# Patient Record
Sex: MALE | Race: BLACK OR AFRICAN AMERICAN | Employment: OTHER | URBAN - METROPOLITAN AREA
[De-identification: names, ages, dates, MRNs, and addresses within clinical notes are randomized per-mention and may not be internally consistent; named-entity substitution may affect disease eponyms.]

---

## 2018-07-09 ENCOUNTER — OFFICE VISIT (OUTPATIENT)
Dept: SURGERY | Age: 83
End: 2018-07-09

## 2018-07-09 VITALS
OXYGEN SATURATION: 96 % | BODY MASS INDEX: 23.4 KG/M2 | HEIGHT: 66 IN | WEIGHT: 145.6 LBS | HEART RATE: 62 BPM | DIASTOLIC BLOOD PRESSURE: 64 MMHG | RESPIRATION RATE: 16 BRPM | SYSTOLIC BLOOD PRESSURE: 109 MMHG

## 2018-07-09 DIAGNOSIS — L98.8 DRAINING CUTANEOUS SINUS TRACT: Primary | ICD-10-CM

## 2018-07-09 NOTE — PROGRESS NOTES
To: Bradley Celaya MD  From: Norma Ochoa MD    Thank you for sending Andra Marquez to see us. 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 No spreading soft tissue infection. Body mass index is 23.5 kg/(m^2). Plan:   I recommended excision of the tract int he OR. Risks including bleeding and infection were explained to the patient. The patient expressed understanding of the risks, and all questions were answered to the patient's satisfaction. HPI:   Vasu Christopher is a 80 y.o. male who is seen in consultation at the request of Bradley Celaya MD for evaluation of an abscess on the left flank. Says it started draining a few weeks ago. Says it started from his \"birth ngozi. \"  Has always had darker pigment there and always remember a bump there. Had not previously had drainage. Was started on Bactrim today. Denies fevers, pain. Past Medical History:   Diagnosis Date    Hypertension      Past Surgical History:   Procedure Laterality Date    HX ORTHOPAEDIC  2008    hip replacement/[facility]/Dr Alivia Perez      Family History   Problem Relation Age of Onset    Cancer Daughter      Breast ca - stage 1     Social History   Substance Use Topics    Smoking status: Never Smoker    Smokeless tobacco: Never Used    Alcohol use No      Current Outpatient Prescriptions   Medication Sig    sulfamethoxazole/trimethoprim (BACTRIM PO) Take  by mouth.  OTHER Takes prescription arthritis pain medication    diazepam (VALIUM) 5 mg tablet Take 1 Tab by mouth three (3) times daily as needed for Anxiety (spasm). No current facility-administered medications for this visit. Allergies:  No Known Allergies    Review of Systems:  10 systems reviewed. See scanned sheet in \"Media\" section. See HPI for pertinent positives and negatives.       Objective:     Visit Vitals    /64 (BP 1 Location: Left arm, BP Patient Position: Sitting)    Pulse 62    Resp 16    Ht 5' 6\" (1.676 m)    Wt 66 kg (145 lb 9.6 oz)    SpO2 96%    BMI 23.5 kg/m2       Physical Exam:  General appearance  Alert, cooperative, no distress, appears stated age   [de-identified] Anicteric           Lungs   Clear to auscultation bilaterally   Heart  Regular rate and rhythm. No murmur, rub or gallop   Abdomen   Soft, non-tender. Bowel sounds normal.    Extremities no cyanosis or edema   Pulses 2+ right radial       Lymph nodes No palpable axillary or groin LAD. Neurologic Without overt sensory or motor deficit     Focused exam of the left falnk reveals a area of darker pigment with a raised firm area ~3x10cm with a draining sinus. Non-tender. No erythema. On US, goes down to fascia.       Signed By: Monica Doty MD     July 9, 2018

## 2018-07-09 NOTE — MR AVS SNAPSHOT
[address], [address] 
[phone] Patient: Sandra Anthony MRN: [id number] [facility]:0/07/9224 Visit Information Date & Time Provider Department Dept. Phone Encounter #  
 7/9/2018  3:00 PM Connor Duran MD [facility] [id number] Upcoming Health Maintenance Date Due DTaP/Tdap/Td series (1 - Tdap) 6/13/1954 ZOSTER VACCINE AGE 60> 4/13/1993 GLAUCOMA SCREENING Q2Y 6/13/1998 Pneumococcal 65+ Low/Medium Risk (1 of 2 - PCV13) 6/13/1998 Influenza Age 5 to Adult 8/1/2018 Allergies as of 7/9/2018  Review Complete On: 7/9/2018 By: Connor Duran MD  
 No Known Allergies Current Immunizations  Never Reviewed No immunizations on file. Not reviewed this visit You Were Diagnosed With   
  
 Codes Comments Draining cutaneous sinus tract    -  Primary ICD-10-CM: L98.8 ICD-9-CM: 169. 9 Vitals BP Pulse Resp Height(growth percentile) Weight(growth percentile) SpO2  
 109/64 (BP 1 Location: Left arm, BP Patient Position: Sitting) 62 16 5' 6\" (1.676 m) 145 lb 9.6 oz (66 kg) 96% BMI Smoking Status 23.5 kg/m2 Never Smoker Vitals History BMI and BSA Data Body Mass Index Body Surface Area  
 23.5 kg/m 2 1.75 m 2 Your Updated Medication List  
  
   
This list is accurate as of 7/9/18  4:17 PM.  Always use your most recent med list.  
  
  
  
  
 BACTRIM PO Take  by mouth. diazePAM 5 mg tablet Commonly known as:  VALIUM Take 1 Tab by mouth three (3) times daily as needed for Anxiety (spasm). OTHER Takes prescription arthritis pain medication To-Do List   
 07/10/2018 4:00 PM  
  Appointment with [facility] PAT ROOM P2 at [place] [address] ([phone]) Introducing hospitals & HEALTH SERVICES! [facility] introduces [facility] patient portal. Now you can access parts of your medical record, email your doctor's office, and request medication refills online. 1. In your internet browser, go to [email or web address] 2. Click on the First Time User? Click Here link in the Sign In box. You will see the New Member Sign Up page. 3. Enter your [facility] Access Code exactly as it appears below. You will not need to use this code after youve completed the sign-up process. If you do not sign up before the expiration date, you must request a new code. · [facility] [employer] Access Code: [email or web address] Expires: 10/7/2018  2:23 PM 
 
4. Enter the last four digits of your Social Security Number (xxxx) and Date of Birth (mm/dd/yyyy) as indicated and click Submit. You will be taken to the next sign-up page. 5. Create a [facility] ID. This will be your [facility] login ID and cannot be changed, so think of one that is secure and easy to remember. 6. Create a [facility] password. You can change your password at any time. 7. Enter your Password Reset Question and Answer. This can be used at a later time if you forget your password. 8. Enter your e-mail address. You will receive e-mail notification when new information is available in [address] 9. Click Sign Up. You can now view and download portions of your medical record. 10. Click the Download Summary menu link to download a portable copy of your medical information. If you have questions, please visit the Frequently Asked Questions section of the [employer] website. Remember, [facility] is NOT to be used for urgent needs. For medical emergencies, dial 911. Now available from your iPhone and Android! Please provide this summary of care documentation to your next provider. Your primary care clinician is listed as Nidia Arrington. If you have any questions after today's visit, please call [phone].

## 2018-07-10 ENCOUNTER — HOSPITAL ENCOUNTER (OUTPATIENT)
Dept: PREADMISSION TESTING | Age: 83
Discharge: HOME | End: 2018-07-10
Payer: MEDICARE

## 2018-07-10 VITALS
OXYGEN SATURATION: 95 % | BODY MASS INDEX: 20.52 KG/M2 | HEIGHT: 71 IN | DIASTOLIC BLOOD PRESSURE: 59 MMHG | RESPIRATION RATE: 16 BRPM | SYSTOLIC BLOOD PRESSURE: 124 MMHG | HEART RATE: 84 BPM | WEIGHT: 146.61 LBS | TEMPERATURE: 98.3 F

## 2018-07-10 LAB
ANION GAP SERPL CALC-SCNC: 9 MMOL/L (ref 5–15)
BUN SERPL-MCNC: 24 MG/DL (ref 6–20)
BUN/CREAT SERPL: 13 (ref 12–20)
CALCIUM SERPL-MCNC: 8.8 MG/DL (ref 8.5–10.1)
CHLORIDE SERPL-SCNC: 104 MMOL/L (ref 97–108)
CO2 SERPL-SCNC: 25 MMOL/L (ref 21–32)
CREAT SERPL-MCNC: 1.78 MG/DL (ref 0.7–1.3)
ERYTHROCYTE [DISTWIDTH] IN BLOOD BY AUTOMATED COUNT: 12.8 % (ref 11.5–14.5)
GLUCOSE SERPL-MCNC: 100 MG/DL (ref 65–100)
HCT VFR BLD AUTO: 39 % (ref 36.6–50.3)
HGB BLD-MCNC: 12.8 G/DL (ref 12.1–17)
MCH RBC QN AUTO: 31 PG (ref 26–34)
MCHC RBC AUTO-ENTMCNC: 32.8 G/DL (ref 30–36.5)
MCV RBC AUTO: 94.4 FL (ref 80–99)
NRBC # BLD: 0 K/UL (ref 0–0.01)
NRBC BLD-RTO: 0 PER 100 WBC
PLATELET # BLD AUTO: 231 K/UL (ref 150–400)
PMV BLD AUTO: 10 FL (ref 8.9–12.9)
POTASSIUM SERPL-SCNC: 3.9 MMOL/L (ref 3.5–5.1)
RBC # BLD AUTO: 4.13 M/UL (ref 4.1–5.7)
SODIUM SERPL-SCNC: 138 MMOL/L (ref 136–145)
WBC # BLD AUTO: 7.7 K/UL (ref 4.1–11.1)

## 2018-07-10 PROCEDURE — 36415 COLL VENOUS BLD VENIPUNCTURE: CPT | Performed by: SURGERY

## 2018-07-10 PROCEDURE — 80048 BASIC METABOLIC PNL TOTAL CA: CPT | Performed by: SURGERY

## 2018-07-10 PROCEDURE — 93005 ELECTROCARDIOGRAM TRACING: CPT

## 2018-07-10 PROCEDURE — 85027 COMPLETE CBC AUTOMATED: CPT | Performed by: SURGERY

## 2018-07-10 RX ORDER — BISMUTH SUBSALICYLATE 262 MG
1 TABLET,CHEWABLE ORAL DAILY
COMMUNITY
End: 2021-09-22

## 2018-07-10 RX ORDER — SULFAMETHOXAZOLE AND TRIMETHOPRIM 800; 160 MG/1; MG/1
1 TABLET ORAL 2 TIMES DAILY
COMMUNITY
End: 2018-07-31 | Stop reason: ALTCHOICE

## 2018-07-10 RX ORDER — TAMSULOSIN HYDROCHLORIDE 0.4 MG/1
0.8 CAPSULE ORAL DAILY
COMMUNITY
End: 2021-09-22

## 2018-07-10 RX ORDER — AMLODIPINE AND OLMESARTAN MEDOXOMIL 10; 20 MG/1; MG/1
1 TABLET ORAL DAILY
COMMUNITY
End: 2021-09-30

## 2018-07-10 RX ORDER — ATENOLOL AND CHLORTHALIDONE TABLET 50; 25 MG/1; MG/1
1 TABLET ORAL DAILY
COMMUNITY

## 2018-07-10 NOTE — ADVANCED PRACTICE NURSE
CMP results from PAT assessment faxed to PCP for review and further recommendations prior to surgery.

## 2018-07-10 NOTE — PERIOP NOTES
[facility]  Preoperative Instructions        Surgery Date 07/12/2018          Time of Arrival 1100 am Contact # heather cerda [phone]    1. On the day of your surgery, please report to the Surgical Services Registration Desk and sign in at your designated time. The Surgery Center is located to the right of the Emergency Room. 2. You must have someone with you to drive you home. You should not drive a car for 24 hours following surgery. Please make arrangements for a friend or family member to stay with you for the first 24 hours after your surgery. 3. Do not have anything to eat or drink (including water, gum, mints, coffee, juice) after midnight ?? .? This may not apply to medications prescribed by your physician. ?(Please note below the special instructions with medications to take the morning of your procedure.)    4. We recommend you do not drink any alcoholic beverages for 24 hours before and after your surgery. 5. Contact your surgeons office for instructions on the following medications: non-steroidal anti-inflammatory drugs (i.e. Advil, Aleve), vitamins, and supplements. (Some surgeons will want you to stop these medications prior to surgery and others may allow you to take them)  **If you are currently taking Plavix, Coumadin, Aspirin and/or other blood-thinning agents, contact your surgeon for instructions. ** Your surgeon will partner with the physician prescribing these medications to determine if it is safe to stop or if you need to continue taking. Please do not stop taking these medications without instructions from your surgeon    6. Wear comfortable clothes. Wear glasses instead of contacts. Do not bring any money or jewelry. Please bring picture ID, insurance card, and any prearranged co-payment or hospital payment. Do not wear make-up, particularly mascara the morning of your surgery.   Do not wear nail polish, particularly if you are having foot /hand surgery. Wear your hair loose or down, no ponytails, buns, elkin pins or clips. All body piercings must be removed. Please shower with antibacterial soap for three consecutive days before and on the morning of surgery, but do not apply any lotions, powders or deodorants after the shower on the day of surgery. Please use a fresh towels after each shower. Please sleep in clean clothes and change bed linens the night before surgery. Please do not shave for 48 hours prior to surgery. Shaving of the face is acceptable. 7. You should understand that if you do not follow these instructions your surgery may be cancelled. If your physical condition changes (I.e. fever, cold or flu) please contact your surgeon as soon as possible. 8. It is important that you be on time. If a situation occurs where you may be late, please call [phone] (OR Holding Area). 9. If you have any questions and or problems, please call [phone] (Pre-admission Testing). 10. Your surgery time may be subject to change. You will receive a phone call the evening prior if your time changes. 11.  If having outpatient surgery, you must have someone to drive you here, stay with you during the duration of your stay, and to drive you home at time of discharge. 12.   In an effort to improve the efficiency, privacy, and safety for all of our Pre-op patients visitors are not allowed in the Holding area. Once you arrive and are registered your family/visitors will be asked to remain in the waiting room. The Pre-op staff will get you from the Surgical Waiting Area and will explain to you and your family/visitors that the Pre-op phase is beginning. The staff will answer any questions and provide instructions for tracking of the patient, by use of the existing tracking number and color-coded status board in the waiting room.   At this time the staff will also ask for your designated spokesperson information in the event that the physician or staff need to provide an update or obtain any pertinent information. The designated spokesperson will be notified if the physician needs to speak to family during the pre-operative phase. If at any time your family/visitors has questions or concerns they may approach the volunteer desk in the waiting area for assistance. Special Instructions:    MEDICATIONS TO TAKE THE MORNING OF SURGERY WITH A SIP OF WATER:valium if needed, flomax       I understand a pre-operative phone call will be made to verify my surgery time. In the event that I am not available, I give permission for a message to be left on my answering service and/or with another person?   yes         ___________________      __________   _________    (Signature of Patient)             (Witness)                (Date and Time)

## 2018-07-10 NOTE — ADVANCED PRACTICE NURSE
WILIAN score fo 4 in PAT assessment. Pt denies snoring loud enough to be heard through a closed door, ever having been advised that he has pauses in breathing while sleeping or ever having been referred for a sleep apnea evaluation. Denies prior complications from anesthesia. Denies decreased cervical ROM. Denies loose teeth, dentures or partial plates (almost all teeth are missing).

## 2018-07-11 LAB
ATRIAL RATE: 81 BPM
CALCULATED P AXIS, ECG09: 72 DEGREES
CALCULATED R AXIS, ECG10: 4 DEGREES
CALCULATED T AXIS, ECG11: 8 DEGREES
DIAGNOSIS, 93000: NORMAL
P-R INTERVAL, ECG05: 152 MS
Q-T INTERVAL, ECG07: 376 MS
QRS DURATION, ECG06: 84 MS
QTC CALCULATION (BEZET), ECG08: 436 MS
VENTRICULAR RATE, ECG03: 81 BPM

## 2018-07-12 ENCOUNTER — ANESTHESIA EVENT (OUTPATIENT)
Dept: SURGERY | Age: 83
End: 2018-07-12
Payer: MEDICARE

## 2018-07-12 ENCOUNTER — HOSPITAL ENCOUNTER (OUTPATIENT)
Age: 83
Setting detail: OUTPATIENT SURGERY
Discharge: HOME | End: 2018-07-12
Attending: SURGERY | Admitting: SURGERY
Payer: MEDICARE

## 2018-07-12 ENCOUNTER — ANESTHESIA (OUTPATIENT)
Dept: SURGERY | Age: 83
End: 2018-07-12
Payer: MEDICARE

## 2018-07-12 VITALS
RESPIRATION RATE: 20 BRPM | BODY MASS INDEX: 20.03 KG/M2 | OXYGEN SATURATION: 99 % | HEIGHT: 71 IN | DIASTOLIC BLOOD PRESSURE: 71 MMHG | WEIGHT: 143.08 LBS | TEMPERATURE: 97.9 F | SYSTOLIC BLOOD PRESSURE: 117 MMHG | HEART RATE: 89 BPM

## 2018-07-12 DIAGNOSIS — L98.8 DRAINING CUTANEOUS SINUS TRACT: Primary | ICD-10-CM

## 2018-07-12 PROCEDURE — 74011000250 HC RX REV CODE- 250: Performed by: SURGERY

## 2018-07-12 PROCEDURE — 77030032490 HC SLV COMPR SCD KNE COVD -B: Performed by: SURGERY

## 2018-07-12 PROCEDURE — 88305 TISSUE EXAM BY PATHOLOGIST: CPT | Performed by: SURGERY

## 2018-07-12 PROCEDURE — 77030019895 HC PCKNG STRP IODO -A: Performed by: SURGERY

## 2018-07-12 PROCEDURE — 74011250637 HC RX REV CODE- 250/637

## 2018-07-12 PROCEDURE — 74011250636 HC RX REV CODE- 250/636: Performed by: SURGERY

## 2018-07-12 PROCEDURE — 74011250636 HC RX REV CODE- 250/636

## 2018-07-12 PROCEDURE — 74011250636 HC RX REV CODE- 250/636: Performed by: ANESTHESIOLOGY

## 2018-07-12 PROCEDURE — 87077 CULTURE AEROBIC IDENTIFY: CPT | Performed by: SURGERY

## 2018-07-12 PROCEDURE — 76010000138 HC OR TIME 0.5 TO 1 HR: Performed by: SURGERY

## 2018-07-12 PROCEDURE — 87185 SC STD ENZYME DETCJ PER NZM: CPT | Performed by: SURGERY

## 2018-07-12 PROCEDURE — 77030011640 HC PAD GRND REM COVD -A: Performed by: SURGERY

## 2018-07-12 PROCEDURE — 76210000016 HC OR PH I REC 1 TO 1.5 HR: Performed by: SURGERY

## 2018-07-12 PROCEDURE — 87075 CULTR BACTERIA EXCEPT BLOOD: CPT | Performed by: SURGERY

## 2018-07-12 PROCEDURE — 74011000272 HC RX REV CODE- 272: Performed by: SURGERY

## 2018-07-12 PROCEDURE — 74011000250 HC RX REV CODE- 250: Performed by: ANESTHESIOLOGY

## 2018-07-12 PROCEDURE — 87186 SC STD MICRODIL/AGAR DIL: CPT | Performed by: SURGERY

## 2018-07-12 PROCEDURE — 76060000032 HC ANESTHESIA 0.5 TO 1 HR: Performed by: SURGERY

## 2018-07-12 PROCEDURE — 87205 SMEAR GRAM STAIN: CPT | Performed by: SURGERY

## 2018-07-12 PROCEDURE — 76210000020 HC REC RM PH II FIRST 0.5 HR: Performed by: SURGERY

## 2018-07-12 PROCEDURE — 77030020782 HC GWN BAIR PAWS FLX 3M -B

## 2018-07-12 PROCEDURE — 77030002966 HC SUT PDS J&J -A: Performed by: SURGERY

## 2018-07-12 RX ORDER — CIPROFLOXACIN 500 MG/1
500 TABLET ORAL 2 TIMES DAILY
Qty: 20 TAB | Refills: 0 | Status: SHIPPED | OUTPATIENT
Start: 2018-07-12 | End: 2018-07-22

## 2018-07-12 RX ORDER — HYDROMORPHONE HYDROCHLORIDE 1 MG/ML
.2-.5 INJECTION, SOLUTION INTRAMUSCULAR; INTRAVENOUS; SUBCUTANEOUS
Status: DISCONTINUED | OUTPATIENT
Start: 2018-07-12 | End: 2018-07-12 | Stop reason: HOSPADM

## 2018-07-12 RX ORDER — SODIUM CHLORIDE, SODIUM LACTATE, POTASSIUM CHLORIDE, CALCIUM CHLORIDE 600; 310; 30; 20 MG/100ML; MG/100ML; MG/100ML; MG/100ML
25 INJECTION, SOLUTION INTRAVENOUS CONTINUOUS
Status: DISCONTINUED | OUTPATIENT
Start: 2018-07-12 | End: 2018-07-12 | Stop reason: HOSPADM

## 2018-07-12 RX ORDER — FENTANYL CITRATE 50 UG/ML
25 INJECTION, SOLUTION INTRAMUSCULAR; INTRAVENOUS
Status: DISCONTINUED | OUTPATIENT
Start: 2018-07-12 | End: 2018-07-12 | Stop reason: HOSPADM

## 2018-07-12 RX ORDER — MIDAZOLAM HYDROCHLORIDE 1 MG/ML
0.5 INJECTION, SOLUTION INTRAMUSCULAR; INTRAVENOUS
Status: DISCONTINUED | OUTPATIENT
Start: 2018-07-12 | End: 2018-07-12 | Stop reason: HOSPADM

## 2018-07-12 RX ORDER — SODIUM CHLORIDE 0.9 % (FLUSH) 0.9 %
5-10 SYRINGE (ML) INJECTION AS NEEDED
Status: DISCONTINUED | OUTPATIENT
Start: 2018-07-12 | End: 2018-07-12 | Stop reason: HOSPADM

## 2018-07-12 RX ORDER — MORPHINE SULFATE 10 MG/ML
2 INJECTION, SOLUTION INTRAMUSCULAR; INTRAVENOUS
Status: DISCONTINUED | OUTPATIENT
Start: 2018-07-12 | End: 2018-07-12 | Stop reason: HOSPADM

## 2018-07-12 RX ORDER — HYDROCODONE BITARTRATE AND ACETAMINOPHEN 5; 325 MG/1; MG/1
1-2 TABLET ORAL
Qty: 30 TAB | Refills: 0 | Status: SHIPPED | OUTPATIENT
Start: 2018-07-12 | End: 2021-09-22

## 2018-07-12 RX ORDER — OXYCODONE AND ACETAMINOPHEN 5; 325 MG/1; MG/1
1 TABLET ORAL ONCE
Status: COMPLETED | OUTPATIENT
Start: 2018-07-12 | End: 2018-07-12

## 2018-07-12 RX ORDER — OXYCODONE AND ACETAMINOPHEN 5; 325 MG/1; MG/1
TABLET ORAL
Status: COMPLETED
Start: 2018-07-12 | End: 2018-07-12

## 2018-07-12 RX ORDER — FENTANYL CITRATE 50 UG/ML
INJECTION, SOLUTION INTRAMUSCULAR; INTRAVENOUS AS NEEDED
Status: DISCONTINUED | OUTPATIENT
Start: 2018-07-12 | End: 2018-07-12 | Stop reason: HOSPADM

## 2018-07-12 RX ORDER — PROPOFOL 10 MG/ML
INJECTION, EMULSION INTRAVENOUS
Status: DISCONTINUED | OUTPATIENT
Start: 2018-07-12 | End: 2018-07-12 | Stop reason: HOSPADM

## 2018-07-12 RX ORDER — CEFAZOLIN SODIUM/WATER 2 G/20 ML
2 SYRINGE (ML) INTRAVENOUS ONCE
Status: COMPLETED | OUTPATIENT
Start: 2018-07-12 | End: 2018-07-12

## 2018-07-12 RX ORDER — DIPHENHYDRAMINE HYDROCHLORIDE 50 MG/ML
12.5 INJECTION, SOLUTION INTRAMUSCULAR; INTRAVENOUS AS NEEDED
Status: DISCONTINUED | OUTPATIENT
Start: 2018-07-12 | End: 2018-07-12 | Stop reason: HOSPADM

## 2018-07-12 RX ORDER — ONDANSETRON 2 MG/ML
4 INJECTION INTRAMUSCULAR; INTRAVENOUS AS NEEDED
Status: DISCONTINUED | OUTPATIENT
Start: 2018-07-12 | End: 2018-07-12 | Stop reason: HOSPADM

## 2018-07-12 RX ORDER — PHENYLEPHRINE HCL IN 0.9% NACL 0.4MG/10ML
SYRINGE (ML) INTRAVENOUS AS NEEDED
Status: DISCONTINUED | OUTPATIENT
Start: 2018-07-12 | End: 2018-07-12 | Stop reason: HOSPADM

## 2018-07-12 RX ORDER — METOPROLOL TARTRATE 5 MG/5ML
1 INJECTION INTRAVENOUS ONCE
Status: COMPLETED | OUTPATIENT
Start: 2018-07-12 | End: 2018-07-12

## 2018-07-12 RX ORDER — LIDOCAINE HYDROCHLORIDE 10 MG/ML
0.1 INJECTION, SOLUTION EPIDURAL; INFILTRATION; INTRACAUDAL; PERINEURAL AS NEEDED
Status: DISCONTINUED | OUTPATIENT
Start: 2018-07-12 | End: 2018-07-12 | Stop reason: HOSPADM

## 2018-07-12 RX ORDER — FENTANYL CITRATE 50 UG/ML
50 INJECTION, SOLUTION INTRAMUSCULAR; INTRAVENOUS AS NEEDED
Status: DISCONTINUED | OUTPATIENT
Start: 2018-07-12 | End: 2018-07-12 | Stop reason: HOSPADM

## 2018-07-12 RX ADMIN — Medication 2 G: at 15:23

## 2018-07-12 RX ADMIN — FENTANYL CITRATE 50 MCG: 50 INJECTION, SOLUTION INTRAMUSCULAR; INTRAVENOUS at 15:20

## 2018-07-12 RX ADMIN — SODIUM CHLORIDE, SODIUM LACTATE, POTASSIUM CHLORIDE, AND CALCIUM CHLORIDE 25 ML/HR: 600; 310; 30; 20 INJECTION, SOLUTION INTRAVENOUS at 12:01

## 2018-07-12 RX ADMIN — Medication 80 MCG: at 15:29

## 2018-07-12 RX ADMIN — METOPROLOL TARTRATE 1 MG: 5 INJECTION, SOLUTION INTRAVENOUS at 12:06

## 2018-07-12 RX ADMIN — OXYCODONE AND ACETAMINOPHEN 1 TABLET: 5; 325 TABLET ORAL at 17:00

## 2018-07-12 RX ADMIN — FENTANYL CITRATE 50 MCG: 50 INJECTION, SOLUTION INTRAMUSCULAR; INTRAVENOUS at 15:23

## 2018-07-12 RX ADMIN — PROPOFOL 75 MCG/KG/MIN: 10 INJECTION, EMULSION INTRAVENOUS at 15:16

## 2018-07-12 RX ADMIN — OXYCODONE HYDROCHLORIDE AND ACETAMINOPHEN 1 TABLET: 5; 325 TABLET ORAL at 17:00

## 2018-07-12 NOTE — PERIOP NOTES
Family updated attempted at 36 by Renae Alamo. The patient's wife had gone to the cafeteria, per staff at the surgical waiting area. Will attempt again later.

## 2018-07-12 NOTE — IP AVS SNAPSHOT
[address] [facility] 
[phone] Patient: Michelle Corral MRN: [id number] QIR:[id number][birth year]04[id number] About your hospitalization You were admitted on:  July 12, 2018 You last received care in the:  [facility] PACU You were discharged on:  July 12, 2018 Why you were hospitalized Your primary diagnosis was:  Draining Cutaneous Sinus Tract Follow-up Information Follow up With Details Comments Contact Info Rodger Sosa MD   [address] [address] [address] [place] [id number] [phone] Discharge Orders None A check ngozi indicates which time of day the medication should be taken. My Medications START taking these medications Instructions Each Dose to Equal  
 Morning Noon Evening Bedtime  
 ciprofloxacin HCl 500 mg tablet Commonly known as:  CIPRO Your last dose was: Your next dose is: Take 1 Tab by mouth two (2) times a day for 10 days. 500 mg HYDROcodone-acetaminophen 5-325 mg per tablet Commonly known as:  Nataliia Mura Your last dose was: Your next dose is: Take 1-2 Tabs by mouth every six (6) hours as needed for Pain. Max Daily Amount: 8 Tabs. 1-2 Tab CONTINUE taking these medications Instructions Each Dose to Equal  
 Morning Noon Evening Bedtime  
 amLODIPine-Olmesartan 10-20 mg Tab Your last dose was: Your next dose is: Take 1 Tab by mouth daily. 1 Tab  
    
   
   
   
  
 atenolol-chlorthalidone 50-25 mg per tablet Commonly known as:  Tj Seth Your last dose was: Your next dose is: Take 1 Tab by mouth daily. 1 Tab BACTRIM -800 mg per tablet Generic drug:  trimethoprim-sulfamethoxazole Your last dose was: Your next dose is: Take 1 Tab by mouth two (2) times a day. 1 Tab  
    
   
   
   
  
 diazePAM 5 mg tablet Commonly known as:  VALIUM Your last dose was: Your next dose is: Take 1 Tab by mouth three (3) times daily as needed for Anxiety (spasm). 5 mg FLOMAX 0.4 mg capsule Generic drug:  tamsulosin Your last dose was: Your next dose is: Take 0.4 mg by mouth daily. 0.4 mg  
    
   
   
   
  
 Gluc-MSM-Jeff 2-Hyal-Antiar 3 375-375-20 mg Tab Your last dose was: Your next dose is: Take 1 Tab by mouth daily. 1 Tab  
    
   
   
   
  
 multivitamin tablet Commonly known as:  ONE A DAY Your last dose was: Your next dose is: Take 1 Tab by mouth daily. 1 Tab Where to Get Your Medications These medications were sent to [facility]/pharmacy #[id number] - [facility], [address] Darrius Glass [facility]  [address] [address] Phone:  [phone]  
  ciprofloxacin HCl 500 mg tablet Information on where to get these meds will be given to you by the nurse or doctor. ! Ask your nurse or doctor about these medications HYDROcodone-acetaminophen 5-325 mg per tablet Opioid Education Prescription Opioids: What You Need to Know: 
 
Prescription opioids can be used to help relieve moderate-to-severe pain and are often prescribed following a surgery or injury, or for certain health conditions. These medications can be an important part of treatment but also come with serious risks. Opioids are strong pain medicines. Examples include hydrocodone, oxycodone, fentanyl, and morphine. Heroin is an example of an illegal opioid. It is important to work with your health care provider to make sure you are getting the safest, most effective care. WHAT ARE THE RISKS AND SIDE EFFECTS OF OPIOID USE? Prescription opioids carry serious risks of addiction and overdose, especially with prolonged use. An opioid overdose, often marked by slow breathing, can cause sudden death. The use of prescription opioids can have a number of side effects as well, even when taken as directed. · Tolerance-meaning you might need to take more of a medication for the same pain relief · Physical dependence-meaning you have symptoms of withdrawal when the medication is stopped. Withdrawal symptoms can include nausea, sweating, chills, diarrhea, stomach cramps, and muscle aches. Withdrawal can last up to several weeks, depending on which drug you took and how long you took it. · Increased sensitivity to pain · Constipation · Nausea, vomiting, and dry mouth · Sleepiness and dizziness · Confusion · Depression · Low levels of testosterone that can result in lower sex drive, energy, and strength · Itching and sweating RISKS ARE GREATER WITH:      
· History of drug misuse, substance use disorder, or overdose · Mental health conditions (such as depression or anxiety) · Sleep apnea · Older age (72 years or older) · Pregnancy Avoid alcohol while taking prescription opioids. Also, unless specifically advised by your health care provider, medications to avoid include: · Benzodiazepines (such as Xanax or Valium) · Muscle relaxants (such as Soma or Flexeril) · Hypnotics (such as Ambien or Lunesta) · Other prescription opioids KNOW YOUR OPTIONS Talk to your health care provider about ways to manage your pain that don't involve prescription opioids. Some of these options may actually work better and have fewer risks and side effects. Options may include: 
· Pain relievers such as acetaminophen, ibuprofen, and naproxen · Some medications that are also used for depression or seizures · Physical therapy and exercise · Counseling to help patients learn how to cope better with triggers of pain and stress. · Application of heat or cold compress · Massage therapy · Relaxation techniques Be Informed Make sure you know the name of your medication, how much and how often to take it, and its potential risks & side effects. IF YOU ARE PRESCRIBED OPIOIDS FOR PAIN: 
· Never take opioids in greater amounts or more often than prescribed. Remember the goal is not to be pain-free but to manage your pain at a tolerable level. · Follow up with your primary care provider to: · Work together to create a plan on how to manage your pain. · Talk about ways to help manage your pain that don't involve prescription opioids. · Talk about any and all concerns and side effects. · Help prevent misuse and abuse. · Never sell or share prescription opioids · Help prevent misuse and abuse. · Store prescription opioids in a secure place and out of reach of others (this may include visitors, children, friends, and family). · Safely dispose of unused/unwanted prescription opioids: Find your community drug take-back program or your pharmacy mail-back program, or flush them down the toilet, following guidance from the Food and Drug Administration (www.fda.gov/Drugs/ResourcesForYou). · Visit www.cdc.gov/drugoverdose to learn about the risks of opioid abuse and overdose. · If you believe you may be struggling with addiction, tell your health care provider and ask for guidance or call [address] [place] [address] Drive at [phone]. Discharge Instructions Discharge Instructions:  Dr. Shanelle Brewer Call on next business day to arrange appointment for follow up this coming Monday. [phone]. Activity: 
Walk regularly starting immediately. No lifting more than 10 pounds. You may resume driving when off narcotics for pain. Diet: 
You may resume normal diet. Wound Care: On Saturday, remove the outer dressing but not the ribbon gauze inside the wound.  Rinse off in the shower then blot dry and put on new gauze. Do this again on Sunday and come to the office on Monday. Medications: 
Resume home medications as indicated on the Medical Reconciliation form. Do not use blood thinners (such as Aspirin, Coumadin, or Plavix) until 3 days after surgery. PUT ICE ON YOUR INCISION 20 MINUTES EVERY HOUR THROUGH THE WEEKEND. PLACE A CLOTH BETWEEN YOUR SKIN THE THE ICE BAG. Colace or Miralax should be used twice daily to prevent constipation while on narcotics. If you are still having trouble having a BM after 1-2 days, try milk of magnesia. If this does not work within 24 hours, try a bottle of magnesium citrate. Narcotics and anesthesia sometimes cause nausea and vomiting. If persistent please call the office. Do not hesitate to call with questions or concerns. Angel Ulloa MD 
Tel [phone] Fax [phone] Narcotic-Analgesic/Acetaminophen (Percocet, Norco, Lorcet HD, Lortab 10/325) - (By mouth) Why this medicine is used:  
Relieves pain. Contact a nurse or doctor right away if you have: 
· Extreme weakness, shallow breathing, slow heartbeat · Severe confusion, lightheadedness, dizziness, fainting · Yellow skin or eyes, dark urine or pale stools · Severe constipation, severe stomach pain, nausea, vomiting, loss of appetite · Sweating or cold, clammy skin Common side effects: · Mild constipation, nausea, vomiting · Sleepiness, tiredness · Itching, rash © 2017 [facility] Information is for End User's use only and may not be sold, redistributed or otherwise used for commercial purposes. DISCHARGE SUMMARY from Nurse PATIENT INSTRUCTIONS: 
 
After general anesthesia or intravenous sedation, for 24 hours or while taking prescription Narcotics: · Limit your activities · Do not drive and operate hazardous machinery · Do not make important personal or business decisions · Do  not drink alcoholic beverages · If you have not urinated within 8 hours after discharge, please contact your surgeon on call. Report the following to your surgeon: 
· Excessive pain, swelling, redness or odor of or around the surgical area · Temperature over 100.5 · Nausea and vomiting lasting longer than 4 hours or if unable to take medications · Any signs of decreased circulation or nerve impairment to extremity: change in color, persistent  numbness, tingling, coldness or increase pain · Any questions Obesity, smoking, and sedentary lifestyle greatly increases your risk for illness A healthy diet, regular physical exercise & weight monitoring are important for maintaining a healthy lifestyle You may be retaining fluid if you have a history of heart failure or if you experience any of the following symptoms:  Weight gain of 3 pounds or more overnight or 5 pounds in a week, increased swelling in our hands or feet or shortness of breath while lying flat in bed. Please call your doctor as soon as you notice any of these symptoms; do not wait until your next office visit. Recognize signs and symptoms of STROKE: 
 
F-face looks uneven A-arms unable to move or move unevenly S-speech slurred or non-existent T-time-call 911 as soon as signs and symptoms begin-DO NOT go Back to bed or wait to see if you get better-TIME IS BRAIN. Warning Signs of HEART ATTACK Call 911 if you have these symptoms: 
? Chest discomfort. Most heart attacks involve discomfort in the center of the chest that lasts more than a few minutes, or that goes away and comes back. It can feel like uncomfortable pressure, squeezing, fullness, or pain. ? Discomfort in other areas of the upper body. Symptoms can include pain or discomfort in one or both arms, the back, neck, jaw, or stomach. ? Shortness of breath with or without chest discomfort. ? Other signs may include breaking out in a cold sweat, nausea, or lightheadedness. Don't wait more than five minutes to call [address]! Fast action can save your life. Calling 911 is almost always the fastest way to get lifesaving treatment. Emergency Medical Services staff can begin treatment when they arrive  up to an hour sooner than if someone gets to the hospital by car. The discharge information has been reviewed with the {PATIENT PARENT GUARDIAN:98041}. The {PATIENT PARENT GUARDIAN:06673} verbalized understanding. Discharge medications reviewed with the {Dishcarge meds reviewed BOZR:90545} and appropriate educational materials and side effects teaching were provided. ___________________________________________________________________________________________________________________________________ Introducing [facility]! Lidia Servin introduces Casetext patient portal. Now you can access parts of your medical record, email your doctor's office, and request medication refills online. 1. In your internet browser, go to [email or web address] 2. Click on the First Time User? Click Here link in the Sign In box. You will see the New Member Sign Up page. 3. Enter your Casetext Access Code exactly as it appears below. You will not need to use this code after youve completed the sign-up process. If you do not sign up before the expiration date, you must request a new code. · Casetext Access Code: [email or web address] Expires: 10/7/2018  2:23 PM 
 
4. Enter the last four digits of your Social Security Number (xxxx) and Date of Birth (mm/dd/yyyy) as indicated and click Submit. You will be taken to the next sign-up page. 5. Create a Casetext ID. This will be your MyChart login ID and cannot be changed, so think of one that is secure and easy to remember. 6. Create a [employer] password. You can change your password at any time. 7. Enter your Password Reset Question and Answer. This can be used at a later time if you forget your password. 8. Enter your e-mail address. You will receive e-mail notification when new information is available in [address] 9. Click Sign Up. You can now view and download portions of your medical record. 10. Click the Download Summary menu link to download a portable copy of your medical information. If you have questions, please visit the Frequently Asked Questions section of the [employer] website. Remember, [employer] is NOT to be used for urgent needs. For medical emergencies, dial 911. Now available from your iPhone and Android! Introducing Luisito Ellis As a [employer] [facility] patient, I wanted to make you aware of our electronic visit tool called Luisito Ellis. [employer] S[employer] allows you to connect within minutes with a medical provider 24 hours a day, seven days a week via a mobile device or tablet or logging into a secure website from your computer. You can access Luisito Ellis from anywhere in the [place]. A virtual visit might be right for you when you have a simple condition and feel like you just dont want to get out of bed, or cant get away from work for an appointment, when your regular Phillip Neal Cyrba [facility] provider is not available (evenings, weekends or holidays), or when youre out of town and need minor care. Electronic visits cost only $49 and if the [employer] provider determines a prescription is needed to treat your condition, one can be electronically transmitted to a nearby pharmacy*. Please take a moment to enroll today if you have not already done so. The enrollment process is free and takes just a few minutes. To enroll, please download the [employer] se to your tablet or phone, or visit [email or web address] to enroll on your computer.    
And, as an [address] [place] [address] patient with a [employer] account, the results of your visits will be scanned into your electronic medical record and your primary care provider will be able to view the scanned results. We urge you to continue to see your regular Kishore Carbo provider for your ongoing medical care. And while your primary care provider may not be the one available when you seek a Luisito Sextonfin virtual visit, the peace of mind you get from getting a real diagnosis real time can be priceless. For more information on [employer], view our Frequently Asked Questions (FAQs) at [email or web address]. Sincerely, 
 
Kimberlee Yarbrough MD 
Chief Medical Officer Analy Candelaria *:  certain medications cannot be prescribed via [employer] Unresulted Labs-Please follow up with your PCP about these lab tests Order Current Status CULTURE, ANAEROBIC In process CULTURE, WOUND W GRAM STAIN In process Providers Seen During Your Hospitalization Provider Specialty Primary office phone Justin Chew MD General Surgery [phone] Your Primary Care Physician (PCP) Primary Care Physician Office Phone Office Fax [address] [phone] You are allergic to the following No active allergies Recent Documentation Height Weight BMI Smoking Status 1.803 m 64.9 kg 19.96 kg/m2 Never Smoker Emergency Contacts Name Discharge Info Relation Home Work Mobile Ashly Flores  Spouse [3] [phone] Carissa Tirado DISCHARGE CAREGIVER [3] Child [2] [phone] Anna Tirado DISCHARGE CAREGIVER [3] Child [2] [phone] Patient Belongings The following personal items are in your possession at time of discharge: 
  Dental Appliances: None  Visual Aid: Glasses      Home Medications: None   Jewelry: None  Clothing: Pants, Undergarments, Shirt, Footwear, Socks    Other Valuables: Eyeglasses  Personal Items Sent to Safe: declined Please provide this summary of care documentation to your next provider. Signatures-by signing, you are acknowledging that this After Visit Summary has been reviewed with you and you have received a copy. Patient Signature:  ____________________________________________________________ Date:  ____________________________________________________________  
  
[facility] Signature:  ____________________________________________________________ Date:  ____________________________________________________________

## 2018-07-12 NOTE — H&P (VIEW-ONLY)
To: Penny Choudhury MD  From: India Alcazar MD    Thank you for sending Stacie Aldana to see us. 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 No spreading soft tissue infection. Body mass index is 23.5 kg/(m^2). Plan:   I recommended excision of the tract int he OR. Risks including bleeding and infection were explained to the patient. The patient expressed understanding of the risks, and all questions were answered to the patient's satisfaction. HPI:   Christopher Cespedes is a 80 y.o. male who is seen in consultation at the request of Penny Choudhury MD for evaluation of an abscess on the left flank. Says it started draining a few weeks ago. Says it started from his \"birth ngozi. \"  Has always had darker pigment there and always remember a bump there. Had not previously had drainage. Was started on Bactrim today. Denies fevers, pain. Past Medical History:   Diagnosis Date    Hypertension      Past Surgical History:   Procedure Laterality Date    HX ORTHOPAEDIC  2008    hip replacement/[facility]/Dr Marianne Chao      Family History   Problem Relation Age of Onset    Cancer Daughter      Breast ca - stage 1     Social History   Substance Use Topics    Smoking status: Never Smoker    Smokeless tobacco: Never Used    Alcohol use No      Current Outpatient Prescriptions   Medication Sig    sulfamethoxazole/trimethoprim (BACTRIM PO) Take  by mouth.  OTHER Takes prescription arthritis pain medication    diazepam (VALIUM) 5 mg tablet Take 1 Tab by mouth three (3) times daily as needed for Anxiety (spasm). No current facility-administered medications for this visit. Allergies:  No Known Allergies    Review of Systems:  10 systems reviewed. See scanned sheet in \"Media\" section. See HPI for pertinent positives and negatives.       Objective:     Visit Vitals    /64 (BP 1 Location: Left arm, BP Patient Position: Sitting)    Pulse 62    Resp 16    Ht 5' 6\" (1.676 m)    Wt 66 kg (145 lb 9.6 oz)    SpO2 96%    BMI 23.5 kg/m2       Physical Exam:  General appearance  Alert, cooperative, no distress, appears stated age   [de-identified] Anicteric           Lungs   Clear to auscultation bilaterally   Heart  Regular rate and rhythm. No murmur, rub or gallop   Abdomen   Soft, non-tender. Bowel sounds normal.    Extremities no cyanosis or edema   Pulses 2+ right radial       Lymph nodes No palpable axillary or groin LAD. Neurologic Without overt sensory or motor deficit     Focused exam of the left falnk reveals a area of darker pigment with a raised firm area ~3x10cm with a draining sinus. Non-tender. No erythema. On US, goes down to fascia.       Signed By: Shelley Zuluaga MD     July 9, 2018

## 2018-07-12 NOTE — PERIOP NOTES
Patient discharged to home. Discharge teaching conducted at the bedside with the patient and his daughter, Franklin Jones. Discharge teaching included follow up appointments, diet and activity recommendations, basic wound care, and medication education. Medication education provided on Cipro and Percocet and included dosage, timing, safety, and potential side effects. The patient and his daughter were asked by this nurse if they had any questions or concerns regarding discharge teaching or post operative care, as they were in a hurry to leave the hospital. The patient's daughter stated to this nurse \"We're ok. We know what to do. My niece is in her last year of nursing school so we're good. \"  This nurse asked the patient once more if he was having any pain, nausea, or dizziness; the patient denied having any of these. The patient's IV was removed; minimal blood loss noted. The patient was transported to the surgical services entrance by wheelchair and got into the car with his daughter.

## 2018-07-12 NOTE — ANESTHESIA POSTPROCEDURE EVALUATION
Post-Anesthesia Evaluation and Assessment    Patient: Alessandro Jones MRN: [id number]  SSN: [id number]    YOB: 1933  Age: 80 y.o. Sex: male       Cardiovascular Function/Vital Signs  Visit Vitals    /71 (BP 1 Location: Left arm, BP Patient Position: At rest)    Pulse 94    Temp 36.8 °C (98.3 °F)    Resp 22    Ht 5' 11\" (1.803 m)    Wt 64.9 kg (143 lb 1.3 oz)    SpO2 98%    BMI 19.96 kg/m2       Patient is status post MAC anesthesia for Procedure(s):  EXCISION HYDRADENITIS LEFT FLANK. Nausea/Vomiting: None    Postoperative hydration reviewed and adequate. Pain:  Pain Scale 1: Numeric (0 - 10) (07/12/18 1700)  Pain Intensity 1: 0 (07/12/18 1700)   Managed    Neurological Status:   Neuro (WDL): Exceptions to WDL (07/12/18 1605)  Neuro  Neurologic State: Alert (07/12/18 1605)  Orientation Level: Oriented X4 (07/12/18 1605)  Cognition: Follows commands (07/12/18 1605)  Speech: Clear;Delayed responses (07/12/18 1605)  LUE Motor Response: Purposeful;Weak (07/12/18 1605)  LLE Motor Response: Purposeful;Weak (07/12/18 1605)  RUE Motor Response: Purposeful;Weak (07/12/18 1605)  RLE Motor Response: Purposeful;Weak (07/12/18 1605)   At baseline    Mental Status and Level of Consciousness: Arousable    Pulmonary Status:   O2 Device: Room air (07/12/18 1700)   Adequate oxygenation and airway patent    Complications related to anesthesia: None    Post-anesthesia assessment completed.  No concerns    Signed By: Tyson Lion MD     July 12, 2018

## 2018-07-12 NOTE — PERIOP NOTES
The patient will be discharged with a prescription for Percocet, 5-325 mg. Per Dr. Jaydon Schmidt, the patient can have one Percocet prior to discharge. Order placed in [facility] and acknowledged.

## 2018-07-12 NOTE — DISCHARGE INSTRUCTIONS
Discharge Instructions:  Dr. Galloway Payment    Call on next business day to arrange appointment for follow up this coming Monday. [phone]. Activity:  Walk regularly starting immediately. No lifting more than 10 pounds. You may resume driving when off narcotics for pain. Diet:  You may resume normal diet. Wound Care: On Saturday, remove the outer dressing but not the ribbon gauze inside the wound. Rinse off in the shower then blot dry and put on new gauze. Do this again on Sunday and come to the office on Monday. Medications:  Resume home medications as indicated on the Medical Reconciliation form. Do not use blood thinners (such as Aspirin, Coumadin, or Plavix) until 3 days after surgery. PUT ICE ON YOUR INCISION 20 MINUTES EVERY HOUR THROUGH THE WEEKEND. PLACE A CLOTH BETWEEN YOUR SKIN THE THE ICE BAG. Colace or Miralax should be used twice daily to prevent constipation while on narcotics. If you are still having trouble having a BM after 1-2 days, try milk of magnesia. If this does not work within 24 hours, try a bottle of magnesium citrate. Narcotics and anesthesia sometimes cause nausea and vomiting. If persistent please call the office. Do not hesitate to call with questions or concerns. Karli Nails MD  Tel [phone]  Fax [phone]     Narcotic-Analgesic/Acetaminophen ([facility] [address], [address] [facility], [facility], Lortab 10/325) - (By mouth)   Why this medicine is used:   Relieves pain.   Contact a nurse or doctor right away if you have:  · Extreme weakness, shallow breathing, slow heartbeat  · Severe confusion, lightheadedness, dizziness, fainting  · Yellow skin or eyes, dark urine or pale stools  · Severe constipation, severe stomach pain, nausea, vomiting, loss of appetite  · Sweating or cold, clammy skin     Common side effects:  · Mild constipation, nausea, vomiting  · Sleepiness, tiredness  · Itching, rash  © 2017 [address] [employer] Information is for End User's use only and may not be sold, redistributed or otherwise used for commercial purposes. DISCHARGE SUMMARY from Nurse    PATIENT INSTRUCTIONS:    After general anesthesia or intravenous sedation, for 24 hours or while taking prescription Narcotics:  · Limit your activities  · Do not drive and operate hazardous machinery  · Do not make important personal or business decisions  · Do  not drink alcoholic beverages  · If you have not urinated within 8 hours after discharge, please contact your surgeon on call. Report the following to your surgeon:  · Excessive pain, swelling, redness or odor of or around the surgical area  · Temperature over 100.5  · Nausea and vomiting lasting longer than 4 hours or if unable to take medications  · Any signs of decreased circulation or nerve impairment to extremity: change in color, persistent  numbness, tingling, coldness or increase pain  · Any questions    Obesity, smoking, and sedentary lifestyle greatly increases your risk for illness    A healthy diet, regular physical exercise & weight monitoring are important for maintaining a healthy lifestyle    You may be retaining fluid if you have a history of heart failure or if you experience any of the following symptoms:  Weight gain of 3 pounds or more overnight or 5 pounds in a week, increased swelling in our hands or feet or shortness of breath while lying flat in bed. Please call your doctor as soon as you notice any of these symptoms; do not wait until your next office visit. Recognize signs and symptoms of STROKE:    F-face looks uneven    A-arms unable to move or move unevenly    S-speech slurred or non-existent    T-time-call 911 as soon as signs and symptoms begin-DO NOT go       Back to bed or wait to see if you get better-TIME IS BRAIN. Warning Signs of HEART ATTACK     Call 911 if you have these symptoms:   Chest discomfort.  Most heart attacks involve discomfort in the center of the chest that lasts more than a few minutes, or that goes away and comes back. It can feel like uncomfortable pressure, squeezing, fullness, or pain.  Discomfort in other areas of the upper body. Symptoms can include pain or discomfort in one or both arms, the back, neck, jaw, or stomach.  Shortness of breath with or without chest discomfort.  Other signs may include breaking out in a cold sweat, nausea, or lightheadedness. Don't wait more than five minutes to call 911 - MINUTES MATTER! Fast action can save your life. Calling 911 is almost always the fastest way to get lifesaving treatment. Emergency Medical Services staff can begin treatment when they arrive -- up to an hour sooner than if someone gets to the hospital by car. The discharge information has been reviewed with the caregiver. The caregiver verbalized understanding. Discharge medications reviewed with the caregiver and appropriate educational materials and side effects teaching were provided.   ___________________________________________________________________________________________________________________________________

## 2018-07-12 NOTE — OP NOTES
[address]  OPERATIVE REPORT    Chelsy Naranjo  MR#: [id number]  : 1933  ACCOUNT #: [de-identified]   DATE OF SERVICE: 2018    PREOPERATIVE DIAGNOSIS:  Left flank draining cutaneous sinus tract. POSTOPERATIVE DIAGNOSIS:  Left flank draining cutaneous sinus tract. PROCEDURE PERFORMED:  Excision of left flank draining cutaneous sinus tract, and intermediate closure approximately 10 cm. SURGEON:  Sandie Cheng MD    ASSISTANT:  None. ANESTHESIA:  Local anesthesia and MAC. ESTIMATED BLOOD LOSS:  Minimal.    SPECIMENS REMOVED:  Wound cultures and excised sinus tract. FINDINGS:  There was a sinus tract with an underlying mass approximately 4 x 10 cm. This was draining purulent fluid. COMPLICATIONS:  None immediate. IMPLANTS:  None. INDICATIONS:  The patient is an 15-year-old male who presented to my office the other day with the above condition. There was no erythema at this time and the patient was nontoxic. He was on antibiotics and was scheduled for resection in the OR. DESCRIPTION OF PROCEDURE:  After obtaining informed consent, the patient was taken to the operating room and placed supine on the operating table. An operative timeout was performed and conscious sedation was achieved. The patient was rolled right lateral decubitus with appropriate padding. Cultures of the draining fluid were taken and sent. The area was then prepped and draped in the usual sterile fashion. An elliptical incision was made around the palpable mass. The subcutaneous tissues were divided with electrocautery and the procedure sinus tract was enucleated using electrocautery, taking a rim of grossly normal tissue. The specimen was then passed off. Electrocautery was used to achieve excellent hemostasis. The cavity of the wound was irrigated with bacitracin and saline and again checked for hemostasis. Excellent hemostasis was noted.   The incision was then closed in 2 layers using buried interrupted 2-0 PDS sutures. Iodoform ribbon gauze was then placed between the stitches into the wound and the wound was then dressed with sterile 4 x 4's and tape. The patient was recovered from conscious sedation and taken to the recovery area in satisfactory condition. All instrument, sponge and needle counts were reported as correct.       MD SHELLI Garza / ELMO  D: 07/12/2018 16:10     T: 07/12/2018 18:27  JOB #: [id number]  CC: Pedro Luis Phan MD

## 2018-07-12 NOTE — BRIEF OP NOTE
BRIEF OPERATIVE NOTE    Date of Procedure: 7/12/2018   Preoperative Diagnosis: left flank draining cutaneous sinus tract  Postoperative Diagnosis: same  Procedure(s):  EXCISION left flank draining cutaneous sinus tract  Surgeon(s) and Role:     * Modesta Arrieta MD - Primary           Surgical Staff:  Circ-1: Savannah Plascencia RN  Circ-Intern: Hua Jones  Scrub Tech-1: Chely Adams  Scrub Tech-Relief: Yana Méndez  Surg Asst-1: Silvestre Beckman  Event Time In   Incision Start 1529   Incision Close 1557     Anesthesia: MAC   Estimated Blood Loss: min  Specimens:   ID Type Source Tests Collected by Time Destination   1 : Left Flank Cyst Preservative Cyst  Modesta Arrieta MD 7/12/2018 1537 Pathology   1 : Left flank Wound Wound AEROBIC/ANAEROBIC CULTURE, CULTURE, WOUND W GRAM STAIN Modesta Arrieta MD 7/12/2018 1524 Microbiology      Implants: * No implants in log *    [id number]    [id number]

## 2018-07-12 NOTE — PERIOP NOTES
Delay in start of case, will ask if pt's daughter wishes to come back to bedside. 26 Daughter into visit.

## 2018-07-12 NOTE — INTERVAL H&P NOTE
H&P Update:  Maxwell Mcallister was seen and examined. History and physical has been reviewed. The patient has been examined.  There have been no significant clinical changes since the completion of the originally dated History and Physical.    Signed By: Hu Mcwilliams MD     July 12, 2018 2:47 PM

## 2018-07-12 NOTE — ANESTHESIA PREPROCEDURE EVALUATION
Anesthetic History   No history of anesthetic complications            Review of Systems / Medical History  Patient summary reviewed, nursing notes reviewed and pertinent labs reviewed    Pulmonary  Within defined limits                 Neuro/Psych   Within defined limits           Cardiovascular    Hypertension              Exercise tolerance: >4 METS     GI/Hepatic/Renal  Within defined limits              Endo/Other        Arthritis     Other Findings   Comments: HYDRADENITIS         Physical Exam    Airway  Mallampati: I    Neck ROM: normal range of motion   Mouth opening: Normal     Cardiovascular  Regular rate and rhythm,  S1 and S2 normal,  no murmur, click, rub, or gallop             Dental      Comments:  Only 1 tooth left   Pulmonary  Breath sounds clear to auscultation               Abdominal  GI exam deferred       Other Findings            Anesthetic Plan    ASA: 2  Anesthesia type: MAC          Induction: Intravenous  Anesthetic plan and risks discussed with: Patient

## 2018-07-12 NOTE — PERIOP NOTES
Handoff Report from Operating Room to PACU    Report received from 1175 Kash Davye RN and Trenton Gould CRNA regarding Nirali Mccloud. Surgeon(s):  Angel Ulloa MD  And Procedure(s) (LRB):  EXCISION HYDRADENITIS LEFT FLANK (Left)  confirmed with allergies and dressings discussed. Anesthesia type, drugs, patient history, complications, estimated blood loss, vital signs, intake and output, and last pain medication, lines, reversal medications and temperature were reviewed.

## 2018-07-14 LAB
BACTERIA SPEC CULT: ABNORMAL
GRAM STN SPEC: ABNORMAL
SERVICE CMNT-IMP: ABNORMAL

## 2018-07-15 LAB
BACTERIA SPEC CULT: ABNORMAL
BACTERIA SPEC CULT: ABNORMAL
SERVICE CMNT-IMP: ABNORMAL

## 2018-07-16 ENCOUNTER — OFFICE VISIT (OUTPATIENT)
Dept: SURGERY | Age: 83
End: 2018-07-16

## 2018-07-16 VITALS
WEIGHT: 144.2 LBS | OXYGEN SATURATION: 97 % | HEART RATE: 74 BPM | DIASTOLIC BLOOD PRESSURE: 74 MMHG | RESPIRATION RATE: 16 BRPM | TEMPERATURE: 97.8 F | HEIGHT: 71 IN | BODY MASS INDEX: 20.19 KG/M2 | SYSTOLIC BLOOD PRESSURE: 120 MMHG

## 2018-07-16 DIAGNOSIS — Z09 POSTOPERATIVE EXAMINATION: Primary | ICD-10-CM

## 2018-07-16 NOTE — PROGRESS NOTES
Harvinder Thornton is a 80 y.o. male     Chief Complaint   Patient presents with    Surgical Follow-up     p/o excision of left flank 7/13/18. Visit Vitals    /74 (BP 1 Location: Left arm, BP Patient Position: Sitting)    Pulse 74    Temp 97.8 °F (36.6 °C) (Oral)    Resp 16    Ht 5' 11\" (1.803 m)    Wt 144 lb 3.2 oz (65.4 kg)    SpO2 97%    BMI 20.11 kg/m2       Health Maintenance Due   Topic Date Due    DTaP/Tdap/Td series (1 - Tdap) 06/13/1954    ZOSTER VACCINE AGE 60>  04/13/1993    GLAUCOMA SCREENING Q2Y  06/13/1998    Pneumococcal 65+ Low/Medium Risk (1 of 2 - PCV13) 06/13/1998       1. Have you been to the ER, urgent care clinic since your last visit? Hospitalized since your last visit? No    2. Have you seen or consulted any other health care providers outside of the [address] [place] [address] since your last visit? Include any pap smears or colon screening.  No

## 2018-07-16 NOTE — MR AVS SNAPSHOT
[address], [address] [facility], [address] [place] [address] [place] [phone] Patient: Nishant Villanueva MRN: [id number] KBO:[birth year] Visit Information Date & Time Provider Department Dept. Phone Encounter #  
 7/16/2018 11:40 AM Justin Chew MD [facility] [id number] Your Appointments 7/20/2018  2:00 PM  
POST OP with Justin Chew MD  
[facility] [address] [facility] ([facility]) Appt Note: Excision left flank (ck wound) [address] [facility] [address] [phone]  
[address] [facility], [address] [place] [address] [address] [phone] Upcoming Health Maintenance Date Due DTaP/Tdap/Td series (1 - Tdap) 6/13/1954 ZOSTER VACCINE AGE 60> 4/13/1993 GLAUCOMA SCREENING Q2Y 6/13/1998 Pneumococcal 65+ Low/Medium Risk (1 of 2 - PCV13) 6/13/1998 Influenza Age 5 to Adult 8/1/2018 Allergies as of 7/16/2018  Review Complete On: 7/16/2018 By: Devaughn Factor No Known Allergies Current Immunizations  Never Reviewed No immunizations on file. Not reviewed this visit Vitals BP Pulse Temp Resp Height(growth percentile) Weight(growth percentile) 120/74 (BP 1 Location: Left arm, BP Patient Position: Sitting) 74 97.8 °F (36.6 °C) (Oral) 16 5' 11\" (1.803 m) 144 lb 3.2 oz (65.4 kg) SpO2 BMI Smoking Status 97% 20.11 kg/m2 Never Smoker BMI and BSA Data Body Mass Index Body Surface Area  
 20.11 kg/m 2 1.81 m 2 Preferred Pharmacy Pharmacy Name Phone [id number] [phone] Your Updated Medication List  
  
   
This list is accurate as of 7/16/18  4:43 PM.  Always use your most recent med list. amLODIPine-Olmesartan 10-20 mg Tab Take 1 Tab by mouth daily. atenolol-chlorthalidone 50-25 mg per tablet Commonly known as:  Raenelle Endo Take 1 Tab by mouth daily. BACTRIM -800 mg per tablet Generic drug:  trimethoprim-sulfamethoxazole Take 1 Tab by mouth two (2) times a day. ciprofloxacin HCl 500 mg tablet Commonly known as:  CIPRO Take 1 Tab by mouth two (2) times a day for 10 days. diazePAM 5 mg tablet Commonly known as:  VALIUM Take 1 Tab by mouth three (3) times daily as needed for Anxiety (spasm). FLOMAX 0.4 mg capsule Generic drug:  tamsulosin Take 0.4 mg by mouth daily. Gluc-MSM-Jeff 2-Hyal-Antiar 3 375-375-20 mg Tab Take 1 Tab by mouth daily. HYDROcodone-acetaminophen 5-325 mg per tablet Commonly known as:  Rolinda Doles Take 1-2 Tabs by mouth every six (6) hours as needed for Pain. Max Daily Amount: 8 Tabs. multivitamin tablet Commonly known as:  ONE A DAY Take 1 Tab by mouth daily. Introducing \[facility] [place] [facility]\"" & HEALTH SERVICES! [facility] introduces Idea Shower patient portal. Now you can access parts of your medical record, email your doctor's office, and request medication refills online. 1. In your internet browser, go to [email or web address] 2. Click on the First Time User? Click Here link in the Sign In box. You will see the New Member Sign Up page. 3. Enter your Idea Shower Access Code exactly as it appears below. You will not need to use this code after youve completed the sign-up process. If you do not sign up before the expiration date, you must request a new code. · Ide[employer]ower Access Code: [email or web address] Expires: 10/7/2018  2:23 PM 
 
4. Enter the last four digits of your Social Security Number (xxxx) and Date of Birth (mm/dd/yyyy) as indicated and click Submit. You will be taken to the next sign-up page. 5. Create a Idea Shower ID. This will be your Ide[employer] Shower login ID and cannot be changed, so think of one that is secure and easy to remember. 6. Create a Codewars password. You can change your password at any time. 7. Enter your Password Reset Question and Answer. This can be used at a later time if you forget your password. 8. Enter your e-mail address. You will receive e-mail notification when new information is available in [address] 9. Click Sign Up. You can now view and download portions of your medical record. 10. Click the Download Summary menu link to download a portable copy of your medical information. If you have questions, please visit the Frequently Asked Questions section of the Codewars website. Remember, Codewars is NOT to be used for urgent needs. For medical emergencies, dial 911. Now available from your iPhone and Android! Please provide this summary of care documentation to your next provider. Your primary care clinician is listed as Regis Tim. If you have any questions after today's visit, please call [phone].

## 2018-07-20 ENCOUNTER — OFFICE VISIT (OUTPATIENT)
Dept: SURGERY | Age: 83
End: 2018-07-20

## 2018-07-20 VITALS
DIASTOLIC BLOOD PRESSURE: 71 MMHG | TEMPERATURE: 97.8 F | RESPIRATION RATE: 16 BRPM | WEIGHT: 144 LBS | HEIGHT: 71 IN | OXYGEN SATURATION: 95 % | HEART RATE: 78 BPM | SYSTOLIC BLOOD PRESSURE: 134 MMHG | BODY MASS INDEX: 20.16 KG/M2

## 2018-07-20 DIAGNOSIS — Z09 POSTOPERATIVE EXAMINATION: Primary | ICD-10-CM

## 2018-07-20 NOTE — PROGRESS NOTES
Duayne Bloch is a 80 y.o. male     Chief Complaint   Patient presents with    Surgical Follow-up     wound check       Visit Vitals    /71 (BP 1 Location: Right arm, BP Patient Position: Sitting)    Pulse 78    Temp 97.8 °F (36.6 °C) (Oral)    Resp 16    Ht 5' 11\" (1.803 m)    Wt 144 lb (65.3 kg)    SpO2 95%    BMI 20.08 kg/m2       Health Maintenance Due   Topic Date Due    DTaP/Tdap/Td series (1 - Tdap) 06[birth year]13/1954    ZOSTER VACCINE AGE 60>  04/13/1993    GLAUCOMA SCREENING Q2Y  06/13/1998    Pneumococcal 65+ Low/Medium Risk (1 of 2 - PCV13) 06/13/1998       1. Have you been to the ER, urgent care clinic since your last visit? Hospitalized since your last visit? No    2. Have you seen or consulted any other health care providers outside of the [address] [place] [address] since your last visit? Include any pap smears or colon screening.  No

## 2018-07-20 NOTE — MR AVS SNAPSHOT
[address], [address] [facility], [address] [place] [address] [facility] 
[phone] Patient: Criss Weaver MRN: [id number] OPV:[birth year] Visit Information Date & Time Provider Department Dept. Phone Encounter #  
 7/20/2018  2:00 PM Tenny Galeazzi, MD [facility] [id number] Your Appointments 7/31/2018  4:20 PM  
POST OP with Tenny Galeazzi, MD  
[facility] [address] [facility] ([facility]) Appt Note: Excision left flank (ck wound [address] [phone]  
[address] [place] [facility], [address] [place] [address] [address] [phone] Upcoming Health Maintenance Date Due DTaP/Tdap/Td series (1 - Tdap) 6/13/1954 ZOSTER VACCINE AGE 60> 4/13/1993 GLAUCOMA SCREENING Q2Y 6/13/1998 Pneumococcal 65+ Low/Medium Risk (1 of 2 - PCV13) 6/13/1998 Influenza Age 5 to Adult 8/1/2018 Allergies as of 7/20/2018  Review Complete On: 7/20/2018 By: Richard Saldivar No Known Allergies Current Immunizations  Never Reviewed No immunizations on file. Not reviewed this visit Vitals BP Pulse Temp Resp Height(growth percentile) Weight(growth percentile) 134/71 (BP 1 Location: Right arm, BP Patient Position: Sitting) 78 97.8 °F (36.6 °C) (Oral) 16 5' 11\" (1.803 m) 144 lb (65.3 kg) SpO2 BMI Smoking Status 95% 20.08 kg/m2 Never Smoker BMI and BSA Data Body Mass Index Body Surface Area 20.08 kg/m 2 1.81 m 2 Preferred Pharmacy Pharmacy Name Phone [id number] [place] [phone] Your Updated Medication List  
  
   
This list is accurate as of 7/20/18  2:24 PM.  Always use your most recent med list. amLODIPine-Olmesartan 10-20 mg Tab Take 1 Tab by mouth daily. atenolol-chlorthalidone 50-25 mg per tablet Commonly known as:  Jer Rolling Take 1 Tab by mouth daily. BACTRIM -800 mg per tablet Generic drug:  trimethoprim-sulfamethoxazole Take 1 Tab by mouth two (2) times a day. ciprofloxacin HCl 500 mg tablet Commonly known as:  CIPRO Take 1 Tab by mouth two (2) times a day for 10 days. diazePAM 5 mg tablet Commonly known as:  VALIUM Take 1 Tab by mouth three (3) times daily as needed for Anxiety (spasm). FLOMAX 0.4 mg capsule Generic drug:  tamsulosin Take 0.4 mg by mouth daily. Gluc-MSM-Jeff 2-Hyal-Antiar 3 375-375-20 mg Tab Take 1 Tab by mouth daily. HYDROcodone-acetaminophen 5-325 mg per tablet Commonly known as:  Benetta Pock Take 1-2 Tabs by mouth every six (6) hours as needed for Pain. Max Daily Amount: 8 Tabs. multivitamin tablet Commonly known as:  ONE A DAY Take 1 Tab by mouth daily. Introducing [facility] & HEALTH SERVICES! [facility] introduces BISON patient portal. Now you can access parts of your medical record, email your doctor's office, and request medication refills online. 1. In your internet browser, go to [email or web address] 2. Click on the First Time User? Click Here link in the Sign In box. You will see the New Member Sign Up page. 3. Enter your BISON Access Code exactly as it appears below. You will not need to use this code after youve completed the sign-up process. If you do not sign up before the expiration date, you must request a new code. · BISON Access Code: [email or web address] Expires: 10/7/2018  2:23 PM 
 
4. Enter the last four digits of your Social Security Number (xxxx) and Date of Birth (mm/dd/yyyy) as indicated and click Submit. You will be taken to the next sign-up page. 5. Create a BISON ID. This will be your BISON login ID and cannot be changed, so think of one that is secure and easy to remember. 6. Create a [employer] password. You can change your password at any time. 7. Enter your Password Reset Question and Answer. This can be used at a later time if you forget your password. 8. Enter your e-mail address. You will receive e-mail notification when new information is available in [address] 9. Click Sign Up. You can now view and download portions of your medical record. 10. Click the Download Summary menu link to download a portable copy of your medical information. If you have questions, please visit the Frequently Asked Questions section of the [employer] website. Remember, [employer] is NOT to be used for urgent needs. For medical emergencies, dial 911. Now available from your iPhone and Android! Please provide this summary of care documentation to your next provider. Your primary care clinician is listed as Kayla Beal. If you have any questions after today's visit, please call [phone].

## 2018-07-31 ENCOUNTER — OFFICE VISIT (OUTPATIENT)
Dept: SURGERY | Age: 83
End: 2018-07-31

## 2018-07-31 VITALS
BODY MASS INDEX: 20.58 KG/M2 | DIASTOLIC BLOOD PRESSURE: 74 MMHG | WEIGHT: 147 LBS | HEIGHT: 71 IN | OXYGEN SATURATION: 98 % | RESPIRATION RATE: 16 BRPM | SYSTOLIC BLOOD PRESSURE: 137 MMHG | HEART RATE: 69 BPM | TEMPERATURE: 98.1 F

## 2018-07-31 DIAGNOSIS — Z09 POSTOPERATIVE EXAMINATION: Primary | ICD-10-CM

## 2018-07-31 NOTE — PROGRESS NOTES
Chief Complaint   Patient presents with    Surgical Follow-up     Excision left flank, wound check. 1. Have you been to the ER, urgent care clinic since your last visit? Hospitalized since your last visit? No    2. Have you seen or consulted any other health care providers outside of the [address] [place] [address] since your last visit? Include any pap smears or colon screening.  No

## 2018-07-31 NOTE — MR AVS SNAPSHOT
[address] [place] [address] [address] [place] [address] 
[phone] Patient: Max Setting MRN: [id number] AGA:[id number]/49/3450 Visit Information Date & Time Provider Department Dept. Phone Encounter #  
 7/31/2018  4:20 PM Modesta Arrieta MD [facility] [id number] Upcoming Health Maintenance Date Due DTaP/Tdap/Td series (1 - Tdap) 6/13/1954 ZOSTER VACCINE AGE 60> 4/13/1993 GLAUCOMA SCREENING Q2Y 6/13/1998 Pneumococcal 65+ Low/Medium Risk (1 of 2 - PCV13) 6/13/1998 Influenza Age 5 to Adult 8/1/2018 Allergies as of 7/31/2018  Review Complete On: 7/31/2018 By: Modesta Arrieta MD  
 No Known Allergies Current Immunizations  Never Reviewed No immunizations on file. Not reviewed this visit You Were Diagnosed With   
  
 Codes Comments Postoperative examination    -  Primary ICD-10-CM: Y22 ICD-9-CM: V67.00 Vitals BP Pulse Temp Resp Height(growth percentile) Weight(growth percentile)  
 137/74 (BP 1 Location: Right arm, BP Patient Position: Sitting) 69 98.1 °F (36.7 °C) (Oral) 16 5' 11\" (1.803 m) 147 lb (66.7 kg) SpO2 BMI Smoking Status 98% 20.5 kg/m2 Never Smoker Vitals History BMI and BSA Data Body Mass Index Body Surface Area 20.5 kg/m 2 1.83 m 2 Preferred Pharmacy Pharmacy Name Phone [id number] Nuno Perez Pérez[address]indi Barrios [phone] Your Updated Medication List  
  
   
This list is accurate as of 7/31/18  4:29 PM.  Always use your most recent med list. amLODIPine-Olmesartan 10-20 mg Tab Take 1 Tab by mouth daily. atenolol-chlorthalidone 50-25 mg per tablet Commonly known as:  Ganga Stamp Take 1 Tab by mouth daily. diazePAM 5 mg tablet Commonly known as:  VALIUM  
 Take 1 Tab by mouth three (3) times daily as needed for Anxiety (spasm). FLOMAX 0.4 mg capsule Generic drug:  tamsulosin Take 0.4 mg by mouth daily. Gluc-MSM-Jeff 2-Hyal-Antiar 3 375-375-20 mg Tab Take 1 Tab by mouth daily. HYDROcodone-acetaminophen 5-325 mg per tablet Commonly known as:  Matildakarine Noland Take 1-2 Tabs by mouth every six (6) hours as needed for Pain. Max Daily Amount: 8 Tabs. multivitamin tablet Commonly known as:  ONE A DAY Take 1 Tab by mouth daily. Introducing [facility]! Shantel Ant introduces [email or web address] patient portal. Now you can access parts of your medical record, email your doctor's office, and request medication refills online. 1. In your internet browser, go to [email or web address] 2. Click on the First Time User? Click Here link in the Sign In box. You will see the New Member Sign Up page. 3. Enter your [email or web address] Access Code exactly as it appears below. You will not need to use this code after youve completed the sign-up process. If you do not sign up before the expiration date, you must request a new code. · [email or web address] Access Code: [email or web address] Expires: 10/7/2018  2:23 PM 
 
4. Enter the last four digits of your Social Security Number (xxxx) and Date of Birth (mm/dd/yyyy) as indicated and click Submit. You will be taken to the next sign-up page. 5. Create a [employer] ID. This will be your [email or web address] login ID and cannot be changed, so think of one that is secure and easy to remember. 6. Create a [email or web address] password. You can change your password at any time. 7. Enter your Password Reset Question and Answer. This can be used at a later time if you forget your password. 8. Enter your e-mail address. You will receive e-mail notification when new information is available in [address] 9. Click Sign Up. You can now view and download portions of your medical record. 10. Click the Download Summary menu link to download a portable copy of your medical information. If you have questions, please visit the Frequently Asked Questions section of the [employer] website. Remember, [employer] is NOT to be used for urgent needs. For medical emergencies, dial 911. Now available from your iPhone and Android! Please provide this summary of care documentation to your next provider. Your primary care clinician is listed as Cheyenne Ruiz. If you have any questions after today's visit, please call [phone].

## 2018-07-31 NOTE — PROGRESS NOTES
To: Lynnette Garcia MD    From: Azael Phillips MD    Thank you for referring Raisa Cochran. Hope you are enjoying the summer -- Frederick Khan. Encounter Date: 7/31/2018    Subjective:      Cher Hamilton is a 80 y.o. male presents for postop care. The patient has no complaints. No pain. 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 Doing well postoperatively. Plan:     No more dressings needed. Follow-up only as needed. Knows to call for any concerns.       Azael Phillips MD

## 2019-04-01 ENCOUNTER — HOSPITAL ENCOUNTER (EMERGENCY)
Age: 84
Discharge: HOME | End: 2019-04-02
Attending: EMERGENCY MEDICINE
Payer: MEDICARE

## 2019-04-01 ENCOUNTER — APPOINTMENT (OUTPATIENT)
Dept: CT IMAGING | Age: 84
End: 2019-04-01
Attending: EMERGENCY MEDICINE
Payer: MEDICARE

## 2019-04-01 DIAGNOSIS — R10.2 SUPRAPUBIC ABDOMINAL PAIN: ICD-10-CM

## 2019-04-01 DIAGNOSIS — N28.9 RENAL INSUFFICIENCY: ICD-10-CM

## 2019-04-01 DIAGNOSIS — R10.12 ABDOMINAL PAIN, LUQ (LEFT UPPER QUADRANT): ICD-10-CM

## 2019-04-01 DIAGNOSIS — N28.89 BILATERAL RENAL MASSES: Primary | ICD-10-CM

## 2019-04-01 DIAGNOSIS — N40.0 ENLARGED PROSTATE: ICD-10-CM

## 2019-04-01 LAB
ALBUMIN SERPL-MCNC: 2.8 G/DL (ref 3.5–5)
ALBUMIN/GLOB SERPL: 0.6 {RATIO} (ref 1.1–2.2)
ALP SERPL-CCNC: 77 U/L (ref 45–117)
ALT SERPL-CCNC: 15 U/L (ref 12–78)
ANION GAP SERPL CALC-SCNC: 8 MMOL/L (ref 5–15)
APPEARANCE UR: ABNORMAL
AST SERPL-CCNC: 46 U/L (ref 15–37)
BACTERIA URNS QL MICRO: NEGATIVE /HPF
BASOPHILS # BLD: 0 K/UL (ref 0–0.1)
BASOPHILS NFR BLD: 0 % (ref 0–1)
BILIRUB SERPL-MCNC: 1.7 MG/DL (ref 0.2–1)
BILIRUB UR QL CFM: NEGATIVE
BUN SERPL-MCNC: 29 MG/DL (ref 6–20)
BUN/CREAT SERPL: 20 (ref 12–20)
CALCIUM SERPL-MCNC: 8.8 MG/DL (ref 8.5–10.1)
CHLORIDE SERPL-SCNC: 105 MMOL/L (ref 97–108)
CK SERPL-CCNC: 99 U/L (ref 39–308)
CO2 SERPL-SCNC: 26 MMOL/L (ref 21–32)
COLOR UR: ABNORMAL
CREAT SERPL-MCNC: 1.43 MG/DL (ref 0.7–1.3)
DIFFERENTIAL METHOD BLD: ABNORMAL
EOSINOPHIL # BLD: 0 K/UL (ref 0–0.4)
EOSINOPHIL NFR BLD: 0 % (ref 0–7)
EPITH CASTS URNS QL MICRO: ABNORMAL /LPF
ERYTHROCYTE [DISTWIDTH] IN BLOOD BY AUTOMATED COUNT: 12.4 % (ref 11.5–14.5)
GLOBULIN SER CALC-MCNC: 4.6 G/DL (ref 2–4)
GLUCOSE SERPL-MCNC: 117 MG/DL (ref 65–100)
GLUCOSE UR STRIP.AUTO-MCNC: NEGATIVE MG/DL
HCT VFR BLD AUTO: 39.9 % (ref 36.6–50.3)
HGB BLD-MCNC: 13.3 G/DL (ref 12.1–17)
HGB UR QL STRIP: ABNORMAL
IMM GRANULOCYTES # BLD AUTO: 0.1 K/UL (ref 0–0.04)
IMM GRANULOCYTES NFR BLD AUTO: 0 % (ref 0–0.5)
KETONES UR QL STRIP.AUTO: NEGATIVE MG/DL
LACTATE SERPL-SCNC: 2.2 MMOL/L (ref 0.4–2)
LEUKOCYTE ESTERASE UR QL STRIP.AUTO: NEGATIVE
LIPASE SERPL-CCNC: 36 U/L (ref 73–393)
LYMPHOCYTES # BLD: 1.1 K/UL (ref 0.8–3.5)
LYMPHOCYTES NFR BLD: 7 % (ref 12–49)
MCH RBC QN AUTO: 32.7 PG (ref 26–34)
MCHC RBC AUTO-ENTMCNC: 33.3 G/DL (ref 30–36.5)
MCV RBC AUTO: 98 FL (ref 80–99)
MONOCYTES # BLD: 1.2 K/UL (ref 0–1)
MONOCYTES NFR BLD: 8 % (ref 5–13)
MUCOUS THREADS URNS QL MICRO: ABNORMAL /LPF
NEUTS SEG # BLD: 12.5 K/UL (ref 1.8–8)
NEUTS SEG NFR BLD: 85 % (ref 32–75)
NITRITE UR QL STRIP.AUTO: NEGATIVE
NRBC # BLD: 0 K/UL (ref 0–0.01)
NRBC BLD-RTO: 0 PER 100 WBC
PH UR STRIP: 5.5 [PH] (ref 5–8)
PLATELET # BLD AUTO: 188 K/UL (ref 150–400)
PMV BLD AUTO: 10.5 FL (ref 8.9–12.9)
POTASSIUM SERPL-SCNC: 3.4 MMOL/L (ref 3.5–5.1)
PROT SERPL-MCNC: 7.4 G/DL (ref 6.4–8.2)
PROT UR STRIP-MCNC: 30 MG/DL
RBC # BLD AUTO: 4.07 M/UL (ref 4.1–5.7)
RBC #/AREA URNS HPF: >100 /HPF (ref 0–5)
SODIUM SERPL-SCNC: 139 MMOL/L (ref 136–145)
SP GR UR REFRACTOMETRY: >1.03 (ref 1–1.03)
TROPONIN I SERPL-MCNC: <0.05 NG/ML
UROBILINOGEN UR QL STRIP.AUTO: 1 EU/DL (ref 0.2–1)
WBC # BLD AUTO: 14.9 K/UL (ref 4.1–11.1)
WBC URNS QL MICRO: ABNORMAL /HPF (ref 0–4)

## 2019-04-01 PROCEDURE — 83605 ASSAY OF LACTIC ACID: CPT

## 2019-04-01 PROCEDURE — 99285 EMERGENCY DEPT VISIT HI MDM: CPT

## 2019-04-01 PROCEDURE — 93005 ELECTROCARDIOGRAM TRACING: CPT

## 2019-04-01 PROCEDURE — 74011636320 HC RX REV CODE- 636/320: Performed by: EMERGENCY MEDICINE

## 2019-04-01 PROCEDURE — 82550 ASSAY OF CK (CPK): CPT

## 2019-04-01 PROCEDURE — 81001 URINALYSIS AUTO W/SCOPE: CPT

## 2019-04-01 PROCEDURE — 51702 INSERT TEMP BLADDER CATH: CPT

## 2019-04-01 PROCEDURE — 51701 INSERT BLADDER CATHETER: CPT

## 2019-04-01 PROCEDURE — 84484 ASSAY OF TROPONIN QUANT: CPT

## 2019-04-01 PROCEDURE — 74177 CT ABD & PELVIS W/CONTRAST: CPT

## 2019-04-01 PROCEDURE — 51798 US URINE CAPACITY MEASURE: CPT

## 2019-04-01 PROCEDURE — 77030005514 HC CATH URETH FOL14 BARD -A

## 2019-04-01 PROCEDURE — 36415 COLL VENOUS BLD VENIPUNCTURE: CPT

## 2019-04-01 PROCEDURE — 80053 COMPREHEN METABOLIC PANEL: CPT

## 2019-04-01 PROCEDURE — 77030005563 HC CATH URETH INT MMGH -A

## 2019-04-01 PROCEDURE — 85025 COMPLETE CBC W/AUTO DIFF WBC: CPT

## 2019-04-01 PROCEDURE — 83690 ASSAY OF LIPASE: CPT

## 2019-04-01 RX ORDER — ONDANSETRON 4 MG/1
4 TABLET, ORALLY DISINTEGRATING ORAL
Qty: 10 TAB | Refills: 0 | Status: SHIPPED | OUTPATIENT
Start: 2019-04-01 | End: 2021-09-22

## 2019-04-01 RX ORDER — DICYCLOMINE HYDROCHLORIDE 20 MG/1
20 TABLET ORAL
Qty: 20 TAB | Refills: 0 | Status: SHIPPED | OUTPATIENT
Start: 2019-04-01 | End: 2021-09-22

## 2019-04-01 RX ORDER — SODIUM CHLORIDE 0.9 % (FLUSH) 0.9 %
10 SYRINGE (ML) INJECTION
Status: COMPLETED | OUTPATIENT
Start: 2019-04-01 | End: 2019-04-01

## 2019-04-01 RX ADMIN — Medication 10 ML: at 21:30

## 2019-04-01 RX ADMIN — DIATRIZOATE MEGLUMINE AND DIATRIZOATE SODIUM 30 ML: 600; 100 SOLUTION ORAL; RECTAL at 20:04

## 2019-04-01 RX ADMIN — IOPAMIDOL 100 ML: 755 INJECTION, SOLUTION INTRAVENOUS at 21:30

## 2019-04-01 NOTE — ED NOTES
Patient presents to ED via EMS. Patient ambulatory from stretcher to bed. Patient presents with complaint of lower abdominal pain and difficulty urinating. Patient is alert and answering questions appropriately. Will continue to monitor and assess patient needs. Bladder scan showed 124.

## 2019-04-01 NOTE — ED PROVIDER NOTES
EMERGENCY DEPARTMENT HISTORY AND PHYSICAL EXAM 
 
 
Date: 4/1/2019 Patient Name: Fiona Pan History of Presenting Illness Chief Complaint Patient presents with  Abdominal Pain  
  pt presents via EMS with c/o abdominal pain x1 day and difficulty urinating for one week. History Provided By: Patient, EMS and Relative HPI: Fiona Pan, 80 y.o. male with PMHx significant for hypertension, arthritis and right hip replacement, presents  to the ED with cc of abdominal pain and difficulty urinating. She states that he has had abdominal pain off and on for 1 day. The pain has resolved currently. It was of moderate severity and involve the suprapubic region and the left upper quadrant. He also states that he has had difficulty urinating for 1 week. He denies fever, chills, shortness of breath, diarrhea. He states that he has constipation from time to time but not currently. When asked if he has had chest pain he points to the left upper quadrant of the abdomen and states that he had the \" chest pain\" with the suprapubic pain. There are no other complaints, changes, or physical findings at this time. PCP: Monica Petty MD 
 
No current facility-administered medications on file prior to encounter. Current Outpatient Medications on File Prior to Encounter Medication Sig Dispense Refill  
 HYDROcodone-acetaminophen (NORCO) 5-325 mg per tablet Take 1-2 Tabs by mouth every six (6) hours as needed for Pain. Max Daily Amount: 8 Tabs. 30 Tab 0  
 amLODIPine-Olmesartan 10-20 mg tab Take 1 Tab by mouth daily.  tamsulosin (FLOMAX) 0.4 mg capsule Take 0.4 mg by mouth daily.  atenolol-chlorthalidone (TENORETIC) 50-25 mg per tablet Take 1 Tab by mouth daily.  multivitamin (ONE A DAY) tablet Take 1 Tab by mouth daily.  Gluc-MSM-Jeff 2-Hyal-Antiar 3 375-375-20 mg tab Take 1 Tab by mouth daily.  diazepam (VALIUM) 5 mg tablet Take 1 Tab by mouth three (3) times daily as needed for Anxiety (spasm). 20 Tab 0 Past History Past Medical History: 
Past Medical History:  
Diagnosis Date  Arthritis  Hypertension Past Surgical History: 
Past Surgical History:  
Procedure Laterality Date  HX ORTHOPAEDIC Right 2008  
 hip replacement/[facility]/Dr Cecy Mena  HX PREMALIG/BENIGN SKIN LESION EXCISION  07/13/2018  
 excision of left flank Family History: 
Family History Problem Relation Age of Onset  Cancer Daughter Breast ca - stage 1  
 Hypertension Mother Social History: 
Social History Tobacco Use  Smoking status: Never Smoker  Smokeless tobacco: Never Used Substance Use Topics  Alcohol use: No  
 Drug use: No  
 
 
Allergies: 
No Known Allergies Review of Systems Review of Systems Constitutional: Negative for fever. HENT: Negative for congestion. Eyes: Negative. Respiratory: Negative for shortness of breath. Cardiovascular: Negative for leg swelling. Gastrointestinal: Positive for abdominal pain. Negative for nausea. Endocrine: Negative for heat intolerance. Genitourinary: Negative. Musculoskeletal: Negative for back pain. Skin: Negative for rash. Allergic/Immunologic: Negative for immunocompromised state. Neurological: Negative for dizziness. Hematological: Does not bruise/bleed easily. Psychiatric/Behavioral: Negative. All other systems reviewed and are negative. Physical Exam  
Physical Exam  
Constitutional: He is oriented to person, place, and time. He appears well-developed and well-nourished. No distress. HENT:  
Head: Normocephalic and atraumatic. Eyes: Pupils are equal, round, and reactive to light. EOM are normal.  
Neck: Normal range of motion. Neck supple. Cardiovascular: Normal rate, regular rhythm and normal heart sounds. Pulmonary/Chest: Effort normal and breath sounds normal. He has no wheezes. Abdominal: Soft. Bowel sounds are normal. There is no tenderness. Musculoskeletal: Normal range of motion. He exhibits no edema or tenderness. Neurological: He is alert and oriented to person, place, and time. No cranial nerve deficit. Skin: Skin is warm and dry. Psychiatric: He has a normal mood and affect. His behavior is normal.  
Nursing note and vitals reviewed. Diagnostic Study Results Labs - Recent Results (from the past 12 hour(s)) EKG, 12 LEAD, INITIAL Collection Time: 04/01/19  4:51 PM  
Result Value Ref Range Ventricular Rate 96 BPM  
 Atrial Rate 96 BPM  
 P-R Interval 166 ms  
 QRS Duration 88 ms Q-T Interval 366 ms  
 QTC Calculation (Bezet) 462 ms Calculated P Axis 23 degrees Calculated R Axis 3 degrees Calculated T Axis -8 degrees Diagnosis Normal sinus rhythm Nonspecific T wave abnormality When compared with ECG of 10-JUL-2018 15:37, 
Nonspecific T wave abnormality now evident in Lateral leads Radiologic Studies -  
CT ABD PELV W CONT Final Result IMPRESSION:   
1. 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  
hemorrhage. No significant retroperitoneal hemorrhage. 2. 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 3. Multiple, additional, indeterminate, bilateral renal lesions. 4. Significant prostatomegaly is the probable cause of difficulty urinating. The findings were called to Dr. Esther Moya on 4/1/2019 9:56 PM by Dr. Werner Thacker. [address] CT Results  (Last 48 hours) 04/01/19 2130  CT ABD PELV W CONT Final result Impression:  IMPRESSION:   
1. 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  
hemorrhage. No significant retroperitoneal hemorrhage. 2. 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 3. Multiple, additional, indeterminate, bilateral renal lesions. 4. Significant prostatomegaly is the probable cause of difficulty urinating. The findings were called to Dr. Franco Velazquez on 4/1/2019 9:56 PM by Dr. Laz Thacker. [address] Narrative:  CT ABDOMEN AND PELVIS WITH CONTRAST. 4/1/2019 9:30 PM   
   
INDICATION: Abdominal pain for a day and difficulty urinating for a week. COMPARISON: Hip radiographs 6/22/2008 TECHNIQUE: CT of the abdomen and pelvis was performed after the administration 100 cc IV Isovue-370 and oral contrast. CT dose reduction was achieved through  
use of a standardized protocol tailored for this examination and automatic  
exposure control for dose modulation. FINDINGS:  
Kidneys: A large, heterogeneous, exophytic, mixed solid and cystic, left upper  
pole renal mass represents renal cell carcinoma until proven otherwise. The more  
solid components measure 45 x 58 x 47 mm. Superomedial to the solid components,  
there is an oval, circumscribed, mixed density collection measuring  
approximately 61 x 78 x 75 mm. This may represent a cystic component, hemorrhage  
within an adjacent cyst, or hemorrhage within a cystic component of renal cell  
carcinoma. Trace perinephric and pararenal fluid is associated, as well as trace  
perisplenic/subdiaphragmatic fluid. In the left interpolar region, a mixed solid and cystic renal mass measures 25 x  
31 x 26 mm. In the anterior right interpolar region, a cystic mass with a solid mural nodule  
measures 18 x 24 x 18 mm. The solid mural nodule measures 10 x 11 x 10 mm. Several, smaller, homogenously hypodense renal masses are denser than simple  
fluid and are indeterminate. There are multiple bilateral renal cysts. Abdomen: There is subsegmental atelectasis in the left lower lobe. A sliding  
hiatal hernia is moderate. The heart is enlarged, and three-vessel coronary calcifications are extensive. The duodenum, liver, gallbladder, pancreas,  
spleen, and adrenals are normal.  
   
Pelvis: Right hip replacement causes scatter artifact over the right hemipelvis. The prostate is significantly enlarged, measuring at least 53 x 57 x 85 mm. It  
indents the bladder base, and is the likely cause of difficulty urinating. The small bowel, ileocecal junction, appendix, and colon are normal. Incidental  
note is made of a fat-containing left inguinal hernia. CXR Results  (Last 48 hours) None Medical Decision Making I am the first provider for this patient. I reviewed the vital signs, available nursing notes, past medical history, past surgical history, family history and social history. Vital Signs-Reviewed the patient's vital signs. Patient Vitals for the past 12 hrs: 
 Temp Pulse Resp BP SpO2  
04/01/19 1630 97.9 °F (36.6 °C) 96 26 111/71 91 % Pulse Oximetry Analysis - 91% on room air Cardiac Monitor:  
Rate: 96 bpm 
Rhythm: Normal Sinus Rhythm EKG interpretation: (Preliminary) Rhythm: normal sinus rhythm; and regular . Rate (approx.): 96; Axis: normal; NH interval: normal; QRS interval: normal ; ST/T wave: non-specific changes; Other findings: unchanged from previous ekg. Records Reviewed: Nursing Notes, Old Medical Records, Previous electrocardiograms, Previous Radiology Studies and Previous Laboratory Studies Provider Notes (Medical Decision Making): Urinary retention kidney stone pancreatitis UTI 
 
ED Course:  
Initial assessment performed. The patients presenting problems have been discussed, and they are in agreement with the care plan formulated and outlined with them. I have encouraged them to ask questions as they arise throughout their visit. PROGRESS NOTE: The patient was able to urinate while in the ER. The patient's results were reviewed with the patient and his daughter.  He is advised to follow-up with Urology as soon as possible. Critical Care Time:  
0 Disposition: 
Home PLAN: 
1. Current Discharge Medication List  
  
 
2. Follow-up Information None Return to ED if worse Diagnosis Clinical Impression: 1. Bilateral renal masses 2. Enlarged prostate 3. Renal insufficiency 4. Abdominal pain, LUQ (left upper quadrant) 5. Suprapubic abdominal pain Attestations:

## 2019-04-02 VITALS
OXYGEN SATURATION: 95 % | HEART RATE: 104 BPM | SYSTOLIC BLOOD PRESSURE: 131 MMHG | HEIGHT: 71 IN | TEMPERATURE: 99.3 F | RESPIRATION RATE: 24 BRPM | DIASTOLIC BLOOD PRESSURE: 80 MMHG | BODY MASS INDEX: 21.7 KG/M2 | WEIGHT: 154.98 LBS

## 2019-04-02 LAB
ATRIAL RATE: 96 BPM
CALCULATED P AXIS, ECG09: 23 DEGREES
CALCULATED R AXIS, ECG10: 3 DEGREES
CALCULATED T AXIS, ECG11: -8 DEGREES
DIAGNOSIS, 93000: NORMAL
P-R INTERVAL, ECG05: 166 MS
Q-T INTERVAL, ECG07: 366 MS
QRS DURATION, ECG06: 88 MS
QTC CALCULATION (BEZET), ECG08: 462 MS
VENTRICULAR RATE, ECG03: 96 BPM

## 2019-04-02 NOTE — ED NOTES
Dr. Chika Roman has reviewed discharge instructions with the patient. The patient verbalized understanding. Pt. A&Ox4, respirations even and unlabored. VS stable as noted in flowsheet. Wheelchair assist from department; paperwork in hand.

## 2019-04-02 NOTE — DISCHARGE INSTRUCTIONS
Patient Education        Abdominal Pain: Care Instructions  Your Care Instructions    Abdominal pain has many possible causes. Some aren't serious and get better on their own in a few days. Others need more testing and treatment. If your pain continues or gets worse, you need to be rechecked and may need more tests to find out what is wrong. You may need surgery to correct the problem. Don't ignore new symptoms, such as fever, nausea and vomiting, urination problems, pain that gets worse, and dizziness. These may be signs of a more serious problem. Your doctor may have recommended a follow-up visit in the next 8 to 12 hours. If you are not getting better, you may need more tests or treatment. The doctor has checked you carefully, but problems can develop later. If you notice any problems or new symptoms, get medical treatment right away. Follow-up care is a key part of your treatment and safety. Be sure to make and go to all appointments, and call your doctor if you are having problems. It's also a good idea to know your test results and keep a list of the medicines you take. How can you care for yourself at home? · Rest until you feel better. · To prevent dehydration, drink plenty of fluids, enough so that your urine is light yellow or clear like water. Choose water and other caffeine-free clear liquids until you feel better. If you have kidney, heart, or liver disease and have to limit fluids, talk with your doctor before you increase the amount of fluids you drink. · If your stomach is upset, eat mild foods, such as rice, dry toast or crackers, bananas, and applesauce. Try eating several small meals instead of two or three large ones. · Wait until 48 hours after all symptoms have gone away before you have spicy foods, alcohol, and drinks that contain caffeine. · Do not eat foods that are high in fat. · Avoid anti-inflammatory medicines such as aspirin, ibuprofen (Advil, Motrin), and naproxen (Aleve). These can cause stomach upset. Talk to your doctor if you take daily aspirin for another health problem. When should you call for help? Call 911 anytime you think you may need emergency care. For example, call if:    · You passed out (lost consciousness).     · You pass maroon or very bloody stools.     · You vomit blood or what looks like coffee grounds.     · You have new, severe belly pain.    Call your doctor now or seek immediate medical care if:    · Your pain gets worse, especially if it becomes focused in one area of your belly.     · You have a new or higher fever.     · Your stools are black and look like tar, or they have streaks of blood.     · You have unexpected vaginal bleeding.     · You have symptoms of a urinary tract infection. These may include:  ? Pain when you urinate. ? Urinating more often than usual.  ? Blood in your urine.     · You are dizzy or lightheaded, or you feel like you may faint.    Watch closely for changes in your health, and be sure to contact your doctor if:    · You are not getting better after 1 day (24 hours). Where can you learn more? Go to http://tay-lalo.info/. Enter E416 in the search box to learn more about \"Abdominal Pain: Care Instructions. \"  Current as of: September 23, 2018  Content Version: 11.9  © 5399-3630 [employer]. Care instructions adapted under license by [employer] (which disclaims liability or warranty for this information). If you have questions about a medical condition or this instruction, always ask your healthcare professional. [place] [address] any warranty or liability for your use of this information. Patient Education        Kidney Cancer: Care Instructions  Your Care Instructions  Kidney cancer is when abnormal cells grow out of control in one or both of the kidneys.  Your doctor will do tests to see if the cancer is in the kidney only or has spread to other parts of the body. Then you and your doctor can decide on treatment. Most people who have kidney cancer have surgery to remove the cancer and all or part of the kidney. Your treatment options will depend on the size of the tumor and whether it has spread. · For very small tumors, active surveillance may be an option. This means you will have tests on a regular basis and only have treatment if the cancer grows. · Small tumors may be treated with heat or cold to destroy the cancer cells. · Cancer that has spread to other parts of the body may be treated with targeted therapy or immunotherapy (with or without surgery). Many people still have the use of one or both kidneys after treatment for early kidney cancer. If you do not have a working kidney after treatment, you will need to have your blood cleaned by a machine (dialysis) or have a kidney transplant. Being treated for cancer can weaken your body, and you may feel very tired. Home treatment can help you feel better. Finding out that you have cancer is scary. You may feel many emotions and may need some help coping. Seek out family, friends, and counselors for support. You also can do things at home to make yourself feel better while you go through treatment. Call the [employer] (7-892.511.4861) or visit its website at [address] [employer] for more information. Follow-up care is a key part of your treatment and safety. Be sure to make and go to all appointments, and call your doctor if you are having problems. It's also a good idea to know your test results and keep a list of the medicines you take. How can you care for yourself at home? · Take your medicines exactly as prescribed. Call your doctor if you think you are having a problem with your medicine. You may get medicine for nausea and vomiting if you have these side effects. · Follow your doctor's instructions to relieve pain. Pain from cancer and surgery can almost always be controlled.  Use pain medicine when you first notice pain, before it becomes severe. · Eat healthy food. If you do not feel like eating, try to eat food that has protein and extra calories to keep up your strength and prevent weight loss. Drink liquid meal replacements for extra calories and protein. Try to eat your main meal early. Your doctor also may recommend a special diet. · Get some physical activity every day, but do not get too tired. Keep doing the hobbies you enjoy as your energy allows. · Get enough sleep. Try using a sleep mask and earplugs at night, and keep your bedroom dark, cool, and quiet. · Do not smoke. Smoking can make kidney cancer worse. If you need help quitting, talk to your doctor about stop-smoking programs and medicines. These can increase your chances of quitting for good. · Take steps to control your stress and workload. Learn relaxation techniques. ? Share your feelings. Stress and tension affect our emotions. By expressing your feelings to others, you may be able to understand and cope with them. ? Consider joining a support group. Talking about a problem with your spouse, a good friend, or other people with similar problems is a good way to reduce tension and stress. ? Express yourself through art. Try writing, crafts, dance, or art to relieve stress. Some dance, writing, or art groups may be available just for people who have cancer. ? Be kind to your body and mind. Getting enough sleep, eating a healthy diet, and taking time to do things you enjoy can contribute to an overall feeling of balance in your life and help reduce stress. ? Get help if you need it. Discuss your concerns with your doctor or counselor. · If you are vomiting or have diarrhea:  ? Drink plenty of fluids (enough so that your urine is light yellow or clear like water) to prevent dehydration. Choose water and other caffeine-free clear liquids.  If you have kidney, heart, or liver disease and have to limit fluids, talk with your doctor before you increase the amount of fluids you drink. ? When you are able to eat, try clear soups, mild foods, and liquids until all symptoms are gone for 12 to 48 hours. Other good choices include dry toast, crackers, cooked cereal, and gelatin dessert, such as Jell-O.  · If you have not already done so, prepare a list of advance directives. Advance directives are instructions to your doctor and family members about what kind of care you want if you become unable to speak or express yourself. When should you call for help? Call your doctor now or seek immediate medical care if:    · You have pain that does not get better after you take pain medicine.     · You have symptoms of a urinary infection. These may include:  ? Pain or burning when you urinate. ? A frequent need to urinate without being able to pass much urine. ? Pain in the flank, which is just below the rib cage and above the waist on either side of the back. ? Blood in your urine. ? A fever.    Watch closely for changes in your health, and be sure to contact your doctor if:    · You do not get better as expected. Where can you learn more? Go to http://tay-lalo.info/. Enter N160 in the search box to learn more about \"Kidney Cancer: Care Instructions. \"  Current as of: March 27, 2018  Content Version: 11.9  © 1568-1383 [employer]. Care instructions adapted under license by [employer] (which disclaims liability or warranty for this information). If you have questions about a medical condition or this instruction, always ask your healthcare professional. [place] [address] any warranty or liability for your use of this information.

## 2019-04-02 NOTE — ED NOTES
Patient bladder scan showed 272. MD requested straight cath instead of rosales. Attempted straight cath but unsuccessful. MD stated ok to place rosales due to difficulty with straight cath. Patient urinated a small amount after, but bladder scan still showed 211. MD Asad Issa notified. Ok to place rosales catheter. Rosales attempted and also unsuccesful.

## 2019-04-17 ENCOUNTER — HOSPITAL ENCOUNTER (EMERGENCY)
Age: 84
Discharge: HOME | End: 2019-04-17
Attending: EMERGENCY MEDICINE
Payer: MEDICARE

## 2019-04-17 ENCOUNTER — APPOINTMENT (OUTPATIENT)
Dept: CT IMAGING | Age: 84
End: 2019-04-17
Attending: EMERGENCY MEDICINE
Payer: MEDICARE

## 2019-04-17 VITALS
DIASTOLIC BLOOD PRESSURE: 60 MMHG | OXYGEN SATURATION: 98 % | RESPIRATION RATE: 20 BRPM | BODY MASS INDEX: 20.58 KG/M2 | SYSTOLIC BLOOD PRESSURE: 124 MMHG | WEIGHT: 147 LBS | HEART RATE: 87 BPM | TEMPERATURE: 97.9 F | HEIGHT: 71 IN

## 2019-04-17 DIAGNOSIS — N30.01 ACUTE CYSTITIS WITH HEMATURIA: Primary | ICD-10-CM

## 2019-04-17 DIAGNOSIS — C64.9 RENAL CELL CARCINOMA, UNSPECIFIED LATERALITY (HCC): ICD-10-CM

## 2019-04-17 DIAGNOSIS — R33.9 URINARY RETENTION: ICD-10-CM

## 2019-04-17 LAB
ALBUMIN SERPL-MCNC: 2.6 G/DL (ref 3.5–5)
ALBUMIN/GLOB SERPL: 0.6 {RATIO} (ref 1.1–2.2)
ALP SERPL-CCNC: 92 U/L (ref 45–117)
ALT SERPL-CCNC: 30 U/L (ref 12–78)
ANION GAP SERPL CALC-SCNC: 9 MMOL/L (ref 5–15)
APPEARANCE UR: CLEAR
AST SERPL-CCNC: 27 U/L (ref 15–37)
BACTERIA URNS QL MICRO: NEGATIVE /HPF
BASOPHILS # BLD: 0 K/UL (ref 0–0.1)
BASOPHILS NFR BLD: 0 % (ref 0–1)
BILIRUB SERPL-MCNC: 0.4 MG/DL (ref 0.2–1)
BILIRUB UR QL: NEGATIVE
BUN SERPL-MCNC: 24 MG/DL (ref 6–20)
BUN/CREAT SERPL: 13 (ref 12–20)
CALCIUM SERPL-MCNC: 8 MG/DL (ref 8.5–10.1)
CHLORIDE SERPL-SCNC: 107 MMOL/L (ref 97–108)
CO2 SERPL-SCNC: 20 MMOL/L (ref 21–32)
COLOR UR: ABNORMAL
CREAT SERPL-MCNC: 1.92 MG/DL (ref 0.7–1.3)
DIFFERENTIAL METHOD BLD: ABNORMAL
EOSINOPHIL # BLD: 0 K/UL (ref 0–0.4)
EOSINOPHIL NFR BLD: 0 % (ref 0–7)
EPITH CASTS URNS QL MICRO: ABNORMAL /LPF
ERYTHROCYTE [DISTWIDTH] IN BLOOD BY AUTOMATED COUNT: 12.7 % (ref 11.5–14.5)
GLOBULIN SER CALC-MCNC: 4.5 G/DL (ref 2–4)
GLUCOSE SERPL-MCNC: 90 MG/DL (ref 65–100)
GLUCOSE UR STRIP.AUTO-MCNC: NEGATIVE MG/DL
HCT VFR BLD AUTO: 31.4 % (ref 36.6–50.3)
HGB BLD-MCNC: 10.1 G/DL (ref 12.1–17)
HGB UR QL STRIP: ABNORMAL
HYALINE CASTS URNS QL MICRO: ABNORMAL /LPF (ref 0–5)
IMM GRANULOCYTES # BLD AUTO: 0.1 K/UL (ref 0–0.04)
IMM GRANULOCYTES NFR BLD AUTO: 1 % (ref 0–0.5)
KETONES UR QL STRIP.AUTO: NEGATIVE MG/DL
LEUKOCYTE ESTERASE UR QL STRIP.AUTO: ABNORMAL
LYMPHOCYTES # BLD: 1.6 K/UL (ref 0.8–3.5)
LYMPHOCYTES NFR BLD: 23 % (ref 12–49)
MCH RBC QN AUTO: 31.6 PG (ref 26–34)
MCHC RBC AUTO-ENTMCNC: 32.2 G/DL (ref 30–36.5)
MCV RBC AUTO: 98.1 FL (ref 80–99)
MONOCYTES # BLD: 0.5 K/UL (ref 0–1)
MONOCYTES NFR BLD: 8 % (ref 5–13)
NEUTS SEG # BLD: 4.8 K/UL (ref 1.8–8)
NEUTS SEG NFR BLD: 69 % (ref 32–75)
NITRITE UR QL STRIP.AUTO: NEGATIVE
NRBC # BLD: 0 K/UL (ref 0–0.01)
NRBC BLD-RTO: 0 PER 100 WBC
PH UR STRIP: 7.5 [PH] (ref 5–8)
PLATELET # BLD AUTO: 387 K/UL (ref 150–400)
PMV BLD AUTO: 9.4 FL (ref 8.9–12.9)
POTASSIUM SERPL-SCNC: 4.1 MMOL/L (ref 3.5–5.1)
PROT SERPL-MCNC: 7.1 G/DL (ref 6.4–8.2)
PROT UR STRIP-MCNC: NEGATIVE MG/DL
RBC # BLD AUTO: 3.2 M/UL (ref 4.1–5.7)
RBC #/AREA URNS HPF: ABNORMAL /HPF (ref 0–5)
SODIUM SERPL-SCNC: 136 MMOL/L (ref 136–145)
SP GR UR REFRACTOMETRY: 1.01 (ref 1–1.03)
UA: UC IF INDICATED,UAUC: ABNORMAL
UROBILINOGEN UR QL STRIP.AUTO: 1 EU/DL (ref 0.2–1)
WBC # BLD AUTO: 6.9 K/UL (ref 4.1–11.1)
WBC URNS QL MICRO: ABNORMAL /HPF (ref 0–4)

## 2019-04-17 PROCEDURE — 77030005530 HC CATH URETH FOL40 BARD -B

## 2019-04-17 PROCEDURE — 36415 COLL VENOUS BLD VENIPUNCTURE: CPT

## 2019-04-17 PROCEDURE — 51702 INSERT TEMP BLADDER CATH: CPT

## 2019-04-17 PROCEDURE — 85025 COMPLETE CBC W/AUTO DIFF WBC: CPT

## 2019-04-17 PROCEDURE — 96365 THER/PROPH/DIAG IV INF INIT: CPT

## 2019-04-17 PROCEDURE — 74176 CT ABD & PELVIS W/O CONTRAST: CPT

## 2019-04-17 PROCEDURE — 99285 EMERGENCY DEPT VISIT HI MDM: CPT

## 2019-04-17 PROCEDURE — 80053 COMPREHEN METABOLIC PANEL: CPT

## 2019-04-17 PROCEDURE — 77030034696 HC CATH URETH FOL 2W BARD -A

## 2019-04-17 PROCEDURE — 74011250636 HC RX REV CODE- 250/636: Performed by: EMERGENCY MEDICINE

## 2019-04-17 PROCEDURE — 87086 URINE CULTURE/COLONY COUNT: CPT

## 2019-04-17 PROCEDURE — 51798 US URINE CAPACITY MEASURE: CPT

## 2019-04-17 PROCEDURE — 77030005518 HC CATH URETH FOL 2W BARD -B

## 2019-04-17 PROCEDURE — 81001 URINALYSIS AUTO W/SCOPE: CPT

## 2019-04-17 PROCEDURE — 74011000258 HC RX REV CODE- 258: Performed by: EMERGENCY MEDICINE

## 2019-04-17 RX ORDER — CEPHALEXIN 500 MG/1
500 CAPSULE ORAL 3 TIMES DAILY
Qty: 30 CAP | Refills: 0 | Status: SHIPPED | OUTPATIENT
Start: 2019-04-17 | End: 2019-04-27

## 2019-04-17 RX ADMIN — CEFTRIAXONE 1 G: 1 INJECTION, POWDER, FOR SOLUTION INTRAMUSCULAR; INTRAVENOUS at 17:15

## 2019-04-17 NOTE — ED NOTES
Assumed care of patient from EMS. Patient is A&Ox4, respirations even and unlabored. Pt. States he has had generalized abdominal pain for several days, decreased appetite and oral intake, difficulty with BMs and urinating. Patient reports being catheterized in ED for retention on 4/1 and again at urologist's office on 4/10. Recently diagnosed with renal cancer and scheduled to have his right kidney removed in the next few weeks. Patient reports burning and frequency with urination with a feeling of fullness and retention after urination. Patient reports urinating small amounts at a time and with a poor stream. 
 
Patient denies n/v/d, SOB, chest pain. Pt. Dariana Birch in low bed in POC, side rail x2, monitor x3, call light within reach.

## 2019-04-17 NOTE — ED NOTES
Jett catheter placed without difficulty. Patient tolerated procedure well. Voided 310mL immediately. No further needs at this time. Family back at bedside. VS stable, call bell in reach.

## 2019-04-17 NOTE — ED PROVIDER NOTES
EMERGENCY DEPARTMENT HISTORY AND PHYSICAL EXAM 
 
 
Date: 4/17/2019 Patient Name: Duayne Bloch History of Presenting Illness Chief Complaint Patient presents with  Abdominal Pain  Constipation  Urinary Pain History Provided By: Patient HPI: Duayne Bloch, 80 y.o. male with PMHx significant for renal cell carcinoma, enlarged prostate, presents via EMS to the ED with cc of dysuria and difficulty urinating for the past several days. Reports he has had some difficulty urinating for the past month. Acutely worsened over the past 2 days. Denies any fevers or chills. He does feel that he is constipated. Denies any abdominal pain. Denies any nausea vomiting. Denies any hematuria. Denies any penile discharge. Denies any new back pain. Reports some suprapubic abdominal pain. Described as a dull ache. Port some nausea but denies any vomiting. On review of old records in EMR, he was seen here on April 1 with similar complaints. He had a CT scan with a large left renal cell carcinoma, and probable small right renal cell carcinoma, as well as prostatomegaly. He was able to urinate on his own and was sent home with urology follow-up. He is a poor historian. He states he did follow-up with urology after that ER visit. He states he is scheduled for a left nephrectomy. PMHx: Significant for renal cell carcinoma PSHx: Significant for hip replacement Social Hx: Non-smoker, nondrinker, denies illicit drug use There are no other complaints, changes, or physical findings at this time. PCP: Richy Ricketts MD 
 
No current facility-administered medications on file prior to encounter. Current Outpatient Medications on File Prior to Encounter Medication Sig Dispense Refill  ondansetron (ZOFRAN ODT) 4 mg disintegrating tablet Take 1 Tab by mouth every eight (8) hours as needed for Nausea.  10 Tab 0  
  dicyclomine (BENTYL) 20 mg tablet Take 1 Tab by mouth every six (6) hours as needed (abdominal cramps) for up to 20 doses. 20 Tab 0  
 HYDROcodone-acetaminophen (NORCO) 5-325 mg per tablet Take 1-2 Tabs by mouth every six (6) hours as needed for Pain. Max Daily Amount: 8 Tabs. 30 Tab 0  
 amLODIPine-Olmesartan 10-20 mg tab Take 1 Tab by mouth daily.  tamsulosin (FLOMAX) 0.4 mg capsule Take 0.4 mg by mouth daily.  atenolol-chlorthalidone (TENORETIC) 50-25 mg per tablet Take 1 Tab by mouth daily.  multivitamin (ONE A DAY) tablet Take 1 Tab by mouth daily.  Gluc-MSM-Jeff 2-Hyal-Antiar 3 375-375-20 mg tab Take 1 Tab by mouth daily.  diazepam (VALIUM) 5 mg tablet Take 1 Tab by mouth three (3) times daily as needed for Anxiety (spasm). 20 Tab 0 Past History Past Medical History: 
Past Medical History:  
Diagnosis Date  Arthritis  Cancer ([facility] [address] 04/2019  
 renal  
 Hypertension  Prostate enlargement Past Surgical History: 
Past Surgical History:  
Procedure Laterality Date  HX ORTHOPAEDIC Right 2008  
 hip replacement/[facility]/Dr Estrada Many  HX PREMALIG/BENIGN SKIN LESION EXCISION  07/13/2018  
 excision of left flank Family History: 
Family History Problem Relation Age of Onset  Cancer Daughter Breast ca - stage 1  
 Hypertension Mother Social History: 
Social History Tobacco Use  Smoking status: Never Smoker  Smokeless tobacco: Never Used Substance Use Topics  Alcohol use: No  
 Drug use: No  
 
 
Allergies: 
No Known Allergies Review of Systems Review of Systems Constitutional: Negative for activity change, chills and fever. HENT: Negative for congestion and sore throat. Eyes: Negative for pain and redness. Respiratory: Negative for cough, chest tightness and shortness of breath. Cardiovascular: Negative for chest pain and palpitations. Gastrointestinal: Positive for abdominal pain and nausea. Negative for diarrhea and vomiting. Genitourinary: Positive for decreased urine volume, difficulty urinating and dysuria. Negative for discharge, flank pain, frequency, genital sores, hematuria, penile pain, penile swelling, scrotal swelling, testicular pain and urgency. Musculoskeletal: Negative for back pain and neck pain. Skin: Negative for rash. Neurological: Negative for syncope, light-headedness and headaches. Psychiatric/Behavioral: Negative for confusion. All other systems reviewed and are negative. Physical Exam  
Physical Exam  
Constitutional: He is oriented to person, place, and time. He appears well-developed and well-nourished. No distress. HENT:  
Head: Normocephalic. Nose: Nose normal.  
Mouth/Throat: Oropharynx is clear and moist. No oropharyngeal exudate. Eyes: Pupils are equal, round, and reactive to light. Conjunctivae are normal. No scleral icterus. Neck: Normal range of motion. Neck supple. No JVD present. No tracheal deviation present. No thyromegaly present. Cardiovascular: Normal rate, regular rhythm and intact distal pulses. Exam reveals no gallop and no friction rub. No murmur heard. Pulmonary/Chest: Effort normal and breath sounds normal. No stridor. No respiratory distress. He has no wheezes. He has no rales. Abdominal: Soft. Bowel sounds are normal. He exhibits no distension. There is no tenderness. There is no rebound and no guarding. Musculoskeletal: Normal range of motion. He exhibits no edema. Lymphadenopathy:  
  He has no cervical adenopathy. Neurological: He is alert and oriented to person, place, and time. No cranial nerve deficit. He exhibits normal muscle tone. Coordination normal.  
Skin: Skin is warm and dry. No rash noted. He is not diaphoretic. No erythema. Psychiatric: He has a normal mood and affect. His behavior is normal.  
Nursing note and vitals reviewed. Diagnostic Study Results Labs - Recent Results (from the past 12 hour(s)) CBC WITH AUTOMATED DIFF Collection Time: 04/17/19  4:18 PM  
Result Value Ref Range WBC 6.9 4.1 - 11.1 K/uL  
 RBC 3.20 (L) 4.10 - 5.70 M/uL  
 HGB 10.1 (L) 12.1 - 17.0 g/dL HCT 31.4 (L) 36.6 - 50.3 % MCV 98.1 80.0 - 99.0 FL  
 MCH 31.6 26.0 - 34.0 PG  
 MCHC 32.2 30.0 - 36.5 g/dL  
 RDW 12.7 11.5 - 14.5 % PLATELET 372 333 - 506 K/uL MPV 9.4 8.9 - 12.9 FL  
 NRBC 0.0 0  WBC ABSOLUTE NRBC 0.00 0.00 - 0.01 K/uL NEUTROPHILS 69 32 - 75 % LYMPHOCYTES 23 12 - 49 % MONOCYTES 8 5 - 13 % EOSINOPHILS 0 0 - 7 % BASOPHILS 0 0 - 1 % IMMATURE GRANULOCYTES 1 (H) 0.0 - 0.5 % ABS. NEUTROPHILS 4.8 1.8 - 8.0 K/UL  
 ABS. LYMPHOCYTES 1.6 0.8 - 3.5 K/UL  
 ABS. MONOCYTES 0.5 0.0 - 1.0 K/UL  
 ABS. EOSINOPHILS 0.0 0.0 - 0.4 K/UL  
 ABS. BASOPHILS 0.0 0.0 - 0.1 K/UL  
 ABS. IMM. GRANS. 0.1 (H) 0.00 - 0.04 K/UL  
 DF AUTOMATED METABOLIC PANEL, COMPREHENSIVE Collection Time: 04/17/19  4:18 PM  
Result Value Ref Range Sodium 136 136 - 145 mmol/L Potassium 4.1 3.5 - 5.1 mmol/L Chloride 107 97 - 108 mmol/L  
 CO2 20 (L) 21 - 32 mmol/L Anion gap 9 5 - 15 mmol/L Glucose 90 65 - 100 mg/dL BUN 24 (H) 6 - 20 MG/DL Creatinine 1.92 (H) 0.70 - 1.30 MG/DL  
 BUN/Creatinine ratio 13 12 - 20 GFR est AA 41 (L) >60 ml/min/1.73m2 GFR est non-AA 33 (L) >60 ml/min/1.73m2 Calcium 8.0 (L) 8.5 - 10.1 MG/DL Bilirubin, total 0.4 0.2 - 1.0 MG/DL  
 ALT (SGPT) 30 12 - 78 U/L  
 AST (SGOT) 27 15 - 37 U/L Alk. phosphatase 92 45 - 117 U/L Protein, total 7.1 6.4 - 8.2 g/dL Albumin 2.6 (L) 3.5 - 5.0 g/dL Globulin 4.5 (H) 2.0 - 4.0 g/dL A-G Ratio 0.6 (L) 1.1 - 2.2 URINALYSIS W/ REFLEX CULTURE Collection Time: 04/17/19  4:18 PM  
Result Value Ref Range Color YELLOW/STRAW Appearance CLEAR CLEAR Specific gravity 1.010 1.003 - 1.030    
 pH (UA) 7.5 5.0 - 8.0 Protein NEGATIVE  NEG mg/dL Glucose NEGATIVE  NEG mg/dL Ketone NEGATIVE  NEG mg/dL Bilirubin NEGATIVE  NEG Blood TRACE (A) NEG Urobilinogen 1.0 0.2 - 1.0 EU/dL Nitrites NEGATIVE  NEG Leukocyte Esterase MODERATE (A) NEG    
 WBC 20-50 0 - 4 /hpf  
 RBC 5-10 0 - 5 /hpf Epithelial cells FEW FEW /lpf Bacteria NEGATIVE  NEG /hpf  
 UA:UC IF INDICATED URINE CULTURE ORDERED (A) CNI Hyaline cast 0-2 0 - 5 /lpf Radiologic Studies -  
CT ABD PELV WO CONT Final Result IMPRESSION:  
  
1. Multiple bilateral renal lesions again seen, several of which are hyperdense. These are indeterminate. The superior one in the left upper pole is particularly  
worrisome for renal cell carcinoma and may contain internal hemorrhage. 2. No significant interval change. 3. Prostate enlargement. 4. Urinary catheter in place. CT Results  (Last 48 hours) 04/17/19 1846  CT ABD PELV WO CONT Final result Impression:  IMPRESSION:  
   
1. Multiple bilateral renal lesions again seen, several of which are hyperdense. These are indeterminate. The superior one in the left upper pole is particularly  
worrisome for renal cell carcinoma and may contain internal hemorrhage. 2. No significant interval change. 3. Prostate enlargement. 4. Urinary catheter in place. Narrative:  EXAM: CT ABD PELV WO CONT INDICATION: Abdominal pain; dysuria/uti; recently dxd with RCC  
   
COMPARISON: CT 4/1/2018 CONTRAST:  None. TECHNIQUE:   
Thin axial images were obtained through the abdomen and pelvis. Coronal and  
sagittal reconstructions were generated. Oral contrast was not administered. CT  
dose reduction was achieved through use of a standardized protocol tailored for  
this examination and automatic exposure control for dose modulation. The absence of intravenous contrast material reduces the sensitivity for evaluation of the solid parenchymal organs of the abdomen. FINDINGS:   
LUNG BASES: Clear. INCIDENTALLY IMAGED HEART AND MEDIASTINUM: Unremarkable. LIVER: No mass or biliary dilatation. GALLBLADDER: Unremarkable. SPLEEN: No mass. PANCREAS: No mass or ductal dilatation. ADRENALS: Unremarkable. KIDNEYS/URETERS: Multiple bilateral renal lesions are again seen with several  
being hyperdense and complex. The largest on the left posterior tibia worrisome  
for renal cell carcinoma and recent hemorrhage as previously described. No  
significant hydronephrosis. STOMACH: Small hiatal hernia. SMALL BOWEL: No dilatation or wall thickening. COLON: No dilatation or wall thickening. APPENDIX: Unremarkable. PERITONEUM: No ascites or pneumoperitoneum. RETROPERITONEUM: No lymphadenopathy or aortic aneurysm. REPRODUCTIVE ORGANS: The prostate gland is enlarged. URINARY BLADDER: The urinary catheter is in place. Several locules of gas in the  
bladder likely related to this. BONES: The patient is status post right total hip arthroplasty. There is severe  
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 There is trabecular thickening in the right  
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 Significant multilevel  
spondylosis is seen in the spine. ADDITIONAL COMMENTS: There is a left inguinal fat-containing hernia. CXR Results  (Last 48 hours) None Medical Decision Making I am the first provider for this patient. I reviewed the vital signs, available nursing notes, past medical history, past surgical history, family history and social history. Vital Signs-Reviewed the patient's vital signs. Patient Vitals for the past 12 hrs: 
 Temp Pulse Resp BP SpO2  
04/17/19 1954    109/66 98 % 04/17/19 1800    94/58 98 % 04/17/19 1730    108/54 98 % 04/17/19 1700    118/79 93 % 04/17/19 1630    123/89 97 % 04/17/19 1603 97.9 °F (36.6 °C) 87 20 154/72 99 % Pulse Oximetry Analysis - 99% on RA Cardiac Monitor:  
Rate: 87 bpm 
Rhythm: Normal Sinus Rhythm Records Reviewed: Nursing Notes and Old Medical Records Provider Notes (Medical Decision Making): DDX: UTI, constipation, prostatitis, urinary retention ED Course:  
Initial assessment performed. The patients presenting problems have been discussed, and they are in agreement with the care plan formulated and outlined with them. I have encouraged them to ask questions as they arise throughout their visit. PROGRESS NOTE Pt reevaluated. Pt feeling better after Jett placement. Positive UTI. Gave IV ceftriaxone here. No increased WBC. Creatinine mildly increased from baseline. CT abdomen pelvis unchanged from prior. DC home with Keflex times 10 days. Patient agreed to follow-up with urology as scheduled. Written by Diogenes Mckinnon MD  
 
Progress note: 
 
Pt noted to be feeling better , ready for discharge. Updated pt and/or family on all final lab and imaging findings. Will follow up as instructed. All questions have been answered, pt voiced understanding and agreement with plan. Abx were prescribed, pt advised that diarrhea and rash are possible side effects of the medications. Specific return precautions provided as well as instructions to return to the ED should sx worsen at any time. Vital signs stable for discharge. Written by Diogenes Mckinnon MD 
 
 
 
Critical Care Time:  
0 Disposition: 
Discharge home PLAN: 
1. Current Discharge Medication List  
  
START taking these medications Details  
cephALEXin (KEFLEX) 500 mg capsule Take 1 Cap by mouth three (3) times daily for 10 days. Qty: 30 Cap, Refills: 0  
  
  
 
2. Follow-up Information Follow up With Specialties Details Why Contact Info  Richy Ricketts MD Internal Medicine Schedule an appointment as soon as possible for a visit in 1 week  [address] [address] [address] [address] [facility] [address] [phone] [facility] EMERGENCY DEPT Emergency Medicine Go in 1 day If symptoms worsen [address] [facility] 
[phone] Your Urologist  Call in 1 day Return to ED if worse Diagnosis Clinical Impression: 1. Acute cystitis with hematuria 2. Urinary retention 3. Renal cell carcinoma, unspecified laterality ([facility] [address] Please note that this dictation was completed with [employer], the computer voice recognition software. Quite often unanticipated grammatical, syntax, homophones, and other interpretive errors are inadvertently transcribed by the computer software. Please disregard these errors. Please excuse any errors that have escaped final proofreading.

## 2019-04-17 NOTE — ED NOTES
Pt. Resting comfortably in bed, denies needs at this time. Bed locked and low. Patient going to CT at this time.

## 2019-04-17 NOTE — DISCHARGE INSTRUCTIONS
Patient Education        Kidney Cancer: Care Instructions  Your Care Instructions  Kidney cancer is when abnormal cells grow out of control in one or both of the kidneys. Your doctor will do tests to see if the cancer is in the kidney only or has spread to other parts of the body. Then you and your doctor can decide on treatment. Most people who have kidney cancer have surgery to remove the cancer and all or part of the kidney. Your treatment options will depend on the size of the tumor and whether it has spread. · For very small tumors, active surveillance may be an option. This means you will have tests on a regular basis and only have treatment if the cancer grows. · Small tumors may be treated with heat or cold to destroy the cancer cells. · Cancer that has spread to other parts of the body may be treated with targeted therapy or immunotherapy (with or without surgery). Many people still have the use of one or both kidneys after treatment for early kidney cancer. If you do not have a working kidney after treatment, you will need to have your blood cleaned by a machine (dialysis) or have a kidney transplant. Being treated for cancer can weaken your body, and you may feel very tired. Home treatment can help you feel better. Finding out that you have cancer is scary. You may feel many emotions and may need some help coping. Seek out family, friends, and counselors for support. You also can do things at home to make yourself feel better while you go through treatment. Call the [employer] [facility] ([phone]) or visit its website at [employer] [employer] 3TEN8 for more information. Follow-up care is a key part of your treatment and safety. Be sure to make and go to all appointments, and call your doctor if you are having problems. It's also a good idea to know your test results and keep a list of the medicines you take. How can you care for yourself at home? · Take your medicines exactly as prescribed.  Call your doctor if you think you are having a problem with your medicine. You may get medicine for nausea and vomiting if you have these side effects. · Follow your doctor's instructions to relieve pain. Pain from cancer and surgery can almost always be controlled. Use pain medicine when you first notice pain, before it becomes severe. · Eat healthy food. If you do not feel like eating, try to eat food that has protein and extra calories to keep up your strength and prevent weight loss. Drink liquid meal replacements for extra calories and protein. Try to eat your main meal early. Your doctor also may recommend a special diet. · Get some physical activity every day, but do not get too tired. Keep doing the hobbies you enjoy as your energy allows. · Get enough sleep. Try using a sleep mask and earplugs at night, and keep your bedroom dark, cool, and quiet. · Do not smoke. Smoking can make kidney cancer worse. If you need help quitting, talk to your doctor about stop-smoking programs and medicines. These can increase your chances of quitting for good. · Take steps to control your stress and workload. Learn relaxation techniques. ? Share your feelings. Stress and tension affect our emotions. By expressing your feelings to others, you may be able to understand and cope with them. ? Consider joining a support group. Talking about a problem with your spouse, a good friend, or other people with similar problems is a good way to reduce tension and stress. ? Express yourself through art. Try writing, crafts, dance, or art to relieve stress. Some dance, writing, or art groups may be available just for people who have cancer. ? Be kind to your body and mind. Getting enough sleep, eating a healthy diet, and taking time to do things you enjoy can contribute to an overall feeling of balance in your life and help reduce stress. ? Get help if you need it. Discuss your concerns with your doctor or counselor.   · If you are vomiting or have diarrhea:  ? Drink plenty of fluids (enough so that your urine is light yellow or clear like water) to prevent dehydration. Choose water and other caffeine-free clear liquids. If you have kidney, heart, or liver disease and have to limit fluids, talk with your doctor before you increase the amount of fluids you drink. ? When you are able to eat, try clear soups, mild foods, and liquids until all symptoms are gone for 12 to 48 hours. Other good choices include dry toast, crackers, cooked cereal, and gelatin dessert, such as Jell-O.  · If you have not already done so, prepare a list of advance directives. Advance directives are instructions to your doctor and family members about what kind of care you want if you become unable to speak or express yourself. When should you call for help? Call your doctor now or seek immediate medical care if:    · You have pain that does not get better after you take pain medicine.     · You have symptoms of a urinary infection. These may include:  ? Pain or burning when you urinate. ? A frequent need to urinate without being able to pass much urine. ? Pain in the flank, which is just below the rib cage and above the waist on either side of the back. ? Blood in your urine. ? A fever.    Watch closely for changes in your health, and be sure to contact your doctor if:    · You do not get better as expected. Where can you learn more? Go to http://tay-lalo.info/. Enter N160 in the search box to learn more about \"Kidney Cancer: Care Instructions. \"  Current as of: March 27, 2018  Content Version: 11.9  © 7276-2621 [employer]. Care instructions adapted under license by [employer] (which disclaims liability or warranty for this information).  If you have questions about a medical condition or this instruction, always ask your healthcare professional. [place] [address] any warranty or liability for your use of this information. Patient Education        Urinary Tract Infections in Men: Care Instructions  Your Care Instructions    A urinary tract infection, or UTI, is a general term for an infection anywhere between the kidneys and the tip of the penis. UTIs can also be a result of a prostate problem. Most cause pain or burning when you urinate. Most UTIs are caused by bacteria and can be cured with antibiotics. It is important to complete your treatment so that the infection does not get worse. Follow-up care is a key part of your treatment and safety. Be sure to make and go to all appointments, and call your doctor if you are having problems. It's also a good idea to know your test results and keep a list of the medicines you take. How can you care for yourself at home? · Take your antibiotics as prescribed. Do not stop taking them just because you feel better. You need to take the full course of antibiotics. · Take your medicines exactly as prescribed. Your doctor may have prescribed a medicine, such as phenazopyridine (Pyridium), to help relieve pain when you urinate. This turns your urine orange. You may stop taking it when your symptoms get better. But be sure to take all of your antibiotics, which treat the infection. · Drink extra water for the next day or two. This will help make the urine less concentrated and help wash out the bacteria causing the infection. (If you have kidney, heart, or liver disease and have to limit your fluids, talk with your doctor before you increase your fluid intake.)  · Avoid drinks that are carbonated or have caffeine. They can irritate the bladder. · Urinate often. Try to empty your bladder each time. · To relieve pain, take a hot bath or lay a heating pad (set on low) over your lower belly or genital area. Never go to sleep with a heating pad in place. To help prevent UTIs  · Drink plenty of fluids, enough so that your urine is light yellow or clear like water.  If you have kidney, heart, or liver disease and have to limit fluids, talk with your doctor before you increase the amount of fluids you drink. · Urinate when you have the urge. Do not hold your urine for a long time. Urinate before you go to sleep. · Keep your penis clean. Catheter care  If you have a drainage tube (catheter) in place, the following steps will help you care for it. · Always wash your hands before and after touching your catheter. · Check the area around the urethra for inflammation or signs of infection. Signs of infection include irritated, swollen, red, or tender skin, or pus around the catheter. · Clean the area around the catheter with soap and water two times a day. Dry with a clean towel afterward. · Do not apply powder or lotion to the skin around the catheter. To empty the urine collection bag  · Wash your hands with soap and water. · Without touching the drain spout, remove the spout from its sleeve at the bottom of the collection bag. Open the valve on the spout. · Let the urine flow out of the bag and into the toilet or a container. Do not let the tubing or drain spout touch anything. · After you empty the bag, clean the end of the drain spout with tissue and water. Close the valve and put the drain spout back into its sleeve at the bottom of the collection bag. · Wash your hands with soap and water. When should you call for help? Call your doctor now or seek immediate medical care if:    · Symptoms such as a fever, chills, nausea, or vomiting get worse or happen for the first time.     · You have new pain in your back just below your rib cage. This is called flank pain.     · There is new blood or pus in your urine.     · You are not able to take or keep down your antibiotics.    Watch closely for changes in your health, and be sure to contact your doctor if:    · You are not getting better after taking an antibiotic for 2 days.     · Your symptoms go away but then come back. Where can you learn more? Go to http://tay-lalo.info/. Enter D359 in the search box to learn more about \"Urinary Tract Infections in Men: Care Instructions. \"  Current as of: March 20, 2018  Content Version: 11.9  © 6369-3621 [employer]. Care instructions adapted under license by [employer] (which disclaims liability or warranty for this information). If you have questions about a medical condition or this instruction, always ask your healthcare professional. [address] any warranty or liability for your use of this information. Patient Education        Urinary Retention: Care Instructions  Your Care Instructions    Urinary retention means that you aren't able to urinate. In men, it is often caused by a blockage of the urinary tract from an enlarged prostate gland. In men and women, it can also be caused by an infection or nerve damage. Or it may be a side effect of a medicine. The doctor may have drained the urine from your bladder. If you still have problems passing urine, you may need to use a catheter at home. This is used to empty your bladder until the problem can be fixed. Your doctor may put a catheter in your bladder before you go home. If so, he or she will tell you when to come back to have the catheter removed. The doctor has checked you closely. But problems can develop later. If you notice any problems or new symptoms, get medical treatment right away. Follow-up care is a key part of your treatment and safety. Be sure to make and go to all appointments, and call your doctor if you are having problems. It's also a good idea to know your test results and keep a list of the medicines you take. How can you care for yourself at home? · Take your medicines exactly as prescribed. Call your doctor if you think you are having a problem with your medicine.  You will get more details on the specific medicines your doctor prescribes. · Check with your doctor before you use any over-the-counter medicines. Many cold and allergy medicines, for example, can make this problem worse. Make sure your doctor knows all of the medicines, vitamins, supplements, and herbal remedies you take. · Spread out through the day the amount of fluid you drink. Do not drink a lot at bedtime. · Avoid alcohol and caffeine. · If you have been given a catheter, or if one is already in place, follow the instructions you were given. Always wash your hands before and after you handle the catheter. When should you call for help? Call your doctor now or seek immediate medical care if:    · You cannot urinate at all, or it is getting harder to urinate.     · You have symptoms of a urinary tract infection. These may include:  ? Pain or burning when you urinate. ? A frequent need to urinate without being able to pass much urine. ? Pain in the flank, which is just below the rib cage and above the waist on either side of the back. ? Blood in your urine. ? A fever.    Watch closely for changes in your health, and be sure to contact your doctor if:    · You have any problems with your catheter.     · You do not get better as expected. Where can you learn more? Go to http://tay-lalo.info/. Enter M244 in the search box to learn more about \"Urinary Retention: Care Instructions. \"  Current as of: March 20, 2018  Content Version: 11.9  © 9018-6729 [employer]. Care instructions adapted under license by [employer] (which disclaims liability or warranty for this information). If you have questions about a medical condition or this instruction, always ask your healthcare professional. [place] [address] any warranty or liability for your use of this information.

## 2019-04-18 NOTE — ED NOTES
Patient's Jett converted to 900mL leg bag. Teaching on usage completed with patient and family. Assisted patient to get dressed. Dr. Niyah Carvalho has reviewed discharge instructions with the patient and caregiver. The patient and caregiver verbalized understanding. Pt. A&Ox4, respirations even and unlabored. VS stable as noted in flowsheet. Wheelchair assist from department; paperwork in hand.

## 2019-04-19 LAB
BACTERIA SPEC CULT: NORMAL
CC UR VC: NORMAL
SERVICE CMNT-IMP: NORMAL

## 2019-04-20 ENCOUNTER — HOSPITAL ENCOUNTER (EMERGENCY)
Age: 84
Discharge: HOME | End: 2019-04-20
Attending: EMERGENCY MEDICINE
Payer: MEDICARE

## 2019-04-20 VITALS
TEMPERATURE: 97.5 F | SYSTOLIC BLOOD PRESSURE: 133 MMHG | OXYGEN SATURATION: 97 % | HEIGHT: 71 IN | RESPIRATION RATE: 18 BRPM | WEIGHT: 142.2 LBS | DIASTOLIC BLOOD PRESSURE: 61 MMHG | HEART RATE: 86 BPM | BODY MASS INDEX: 19.91 KG/M2

## 2019-04-20 DIAGNOSIS — Z76.89 ENCOUNTER FOR ASSESSMENT OF FOLEY CATHETER: Primary | ICD-10-CM

## 2019-04-20 LAB
AMORPH CRY URNS QL MICRO: ABNORMAL
APPEARANCE UR: ABNORMAL
BACTERIA URNS QL MICRO: NEGATIVE /HPF
BILIRUB UR QL: NEGATIVE
COLOR UR: ABNORMAL
EPITH CASTS URNS QL MICRO: ABNORMAL /LPF
GLUCOSE UR STRIP.AUTO-MCNC: NEGATIVE MG/DL
HGB UR QL STRIP: ABNORMAL
KETONES UR QL STRIP.AUTO: NEGATIVE MG/DL
LEUKOCYTE ESTERASE UR QL STRIP.AUTO: ABNORMAL
MUCOUS THREADS URNS QL MICRO: ABNORMAL /LPF
NITRITE UR QL STRIP.AUTO: NEGATIVE
PH UR STRIP: 5.5 [PH] (ref 5–8)
PROT UR STRIP-MCNC: 30 MG/DL
RBC #/AREA URNS HPF: ABNORMAL /HPF (ref 0–5)
SP GR UR REFRACTOMETRY: 1.02 (ref 1–1.03)
UA: UC IF INDICATED,UAUC: ABNORMAL
UROBILINOGEN UR QL STRIP.AUTO: 0.2 EU/DL (ref 0.2–1)
WBC URNS QL MICRO: ABNORMAL /HPF (ref 0–4)

## 2019-04-20 PROCEDURE — 81001 URINALYSIS AUTO W/SCOPE: CPT

## 2019-04-20 PROCEDURE — 99284 EMERGENCY DEPT VISIT MOD MDM: CPT

## 2019-04-21 NOTE — ED PROVIDER NOTES
EMERGENCY DEPARTMENT HISTORY AND PHYSICAL EXAM 
 
 
Date: 4/20/2019 Patient Name: Harvinder Thornton History of Presenting Illness Chief Complaint Patient presents with  Urinary Catheter Problem  
  complains of pain at rosales insertion site: pt denies chest pain History Provided By: Patient HPI: Harvinder Thornton, 80 y.o. male with PMHx significant for BPH with urinary retention requiring Rosales catheter placement 2 days ago, renal cancer (newly diagnosed), who presents to the emergency department with a chief complaint of irritation at his Rosales catheter site. Patient's family is with him and reports that he has been complaining of pain at the tip of his penis related to the Rosales catheter. Catheter has been draining normally. They note that he was started on antibiotics 2 days ago when catheter was placed for a UTI. Patient denies any fevers, abdominal pain, vomiting, diarrhea. PCP: Larae Klinefelter, MD 
 
There are no other complaints, changes, or physical findings at this time. Current Outpatient Medications Medication Sig Dispense Refill  cephALEXin (KEFLEX) 500 mg capsule Take 1 Cap by mouth three (3) times daily for 10 days. 30 Cap 0  
 ondansetron (ZOFRAN ODT) 4 mg disintegrating tablet Take 1 Tab by mouth every eight (8) hours as needed for Nausea. 10 Tab 0  
 dicyclomine (BENTYL) 20 mg tablet Take 1 Tab by mouth every six (6) hours as needed (abdominal cramps) for up to 20 doses. 20 Tab 0  
 HYDROcodone-acetaminophen (NORCO) 5-325 mg per tablet Take 1-2 Tabs by mouth every six (6) hours as needed for Pain. Max Daily Amount: 8 Tabs. 30 Tab 0  
 amLODIPine-Olmesartan 10-20 mg tab Take 1 Tab by mouth daily.  tamsulosin (FLOMAX) 0.4 mg capsule Take 0.4 mg by mouth daily.  atenolol-chlorthalidone (TENORETIC) 50-25 mg per tablet Take 1 Tab by mouth daily.  multivitamin (ONE A DAY) tablet Take 1 Tab by mouth daily.  Gluc-MSM-Jeff 2-Hyal-Antiar 3 375-375-20 mg tab Take 1 Tab by mouth daily.  diazepam (VALIUM) 5 mg tablet Take 1 Tab by mouth three (3) times daily as needed for Anxiety (spasm). 20 Tab 0 Past History Past Medical History: 
Past Medical History:  
Diagnosis Date  Arthritis  Cancer ([facility] [address] 04/2019  
 renal  
 Hypertension  Prostate enlargement Past Surgical History: 
Past Surgical History:  
Procedure Laterality Date  HX ORTHOPAEDIC Right 2008  
 hip replacement/[facility]/Dr Trudy Shook  HX PREMALIG/BENIGN SKIN LESION EXCISION  07/13/2018  
 excision of left flank Family History: 
Family History Problem Relation Age of Onset  Cancer Daughter Breast ca - stage 1  
 Hypertension Mother Social History: 
Social History Tobacco Use  Smoking status: Never Smoker  Smokeless tobacco: Never Used Substance Use Topics  Alcohol use: No  
 Drug use: No  
 
Allergies: 
No Known Allergies Review of Systems Review of Systems Constitutional: Negative for chills and fever. HENT: Negative for congestion, rhinorrhea and sore throat. Respiratory: Negative for cough and shortness of breath. Cardiovascular: Negative for chest pain. Gastrointestinal: Negative for abdominal pain, nausea and vomiting. Genitourinary: Positive for penile pain. Negative for dysuria and urgency. Skin: Negative for rash. Neurological: Negative for dizziness, light-headedness and headaches. All other systems reviewed and are negative. Physical Exam  
Physical Exam  
Constitutional: He is oriented to person, place, and time. He appears well-developed and well-nourished. No distress. HENT:  
Head: Normocephalic and atraumatic. Eyes: Pupils are equal, round, and reactive to light. Conjunctivae and EOM are normal.  
Neck: Normal range of motion. Cardiovascular: Normal rate, regular rhythm and intact distal pulses. Pulmonary/Chest: Effort normal and breath sounds normal. No stridor. No respiratory distress. Abdominal: Soft. He exhibits no distension. There is no tenderness. Genitourinary:  
Genitourinary Comments: Jett catheter in place No skin breakdown, erythema, or drainage from the penis Musculoskeletal: Normal range of motion. Neurological: He is alert and oriented to person, place, and time. Skin: Skin is warm and dry. Psychiatric: He has a normal mood and affect. Nursing note and vitals reviewed. Diagnostic Study Results Labs - Recent Results (from the past 12 hour(s)) URINALYSIS W/ REFLEX CULTURE Collection Time: 04/20/19  8:59 PM  
Result Value Ref Range Color YELLOW/STRAW Appearance CLOUDY (A) CLEAR Specific gravity 1.016 1.003 - 1.030    
 pH (UA) 5.5 5.0 - 8.0 Protein 30 (A) NEG mg/dL Glucose NEGATIVE  NEG mg/dL Ketone NEGATIVE  NEG mg/dL Bilirubin NEGATIVE  NEG Blood LARGE (A) NEG Urobilinogen 0.2 0.2 - 1.0 EU/dL Nitrites NEGATIVE  NEG Leukocyte Esterase SMALL (A) NEG    
 WBC 0-4 0 - 4 /hpf  
 RBC 10-20 0 - 5 /hpf Epithelial cells FEW FEW /lpf Bacteria NEGATIVE  NEG /hpf  
 UA:UC IF INDICATED CULTURE NOT INDICATED BY UA RESULT CNI Mucus TRACE (A) NEG /lpf Amorphous Crystals FEW (A) NEG Radiologic Studies - No orders to display No results found. Medical Decision Making I am the first provider for this patient. I reviewed the vital signs, available nursing notes, past medical history, past surgical history, family history and social history. Vital Signs-Reviewed the patient's vital signs. Patient Vitals for the past 12 hrs: 
 Temp Pulse Resp BP SpO2  
04/20/19 1832 97.5 °F (36.4 °C) 86 18 133/61 97 % Pulse Oximetry Analysis - 97% on ra Records Reviewed: Nursing Notes and Old Medical Records Provider Notes (Medical Decision Making):  
 Patient presents with pain at Jett catheter insertion site. On exam, Jett is draining clear yellow urine, there is no rash, skin breakdown, or other signs of infection to the tip of the penis. Suspect that most likely his discomfort is related to Jett catheter itself given that it was recently placed. We will check a urinalysis today to ensure he does not need a different antibiotic. Advised follow-up with urology. ED Course:  
Initial assessment performed. The patients presenting problems have been discussed, and they are in agreement with the care plan formulated and outlined with them. I have encouraged them to ask questions as they arise throughout their visit. Urinalysis with no signs of infection. Patient has a follow-up appointment with his primary care doctor on 4/23. Advise getting his primary care doctor to reexamine the area if he was still having symptoms. Return precautions were discussed. Patient is stable for discharge home at this time. Critical Care: 
None Disposition: 
Discharge Note: 
10:16 PM 
The patient has been re-evaluated and is ready for discharge. Reviewed available results with patient. Counseled patient on diagnosis and care plan. Patient has expressed understanding, and all questions have been answered. Patient agrees with plan and agrees to follow up as recommended, or to return to the ED if their symptoms worsen. Discharge instructions have been provided and explained to the patient, along with reasons to return to the ED. PLAN: 
1. Discharge Medication List as of 4/20/2019 10:16 PM  
  
 
2. Follow-up Information Follow up With Specialties Details Why Contact Info Dain Man MD Internal Medicine  keep your appointment on tuesday  [address] [facility] [address] [facility] [address] [phone] [facility] EMERGENCY DEPT Emergency Medicine  As needed, If symptoms worsen [address] [facility] 
[phone] Return to ED if worse Diagnosis Clinical Impression: 1. Encounter for assessment of Jett catheter This note will not be viewable in [address]

## 2019-04-21 NOTE — ED NOTES
Patient discharged by Dr. Becki Lainez. Patient provided with discharge instructions Rx and instructions on follow up care. Patient out of ED via wheelchair accompanied by family.

## 2019-04-21 NOTE — ED NOTES
Pt arriving having a rosales and leg bag, placed Wednesday. Reports having rosales placed due to retention from enlarged prostate. Pt c/o pain on the anterior side of his penis and around insertion site. Pt states he is having some leaking around the rosales. Denies blood in urine, no blood noted.

## 2020-05-06 ENCOUNTER — HOSPITAL ENCOUNTER (EMERGENCY)
Age: 85
Discharge: HOME | End: 2020-05-06
Attending: EMERGENCY MEDICINE
Payer: COMMERCIAL

## 2020-05-06 VITALS
HEART RATE: 75 BPM | WEIGHT: 150 LBS | DIASTOLIC BLOOD PRESSURE: 78 MMHG | OXYGEN SATURATION: 99 % | HEIGHT: 71 IN | BODY MASS INDEX: 21 KG/M2 | RESPIRATION RATE: 16 BRPM | TEMPERATURE: 97.6 F | SYSTOLIC BLOOD PRESSURE: 157 MMHG

## 2020-05-06 DIAGNOSIS — N39.0 URINARY TRACT INFECTION ASSOCIATED WITH INDWELLING URETHRAL CATHETER, INITIAL ENCOUNTER (HCC): ICD-10-CM

## 2020-05-06 DIAGNOSIS — T83.511A URINARY TRACT INFECTION ASSOCIATED WITH INDWELLING URETHRAL CATHETER, INITIAL ENCOUNTER (HCC): ICD-10-CM

## 2020-05-06 DIAGNOSIS — T83.091A OBSTRUCTED FOLEY CATHETER, INITIAL ENCOUNTER (HCC): Primary | ICD-10-CM

## 2020-05-06 LAB
APPEARANCE UR: ABNORMAL
BACTERIA URNS QL MICRO: ABNORMAL /HPF
BILIRUB UR QL: NEGATIVE
COLOR UR: ABNORMAL
EPITH CASTS URNS QL MICRO: ABNORMAL /LPF
GLUCOSE UR STRIP.AUTO-MCNC: NEGATIVE MG/DL
HGB UR QL STRIP: ABNORMAL
HYALINE CASTS URNS QL MICRO: ABNORMAL /LPF (ref 0–5)
KETONES UR QL STRIP.AUTO: NEGATIVE MG/DL
LEUKOCYTE ESTERASE UR QL STRIP.AUTO: ABNORMAL
NITRITE UR QL STRIP.AUTO: NEGATIVE
PH UR STRIP: 7 [PH] (ref 5–8)
PROT UR STRIP-MCNC: 100 MG/DL
RBC #/AREA URNS HPF: >100 /HPF (ref 0–5)
SP GR UR REFRACTOMETRY: 1.02 (ref 1–1.03)
UA: UC IF INDICATED,UAUC: ABNORMAL
UROBILINOGEN UR QL STRIP.AUTO: 1 EU/DL (ref 0.2–1)
WBC URNS QL MICRO: >100 /HPF (ref 0–4)

## 2020-05-06 PROCEDURE — 87186 SC STD MICRODIL/AGAR DIL: CPT

## 2020-05-06 PROCEDURE — 99283 EMERGENCY DEPT VISIT LOW MDM: CPT

## 2020-05-06 PROCEDURE — 51702 INSERT TEMP BLADDER CATH: CPT

## 2020-05-06 PROCEDURE — 87086 URINE CULTURE/COLONY COUNT: CPT

## 2020-05-06 PROCEDURE — 74011250637 HC RX REV CODE- 250/637: Performed by: EMERGENCY MEDICINE

## 2020-05-06 PROCEDURE — 81001 URINALYSIS AUTO W/SCOPE: CPT

## 2020-05-06 PROCEDURE — 87077 CULTURE AEROBIC IDENTIFY: CPT

## 2020-05-06 RX ORDER — CEPHALEXIN 500 MG/1
500 CAPSULE ORAL 3 TIMES DAILY
Qty: 15 CAP | Refills: 0 | Status: SHIPPED | OUTPATIENT
Start: 2020-05-06 | End: 2021-01-24 | Stop reason: SDUPTHER

## 2020-05-06 RX ORDER — CEPHALEXIN 250 MG/1
500 CAPSULE ORAL
Status: COMPLETED | OUTPATIENT
Start: 2020-05-06 | End: 2020-05-06

## 2020-05-06 RX ORDER — CEPHALEXIN 500 MG/1
500 CAPSULE ORAL 3 TIMES DAILY
Qty: 15 CAP | Refills: 0 | Status: SHIPPED | OUTPATIENT
Start: 2020-05-06 | End: 2020-05-06

## 2020-05-06 RX ADMIN — CEPHALEXIN 500 MG: 250 CAPSULE ORAL at 21:03

## 2020-05-06 NOTE — LETTER
5/10/2020 Barb Garvin [address] [facility] [address] Dear Mr. Flores, You were recently seen in the Emergency Department of [place] [address]. and had lab work performed. We would like to discuss these results with you. Please call the Emergency Department at your earliest convenience at [phone] between 10am-8pm to speak with one of our providers. Sincerely, JOY Underwood 
 
 
[facility] EMERGENCY DEPT 
[address] [place] [address] 
[phone] Option #3

## 2020-05-07 NOTE — DISCHARGE INSTRUCTIONS
Patient Education        Learning About Urinary Catheter Care to Prevent Infection  What is a urinary catheter? A urinary catheter is a flexible plastic tube used to drain urine from your bladder when you can't urinate on your own. The catheter allows urine to drain from the bladder into a bag. Two types of drainage bags may be used with a urinary catheter. · A bedside bag is a large bag that you can hang on the side of your bed or on a chair. You can use it overnight or anytime you will be sitting or lying down for a long time. · A leg bag is a small bag that you can use during the day. It is usually attached to your thigh or calf and hidden under your clothes. Having a urinary catheter increases your risk of getting a urinary tract infection. Germs may get on the catheter and cause an infection in your bladder or kidneys. The longer you have a catheter, the more likely it is that you will get an infection. You can help prevent this problem with good hygiene and careful handling of your catheter and drainage bags. How can you help prevent infection? Take care to be clean  · Always wash your hands well before and after you handle your catheter. · Clean the skin around the catheter twice a day using soap and water. Dry with a clean towel afterward. You can shower with your catheter and drainage bag in place unless your doctor told you not to. · When you clean around the catheter, check the surrounding skin for signs of infection. Look for things like pus or irritated, swollen, red, or tender skin around the catheter. Be careful with your drainage bag  · Always keep the drainage bag below the level of your bladder. This will help keep urine from flowing back into your bladder. · Check often to see that urine is flowing through the catheter into the drainage bag. · Empty the drainage bag when it is half full. This will keep it from overflowing or backing up.   · When you empty the drainage bag, do not let the tubing or drain spout touch anything. Be careful with your catheter  · Do not unhook the catheter from the drain tube. That could let germs get into the tube. · Make sure that the catheter tubing does not get twisted or kinked. · Do not tug or pull on the catheter. And make sure that the drainage bag does not drag or pull on the catheter. · Do not put powder or lotion on the skin around the catheter. · Talk with your doctor about your options for sexual intercourse while wearing a catheter. How do you empty a urine drainage bag? If your doctor has asked you to keep a record, write down the amount of urine in the bag before you empty it. Wash your hands before and after you touch the bag. 1. Remove the drain spout from its sleeve at the bottom of the drainage bag.  2. Open the valve on the drain spout. Let the urine flow out into the toilet or a container. Be careful not to let the tubing or drain spout touch anything. 3. After you empty the bag, close the valve. Then put the drain spout back into its sleeve at the bottom of the collection bag. How do you add a bedside bag to a leg bag? Wash your hands before and after you handle the bags. 1. Empty the leg bag attached to the catheter. 2. Put a clean towel under the leg bag.  3. Use an alcohol wipe to clean the tip of the bedside bag. Then connect the bedside bag to the leg bag. How can you clean a bedside drainage bag? Many people clean their bedside bag in the morning if they switch to a leg bag. To clean a bedside drainage ba. Remove the bedside bag from the leg bag.  2. Fill the bag with 2 parts vinegar and 3 parts water. Let it stand for 20 minutes. 3. Empty the bag, and let it air dry. When should you call for help? Call your doctor now or seek immediate medical care if:  · You have symptoms of a urinary infection. These may include:  ? Pain or burning when you urinate.   ? A frequent need to urinate without being able to pass much urine.  ? Pain in the flank, which is just below the rib cage and above the waist on either side of the back. ? Blood in your urine. ? A fever. · Your urine smells bad. · You see large blood clots in your urine. · No urine or very little urine is flowing into the bag for 4 or more hours. Watch closely for changes in your health, and be sure to contact your doctor if:  · The area around the catheter becomes irritated, swollen, red, or tender, or there is pus draining from it. · Urine is leaking from the place where the catheter enters your body. Follow-up care is a key part of your treatment and safety. Be sure to make and go to all appointments, and call your doctor if you are having problems. It's also a good idea to know your test results and keep a list of the medicines you take. Where can you learn more? Go to http://tay-lalo.info/  Enter U010 in the search box to learn more about \"Learning About Urinary Catheter Care to Prevent Infection. \"  Current as of: August 21, 2019Content Version: 12.4  © 9503-2948 [employer] Care instructions adapted under license by [employer] (which disclaims liability or warranty for this information). If you have questions about a medical condition or this instruction, always ask your healthcare professional. [place] [address] any warranty or liability for your use of this information. Patient Education        Urinary Tract Infections in Men: Care Instructions  Your Care Instructions    A urinary tract infection, or UTI, is a general term for an infection anywhere between the kidneys and the tip of the penis. UTIs can also be a result of a prostate problem. Most cause pain or burning when you urinate. Most UTIs are caused by bacteria and can be cured with antibiotics. It is important to complete your treatment so that the infection does not get worse.   Follow-up care is a key part of your treatment and safety. Be sure to make and go to all appointments, and call your doctor if you are having problems. It's also a good idea to know your test results and keep a list of the medicines you take. How can you care for yourself at home? · Take your antibiotics as prescribed. Do not stop taking them just because you feel better. You need to take the full course of antibiotics. · Take your medicines exactly as prescribed. Your doctor may have prescribed a medicine, such as phenazopyridine (Pyridium), to help relieve pain when you urinate. This turns your urine orange. You may stop taking it when your symptoms get better. But be sure to take all of your antibiotics, which treat the infection. · Drink extra water for the next day or two. This will help make the urine less concentrated and help wash out the bacteria causing the infection. (If you have kidney, heart, or liver disease and have to limit your fluids, talk with your doctor before you increase your fluid intake.)  · Avoid drinks that are carbonated or have caffeine. They can irritate the bladder. · Urinate often. Try to empty your bladder each time. · To relieve pain, take a hot bath or lay a heating pad (set on low) over your lower belly or genital area. Never go to sleep with a heating pad in place. To help prevent UTIs  · Drink plenty of fluids, enough so that your urine is light yellow or clear like water. If you have kidney, heart, or liver disease and have to limit fluids, talk with your doctor before you increase the amount of fluids you drink. · Urinate when you have the urge. Do not hold your urine for a long time. Urinate before you go to sleep. · Keep your penis clean. Catheter care  If you have a drainage tube (catheter) in place, the following steps will help you care for it. · Always wash your hands before and after touching your catheter. · Check the area around the urethra for inflammation or signs of infection.  Signs of infection include irritated, swollen, red, or tender skin, or pus around the catheter. · Clean the area around the catheter with soap and water two times a day. Dry with a clean towel afterward. · Do not apply powder or lotion to the skin around the catheter. To empty the urine collection bag  · Wash your hands with soap and water. · Without touching the drain spout, remove the spout from its sleeve at the bottom of the collection bag. Open the valve on the spout. · Let the urine flow out of the bag and into the toilet or a container. Do not let the tubing or drain spout touch anything. · After you empty the bag, clean the end of the drain spout with tissue and water. Close the valve and put the drain spout back into its sleeve at the bottom of the collection bag. · Wash your hands with soap and water. When should you call for help? Call your doctor now or seek immediate medical care if:    · Symptoms such as a fever, chills, nausea, or vomiting get worse or happen for the first time.     · You have new pain in your back just below your rib cage. This is called flank pain.     · There is new blood or pus in your urine.     · You are not able to take or keep down your antibiotics.    Watch closely for changes in your health, and be sure to contact your doctor if:    · You are not getting better after taking an antibiotic for 2 days.     · Your symptoms go away but then come back. Where can you learn more? Go to http://tay-lalo.info/  Enter V116 in the search box to learn more about \"Urinary Tract Infections in Men: Care Instructions. \"  Current as of: August 21, 2019Content Version: 12.4  © 2567-0799 [employer]. Care instructions adapted under license by [employer] (which disclaims liability or warranty for this information).  If you have questions about a medical condition or this instruction, always ask your healthcare professional. [employer] disclaims any warranty or liability for your use of this information.

## 2020-05-07 NOTE — ED NOTES
Pt rosales catheter removed and replaced. Purulent bloody drainage noted when removing current catheter. No difficultly reinserting new catheter.

## 2020-05-07 NOTE — ED PROVIDER NOTES
EMERGENCY DEPARTMENT HISTORY AND PHYSICAL EXAM      Date: 5/6/2020  Patient Name: Sharyle Furlough    History of Presenting Illness     Chief Complaint   Patient presents with    Urinary Catheter Problem     pt's catheter no draining, pt c/o of lower abd pain. Placed due to enlarged prostate, having retention. Family irrigated rosales at home. Rosales placed in June 2019, last changed 4/27/2020. History Provided By: Patient    HPI: Sharyle Furlough, 80 y.o. male presents to the ED with cc of abdominal discomfort. Patient states longstanding indwelling urinary-Rosales cath secondary to prostate hypertrophy. Patient states no prior history Of issues Rosales catheter had last been changed out on 4/27. Patient states after the catheter was placed he had had no prior issues until today. Patient states he noted no output in his drainage bag and then began having suprapubic discomfort and a feeling of the urge to urinate. Patient states pain rated a 6 out of 10 with no alleviating or exacerbating factors. He denies any recent fever or chills. Denies any recent chest pain or shortness of breath. He denies any nausea, vomiting or diarrhea. Prior to ED visit he denies any blood in his catheter or malodorous or cloudy urine. He denies any penile or scrotal pain. There are no other complaints, changes, or physical findings at this time. PCP: Lucero Blandon MD    No current facility-administered medications on file prior to encounter. Current Outpatient Medications on File Prior to Encounter   Medication Sig Dispense Refill    ondansetron (ZOFRAN ODT) 4 mg disintegrating tablet Take 1 Tab by mouth every eight (8) hours as needed for Nausea. 10 Tab 0    dicyclomine (BENTYL) 20 mg tablet Take 1 Tab by mouth every six (6) hours as needed (abdominal cramps) for up to 20 doses.  20 Tab 0    HYDROcodone-acetaminophen (NORCO) 5-325 mg per tablet Take 1-2 Tabs by mouth every six (6) hours as needed for Pain. Max Daily Amount: 8 Tabs. 30 Tab 0    amLODIPine-Olmesartan 10-20 mg tab Take 1 Tab by mouth daily.  tamsulosin (FLOMAX) 0.4 mg capsule Take 0.4 mg by mouth daily.  atenolol-chlorthalidone (TENORETIC) 50-25 mg per tablet Take 1 Tab by mouth daily.  multivitamin (ONE A DAY) tablet Take 1 Tab by mouth daily.  Gluc-MSM-Jeff 2-Hyal-Antiar 3 375-375-20 mg tab Take 1 Tab by mouth daily.  diazepam (VALIUM) 5 mg tablet Take 1 Tab by mouth three (3) times daily as needed for Anxiety (spasm). 20 Tab 0       Past History     Past Medical History:  Past Medical History:   Diagnosis Date    Arthritis     Cancer ([facility] [address] 04/2019    renal    Hypertension     Prostate enlargement        Past Surgical History:  Past Surgical History:   Procedure Laterality Date    HX ORTHOPAEDIC Right 2008    hip replacement/[facility]/Dr Carrizales Nearing HX PREMALIG/BENIGN SKIN LESION EXCISION  07/13/2018    excision of left flank        Family History:  Family History   Problem Relation Age of Onset    Cancer Daughter         Breast ca - stage 1    Hypertension Mother        Social History:  Social History     Tobacco Use    Smoking status: Never Smoker    Smokeless tobacco: Never Used   Substance Use Topics    Alcohol use: No    Drug use: No       Allergies:  No Known Allergies      Review of Systems   Review of Systems   Constitutional: Negative. Negative for appetite change, chills, fatigue and fever. HENT: Negative. Negative for congestion, rhinorrhea, sinus pressure and sore throat. Eyes: Negative. Respiratory: Negative. Negative for cough, choking, chest tightness, shortness of breath and wheezing. Cardiovascular: Negative. Negative for chest pain, palpitations and leg swelling. Gastrointestinal: Positive for abdominal pain (Suprapubic). Negative for constipation, diarrhea, nausea and vomiting. Endocrine: Negative. Genitourinary: Positive for difficulty urinating.  Negative for dysuria, flank pain and urgency. Jett catheter in place with no urine output   Musculoskeletal: Negative. Skin: Negative. Neurological: Negative. Negative for dizziness, speech difficulty, weakness, light-headedness, numbness and headaches. Psychiatric/Behavioral: Negative. All other systems reviewed and are negative. Physical Exam   Physical Exam  Vitals signs and nursing note reviewed. Constitutional:       Appearance: He is well-developed. He is not diaphoretic. Comments: Thin male appears in discomfort, smell of malodorous urine present   HENT:      Head: Normocephalic and atraumatic. Mouth/Throat:      Mouth: Mucous membranes are moist.      Pharynx: No oropharyngeal exudate. Eyes:      Extraocular Movements: Extraocular movements intact. Conjunctiva/sclera: Conjunctivae normal.      Pupils: Pupils are equal, round, and reactive to light. Neck:      Musculoskeletal: Normal range of motion and neck supple. Vascular: No JVD. Trachea: No tracheal deviation. Cardiovascular:      Rate and Rhythm: Normal rate and regular rhythm. Heart sounds: Normal heart sounds. No murmur. Pulmonary:      Effort: Pulmonary effort is normal. No respiratory distress. Breath sounds: Normal breath sounds. No stridor. No wheezing or rales. Abdominal:      General: There is distension. Palpations: Abdomen is soft. Tenderness: There is abdominal tenderness (Suprapubic). There is no guarding or rebound. Genitourinary:     Comments: Indwelling Jett catheter present no urine or blood in reservoir bag  Musculoskeletal: Normal range of motion. General: No tenderness. Skin:     General: Skin is warm and dry. Capillary Refill: Capillary refill takes less than 2 seconds. Neurological:      Mental Status: He is alert and oriented to person, place, and time. Cranial Nerves: No cranial nerve deficit.       Comments: No gross motor or sensory deficits Psychiatric:         Mood and Affect: Mood normal.         Behavior: Behavior normal.         Diagnostic Study Results     Labs -     Recent Results (from the past 12 hour(s))   URINALYSIS W/ REFLEX CULTURE    Collection Time: 05/06/20  8:10 PM   Result Value Ref Range    Color YELLOW/STRAW      Appearance CLOUDY (A) CLEAR      Specific gravity 1.016 1.003 - 1.030      pH (UA) 7.0 5.0 - 8.0      Protein 100 (A) NEG mg/dL    Glucose Negative NEG mg/dL    Ketone Negative NEG mg/dL    Bilirubin Negative NEG      Blood LARGE (A) NEG      Urobilinogen 1.0 0.2 - 1.0 EU/dL    Nitrites Negative NEG      Leukocyte Esterase LARGE (A) NEG      WBC >100 (H) 0 - 4 /hpf    RBC >100 (H) 0 - 5 /hpf    Epithelial cells FEW FEW /lpf    Bacteria 4+ (A) NEG /hpf    UA:UC IF INDICATED URINE CULTURE ORDERED (A) CNI      Hyaline cast 0-2 0 - 5 /lpf       Radiologic Studies -   No orders to display     CT Results  (Last 48 hours)    None        CXR Results  (Last 48 hours)    None          Medical Decision Making   I am the first provider for this patient. I reviewed the vital signs, available nursing notes, past medical history, past surgical history, family history and social history. Vital Signs-Reviewed the patient's vital signs. Patient Vitals for the past 12 hrs:   Temp Pulse Resp BP SpO2   05/06/20 2115 97.6 °F (36.4 °C) 75 16 157/78 99 %   05/1934 97.6 °F (36.4 °C) 81 20 149/90 98 %         Records Reviewed: Nursing Notes, Old Medical Records and Previous Laboratory Studies    Provider Notes (Medical Decision Making):   DDx-Jett catheter obstruction, hematuria, UTI    ED Course:   Initial assessment performed. The patients presenting problems have been discussed, and they are in agreement with the care plan formulated and outlined with them. I have encouraged them to ask questions as they arise throughout their visit. Patient Jett catheter in place with no urinary output.   Prior to ED visit Jett catheter had been draining without issue, will have nursing replaced. Rosales catheter removed new catheter was placed in his balloon was being inflated nurse noticed long strip of a clot within the catheter patient then began having immediate return of normal color urine no other gross hematuria noted. Patient abdominal distention and suprapubic discomfort resolved. Disposition:  Pt dc home, rosales catheter in place. Rosales switched to leg bag. No sig hx of prior rosales obstruction or bacterial colonization, will place pt on Ab. DISCHARGE PLAN:  1. Discharge Medication List as of 5/6/2020  8:54 PM      START taking these medications    Details   cephALEXin (Keflex) 500 mg capsule Take 1 Cap by mouth three (3) times daily. , Normal, Disp-15 Cap, R-0         CONTINUE these medications which have NOT CHANGED    Details   ondansetron (ZOFRAN ODT) 4 mg disintegrating tablet Take 1 Tab by mouth every eight (8) hours as needed for Nausea., Normal, Disp-10 Tab, R-0      dicyclomine (BENTYL) 20 mg tablet Take 1 Tab by mouth every six (6) hours as needed (abdominal cramps) for up to 20 doses. , Normal, Disp-20 Tab, R-0      HYDROcodone-acetaminophen (NORCO) 5-325 mg per tablet Take 1-2 Tabs by mouth every six (6) hours as needed for Pain. Max Daily Amount: 8 Tabs., Print, Disp-30 Tab, R-0      amLODIPine-Olmesartan 10-20 mg tab Take 1 Tab by mouth daily. , Historical Med      tamsulosin (FLOMAX) 0.4 mg capsule Take 0.4 mg by mouth daily. , Historical Med      atenolol-chlorthalidone (TENORETIC) 50-25 mg per tablet Take 1 Tab by mouth daily. , Historical Med      multivitamin (ONE A DAY) tablet Take 1 Tab by mouth daily. , Historical Med      Gluc-MSM-Jeff 2-Hyal-Antiar 3 375-375-20 mg tab Take 1 Tab by mouth daily. , Historical Med      diazepam (VALIUM) 5 mg tablet Take 1 Tab by mouth three (3) times daily as needed for Anxiety (spasm). , Print, Disp-20 Tab, R-0           2.    Follow-up Information     Follow up With Specialties Details Why Contact Info    Toshia Blanco MD Internal Medicine   [id number] [address] [facility]  [phone]          3. Return to ED if worse     Diagnosis     Clinical Impression:   1. Obstructed Jett catheter, initial encounter ([facility] [address]    2. Urinary tract infection associated with indwelling urethral catheter, initial encounter [facility])        Attestations:    Vinh Bingham, DO    Please note that this dictation was completed with [employer], the computer voice recognition software. Quite often unanticipated grammatical, syntax, homophones, and other interpretive errors are inadvertently transcribed by the computer software. Please disregard these errors. Please excuse any errors that have escaped final proofreading. Thank you.

## 2020-05-09 LAB
BACTERIA SPEC CULT: ABNORMAL
BACTERIA SPEC CULT: ABNORMAL
CC UR VC: ABNORMAL
SERVICE CMNT-IMP: ABNORMAL

## 2020-05-09 NOTE — PROGRESS NOTES
Patient will need change in Abx - recommended bactrim if not otherwise contraindicated. Attempted to contact the patient but there was no answer. Left HIPPA complaint VM for the patient to return call to the ED.

## 2020-05-10 RX ORDER — SULFAMETHOXAZOLE AND TRIMETHOPRIM 800; 160 MG/1; MG/1
1 TABLET ORAL 2 TIMES DAILY
Qty: 14 TAB | Refills: 0 | Status: SHIPPED | OUTPATIENT
Start: 2020-05-10 | End: 2020-05-17

## 2020-05-10 NOTE — PROGRESS NOTES
Home number mailbox is full. A VM is left at the mobile number on record. An rx for Bactrim is sent electronically to the pharmacy of record. A letter will be sent.

## 2021-01-24 ENCOUNTER — HOSPITAL ENCOUNTER (EMERGENCY)
Age: 86
Discharge: HOME | End: 2021-01-24
Attending: EMERGENCY MEDICINE
Payer: COMMERCIAL

## 2021-01-24 VITALS
DIASTOLIC BLOOD PRESSURE: 66 MMHG | TEMPERATURE: 97.9 F | WEIGHT: 154.98 LBS | HEIGHT: 71 IN | RESPIRATION RATE: 18 BRPM | BODY MASS INDEX: 21.7 KG/M2 | OXYGEN SATURATION: 97 % | HEART RATE: 70 BPM | SYSTOLIC BLOOD PRESSURE: 120 MMHG

## 2021-01-24 DIAGNOSIS — N39.0 URINARY TRACT INFECTION ASSOCIATED WITH INDWELLING URETHRAL CATHETER, INITIAL ENCOUNTER (HCC): ICD-10-CM

## 2021-01-24 DIAGNOSIS — T83.511A URINARY TRACT INFECTION ASSOCIATED WITH INDWELLING URETHRAL CATHETER, INITIAL ENCOUNTER (HCC): ICD-10-CM

## 2021-01-24 DIAGNOSIS — T83.011A MALFUNCTION OF FOLEY CATHETER, INITIAL ENCOUNTER (HCC): Primary | ICD-10-CM

## 2021-01-24 DIAGNOSIS — R33.8 BENIGN PROSTATIC HYPERPLASIA WITH URINARY RETENTION: ICD-10-CM

## 2021-01-24 DIAGNOSIS — N40.1 BENIGN PROSTATIC HYPERPLASIA WITH URINARY RETENTION: ICD-10-CM

## 2021-01-24 LAB
APPEARANCE UR: ABNORMAL
BACTERIA URNS QL MICRO: ABNORMAL /HPF
BILIRUB UR QL: NEGATIVE
COLOR UR: ABNORMAL
EPITH CASTS URNS QL MICRO: ABNORMAL /LPF
GLUCOSE UR STRIP.AUTO-MCNC: NEGATIVE MG/DL
HGB UR QL STRIP: ABNORMAL
KETONES UR QL STRIP.AUTO: ABNORMAL MG/DL
LEUKOCYTE ESTERASE UR QL STRIP.AUTO: ABNORMAL
NITRITE UR QL STRIP.AUTO: POSITIVE
PH UR STRIP: 7.5 [PH] (ref 5–8)
PROT UR STRIP-MCNC: 100 MG/DL
RBC #/AREA URNS HPF: ABNORMAL /HPF (ref 0–5)
SP GR UR REFRACTOMETRY: 1.02 (ref 1–1.03)
TRI-PHOS CRY URNS QL MICRO: ABNORMAL
UROBILINOGEN UR QL STRIP.AUTO: 1 EU/DL (ref 0.2–1)
WBC URNS QL MICRO: >100 /HPF (ref 0–4)

## 2021-01-24 PROCEDURE — 74011250637 HC RX REV CODE- 250/637: Performed by: EMERGENCY MEDICINE

## 2021-01-24 PROCEDURE — 99283 EMERGENCY DEPT VISIT LOW MDM: CPT

## 2021-01-24 PROCEDURE — 51702 INSERT TEMP BLADDER CATH: CPT

## 2021-01-24 PROCEDURE — 77030005514 HC CATH URETH FOL14 BARD -A

## 2021-01-24 PROCEDURE — 81001 URINALYSIS AUTO W/SCOPE: CPT

## 2021-01-24 PROCEDURE — 99284 EMERGENCY DEPT VISIT MOD MDM: CPT

## 2021-01-24 RX ORDER — CEPHALEXIN 500 MG/1
500 CAPSULE ORAL 3 TIMES DAILY
Qty: 30 CAP | Refills: 0 | Status: SHIPPED | OUTPATIENT
Start: 2021-01-24 | End: 2021-02-03

## 2021-01-24 RX ORDER — ACETAMINOPHEN 325 MG/1
650 TABLET ORAL
Status: COMPLETED | OUTPATIENT
Start: 2021-01-24 | End: 2021-01-24

## 2021-01-24 RX ADMIN — ACETAMINOPHEN 650 MG: 325 TABLET ORAL at 12:24

## 2021-01-24 NOTE — DISCHARGE INSTRUCTIONS
It was a pleasure taking care of you in our emergency department this morning. If you encounter any further difficulties with your Jett catheter, return to the emergency department for further evaluation. We recommend that you talk to your urologist about the pros and cons of having a suprapubic catheter placed rather than a Jett catheter.

## 2021-01-24 NOTE — ED NOTES
Discharge instructions reviewed with and given to pt and family member by ER RN. No obvious distress noted, pt assisted into wheelchair and discharged via wheelchair with family member.

## 2021-01-24 NOTE — ED NOTES
Pending urine results, pt dressed and ready for discharge, family member with pt. Pt states he is feeling better.

## 2021-01-24 NOTE — ED PROVIDER NOTES
EMERGENCY DEPARTMENT HISTORY AND PHYSICAL EXAM      Date: 1/24/2021  Patient Name: Abhilash Barber  Patient Age and Sex: 80 y.o. male    History of Presenting Illness     Chief Complaint   Patient presents with    Urinary Catheter Problem     Catheter placed on Friday by Dr. Dominique Baum and yesterday he was not able to urinate. In a lot of pain now. History Provided By: Patient and Patient's Daughter    Uche Hernandez to gather history was limited by:     HPI: Abhilash Barber, 80 y.o. male with history of enlarged prostate, chronic Jett catheter for the past 2 years, complains of bladder pressure and urinary retention for the past 24 hours or so. Of note he had his Jett catheter replaced (replaced monthly on a routine basis) about 48 hours ago. No fevers or flank pain. The patient and his daughter feel as though the new Jett catheter is not in good position or is otherwise not draining correctly. Location:    Quality:      Severity:    Duration:   Timing:      Context:    Modifying factors:   Associated symptoms:       The patient's medical, surgical, family, and social history on file were reviewed by me today.       Past Medical History:   Diagnosis Date    Arthritis     Cancer ([facility] 04/2019    renal    Hypertension     Prostate enlargement      Past Surgical History:   Procedure Laterality Date    HX ORTHOPAEDIC Right 2008    hip replacement/[facility]/Dr Allyssa Shelton    HX PREMALIG/BENIGN SKIN LESION EXCISION  07/13/2018    excision of left flank        PCP: Nargis Cassidy MD    Past History     Past Medical History:  Past Medical History:   Diagnosis Date    Arthritis     Cancer ([facility] 04/2019    renal    Hypertension     Prostate enlargement        Past Surgical History:  Past Surgical History:   Procedure Laterality Date    HX ORTHOPAEDIC Right 2008    hip replacement/[facility]/Dr Allyssa Shelton    HX PREMALIG/BENIGN SKIN LESION EXCISION  07/13/2018    excision of left flank        Family History:  Family History   Problem Relation Age of Onset    Cancer Daughter         Breast ca - stage 1    Hypertension Mother        Social History:  Social History     Tobacco Use    Smoking status: Never Smoker    Smokeless tobacco: Never Used   Substance Use Topics    Alcohol use: No    Drug use: No       Allergies:  No Known Allergies    Current Medications:  No current facility-administered medications on file prior to encounter. Current Outpatient Medications on File Prior to Encounter   Medication Sig Dispense Refill    [DISCONTINUED] cephALEXin (Keflex) 500 mg capsule Take 1 Cap by mouth three (3) times daily. 15 Cap 0    ondansetron (ZOFRAN ODT) 4 mg disintegrating tablet Take 1 Tab by mouth every eight (8) hours as needed for Nausea. 10 Tab 0    dicyclomine (BENTYL) 20 mg tablet Take 1 Tab by mouth every six (6) hours as needed (abdominal cramps) for up to 20 doses. 20 Tab 0    HYDROcodone-acetaminophen (NORCO) 5-325 mg per tablet Take 1-2 Tabs by mouth every six (6) hours as needed for Pain. Max Daily Amount: 8 Tabs. 30 Tab 0    amLODIPine-Olmesartan 10-20 mg tab Take 1 Tab by mouth daily.  tamsulosin (FLOMAX) 0.4 mg capsule Take 0.4 mg by mouth daily.  atenolol-chlorthalidone (TENORETIC) 50-25 mg per tablet Take 1 Tab by mouth daily.  multivitamin (ONE A DAY) tablet Take 1 Tab by mouth daily.  Gluc-MSM-Jeff 2-Hyal-Antiar 3 375-375-20 mg tab Take 1 Tab by mouth daily.  diazepam (VALIUM) 5 mg tablet Take 1 Tab by mouth three (3) times daily as needed for Anxiety (spasm). 20 Tab 0       Review of Systems   Review of Systems   Constitutional: Negative for fatigue and fever. Genitourinary: Positive for difficulty urinating. All other systems reviewed and are negative.       Physical Exam   Vital Signs  Patient Vitals for the past 8 hrs:   Temp Pulse Resp BP SpO2   01/24/21 1205 97.9 °F (36.6 °C) 70 18 120/66 97 %   01/24/21 0956 97.8 °F (36.6 °C) 71 18 119/70 98 % Physical Exam  Vitals signs reviewed. Constitutional:       General: He is not in acute distress. Appearance: He is not ill-appearing. Abdominal:      General: There is distension. Tenderness: There is abdominal tenderness in the suprapubic area. Skin:     General: Skin is warm and dry. Neurological:      General: No focal deficit present. Mental Status: He is alert and oriented to person, place, and time. Psychiatric:         Behavior: Behavior normal.         Cognition and Memory: Cognition normal.         Diagnostic Study Results   Labs  Recent Results (from the past 24 hour(s))   URINALYSIS W/ RFLX MICROSCOPIC    Collection Time: 01/24/21 11:40 AM   Result Value Ref Range    Color ORANGE      Appearance TURBID (A) CLEAR      Specific gravity 1.018 1.003 - 1.030      pH (UA) 7.5 5.0 - 8.0      Protein 100 (A) NEG mg/dL    Glucose Negative NEG mg/dL    Ketone TRACE (A) NEG mg/dL    Bilirubin Negative NEG      Blood LARGE (A) NEG      Urobilinogen 1.0 0.2 - 1.0 EU/dL    Nitrites Positive (A) NEG      Leukocyte Esterase LARGE (A) NEG      WBC >100 (H) 0 - 4 /hpf    RBC 20-50 0 - 5 /hpf    Epithelial cells FEW FEW /lpf    Bacteria 2+ (A) NEG /hpf    Triple Phosphate crystals FEW (A) NEG         Radiologic Studies  No orders to display     CT Results  (Last 48 hours)    None        CXR Results  (Last 48 hours)    None          Procedures   Procedures    Medical Decision Making     I reviewed the patient's most recent Emergency Dept notes and diagnostic tests  in formulating my MDM on today's visit. Provider Notes (Medical Decision Making):   78-year-old male with acute urinary retention, in the setting of Jett catheter for BPH which has been in position for the past 2 years, changed monthly. He was most recently changed 48 hours ago. On examination he is afebrile, normal vital signs. Abdomen is mildly distended, with suprapubic pressure and tenderness.     We will attempt to replace Jett catheter. No significant concern for intra-abdominal process such as appendicitis, SBO etc..    Asif Trujillo MD  10:31 AM    Jett catheter was replaced and patient was able to evacuate about 500 cc of urine, felt much improved. Stable for discharge home. He and his daughter will discuss the possibility of placing a suprapubic catheter with his urologist.    UA looks like UTI. I prescribed Keflex. Follow up Urology. Asif Trujillo MD  3:19 PM      Consults:    Social History     Tobacco Use    Smoking status: Never Smoker    Smokeless tobacco: Never Used   Substance Use Topics    Alcohol use: No    Drug use: No     Patient Vitals for the past 4 hrs:   Temp Pulse Resp BP SpO2   01/24/21 1205 97.9 °F (36.6 °C) 70 18 120/66 97 %          Prescriptions from today's ED visit:  Discharge Medication List as of 1/24/2021 12:21 PM      CONTINUE these medications which have NOT CHANGED    Details   cephALEXin (Keflex) 500 mg capsule Take 1 Cap by mouth three (3) times daily. , Print, Disp-15 Cap, R-0      ondansetron (ZOFRAN ODT) 4 mg disintegrating tablet Take 1 Tab by mouth every eight (8) hours as needed for Nausea., Normal, Disp-10 Tab, R-0      dicyclomine (BENTYL) 20 mg tablet Take 1 Tab by mouth every six (6) hours as needed (abdominal cramps) for up to 20 doses. , Normal, Disp-20 Tab, R-0      HYDROcodone-acetaminophen (NORCO) 5-325 mg per tablet Take 1-2 Tabs by mouth every six (6) hours as needed for Pain. Max Daily Amount: 8 Tabs., Print, Disp-30 Tab, R-0      amLODIPine-Olmesartan 10-20 mg tab Take 1 Tab by mouth daily. , Historical Med      tamsulosin (FLOMAX) 0.4 mg capsule Take 0.4 mg by mouth daily. , Historical Med      atenolol-chlorthalidone (TENORETIC) 50-25 mg per tablet Take 1 Tab by mouth daily. , Historical Med      multivitamin (ONE A DAY) tablet Take 1 Tab by mouth daily. , Historical Med      Gluc-MSM-Jeff 2-Hyal-Antiar 3 375-375-20 mg tab Take 1 Tab by mouth daily. , Historical Med      diazepam (VALIUM) 5 mg tablet Take 1 Tab by mouth three (3) times daily as needed for Anxiety (spasm). , Print, Disp-20 Tab, R-0              Medications Administered during ED course:  Medications   acetaminophen (TYLENOL) tablet 650 mg (650 mg Oral Given 1/24/21 1224)          Diagnosis and Disposition     Disposition:  Discharged    Clinical Impression:   1. Malfunction of Jett catheter, initial encounter ([facility]    2. Benign prostatic hyperplasia with urinary retention    3. Urinary tract infection associated with indwelling urethral catheter, initial encounter [facility]        Attestation:  I personally performed the services described in this documentation on this date 1/24/2021 for patient Eliseo Bergman. Tien Barber MD        I was the first provider for this patient on this visit. To the best of my ability I reviewed relevant prior medical records, electrocardiograms, laboratories, and radiologic studies. The patient's presenting problems were discussed, and the patient was in agreement with the care plan formulated and outlined with them. Tien Barber MD    Please note that this dictation was completed with Dragon voice recognition software. Quite often unanticipated grammatical, syntax, homophones, and other interpretive errors are inadvertently transcribed by the computer software. Please disregard these errors and excuse any errors that have escaped final proofreading.

## 2021-01-24 NOTE — ED NOTES
Attempted to reposition current rosales cath without drainage successful, current cath removed. New 20 fr coude cath placed without issue.

## 2021-09-22 ENCOUNTER — APPOINTMENT (OUTPATIENT)
Dept: ULTRASOUND IMAGING | Age: 86
DRG: 698 | End: 2021-09-22
Attending: INTERNAL MEDICINE
Payer: MEDICARE

## 2021-09-22 ENCOUNTER — HOSPITAL ENCOUNTER (INPATIENT)
Age: 86
LOS: 8 days | Discharge: HOME | DRG: 698 | End: 2021-09-30
Attending: EMERGENCY MEDICINE | Admitting: INTERNAL MEDICINE
Payer: MEDICARE

## 2021-09-22 ENCOUNTER — APPOINTMENT (OUTPATIENT)
Dept: GENERAL RADIOLOGY | Age: 86
DRG: 698 | End: 2021-09-22
Attending: EMERGENCY MEDICINE
Payer: MEDICARE

## 2021-09-22 DIAGNOSIS — R65.20 SEPSIS WITH ACUTE RENAL FAILURE WITHOUT SEPTIC SHOCK, DUE TO UNSPECIFIED ORGANISM, UNSPECIFIED ACUTE RENAL FAILURE TYPE (HCC): Primary | ICD-10-CM

## 2021-09-22 DIAGNOSIS — N30.00 ACUTE CYSTITIS WITHOUT HEMATURIA: ICD-10-CM

## 2021-09-22 DIAGNOSIS — N17.9 SEPSIS WITH ACUTE RENAL FAILURE WITHOUT SEPTIC SHOCK, DUE TO UNSPECIFIED ORGANISM, UNSPECIFIED ACUTE RENAL FAILURE TYPE (HCC): Primary | ICD-10-CM

## 2021-09-22 DIAGNOSIS — A41.9 SEPSIS WITH ACUTE RENAL FAILURE WITHOUT SEPTIC SHOCK, DUE TO UNSPECIFIED ORGANISM, UNSPECIFIED ACUTE RENAL FAILURE TYPE (HCC): Primary | ICD-10-CM

## 2021-09-22 LAB
ALBUMIN SERPL-MCNC: 2.9 G/DL (ref 3.5–5)
ALBUMIN/GLOB SERPL: 0.7 {RATIO} (ref 1.1–2.2)
ALP SERPL-CCNC: 106 U/L (ref 45–117)
ALT SERPL-CCNC: 24 U/L (ref 12–78)
ANION GAP SERPL CALC-SCNC: 7 MMOL/L (ref 5–15)
APPEARANCE UR: ABNORMAL
AST SERPL-CCNC: 15 U/L (ref 15–37)
BACTERIA URNS QL MICRO: ABNORMAL /HPF
BASOPHILS # BLD: 0 K/UL (ref 0–0.1)
BASOPHILS NFR BLD: 0 % (ref 0–1)
BILIRUB SERPL-MCNC: 1.2 MG/DL (ref 0.2–1)
BILIRUB UR QL: NEGATIVE
BUN SERPL-MCNC: 38 MG/DL (ref 6–20)
BUN/CREAT SERPL: 18 (ref 12–20)
CALCIUM SERPL-MCNC: 8.8 MG/DL (ref 8.5–10.1)
CHLORIDE SERPL-SCNC: 108 MMOL/L (ref 97–108)
CO2 SERPL-SCNC: 24 MMOL/L (ref 21–32)
COLOR UR: ABNORMAL
COVID-19 RAPID TEST, COVR: NOT DETECTED
CREAT SERPL-MCNC: 2.13 MG/DL (ref 0.7–1.3)
DIFFERENTIAL METHOD BLD: ABNORMAL
EOSINOPHIL # BLD: 0 K/UL (ref 0–0.4)
EOSINOPHIL NFR BLD: 0 % (ref 0–7)
EPITH CASTS URNS QL MICRO: ABNORMAL /LPF
ERYTHROCYTE [DISTWIDTH] IN BLOOD BY AUTOMATED COUNT: 12.5 % (ref 11.5–14.5)
GLOBULIN SER CALC-MCNC: 4.4 G/DL (ref 2–4)
GLUCOSE SERPL-MCNC: 89 MG/DL (ref 65–100)
GLUCOSE UR STRIP.AUTO-MCNC: NEGATIVE MG/DL
HCT VFR BLD AUTO: 38 % (ref 36.6–50.3)
HGB BLD-MCNC: 12.6 G/DL (ref 12.1–17)
HGB UR QL STRIP: ABNORMAL
HYALINE CASTS URNS QL MICRO: ABNORMAL /LPF (ref 0–5)
IMM GRANULOCYTES # BLD AUTO: 0.2 K/UL (ref 0–0.04)
IMM GRANULOCYTES NFR BLD AUTO: 1 % (ref 0–0.5)
KETONES UR QL STRIP.AUTO: ABNORMAL MG/DL
LACTATE BLD-SCNC: 1.86 MMOL/L (ref 0.4–2)
LEUKOCYTE ESTERASE UR QL STRIP.AUTO: ABNORMAL
LYMPHOCYTES # BLD: 0.2 K/UL (ref 0.8–3.5)
LYMPHOCYTES NFR BLD: 1 % (ref 12–49)
MCH RBC QN AUTO: 31.8 PG (ref 26–34)
MCHC RBC AUTO-ENTMCNC: 33.2 G/DL (ref 30–36.5)
MCV RBC AUTO: 96 FL (ref 80–99)
MONOCYTES # BLD: 0.5 K/UL (ref 0–1)
MONOCYTES NFR BLD: 3 % (ref 5–13)
NEUTS SEG # BLD: 15.5 K/UL (ref 1.8–8)
NEUTS SEG NFR BLD: 95 % (ref 32–75)
NITRITE UR QL STRIP.AUTO: NEGATIVE
NRBC # BLD: 0 K/UL (ref 0–0.01)
NRBC BLD-RTO: 0 PER 100 WBC
PH UR STRIP: 7.5 [PH] (ref 5–8)
PLATELET # BLD AUTO: 180 K/UL (ref 150–400)
PMV BLD AUTO: 10.2 FL (ref 8.9–12.9)
POTASSIUM SERPL-SCNC: 3.1 MMOL/L (ref 3.5–5.1)
PROT SERPL-MCNC: 7.3 G/DL (ref 6.4–8.2)
PROT UR STRIP-MCNC: 100 MG/DL
RBC # BLD AUTO: 3.96 M/UL (ref 4.1–5.7)
RBC #/AREA URNS HPF: ABNORMAL /HPF (ref 0–5)
RBC MORPH BLD: ABNORMAL
SODIUM SERPL-SCNC: 139 MMOL/L (ref 136–145)
SOURCE, COVRS: NORMAL
SP GR UR REFRACTOMETRY: 1.02 (ref 1–1.03)
UA: UC IF INDICATED,UAUC: ABNORMAL
UROBILINOGEN UR QL STRIP.AUTO: 1 EU/DL (ref 0.2–1)
WBC # BLD AUTO: 16.4 K/UL (ref 4.1–11.1)
WBC URNS QL MICRO: >100 /HPF (ref 0–4)

## 2021-09-22 PROCEDURE — 96365 THER/PROPH/DIAG IV INF INIT: CPT

## 2021-09-22 PROCEDURE — 74011250636 HC RX REV CODE- 250/636: Performed by: INTERNAL MEDICINE

## 2021-09-22 PROCEDURE — 51702 INSERT TEMP BLADDER CATH: CPT

## 2021-09-22 PROCEDURE — 74011000258 HC RX REV CODE- 258: Performed by: INTERNAL MEDICINE

## 2021-09-22 PROCEDURE — 87186 SC STD MICRODIL/AGAR DIL: CPT

## 2021-09-22 PROCEDURE — 81001 URINALYSIS AUTO W/SCOPE: CPT

## 2021-09-22 PROCEDURE — 74011250637 HC RX REV CODE- 250/637: Performed by: INTERNAL MEDICINE

## 2021-09-22 PROCEDURE — 85025 COMPLETE CBC W/AUTO DIFF WBC: CPT

## 2021-09-22 PROCEDURE — 87077 CULTURE AEROBIC IDENTIFY: CPT

## 2021-09-22 PROCEDURE — 74011000258 HC RX REV CODE- 258: Performed by: EMERGENCY MEDICINE

## 2021-09-22 PROCEDURE — 87635 SARS-COV-2 COVID-19 AMP PRB: CPT

## 2021-09-22 PROCEDURE — 83605 ASSAY OF LACTIC ACID: CPT

## 2021-09-22 PROCEDURE — 71045 X-RAY EXAM CHEST 1 VIEW: CPT

## 2021-09-22 PROCEDURE — 36415 COLL VENOUS BLD VENIPUNCTURE: CPT

## 2021-09-22 PROCEDURE — 80053 COMPREHEN METABOLIC PANEL: CPT

## 2021-09-22 PROCEDURE — 87086 URINE CULTURE/COLONY COUNT: CPT

## 2021-09-22 PROCEDURE — 87040 BLOOD CULTURE FOR BACTERIA: CPT

## 2021-09-22 PROCEDURE — 65270000029 HC RM PRIVATE

## 2021-09-22 PROCEDURE — 76770 US EXAM ABDO BACK WALL COMP: CPT

## 2021-09-22 PROCEDURE — 99285 EMERGENCY DEPT VISIT HI MDM: CPT

## 2021-09-22 PROCEDURE — 74011250636 HC RX REV CODE- 250/636: Performed by: EMERGENCY MEDICINE

## 2021-09-22 RX ORDER — ACETAMINOPHEN 325 MG/1
650 TABLET ORAL
COMMUNITY

## 2021-09-22 RX ORDER — TAMSULOSIN HYDROCHLORIDE 0.4 MG/1
0.8 CAPSULE ORAL DAILY
Status: DISCONTINUED | OUTPATIENT
Start: 2021-09-22 | End: 2021-09-30 | Stop reason: HOSPADM

## 2021-09-22 RX ORDER — ATENOLOL 25 MG/1
25 TABLET ORAL DAILY
Status: DISCONTINUED | OUTPATIENT
Start: 2021-09-22 | End: 2021-09-30 | Stop reason: HOSPADM

## 2021-09-22 RX ORDER — TAMSULOSIN HYDROCHLORIDE 0.4 MG/1
0.8 CAPSULE ORAL DAILY
COMMUNITY

## 2021-09-22 RX ORDER — FINASTERIDE 5 MG/1
5 TABLET, FILM COATED ORAL DAILY
Status: DISCONTINUED | OUTPATIENT
Start: 2021-09-22 | End: 2021-09-30 | Stop reason: HOSPADM

## 2021-09-22 RX ORDER — FINASTERIDE 5 MG/1
5 TABLET, FILM COATED ORAL DAILY
COMMUNITY

## 2021-09-22 RX ORDER — LANOLIN ALCOHOL/MO/W.PET/CERES
500 CREAM (GRAM) TOPICAL DAILY
COMMUNITY

## 2021-09-22 RX ORDER — AMLODIPINE BESYLATE 5 MG/1
10 TABLET ORAL DAILY
Status: DISCONTINUED | OUTPATIENT
Start: 2021-09-22 | End: 2021-09-30 | Stop reason: HOSPADM

## 2021-09-22 RX ORDER — ASCORBIC ACID 500 MG
500 TABLET ORAL DAILY
Status: DISCONTINUED | OUTPATIENT
Start: 2021-09-22 | End: 2021-09-30 | Stop reason: HOSPADM

## 2021-09-22 RX ORDER — ACETAMINOPHEN 325 MG/1
650 TABLET ORAL
Status: DISCONTINUED | OUTPATIENT
Start: 2021-09-22 | End: 2021-09-30 | Stop reason: HOSPADM

## 2021-09-22 RX ORDER — LANOLIN ALCOHOL/MO/W.PET/CERES
3 CREAM (GRAM) TOPICAL
COMMUNITY

## 2021-09-22 RX ORDER — POTASSIUM CHLORIDE 750 MG/1
40 TABLET, FILM COATED, EXTENDED RELEASE ORAL ONCE
Status: COMPLETED | OUTPATIENT
Start: 2021-09-22 | End: 2021-09-22

## 2021-09-22 RX ORDER — LANOLIN ALCOHOL/MO/W.PET/CERES
3 CREAM (GRAM) TOPICAL
Status: DISCONTINUED | OUTPATIENT
Start: 2021-09-22 | End: 2021-09-30 | Stop reason: HOSPADM

## 2021-09-22 RX ADMIN — POTASSIUM CHLORIDE 40 MEQ: 750 TABLET, FILM COATED, EXTENDED RELEASE ORAL at 11:15

## 2021-09-22 RX ADMIN — SODIUM CHLORIDE 1000 ML: 9 INJECTION, SOLUTION INTRAVENOUS at 07:49

## 2021-09-22 RX ADMIN — CEFEPIME HYDROCHLORIDE 2 G: 2 INJECTION, POWDER, FOR SOLUTION INTRAVENOUS at 07:20

## 2021-09-22 RX ADMIN — TAMSULOSIN HYDROCHLORIDE 0.8 MG: 0.4 CAPSULE ORAL at 11:12

## 2021-09-22 RX ADMIN — FINASTERIDE 5 MG: 5 TABLET, FILM COATED ORAL at 12:46

## 2021-09-22 RX ADMIN — OXYCODONE HYDROCHLORIDE AND ACETAMINOPHEN 500 MG: 500 TABLET ORAL at 11:12

## 2021-09-22 RX ADMIN — CEFEPIME HYDROCHLORIDE 1 G: 1 INJECTION, POWDER, FOR SOLUTION INTRAMUSCULAR; INTRAVENOUS at 11:14

## 2021-09-22 NOTE — ED PROVIDER NOTES
EMERGENCY DEPARTMENT HISTORY AND PHYSICAL EXAM      Date: 9/22/2021  Patient Name: Dory Castelan    Please note that this dictation was completed with [employer], the computer voice recognition software. Quite often unanticipated grammatical, syntax, homophones, and other interpretive errors are inadvertently transcribed by the computer software. Please disregard these errors. Please excuse any errors that have escaped final proofreading. History of Presenting Illness     Chief Complaint   Patient presents with    Urinary Catheter Problem     Patient arrives from home via EMS for catheter problem. Catheter was placed 4pm yesterday. Patient with pain at catheter site & febrile per EMS        History Provided By: Patient     HPI: Dory Castelan, 80 y.o. male, with a past medical history significant for BPH, Jett catheter in place presenting the emergency department with urinary retention, fever. He had a Jett catheter change yesterday and reports after he was changed he had decreased urine output. He also complains of fever. Denies any nausea vomiting. Does report some intermittent loose stools. No chest pain or shortness of breath, does admit to a cough, he is not vaccinated. Nothing makes his symptoms better or worse. PCP: Janett Inman MD    No current facility-administered medications on file prior to encounter. Current Outpatient Medications on File Prior to Encounter   Medication Sig Dispense Refill    tamsulosin (FLOMAX) 0.4 mg capsule Take 0.8 mg by mouth daily.  finasteride (PROSCAR) 5 mg tablet Take 5 mg by mouth daily.  acetaminophen (TylenoL) 325 mg tablet Take 650 mg by mouth every six (6) hours as needed for Pain.  ascorbic acid (VITAMIN C) 500 mg chewable tablet Take 500 mg by mouth daily.  melatonin 3 mg tablet Take 3 mg by mouth nightly as needed for Insomnia.  amLODIPine-Olmesartan 10-20 mg tab Take 1 Tab by mouth daily.       atenolol-chlorthalidone (TENORETIC) 50-25 mg per tablet Take 1 Tab by mouth daily.  [DISCONTINUED] ondansetron (ZOFRAN ODT) 4 mg disintegrating tablet Take 1 Tab by mouth every eight (8) hours as needed for Nausea. 10 Tab 0    [DISCONTINUED] dicyclomine (BENTYL) 20 mg tablet Take 1 Tab by mouth every six (6) hours as needed (abdominal cramps) for up to 20 doses. 20 Tab 0    [DISCONTINUED] HYDROcodone-acetaminophen (NORCO) 5-325 mg per tablet Take 1-2 Tabs by mouth every six (6) hours as needed for Pain. Max Daily Amount: 8 Tabs. 30 Tab 0    [DISCONTINUED] tamsulosin (Flomax) 0.4 mg capsule Take 0.8 mg by mouth daily.  [DISCONTINUED] multivitamin (ONE A DAY) tablet Take 1 Tab by mouth daily.  [DISCONTINUED] Gluc-MSM-Jeff 2-Hyal-Antiar 3 375-375-20 mg tab Take 1 Tab by mouth daily.  [DISCONTINUED] diazepam (VALIUM) 5 mg tablet Take 1 Tab by mouth three (3) times daily as needed for Anxiety (spasm). 20 Tab 0       Past History     Past Medical History:  Past Medical History:   Diagnosis Date    Arthritis     Cancer ([facility] 04/2019    renal    Hypertension     Prostate enlargement        Past Surgical History:  Past Surgical History:   Procedure Laterality Date    HX ORTHOPAEDIC Right 2008    hip replacement/[facility]/Dr Pretty Jorge HX PREMALIG/BENIGN SKIN LESION EXCISION  07/13/2018    excision of left flank        Family History:  Family History   Problem Relation Age of Onset    Cancer Daughter         Breast ca - stage 1    Hypertension Mother        Social History:  Social History     Tobacco Use    Smoking status: Never Smoker    Smokeless tobacco: Never Used   Substance Use Topics    Alcohol use: No    Drug use: No       Allergies:  No Known Allergies      Review of Systems   Review of Systems   Constitutional: Positive for fever. Negative for chills. HENT: Negative for congestion and sore throat. Eyes: Negative for visual disturbance.    Respiratory: Negative for cough and shortness of breath. Cardiovascular: Negative for chest pain and leg swelling. Gastrointestinal: Negative for abdominal pain, blood in stool, diarrhea and nausea. Endocrine: Negative for polyuria. Genitourinary: Positive for decreased urine volume. Negative for dysuria and testicular pain. Musculoskeletal: Negative for arthralgias, joint swelling and myalgias. Skin: Negative for rash. Allergic/Immunologic: Negative for immunocompromised state. Neurological: Negative for weakness and headaches. Hematological: Does not bruise/bleed easily. Psychiatric/Behavioral: Negative for confusion. Physical Exam   Physical Exam  Vitals and nursing note reviewed. Constitutional:       Appearance: He is well-developed. HENT:      Head: Normocephalic and atraumatic. Eyes:      General:         Right eye: No discharge. Left eye: No discharge. Conjunctiva/sclera: Conjunctivae normal.      Pupils: Pupils are equal, round, and reactive to light. Neck:      Trachea: No tracheal deviation. Cardiovascular:      Rate and Rhythm: Regular rhythm. Tachycardia present. Heart sounds: Normal heart sounds. No murmur heard. Pulmonary:      Effort: Pulmonary effort is normal. No respiratory distress. Breath sounds: Normal breath sounds. No wheezing or rales. Abdominal:      General: Bowel sounds are normal.      Palpations: Abdomen is soft. Tenderness: There is no abdominal tenderness. There is no guarding or rebound. Genitourinary:     Comments: Jett catheter in place, no urine in the bag  Musculoskeletal:         General: No tenderness or deformity. Normal range of motion. Cervical back: Normal range of motion and neck supple. Skin:     General: Skin is warm and dry. Findings: No erythema or rash. Neurological:      Mental Status: He is alert and oriented to person, place, and time.    Psychiatric:         Behavior: Behavior normal.         Diagnostic Study Results     Labs -     Recent Results (from the past 12 hour(s))   URINALYSIS W/ REFLEX CULTURE    Collection Time: 09/22/21  6:22 AM    Specimen: Urine   Result Value Ref Range    Color YELLOW/STRAW      Appearance CLOUDY (A) CLEAR      Specific gravity 1.018 1.003 - 1.030      pH (UA) 7.5 5.0 - 8.0      Protein 100 (A) NEG mg/dL    Glucose Negative NEG mg/dL    Ketone TRACE (A) NEG mg/dL    Bilirubin Negative NEG      Blood LARGE (A) NEG      Urobilinogen 1.0 0.2 - 1.0 EU/dL    Nitrites Negative NEG      Leukocyte Esterase LARGE (A) NEG      WBC >100 (H) 0 - 4 /hpf    RBC 20-50 0 - 5 /hpf    Epithelial cells FEW FEW /lpf    Bacteria 4+ (A) NEG /hpf    UA:UC IF INDICATED URINE CULTURE ORDERED (A) CNI      Hyaline cast 0-2 0 - 5 /lpf   CBC WITH AUTOMATED DIFF    Collection Time: 09/22/21  6:34 AM   Result Value Ref Range    WBC 16.4 (H) 4.1 - 11.1 K/uL    RBC 3.96 (L) 4.10 - 5.70 M/uL    HGB 12.6 12.1 - 17.0 g/dL    HCT 38.0 36.6 - 50.3 %    MCV 96.0 80.0 - 99.0 FL    MCH 31.8 26.0 - 34.0 PG    MCHC 33.2 30.0 - 36.5 g/dL    RDW 12.5 11.5 - 14.5 %    PLATELET 908 416 - 650 K/uL    MPV 10.2 8.9 - 12.9 FL    NRBC 0.0 0  WBC    ABSOLUTE NRBC 0.00 0.00 - 0.01 K/uL    NEUTROPHILS 95 (H) 32 - 75 %    LYMPHOCYTES 1 (L) 12 - 49 %    MONOCYTES 3 (L) 5 - 13 %    EOSINOPHILS 0 0 - 7 %    BASOPHILS 0 0 - 1 %    IMMATURE GRANULOCYTES 1 (H) 0.0 - 0.5 %    ABS. NEUTROPHILS 15.5 (H) 1.8 - 8.0 K/UL    ABS. LYMPHOCYTES 0.2 (L) 0.8 - 3.5 K/UL    ABS. MONOCYTES 0.5 0.0 - 1.0 K/UL    ABS. EOSINOPHILS 0.0 0.0 - 0.4 K/UL    ABS. BASOPHILS 0.0 0.0 - 0.1 K/UL    ABS. IMM.  GRANS. 0.2 (H) 0.00 - 0.04 K/UL    DF AUTOMATED      RBC COMMENTS NORMOCYTIC, NORMOCHROMIC     METABOLIC PANEL, COMPREHENSIVE    Collection Time: 09/22/21  6:34 AM   Result Value Ref Range    Sodium 139 136 - 145 mmol/L    Potassium 3.1 (L) 3.5 - 5.1 mmol/L    Chloride 108 97 - 108 mmol/L    CO2 24 21 - 32 mmol/L    Anion gap 7 5 - 15 mmol/L    Glucose 89 65 - 100 mg/dL    BUN 38 (H) 6 - 20 MG/DL    Creatinine 2.13 (H) 0.70 - 1.30 MG/DL    BUN/Creatinine ratio 18 12 - 20      GFR est AA 36 (L) >60 ml/min/1.73m2    GFR est non-AA 29 (L) >60 ml/min/1.73m2    Calcium 8.8 8.5 - 10.1 MG/DL    Bilirubin, total 1.2 (H) 0.2 - 1.0 MG/DL    ALT (SGPT) 24 12 - 78 U/L    AST (SGOT) 15 15 - 37 U/L    Alk. phosphatase 106 45 - 117 U/L    Protein, total 7.3 6.4 - 8.2 g/dL    Albumin 2.9 (L) 3.5 - 5.0 g/dL    Globulin 4.4 (H) 2.0 - 4.0 g/dL    A-G Ratio 0.7 (L) 1.1 - 2.2     POC LACTIC ACID    Collection Time: 09/22/21  6:44 AM   Result Value Ref Range    Lactic Acid (POC) 1.86 0.40 - 2.00 mmol/L   COVID-19 RAPID TEST    Collection Time: 09/22/21  7:52 AM   Result Value Ref Range    Specimen source Nasopharyngeal      COVID-19 rapid test Not detected NOTD         Radiologic Studies -   US RETROPERITONEUM COMP   Final Result   Solitary right kidney without hydronephrosis. XR CHEST PORT   Final Result   No acute process. CT Results  (Last 48 hours)    None        CXR Results  (Last 48 hours)               09/22/21 0708  XR CHEST PORT Final result    Impression:  No acute process. Narrative:  INDICATION: fever  Fever       EXAM:  AP CHEST RADIOGRAPH       COMPARISON: None       FINDINGS:       AP portable view of the chest demonstrates a normal cardiomediastinal   silhouette. There is no edema, effusion, consolidation, or pneumothorax. Gaseous   distention of the stomach. The osseous structures are unremarkable. Medical Decision Making   I am the first provider for this patient. I reviewed the vital signs, available nursing notes, past medical history, past surgical history, family history and social history. Vital Signs-Reviewed the patient's vital signs.   Patient Vitals for the past 12 hrs:   Temp Pulse Resp BP SpO2   09/22/21 1247  81  104/61 96 %   09/22/21 1116    98/61 95 %   09/22/21 0930 98.6 °F (37 °C) 90 18 (!) 113/59 94 %   09/22/21 0700    102/60 93 %   09/22/21 0645     93 %   09/22/21 0630    104/64 92 %   09/22/21 0559 99.6 °F (37.6 °C) (!) 108 18 125/69 93 %           Records Reviewed:   Nursing notes, Prior visits     Provider Notes (Medical Decision Making):   Patient presenting with fever. He also has urinary retention. Likely source is UTI. Covid is a possibility, but patient is not hypoxic, not really complaining of any respiratory symptoms other than a chronic cough. Will check labs for evidence of renal failure, will change Jett catheter, send urine for culture. Patient looks nontoxic, could likely be treated as an outpatient if labs are reassuring. Disposition pending laboratory work-up and imaging. ED Course:   Initial assessment performed. The patients presenting problems have been discussed, and they are in agreement with the care plan formulated and outlined with them. I have encouraged them to ask questions as they arise throughout their visit. Critical Care Time:   none    Disposition:  Patient is being admitted to the hospital by hospitalist.  The results of their tests and reasons for their admission have been discussed with them and/or available family. They convey agreement and understanding for the need to be admitted and for their admission diagnosis. Consultation has been made with the inpatient physician specialist for hospitalization. PLAN:  1. Current Discharge Medication List        2. Follow-up Information    None         Return to ED if worse     Diagnosis     Clinical Impression:   1. Sepsis with acute renal failure without septic shock, due to unspecified organism, unspecified acute renal failure type ([facility]    2. Acute cystitis without hematuria        Attestations:   This note was completed by Mayra Valenzuela DO

## 2021-09-22 NOTE — PROGRESS NOTES
Transition of Care Plan:    RUR: Not listed on chart at this time   Disposition: Home w/ spouse  Follow up appointments: PCP  DME needed: Pt has a cane and shower chair   Transportation at Discharge: Pt's daughterJany or means to access home: Daughter will have access to home         IM Medicare Letter: Will need 2nd IMM letter prior to d/c  Is patient a BCPI-A Bundle:  N/A         If yes, was Bundle Letter given?:     Caregiver Contact: Pt's daughter, aMddie Reed) [phone]  Discharge Caregiver contacted prior to discharge? This CM talked with pt's daughter on today, unit CM to follow. Reason for Admission:  Sepsis, UTI, BPH, hypertension, VALERIE on CKD, Hypokalemia                      RUR Score:   Not listed on chart at this time                    Plan for utilizing home health:  No home health needs identified at present. PCP: First and Last name:  Vince Peña MD     Name of Practice:    Are you a current patient: Yes/No: Yes   Approximate date of last visit: 2 months ago    Can you participate in a virtual visit with your PCP: No                    Current Advanced Directive/Advance Care Plan: No Order   Advance Care Planning     General Advance Care Planning (ACP) Conversation      Date of Conversation: 9/22/2021  Conducted with: Pt's daughterGeorgette Decision Maker:     Primary Decision Maker: Mey rim - [phone]  Click here to complete [address] including selection of the Healthcare Decision Maker Relationship (ie \"Primary\")      Today we documented Decision Maker(s) consistent with Legal Next of Kin hierarchy. Content/Action Overview:   No ACP documents per daughter, pt's spouse is legal next of kin for healthcare decision making.   Reviewed DNR/DNI and patient elects Full Code (Attempt Resuscitation)     Length of Voluntary ACP Conversation in minutes:  <16 minutes (Non-Billable)    Domitila Chaves Healthcare Decision Maker:    Primary Decision Maker: Arti Allison - [phone]                  Transition of Care Plan: Home with spouse, outpatient follow up     CM reviewed chart. Pt being seen by provider at this time, CM completed assessment with pt's daughter, Di Dickens, who was previously at bedside this morning. CM introduced self/role, verified demographics, and discussed discharge planning. Pt's physical address is [facility], [address] [place] [address]. Pt resides with his spouse, and also has family support from 3 daughters, 2 of which are local to patient Elaina Myrtle and April). Pt's daughter reports that pt only leaves his home 1x/month to attend outpatient urology appt for rosales catheter change. Urologist is Dr. Marlin Singleton. Pt has had rosales for 3 years. Pt's daughter reports that this is pt's preference, he prefers to stay at home and has hobbies around the home, such as watching TV, reading the bible, and walking for exercise in his yard. Pt's daughters provide all transportation support to appointments. Pt uses a cane in the community, does not use cane in home. Pt has a shower chair for bathing. Pt's daughter reports that pt's hygiene management has declined, and that he is resistive to support and encouragement for bathing. Pt has family support available to assist him with these tasks. Pt's daughter states that she has talked with pt about possibly hiring an aide to come in to assist with bathing if that would be helpful for him. Pt's daughter states that pt is capable of performing his bathing tasks but does not perform them as needed. Pt is independent with other ADLs in home and manages his medications. Pt's daughter will transport pt home at d/c. Pt has SNF hx at [facility]. Pt's pharmacy preference is [facility] in [place], [address] [facility] on [address]. Unit care management will continue to follow for transition of care planning needs.       Care Management Interventions  PCP Verified by CM: Yes  Palliative Care Criteria Met (RRAT>21 & CHF Dx)?: No  Mode of Transport at Discharge:  Other (see comment) (Pt's daughter, Jose Garcia)  Transition of Care Consult (CM Consult): Discharge Planning  Discharge Durable Medical Equipment: No (Pt has a cane and shower chair)  Physical Therapy Consult: No  Occupational Therapy Consult: No  Speech Therapy Consult: No  Support Systems: Spouse/Significant Other, Child(tammy) (Spouse and daughters are main support system.)  Confirm Follow Up Transport: Family (Pt's daughters assist with all transportation needs)  Discharge Location  Discharge Placement: Home (Home with outpatient follow up)    [address]ina [address], [id number] [facility]

## 2021-09-22 NOTE — PROGRESS NOTES
End of Shift Note    Bedside shift change report given to Susie (oncoming nurse) by Chika Marina (offgoing nurse). Report included the following information SBAR, Kardex and MAR    Shift worked:  7am to 7pm     Shift summary and any significant changes:     Patient tolerated care fairly throughout shift. Hourly rounding completed. Rosales care completed. Patient up x1 at the bedside. No swallowing problems. Patient admitted from the ED. Concerns for physician to address:       Zone phone for oncoming shift:          Activity:  Activity Level: Up with Assistance  Number times ambulated in hallways past shift: 0  Number of times OOB to chair past shift: 0    Cardiac:   Cardiac Monitoring: No           Access:   Current line(s): PIV     Genitourinary:   Urinary status: rosales    Respiratory:   O2 Device: None (Room air)  Chronic home O2 use?: NO       GI:  Last Bowel Movement Date: 09/22/21  Current diet:  ADULT DIET Regular  Passing flatus: YES  Tolerating current diet: YES       Pain Management:   Patient states pain is manageable on current regimen: YES    Skin:  Martinez Score: 18  Interventions: float heels, increase time out of bed, limit briefs and internal/external urinary devices    Patient Safety:  Fall Score:  Total Score: 2  Interventions: bed/chair alarm, gripper socks and pt to call before getting OOB       Length of Stay:  Expected LOS: 3d 12h  Actual LOS: 0      Chika Marina

## 2021-09-22 NOTE — PROGRESS NOTES
Pharmacy Medication Reconciliation     The patient was interviewed regarding current PTA medication list, use and drug allergies; Spoke with patient's oldest daughter Chantale Carvalho" on the phone [phone]. The patient was questioned regarding use of any other inhalers, topical products, over the counter medications, herbal medications, vitamin products or ophthalmic/nasal/otic medication use. Allergy Update: Patient has no known allergies. Recommendations/Findings: The following amendments were made to the [facility]:     1) Additions: Finasteride 5 mg daily, acetaminophen PRN, ascorbic acid 500 mg daily, melatonin 3 mg PRN    2) Deletions: amlodipine-olmesartan 10-20 mg tab, diazepam 5 mg TID PRN, dicyclomine 20 mg q6hprn, glucosamine/chondroitin 1 tablet daily, hydrocodone/acetaminophen 5-325 mg tablet q6hprn, multivitamin daily, ondansetron 4 mg ODT    3) Changes: tamsulosin 0.4 --> 0.8 mg daily      Pertinent Findings: NA    Clarified PTA med list with patient's daughter, Romina Che (see above), [facility] history, [facility] PTA medication list was corrected to the following:     Prior to Admission Medications   Prescriptions Last Dose Informant Taking?   acetaminophen (TylenoL) 325 mg tablet 9/21/2021 at Unknown time  Yes   Sig: Take 650 mg by mouth every six (6) hours as needed for Pain. amLODIPine-Olmesartan 10-20 mg tab 9/21/2021 at Unknown time  Yes   Sig: Take 1 Tab by mouth daily. ascorbic acid (VITAMIN C) 500 mg chewable tablet 9/21/2021 at Unknown time  Yes   Sig: Take 500 mg by mouth daily. atenolol-chlorthalidone (TENORETIC) 50-25 mg per tablet 9/21/2021 at Unknown time  Yes   Sig: Take 1 Tab by mouth daily. finasteride (PROSCAR) 5 mg tablet 9/21/2021 at Unknown time  Yes   Sig: Take 5 mg by mouth daily.    melatonin 3 mg tablet 9/15/2021 at Unknown time  Yes   Sig: Take 3 mg by mouth nightly as needed for Insomnia.   tamsulosin (FLOMAX) 0.4 mg capsule 9/21/2021 at Unknown time  Yes   Sig: Take 0.8 mg by mouth daily.       Facility-Administered Medications: None        Thank you,  Tiffanie Cordova, PHARMD

## 2021-09-22 NOTE — H&P
Hospitalist Admission Note    NAME: Maria Fernanda Barnes   :  1933   MRN:  [id number]     Date/Time:  2021 10:08 AM    Patient PCP: Vince Peña MD  _____________________________________________________________________  Given the patient's current clinical presentation, I have a high level of concern for decompensation if discharged from the emergency department. Complex decision making was performed, which includes reviewing the patient's available past medical records, laboratory results, and x-ray films. My assessment of this patient's clinical condition and my plan of care is as follows. Assessment / Plan:  Sepsis  UTI  BPH  Previous cultures in May 2020 grew Serratia and Proteus both sensitive to cefepime. We will continue with cefepime at this time. Follow-up urine cultures and sensitivities and de-escalate antibiotics as appropriate  Follow-up blood cultures  Continue with BPH medication max and finasteride  Rosales catheter was exchanged in the ED  Monitor urine output    Hypertension  C/w amlodipine 10 mh, Hold olmesartan with slight VALERIE  Atenolol 50 mg  Chlorthalidone 25 mg on hold    VALERIE on CKD  Cr 2.13 today baseline around 1.4-1.7? Check MARY JO to rule out hydro  Avoid nephrotoxins  Monitor Cr    Hypokalemia  Monitor and replete    Code Status: Full   Surrogate Decision Maker: Germaine Allison [phone]      DVT Prophylaxis: Lovenox  GI Prophylaxis: not indicated    Baseline:         Subjective:   CHIEF COMPLAINT:  Urinary retention, fever    HISTORY OF PRESENT ILLNESS:     Faviola Santana is a 80 y.o. male the past medical history significant for BPH (chronic rosales), hypertension and [address] who presents to the ED with urinary retention and fever. Patient had his Rosales catheter exchange yesterday. Since that time, she is noticed a decreased urine output. He was also complaining of some suprapubic tenderness as well as fevers at home.   He came to the ED for further evaluation. He had his Jett exchanged in the ED and he states that he felt better since that time. He has been having adequate urine output. We were asked to admit for work up and evaluation of the above problems. Past Medical History:   Diagnosis Date    Arthritis     Cancer ([address] 04/2019    renal    Hypertension     Prostate enlargement         Past Surgical History:   Procedure Laterality Date    HX ORTHOPAEDIC Right 2008    [facility]    HX PREMALIG/BENIGN SKIN LESION EXCISION  07/13/2018    excision of left flank        Social History     Tobacco Use    Smoking status: Never Smoker    Smokeless tobacco: Never Used   Substance Use Topics    Alcohol use: No        Family History   Problem Relation Age of Onset    Cancer Daughter         Breast ca - stage 1    Hypertension Mother      No Known Allergies     Prior to Admission medications    Medication Sig Start Date End Date Taking? Authorizing Provider   tamsulosin (FLOMAX) 0.4 mg capsule Take 0.8 mg by mouth daily. Yes Provider, Historical   finasteride (PROSCAR) 5 mg tablet Take 5 mg by mouth daily. Yes Provider, Historical   acetaminophen (TylenoL) 325 mg tablet Take 650 mg by mouth every six (6) hours as needed for Pain. Yes Provider, Historical   ascorbic acid (VITAMIN C) 500 mg chewable tablet Take 500 mg by mouth daily. Yes Provider, Historical   melatonin 3 mg tablet Take 3 mg by mouth nightly as needed for Insomnia. Yes Provider, Historical   amLODIPine-Olmesartan 10-20 mg tab Take 1 Tab by mouth daily. Yes Provider, Historical   atenolol-chlorthalidone (TENORETIC) 50-25 mg per tablet Take 1 Tab by mouth daily. Yes Provider, Historical       REVIEW OF SYSTEMS:     I am not able to complete the review of systems because:    The patient is intubated and sedated    The patient has altered mental status due to his acute medical problems    The patient has baseline aphasia from prior stroke(s) The patient has baseline dementia and is not reliable historian    The patient is in acute medical distress and unable to provide information           Total of 12 systems reviewed as follows:       POSITIVE= underlined text  Negative = text not underlined  General:  fever, chills, sweats, generalized weakness, weight loss/gain,      loss of appetite   Eyes:    blurred vision, eye pain, loss of vision, double vision  ENT:    rhinorrhea, pharyngitis   Respiratory:   cough, sputum production, SOB, COBOS, wheezing, pleuritic pain   Cardiology:   chest pain, palpitations, orthopnea, PND, edema, syncope   Gastrointestinal:  abdominal pain , N/V, diarrhea, dysphagia, constipation, bleeding     Muskuloskeletal :  arthralgia, myalgia, back pain  Hematology:  easy bruising, nose or gum bleeding, lymphadenopathy   Dermatological: rash, ulceration, pruritis, color change / jaundice  Endocrine:   hot flashes or polydipsia   Neurological:  headache, dizziness, confusion, focal weakness, paresthesia,     Speech difficulties, memory loss, gait difficulty  Psychological: Feelings of anxiety, depression, agitation    Objective:   VITALS:    Visit Vitals  BP (!) 113/59   Pulse 90   Temp 98.6 °F (37 °C)   Resp 18   Ht 5' 11\" (1.803 m)   Wt 67.7 kg (149 lb 4 oz)   SpO2 94%   BMI 20.82 kg/m²       PHYSICAL EXAM:    General:    Alert, cooperative, no distress, appears stated age. HEENT: Atraumatic, anicteric sclerae, pink conjunctivae     No oral ulcers, mucosa moist, throat clear, dentition fair  Neck:  Supple, symmetrical,  thyroid: non tender  Lungs:   Clear to auscultation bilaterally. No Wheezing or Rhonchi. No rales. Chest wall:  No tenderness  No Accessory muscle use. Heart:   Regular  rhythm,  No  murmur   No edema  Abdomen:   Soft, non-tender. Not distended. Bowel sounds normal  :                 Jett in place, no exudate seen, no suprapubic tenderness  Extremities: No cyanosis.   No clubbing,      Skin turgor normal, Capillary refill normal, Radial dial pulse 2+  Skin:     Not pale. Not Jaundiced  No rashes   Psych:  Good insight. Not depressed. Not anxious or agitated. Neurologic: EOMs intact. No facial asymmetry. No aphasia or slurred speech. Symmetrical strength, Sensation grossly intact. Alert and oriented X 4.     _______________________________________________________________________  Care Plan discussed with:    Comments   Patient     Family      RN     Care Manager                    Consultant:      _______________________________________________________________________  Expected  Disposition:   Home with Family    HH/PT/OT/RN    SNF/LTC    WENDY    ________________________________________________________________________  TOTAL TIME:  35  Minutes    Critical Care Provided     Minutes non procedure based      Comments     Reviewed previous records   >50% of visit spent in counseling and coordination of care  Discussion with patient and/or family and questions answered       Given the patient's current clinical presentation, I have a high level of concern for decompensation if discharged from the ED. Complex decision making was performed which includes reviewing the patient's available past medical records, laboratory results, and Xray films. I have also directly communicated my plan and discussed this case with the involved ED physician.     ____________________________________________________________________  Miranda Johnson MD    Procedures: see electronic medical records for all procedures/Xrays and details which were not copied into this note but were reviewed prior to creation of Plan.     LAB DATA REVIEWED:    Recent Results (from the past 24 hour(s))   URINALYSIS W/ REFLEX CULTURE    Collection Time: 09/22/21  6:22 AM    Specimen: Urine   Result Value Ref Range    Color YELLOW/STRAW      Appearance CLOUDY (A) CLEAR      Specific gravity 1.018 1.003 - 1.030      pH (UA) 7.5 5.0 - 8.0      Protein 100 (A) NEG mg/dL    Glucose Negative NEG mg/dL    Ketone TRACE (A) NEG mg/dL    Bilirubin Negative NEG      Blood LARGE (A) NEG      Urobilinogen 1.0 0.2 - 1.0 EU/dL    Nitrites Negative NEG      Leukocyte Esterase LARGE (A) NEG      WBC >100 (H) 0 - 4 /hpf    RBC 20-50 0 - 5 /hpf    Epithelial cells FEW FEW /lpf    Bacteria 4+ (A) NEG /hpf    UA:UC IF INDICATED URINE CULTURE ORDERED (A) CNI      Hyaline cast 0-2 0 - 5 /lpf   CBC WITH AUTOMATED DIFF    Collection Time: 09/22/21  6:34 AM   Result Value Ref Range    WBC 16.4 (H) 4.1 - 11.1 K/uL    RBC 3.96 (L) 4.10 - 5.70 M/uL    HGB 12.6 12.1 - 17.0 g/dL    HCT 38.0 36.6 - 50.3 %    MCV 96.0 80.0 - 99.0 FL    MCH 31.8 26.0 - 34.0 PG    MCHC 33.2 30.0 - 36.5 g/dL    RDW 12.5 11.5 - 14.5 %    PLATELET 848 757 - 250 K/uL    MPV 10.2 8.9 - 12.9 FL    NRBC 0.0 0  WBC    ABSOLUTE NRBC 0.00 0.00 - 0.01 K/uL    NEUTROPHILS 95 (H) 32 - 75 %    LYMPHOCYTES 1 (L) 12 - 49 %    MONOCYTES 3 (L) 5 - 13 %    EOSINOPHILS 0 0 - 7 %    BASOPHILS 0 0 - 1 %    IMMATURE GRANULOCYTES 1 (H) 0.0 - 0.5 %    ABS. NEUTROPHILS 15.5 (H) 1.8 - 8.0 K/UL    ABS. LYMPHOCYTES 0.2 (L) 0.8 - 3.5 K/UL    ABS. MONOCYTES 0.5 0.0 - 1.0 K/UL    ABS. EOSINOPHILS 0.0 0.0 - 0.4 K/UL    ABS. BASOPHILS 0.0 0.0 - 0.1 K/UL    ABS. IMM.  GRANS. 0.2 (H) 0.00 - 0.04 K/UL    DF AUTOMATED      RBC COMMENTS NORMOCYTIC, NORMOCHROMIC     METABOLIC PANEL, COMPREHENSIVE    Collection Time: 09/22/21  6:34 AM   Result Value Ref Range    Sodium 139 136 - 145 mmol/L    Potassium 3.1 (L) 3.5 - 5.1 mmol/L    Chloride 108 97 - 108 mmol/L    CO2 24 21 - 32 mmol/L    Anion gap 7 5 - 15 mmol/L    Glucose 89 65 - 100 mg/dL    BUN 38 (H) 6 - 20 MG/DL    Creatinine 2.13 (H) 0.70 - 1.30 MG/DL    BUN/Creatinine ratio 18 12 - 20      GFR est AA 36 (L) >60 ml/min/1.73m2    GFR est non-AA 29 (L) >60 ml/min/1.73m2    Calcium 8.8 8.5 - 10.1 MG/DL    Bilirubin, total 1.2 (H) 0.2 - 1.0 MG/DL    ALT (SGPT) 24 12 - 78 U/L    AST (SGOT) 15 15 - 37 U/L Alk. phosphatase 106 45 - 117 U/L    Protein, total 7.3 6.4 - 8.2 g/dL    Albumin 2.9 (L) 3.5 - 5.0 g/dL    Globulin 4.4 (H) 2.0 - 4.0 g/dL    A-G Ratio 0.7 (L) 1.1 - 2.2     POC LACTIC ACID    Collection Time: 09/22/21  6:44 AM   Result Value Ref Range    Lactic Acid (POC) 1.86 0.40 - 2.00 mmol/L   COVID-19 RAPID TEST    Collection Time: 09/22/21  7:52 AM   Result Value Ref Range    Specimen source Nasopharyngeal      COVID-19 rapid test Not detected NOTD

## 2021-09-22 NOTE — PROGRESS NOTES
Physician Progress Note      PATIENT:               Saray Nelson  CSN #:                  [id number]  :                       1933  ADMIT DATE:       2021 5:56 AM  DISCH DATE:  RESPONDING  PROVIDER #:        Srinivasa Licea MD        QUERY TEXT:    Stage of Chronic Kidney Disease: Please provide further specificity, if known. Clinical indicators include: ckd, r, cr, bun, creatinine, bun/creatinine, gfr  Options provided:  -- Chronic kidney disease stage 1  -- Chronic kidney disease stage 2  -- Chronic kidney disease stage 3  -- Chronic kidney disease stage 3a  -- Chronic kidney disease stage 3b  -- Chronic kidney disease stage 4  -- Chronic kidney disease stage 5  -- Chronic kidney disease stage 5, requiring dialysis  -- End stage renal disease  -- Other - I will add my own diagnosis  -- Disagree - Not applicable / Not valid  -- Disagree - Clinically Unable to determine / Unknown        PROVIDER RESPONSE TEXT:    The patient has chronic kidney disease stage 3a.       Electronically signed by:  Srinivasa Licea MD 2021 11:47 AM

## 2021-09-22 NOTE — ED NOTES
Bedside and Verbal shift change report given to Brittney Garcia (oncoming nurse) by Venkat Melendez (offgoing nurse). Report included the following information SBAR, Kardex, ED Summary, Intake/Output, MAR, Recent Results and Med Rec Status.

## 2021-09-22 NOTE — PROGRESS NOTES
End of Shift Note    Bedside shift change report given to BOB Richards (oncoming nurse) by Wili Rincon RN (offgoing nurse). Report included the following information SBAR, Kardex and MAR    Shift worked:  7a - 7p     Shift summary and any significant changes:    Patient transferred to Oncology from the ED this afternoon. Pt tolerated care fairly well throughout shift. Pt had one loose, watery BM x 1 using a bedpan. Hourly rounding completed. Pt stood at bedside x 1 w/ Stephanie Carson RN present at bedside. Concerns for physician to address:       Zone phone for oncoming shift:          Activity:  Activity Level: Up with Assistance  Number times ambulated in hallways past shift: 0  Number of times OOB to chair past shift: 0    Cardiac:   Cardiac Monitoring: No           Access:   Current line(s): PIV     Genitourinary:   Urinary status: rosales; draining, patent    Respiratory:   O2 Device: None (Room air)  Chronic home O2 use?: NO  Incentive spirometer at bedside: N/A     GI:  Last Bowel Movement Date: 09/22/21  Current diet:  ADULT DIET Regular  Passing flatus: YES  Tolerating current diet: YES       Pain Management:   Patient states pain is manageable on current regimen: YES    Skin:  Martinez Score: 18  Interventions: increase time out of bed, PT/OT consult, limit briefs, internal/external urinary devices and nutritional support     Patient Safety:  Fall Score:  Total Score: 2  Interventions: assistive device (walker, cane, etc), gripper socks and pt to call before getting OOB       Length of Stay:  Expected LOS: 3d 12h  Actual LOS: 0      Wili Rincon RN

## 2021-09-22 NOTE — ED NOTES
Assumed care of pt, daughter at bedside     0745 Abx complete and bolus initiated     0800 MD at bedside speaking w/ pt pt eldest daughter via phone, name Kia Lilly [phone]      0975 Pt changed into gown and belongings placed in bag.     3363 Hospitalist at bedside     1120 Pt SBP < 110 and pt reporting he took BP meds this am - confirmed by pharmacist who completed med rec. Will hold and notify hosptialist    [phone] Patient is being transferred to \[facility]\"" Medical Oncology, Room # 439 3788. Report given to RN on Abhilash Barber for routine progression of care. Report consisted of the following information SBAR, Kardex, ED Summary, MAR and Recent Results. Patient transferred to receiving unit by: transport (RN or tech name). Outstanding consults needed: No     Next labs due: 0400 a    The following personal items will be sent with the patient during transfer to the floor:     All valuables:    Cardiac monitoring ordered: No     The following CURRENT information was reported to the receiving RN:    Code status: No Order at time of transfer    Last set of vital signs:  Vital Signs  Level of Consciousness: Alert (0) (09/22/21 1247)  Temp: 98.6 °F (37 °C) (09/22/21 0930)  Temp Source: Oral (09/22/21 0930)  Pulse (Heart Rate): 81 (09/22/21 1247)  Resp Rate: 18 (09/22/21 0930)  BP: 104/61 (09/22/21 1247)  MAP (Monitor): 69 (09/22/21 1116)  MAP (Calculated): 75 (09/22/21 1247)  BP 1 Location: Right upper arm (09/22/21 0559)  BP 1 Method: Automatic (09/22/21 0559)  MEWS Score: 1 (09/22/21 0930)         Oxygen Therapy  O2 Sat (%): 96 % (09/22/21 1247)  Pulse via Oximetry: 86 beats per minute (09/22/21 1116)  O2 Device: None (Room air) (09/22/21 0800)      Last pain assessment:  Pain 1  Pain Scale 1: Numeric (0 - 10)  Pain Intensity 1: 8  Pain Location 1: Groin  Pain Intervention(s) 1: Therapeutic presence      Wounds: No     Urinary catheter: rosales  Is there a rosales order: Yes    LDAs:       Peripheral IV 09/22/21 Left Wrist (Active)   Site Assessment Clean, dry, & intact 09/22/21 0605   Phlebitis Assessment 0 09/22/21 0605   Infiltration Assessment 0 09/22/21 0605   Dressing Status Clean, dry, & intact 09/22/21 0605       Peripheral IV 09/22/21 Posterior;Right Hand (Active)         Opportunity for questions and clarification was provided.     Lisandro Cowart RN

## 2021-09-22 NOTE — PROGRESS NOTES
Physician Progress Note      PATIENT:               Saray Nelson  CSN #:                  [id number]  :                       1933  ADMIT DATE:       2021 5:56 AM  DISCH DATE:  RESPONDING  PROVIDER #:        Srinivasa Licea MD          QUERY TEXT:    Dear Hospitalist Team,  Pt admitted with UTI. Pt noted to have a chronic indwelling urinary catheter changed  and again within the ED. If possible, please document in the progress notes and discharge summary if you are evaluating and/or treating any of the following: The medical record reflects the following:    Risk Factors: [address] M admitted with Sepsis and UTI; h/o chronic indwelling Jett with recent exchange     Clinical Indicators: Patient arrives to the ED with c/o urinary retention and fever. Work up in the ED revealed SIRS of WBC 16.4  with UA indicative of UTI. Per H&P states,  Patient had his Jett catheter exchange yesterday. Since that time, she is noticed a decreased urine output. He was also complaining of some suprapubic tenderness as well as fevers at home. He came to the ED for further evaluation. He had his Jett exchanged in the ED and he states that he felt better since that time. He has been having adequate urine output. Urine culture sent. Patient is receiving Maxipime 1 G IV Q 24 hrs. Treatment: CBC, UA/UC, Jtet exchange in ED, frequent monitoring/vital signs and Maxipime 1 G IV Q 24 hrs. Thank you,  Kari Valdes RN, [facility], [address] [place] [address]  Options provided:  -- UTI due to chronic indwelling urinary catheter  -- UTI not due to indwelling urinary catheter  -- Other - I will add my own diagnosis  -- Disagree - Not applicable / Not valid  -- Disagree - Clinically unable to determine / Unknown  -- Refer to Clinical Documentation Reviewer    PROVIDER RESPONSE TEXT:    UTI is due to the chronic indwelling urinary catheter. Query created by:  Neda Lozano on 2021 11:48 AM      Electronically signed by:  Gil Puentes MD 9/22/2021 11:53 AM

## 2021-09-23 LAB
ANION GAP SERPL CALC-SCNC: 8 MMOL/L (ref 5–15)
BASOPHILS # BLD: 0 K/UL (ref 0–0.1)
BASOPHILS NFR BLD: 0 % (ref 0–1)
BUN SERPL-MCNC: 43 MG/DL (ref 6–20)
BUN/CREAT SERPL: 21 (ref 12–20)
CALCIUM SERPL-MCNC: 7.9 MG/DL (ref 8.5–10.1)
CHLORIDE SERPL-SCNC: 111 MMOL/L (ref 97–108)
CO2 SERPL-SCNC: 23 MMOL/L (ref 21–32)
CREAT SERPL-MCNC: 2.09 MG/DL (ref 0.7–1.3)
DIFFERENTIAL METHOD BLD: ABNORMAL
EOSINOPHIL # BLD: 0 K/UL (ref 0–0.4)
EOSINOPHIL NFR BLD: 0 % (ref 0–7)
ERYTHROCYTE [DISTWIDTH] IN BLOOD BY AUTOMATED COUNT: 13 % (ref 11.5–14.5)
GLUCOSE SERPL-MCNC: 87 MG/DL (ref 65–100)
HCT VFR BLD AUTO: 36.8 % (ref 36.6–50.3)
HGB BLD-MCNC: 12 G/DL (ref 12.1–17)
IMM GRANULOCYTES # BLD AUTO: 0.1 K/UL (ref 0–0.04)
IMM GRANULOCYTES NFR BLD AUTO: 1 % (ref 0–0.5)
LYMPHOCYTES # BLD: 0.8 K/UL (ref 0.8–3.5)
LYMPHOCYTES NFR BLD: 6 % (ref 12–49)
MAGNESIUM SERPL-MCNC: 2.1 MG/DL (ref 1.6–2.4)
MCH RBC QN AUTO: 31.7 PG (ref 26–34)
MCHC RBC AUTO-ENTMCNC: 32.6 G/DL (ref 30–36.5)
MCV RBC AUTO: 97.1 FL (ref 80–99)
MONOCYTES # BLD: 0.6 K/UL (ref 0–1)
MONOCYTES NFR BLD: 4 % (ref 5–13)
NEUTS SEG # BLD: 12.8 K/UL (ref 1.8–8)
NEUTS SEG NFR BLD: 90 % (ref 32–75)
NRBC # BLD: 0 K/UL (ref 0–0.01)
NRBC BLD-RTO: 0 PER 100 WBC
PHOSPHATE SERPL-MCNC: 2.8 MG/DL (ref 2.6–4.7)
PLATELET # BLD AUTO: 162 K/UL (ref 150–400)
PMV BLD AUTO: 10.2 FL (ref 8.9–12.9)
POTASSIUM SERPL-SCNC: 3.7 MMOL/L (ref 3.5–5.1)
RBC # BLD AUTO: 3.79 M/UL (ref 4.1–5.7)
SODIUM SERPL-SCNC: 142 MMOL/L (ref 136–145)
WBC # BLD AUTO: 14.2 K/UL (ref 4.1–11.1)

## 2021-09-23 PROCEDURE — 74011000258 HC RX REV CODE- 258: Performed by: INTERNAL MEDICINE

## 2021-09-23 PROCEDURE — 65270000029 HC RM PRIVATE

## 2021-09-23 PROCEDURE — 74011250636 HC RX REV CODE- 250/636: Performed by: NURSE PRACTITIONER

## 2021-09-23 PROCEDURE — 74011250636 HC RX REV CODE- 250/636: Performed by: INTERNAL MEDICINE

## 2021-09-23 PROCEDURE — 97161 PT EVAL LOW COMPLEX 20 MIN: CPT | Performed by: PHYSICAL THERAPIST

## 2021-09-23 PROCEDURE — 97530 THERAPEUTIC ACTIVITIES: CPT | Performed by: PHYSICAL THERAPIST

## 2021-09-23 PROCEDURE — 74011250637 HC RX REV CODE- 250/637: Performed by: INTERNAL MEDICINE

## 2021-09-23 PROCEDURE — 83735 ASSAY OF MAGNESIUM: CPT

## 2021-09-23 PROCEDURE — 97535 SELF CARE MNGMENT TRAINING: CPT | Performed by: OCCUPATIONAL THERAPIST

## 2021-09-23 PROCEDURE — 85025 COMPLETE CBC W/AUTO DIFF WBC: CPT

## 2021-09-23 PROCEDURE — 80048 BASIC METABOLIC PNL TOTAL CA: CPT

## 2021-09-23 PROCEDURE — 97116 GAIT TRAINING THERAPY: CPT | Performed by: PHYSICAL THERAPIST

## 2021-09-23 PROCEDURE — 84100 ASSAY OF PHOSPHORUS: CPT

## 2021-09-23 PROCEDURE — 97165 OT EVAL LOW COMPLEX 30 MIN: CPT | Performed by: OCCUPATIONAL THERAPIST

## 2021-09-23 PROCEDURE — 87040 BLOOD CULTURE FOR BACTERIA: CPT

## 2021-09-23 PROCEDURE — 97530 THERAPEUTIC ACTIVITIES: CPT | Performed by: OCCUPATIONAL THERAPIST

## 2021-09-23 PROCEDURE — 36415 COLL VENOUS BLD VENIPUNCTURE: CPT

## 2021-09-23 RX ORDER — HEPARIN SODIUM 5000 [USP'U]/ML
5000 INJECTION, SOLUTION INTRAVENOUS; SUBCUTANEOUS EVERY 8 HOURS
Status: DISCONTINUED | OUTPATIENT
Start: 2021-09-23 | End: 2021-09-30 | Stop reason: HOSPADM

## 2021-09-23 RX ORDER — SODIUM CHLORIDE 9 MG/ML
75 INJECTION, SOLUTION INTRAVENOUS CONTINUOUS
Status: DISCONTINUED | OUTPATIENT
Start: 2021-09-23 | End: 2021-09-25

## 2021-09-23 RX ADMIN — ATENOLOL 25 MG: 25 TABLET ORAL at 10:32

## 2021-09-23 RX ADMIN — FINASTERIDE 5 MG: 5 TABLET, FILM COATED ORAL at 10:28

## 2021-09-23 RX ADMIN — TAMSULOSIN HYDROCHLORIDE 0.8 MG: 0.4 CAPSULE ORAL at 10:29

## 2021-09-23 RX ADMIN — HEPARIN SODIUM 5000 UNITS: 5000 INJECTION INTRAVENOUS; SUBCUTANEOUS at 13:35

## 2021-09-23 RX ADMIN — AMLODIPINE BESYLATE 10 MG: 5 TABLET ORAL at 10:32

## 2021-09-23 RX ADMIN — CEFEPIME HYDROCHLORIDE 1 G: 1 INJECTION, POWDER, FOR SOLUTION INTRAMUSCULAR; INTRAVENOUS at 11:19

## 2021-09-23 RX ADMIN — OXYCODONE HYDROCHLORIDE AND ACETAMINOPHEN 500 MG: 500 TABLET ORAL at 10:28

## 2021-09-23 RX ADMIN — SODIUM CHLORIDE 75 ML/HR: 9 INJECTION, SOLUTION INTRAVENOUS at 13:19

## 2021-09-23 RX ADMIN — HEPARIN SODIUM 5000 UNITS: 5000 INJECTION INTRAVENOUS; SUBCUTANEOUS at 21:27

## 2021-09-23 NOTE — PROGRESS NOTES
Problem: Falls - Risk of  Goal: *Absence of Falls  Description: Document Thora Bare Fall Risk and appropriate interventions in the flowsheet.   Outcome: Progressing Towards Goal  Note: Fall Risk Interventions:  Mobility Interventions: Patient to call before getting OOB    Mentation Interventions: Adequate sleep, hydration, pain control         Elimination Interventions: Call light in reach

## 2021-09-23 NOTE — PROGRESS NOTES
Problem: Mobility Impaired (Adult and Pediatric)  Goal: *Acute Goals and Plan of Care (Insert Text)  Description: FUNCTIONAL STATUS PRIOR TO ADMISSION: Patient was modified independent using a single point cane for functional mobility. Often uses no device in the home, per patient report. HOME SUPPORT PRIOR TO ADMISSION: The patient lived with wife but did not require assist. Per chart, daughter has been looking into getting a personal care assistant for patient due to slight decline with patient's ADL/hygiene. Physical Therapy Goals  Initiated 9/23/2021  1. Patient will move from supine to sit and sit to supine  in bed with independence within 7 day(s). 2.  Patient will transfer from bed to chair and chair to bed with modified independence using the least restrictive device within 7 day(s). 3.  Patient will perform sit to stand with modified independence within 7 day(s). 4.  Patient will ambulate with modified independence for 50 feet with the least restrictive device within 7 day(s). 5.  Patient will ascend/descend 10 stairs with 1-2 handrail(s) with supervision/set-up within 7 day(s). Outcome: Not Met   PHYSICAL THERAPY EVALUATION  Patient: Valeria Friday (16 y.o. male)  Date: 9/23/2021  Primary [facility]: [facility] [A41.9]        Precautions:        ASSESSMENT  Based on the objective data described below, the patient presents with general decrease in strength/activity tolerance, decreased standing balance/tolerance, and decreased functional mobility, including bed mobility, transfers, and gait. He is able to ambulate with hand-held assist/minimal assist a total of 25 feet (15 feet, seated rest, then 10 feet) in room; he ambulates with a cane when outside the home, can bring a cane for patient to use with staff in next treatment or two. He is very cooperative and pleasant.  He will benefit from HHPT/HH care aides upon return home, with increased supervision from family, if possible. Current Level of Function Impacting Discharge (mobility/balance): stand-by assist/increased effort supine to sit, contact guard sit to/from stand, minimal assist/hand-held assist for ambulation 15' and 10' in room. Functional Outcome Measure: The patient scored 12/20 on the Elderly Mobility Scale outcome measure which is indicative of borderline dependence with safe ADL/functional mobility. Other factors to consider for discharge: 10 steps to enter home, 2 daughters leave nearby     Patient will benefit from skilled therapy intervention to address the above noted impairments. PLAN :  Recommendations and Planned Interventions: bed mobility training, transfer training, gait training, therapeutic exercises, neuromuscular re-education, patient and family training/education, and therapeutic activities      Frequency/Duration: Patient will be followed by physical therapy:  4 times a week to address goals. Recommendation for discharge: (in order for the patient to meet his/her long term goals)  Physical therapy at least 2 days/week in the home AND ensure assist and/or supervision for safety with mobility as needed    This discharge recommendation:  Has not yet been discussed the attending provider and/or case management    IF patient discharges home will need the following DME: rolling walker? (will assess gait with both single-point cane and rolling walker)         SUBJECTIVE:   Patient stated My friends call me Tanika Cruz.     OBJECTIVE DATA SUMMARY:   HISTORY:    Past Medical History:   Diagnosis Date    Arthritis     Cancer ([facility] 04/2019    renal    Hypertension     Prostate enlargement      Past Surgical History:   Procedure Laterality Date    HX ORTHOPAEDIC Right 2008    hip replacement/[facility]/Dr Javier Machuca    HX PREMALIG/BENIGN SKIN LESION EXCISION  07/13/2018    excision of left flank        Personal factors and/or comorbidities impacting plan of care: has been needing/not receiving increased assist for ADLs recently, daughter has been [address]  # Steps to Enter: 10 (8-10)  Support Systems: Spouse/Significant Other, Child(tammy)  Current DME Used/Available at Home: Cane, straight    EXAMINATION/PRESENTATION/DECISION MAKING:   Critical Behavior:  Neurologic State: Alert, Confused  Orientation Level: Oriented to person, Oriented to place  Cognition: Follows commands  Safety/Judgement: Fall prevention, Decreased insight into deficits  Hearing: Auditory  Auditory Impairment: Hard of hearing, bilateral  Skin:  IV right UE, + rosales  Edema: none noted  Range Of Motion:  AROM: Within functional limits                       Strength:    Strength: Generally decreased, functional                    Tone & Sensation:   Tone: Normal              Sensation: Intact               Coordination:  Coordination: Generally decreased, functional     Functional Mobility:  Bed Mobility:     Supine to Sit: Additional time;Stand-by assistance        Transfers:  Sit to Stand: Contact guard assistance;Assist x1  Stand to Sit: Contact guard assistance;Assist x1        Bed to Chair: Contact guard assistance;Assist x1;Additional time              Balance:   Sitting: Intact  Standing: Impaired; Without support  Standing - Static: Constant support;Good  Standing - Dynamic : Constant support; Fair  Ambulation/Gait Training:  Distance (ft): 25 Feet (ft) (seated rest at 15 foot point)  Assistive Device: Gait belt; Other (comment) (hand-held assist)  Ambulation - Level of Assistance: Minimal assistance;Assist x1 (for hand-held assistance)     Gait Description (WDL): Exceptions to WDL  Gait Abnormalities: Decreased step clearance  Right Side Weight Bearing: Full  Left Side Weight Bearing: Full  Base of Support: Widened     Speed/Maria Luisa: Pace decreased (<100 feet/min)  Step Length: Right shortened;Left shortened         Therapeutic Exercises:   AROM of both UE/LEs from sitting prior to transfer training    Functional Measure:    Elder Mobility Scale    12/20         Scores under 10 - generally these patients are dependent in mobility maneuvers; require help with  basic ADL, such as transfers, toileting and dressing. Scores between 10 - 13 - generally these patients are borderline in terms of safe mobility and  independence in ADL i.e. they require some help with some mobility maneuvers. Scores over 14 - Generally these patients are able to perform mobility maneuvers alone and safely  and are independent in basic ADL. Physical Therapy Evaluation Charge Determination   History Examination Presentation Decision-Making   MEDIUM  Complexity : 1-2 comorbidities / personal factors will impact the outcome/ POC  LOW Complexity : 1-2 Standardized tests and measures addressing body structure, function, activity limitation and / or participation in recreation  LOW Complexity : Stable, uncomplicated  LOW Complexity : FOTO score of       Based on the above components, the patient evaluation is determined to be of the following complexity level: LOW     Pain Rating:  Patient does not report pain    Activity Tolerance:   Fair, SpO2 stable on RA, requires rest breaks, and observed SOB with activity    After treatment patient left in no apparent distress:   Sitting in chair and Call bell within reach    COMMUNICATION/EDUCATION:   The patients plan of care was discussed with: Occupational therapist and Registered nurse. Fall prevention education was provided and the patient/caregiver indicated understanding., Patient/family have participated as able in goal setting and plan of care. , and Patient/family agree to work toward stated goals and plan of care.     Thank you for this referral.  Garrett Zapata, PT   Time Calculation: 26 mins

## 2021-09-23 NOTE — PROGRESS NOTES
Problem: Falls - Risk of  Goal: *Absence of Falls  Description: Document Amanda Sánchez Fall Risk and appropriate interventions in the flowsheet. Outcome: Progressing Towards Goal  Note: Fall Risk Interventions:  Mobility Interventions: Patient to call before getting OOB, Bed/chair exit alarm    Mentation Interventions: Adequate sleep, hydration, pain control         Elimination Interventions: Call light in reach, Patient to call for help with toileting needs              Problem: Patient Education: Go to Patient Education Activity  Goal: Patient/Family Education  Outcome: Progressing Towards Goal     Problem: Pressure Injury - Risk of  Goal: *Prevention of pressure injury  Description: Document Martinez Scale and appropriate interventions in the flowsheet.   Outcome: Progressing Towards Goal  Note: Pressure Injury Interventions:       Moisture Interventions: Absorbent underpads, Apply protective barrier, creams and emollients    Activity Interventions: Increase time out of bed, PT/OT evaluation    Mobility Interventions: HOB 30 degrees or less    Nutrition Interventions: Offer support with meals,snacks and hydration    Friction and Shear Interventions: Apply protective barrier, creams and emollients                Problem: Patient Education: Go to Patient Education Activity  Goal: Patient/Family Education  Outcome: Progressing Towards Goal

## 2021-09-23 NOTE — PROGRESS NOTES
.End of Shift Note    Bedside shift change report given to Jade(oncoming nurse) by Romie Rogers RN (offgoing nurse). Report included the following information SBAR, Procedure Summary, Intake/Output, MAR, Recent Results and Med Rec Status    Shift worked:  7 a- 7 p     Shift summary and any significant changes:    Left wrist 20 g removed, infiltrated this AM. PT came by and assisted patient in to the bathroom, he was able to have a normal BM. Patient resting comfortably in chair. Concerns for physician to address:  none     Zone phone for oncoming shift:  [phone]       Activity:  Activity Level: Up with Assistance  Number times ambulated in hallways past shift: 0  Number of times OOB to chair past shift: 2    Cardiac:   Cardiac Monitoring: No      Cardiac Rhythm: Sinus Rhythm    Access:   Current line(s): PIV     Genitourinary:   Urinary status: rosales    Respiratory:   O2 Device: None (Room air)  Chronic home O2 use?: NO  Incentive spirometer at bedside: NO     GI:  Last Bowel Movement Date: 09/22/21  Current diet:  ADULT DIET Regular  Passing flatus: YES  Tolerating current diet: YES       Pain Management:   Patient states pain is manageable on current regimen: YES    Skin:  Martinez Score: 17  Interventions: float heels, increase time out of bed and PT/OT consult    Patient Safety:  Fall Score:  Total Score: 3  Interventions: bed/chair alarm, assistive device (walker, cane, etc) and pt to call before getting OOB       Length of Stay:  Expected LOS: 4d 16h  Actual LOS: [facility], RN

## 2021-09-23 NOTE — PROGRESS NOTES
Hospitalist Progress Note    NAME: Valeria Friday   :  1933   MRN:  [id number]     I reviewed pertinent labs and imaging, and discussed /agreed on the plan of care with Dr. Sergio Gustafson. Assessment / Plan:  Sepsis with Lactic Acidosis (WBC 16.2, Lactate 2.2, )  r/t Urinary Tract Infection in setting of Chronic Rosales (x3 years) with History of BPH Follows with Urology monthly to change rosales   -Previous cultures in May 2020 grew Serratia and Proteus both sensitive to cefepime.   -Rosales was changed in ED on arrival   -Continue Cefepime - Day 2  -Urine Culture pending   -Blood Cultures  - GRAM NEGATIVE DIPLOCOCCI GROWING IN ALL 4 BOTTLES   -Repeat Blood Cultures drawn  - follow   -Continue tamsulosin and finasteride   -Obtain PT/OT evaluations   -Suspect DC in next 48 hours pending culture reports      Hypertension  -Continue amlodipine and atenolol   -Hold PTA olmesartan and chlorthalidone with slight VALERIE  -Monitor BP      Acute Kidney Injury Cr 2.13  Chronic Kidney Disease Baseline Cr 1.4-1.7  -Cr slowly trending down - follow   -Gentle IVF hydration  -Renal US - Solitary right kidney without hydronephrosis      Hypokalemia - resolved     18.5 - 24.9 Normal weight / Body mass index is 20.82 kg/m². Estimated discharge date:   Barriers: cultures     Code status: Full  Prophylaxis: Lovenox  Recommended Disposition:  PT, OT, RN     Subjective:     Chief Complaint / Reason for Physician Visit  Patient seen bedside, he does not remember why he came to the hospital. Dicussed plan of care, voiced understanding/agreement. Requests to be evaluated by PT/OT. Has no other complaints. Discussed with RN events overnight.      Review of Systems:  Symptom Y/N Comments  Symptom Y/N Comments   Fever/Chills n   Chest Pain n    Poor Appetite n   Edema n    Cough n   Abdominal Pain n    Sputum n   Joint Pain n    SOB/COBOS n   Pruritis/Rash n    Nausea/vomit n   Tolerating PT/OT y    Diarrhea n Tolerating Diet y    Constipation n   Other       Could NOT obtain due to:      Objective:     VITALS:   Last 24hrs VS reviewed since prior progress note. Most recent are:  Patient Vitals for the past 24 hrs:   Temp Pulse Resp BP SpO2   09/23/21 1031    124/70    09/23/21 0801 98.2 °F (36.8 °C) 85 16 98/66 98 %   09/23/21 0756     98 %   09/22/21 2355 98.9 °F (37.2 °C) 94 16 111/69 99 %   09/22/21 2150 98.7 °F (37.1 °C) 78 18 121/61 99 %   09/22/21 1502 98.3 °F (36.8 °C) 78 16 117/69 97 %   09/22/21 1400 98.6 °F (37 °C) 80 18 (!) 108/55 99 %       Intake/Output Summary (Last 24 hours) at 9/23/2021 1308  Last data filed at 9/23/2021 0804  Gross per 24 hour   Intake    Output 1200 ml   Net -1200 ml        I had a face to face encounter and independently examined this patient on 9/23/2021, as outlined below:  PHYSICAL EXAM:  General: WD, WN. Alert, cooperative, elderly male in no acute distress    EENT:  EOMI. Anicteric sclerae. MMM  Resp:  CTA bilaterally, no wheezing or rales. No accessory muscle use  CV:  Regular  rhythm,  No edema  GI:  Soft, Non distended, Non tender. +Bowel sounds  Neurologic:  Alert and oriented X 3, normal speech,   Psych:   Good insight. Not anxious nor agitated  Skin:  No rashes. No jaundice    Reviewed most current lab test results and cultures  YES  Reviewed most current radiology test results   YES  Review and summation of old records today    NO  Reviewed patient's current orders and MAR    YES  PMH/SH reviewed - no change compared to H&P  ________________________________________________________________________  Care Plan discussed with:    Comments   Patient x    Family      RN x    Care Manager x    Consultant                        Multidiciplinary team rounds were held today with , nursing, pharmacist and clinical coordinator. Patient's plan of care was discussed; medications were reviewed and discharge planning was addressed. ________________________________________________________________________  Total NON critical care TIME:  25   Minutes    Total CRITICAL CARE TIME Spent:   Minutes non procedure based      Comments   >50% of visit spent in counseling and coordination of care x    ________________________________________________________________________  Oniel Carlson NP     Procedures: see electronic medical records for all procedures/Xrays and details which were not copied into this note but were reviewed prior to creation of Plan. LABS:  I reviewed today's most current labs and imaging studies.   Pertinent labs include:  Recent Labs     09/23/21 0424 09/22/21  0634   WBC 14.2* 16.4*   HGB 12.0* 12.6   HCT 36.8 38.0    180     Recent Labs     09/23/21 0424 09/22/21  0634    139   K 3.7 3.1*   * 108   CO2 23 24   GLU 87 89   BUN 43* 38*   CREA 2.09* 2.13*   CA 7.9* 8.8   MG 2.1  --    PHOS 2.8  --    ALB  --  2.9*   TBILI  --  1.2*   ALT  --  24       Signed: Oniel Carlson NP

## 2021-09-23 NOTE — PROGRESS NOTES
Problem: Self Care Deficits Care Plan (Adult)  Goal: *Acute Goals and Plan of Care (Insert Text)  Description: FUNCTIONAL STATUS PRIOR TO ADMISSION: Patient was modified independent using a single point cane for functional mobility. His daughter reports (per chart) that he has recently having difficulty with hygiene/thoroughness with bathing and is looking into the possibility of hiring someone to come and help. Otherwise he is able to manage basic self-care. HOME SUPPORT: The patient lived with wife but did not require assist. He reports having 2 local daughters that are also available to assist, one that provides transportation to/from appointments. Occupational Therapy Goals  Initiated 9/23/2021  1. Patient will perform grooming standing at the sink with supervision/distant S within 7 day(s). 2.  Patient will perform bathing with contact guard assist within 7 day(s). 3.  Patient will perform lower body dressing with supervision/SBA within 7 day(s). 4.  Patient will perform toilet transfers with modified independence within 7 day(s). 5.  Patient will perform all aspects of toileting with modified independence within 7 day(s). Outcome: Not Met    OCCUPATIONAL THERAPY EVALUATION  Patient: Lamont Byers (70 y.o. male)  Date: 9/23/2021  Primary Diagnosis: Sepsis ([facility] [A41.9]        Precautions: Fall       ASSESSMENT  Based on the objective data described below, the patient presents with deficits in self-care, primarily due to decreased activity tolerance, decreased safety, general weakness, mild confusion/cognitive deficits, and decreased balance. He is typically able to manage basic self-care at home. Per chart, he needs some cues for hygiene. Patient had difficulty today with shortness of breath with activity, although vital signs were stable. He was able to don one sock, but was too fatigued to don the other, and needed assistance to tie his shoes.  Patient is functioning below his baseline level and will benefit from skilled OT treatment to maximize independence and safety in ADL. Current Level of Function Impacting Discharge (ADLs/self-care): Min A to S    Functional Outcome Measure: The patient scored 40/100 on the Barthel Index. Other factors to consider for discharge: N/A     Patient will benefit from skilled therapy intervention to address the above noted impairments. PLAN :  Recommendations and Planned Interventions: self care training, functional mobility training, therapeutic exercise, balance training, therapeutic activities, cognitive retraining, endurance activities, neuromuscular re-education, patient education, home safety training, and family training/education    Frequency/Duration: Patient will be followed by occupational therapy 3 times a week to address goals. Recommendation for discharge: (in order for the patient to meet his/her long term goals)  Occupational therapy at least 2 days/week in the home AND ensure assist and/or supervision for safety    This discharge recommendation:  A follow-up discussion with the attending provider and/or case management is planned    IF patient discharges home will need the following DME: TBD       SUBJECTIVE:   Patient stated That sounds good.  (recommendation to sit up in the chair after session)    OBJECTIVE DATA SUMMARY:   HISTORY:   Past Medical History:   Diagnosis Date    Arthritis     Cancer ([facility] 04/2019    renal    Hypertension     Prostate enlargement      Past Surgical History:   Procedure Laterality Date    HX ORTHOPAEDIC Right 2008    hip replacement/[facility]Dr Larkin Daughters    HX PREMALIG/BENIGN SKIN LESION EXCISION  07/13/2018    excision of left flank        Expanded or extensive additional review of patient history:     Home Situation  Home Environment: Apartment  # Steps to Enter: 10 (8-10)  Support Systems: Spouse/Significant Other, Child(tammy)  Current DME Used/Available at Home: Cane, straight    Hand dominance: Right    EXAMINATION OF PERFORMANCE DEFICITS:  Cognitive/Behavioral Status:  Neurologic State: Alert;Confused  Orientation Level: Disoriented to time;Disoriented to situation;Oriented to person;Oriented to place  Cognition: Follows commands  Perception: Appears intact  Perseveration: No perseveration noted  Safety/Judgement: Fall prevention;Decreased insight into deficits    Hearing: Auditory  Auditory Impairment: Hard of hearing, bilateral    Vision/Perceptual:                                     Range of Motion:  AROM: Within functional limits                         Strength:  Strength: Generally decreased, functional                Coordination:  Coordination: Generally decreased, functional            Tone & Sensation:  Tone: Normal  Sensation: Intact                      Balance:  Sitting: Intact  Standing: Impaired; Without support  Standing - Static: Constant support;Good  Standing - Dynamic : Constant support; Fair    Functional Mobility and Transfers for ADLs:  Bed Mobility:  Supine to Sit: Additional time;Stand-by assistance  Scooting: Supervision    Transfers:  Sit to Stand: Contact guard assistance;Assist x1  Stand to Sit: Contact guard assistance;Assist x1  Bed to Chair: Contact guard assistance;Assist x1;Additional time  Bathroom Mobility: Contact guard assistance  Toilet Transfer : Contact guard assistance    ADL Assessment:  Feeding: Modified independent    Oral Facial Hygiene/Grooming: Contact guard assistance    Bathing: Minimum assistance    Upper Body Dressing: Supervision    Lower Body Dressing: Minimum assistance    Toileting: Minimum assistance                ADL Intervention and task modifications:  Discussed safety and fall prevention during ADL, including bathroom safety. Initiated ADL training. Patient requiring intermittent cues throughout all tasks for safety.             Cognitive Retraining  Safety/Judgement: Fall prevention;Decreased insight into deficits    Functional Measure:  Barthel Index:    Bathin  Bladder: 0  Bowels: 5  Groomin  Dressin  Feeding: 10  Mobility: 0  Stairs: 5  Toilet Use: 5  Transfer (Bed to Chair and Back): 10  Total: 40/100        The Barthel ADL Index: Guidelines  1. The index should be used as a record of what a patient does, not as a record of what a patient could do. 2. The main aim is to establish degree of independence from any help, physical or verbal, however minor and for whatever reason. 3. The need for supervision renders the patient not independent. 4. A patient's performance should be established using the best available evidence. Asking the patient, friends/relatives and nurses are the usual sources, but direct observation and common sense are also important. However direct testing is not needed. 5. Usually the patient's performance over the preceding 24-48 hours is important, but occasionally longer periods will be relevant. 6. Middle categories imply that the patient supplies over 50 per cent of the effort. 7. Use of aids to be independent is allowed. Yared Medina., Barthel, D.W. ([id number]). Functional evaluation: the Barthel Index. [address] [facility] (14)2. Yi Kim denita Tk, RAMON, Víctor Gross., [facility] [place], [address] [place] [address] (). Measuring the change indisability after inpatient rehabilitation; comparison of the responsiveness of the Barthel Index and Functional Ogle Measure. Journal of Neurology, Neurosurgery, and Psychiatry, 66(4), 978-095. Gabriela Leach, N.J.A, LILA Mccabe, & Thony Perez M.A. (2004.) Assessment of post-stroke quality of life in cost-effectiveness studies: The usefulness of the Barthel Index and the EuroQoL-5D.  Quality of Life Research, 15, 613-98        Occupational Therapy Evaluation Charge Determination   History Examination Decision-Making   LOW Complexity : Brief history review  MEDIUM Complexity : 3-5 performance deficits relating to physical, cognitive , or psychosocial skils that result in activity limitations and / or participation restrictions LOW Complexity : No comorbidities that affect functional and no verbal or physical assistance needed to complete eval tasks       Based on the above components, the patient evaluation is determined to be of the following complexity level: LOW   Pain Rating:  No complaints    Activity Tolerance:   Fair and requires rest breaks    After treatment patient left in no apparent distress:    Sitting in chair and Call bell within reach    COMMUNICATION/EDUCATION:   The patients plan of care was discussed with: Physical therapist and Registered nurse. Patient/family agree to work toward stated goals and plan of care. This patients plan of care is appropriate for delegation to [facility].     Thank you for this referral.  Chadwick Robles, OTR/L  Time Calculation: 29 mins

## 2021-09-23 NOTE — PROGRESS NOTES
End of Shift Note    Bedside shift change report given to Milena Cruz (oncoming nurse) by Puma Deluna RN (offgoing nurse). Report included the following information SBAR, Kardex, Intake/Output, MAR and Recent Results    Shift worked:  7p-7a     Shift summary and any significant changes: Followed pt plan of care. No c/o of pain during this shift. No scheduled meds. Pt has rosales that is patent and draining. Pt is A&o x 2-3. Labs drawn. Pt was calm throughout the shift. Concerns for physician to address:  No concerns during this shift    Zone phone for oncoming shift:        Activity:  Activity Level: Up with Assistance  Number times ambulated in hallways past shift: 0  Number of times OOB to chair past shift: 0    Cardiac:   Cardiac Monitoring: No      Cardiac Rhythm: Sinus Rhythm    Access:   Current line(s): PIV     Genitourinary:   Urinary status: rosales    Respiratory:   O2 Device: None (Room air)  Chronic home O2 use?: NO  Incentive spirometer at bedside: NO     GI:  Last Bowel Movement Date: 09/22/21  Current diet:  ADULT DIET Regular  Passing flatus: YES  Tolerating current diet: YES       Pain Management:   Patient states pain is manageable on current regimen: N/A    Skin:  Martinez Score: 17  Interventions: increase time out of bed    Patient Safety:  Fall Score:  Total Score: 3  Interventions: gripper socks and pt to call before getting OOB       Length of Stay:  Expected LOS: 3d 12h  Actual LOS: [address] [place] [address]

## 2021-09-24 LAB
ANION GAP SERPL CALC-SCNC: 10 MMOL/L (ref 5–15)
BACTERIA SPEC CULT: ABNORMAL
BUN SERPL-MCNC: 44 MG/DL (ref 6–20)
BUN/CREAT SERPL: 27 (ref 12–20)
CALCIUM SERPL-MCNC: 8.4 MG/DL (ref 8.5–10.1)
CC UR VC: ABNORMAL
CHLORIDE SERPL-SCNC: 111 MMOL/L (ref 97–108)
CO2 SERPL-SCNC: 22 MMOL/L (ref 21–32)
CREAT SERPL-MCNC: 1.65 MG/DL (ref 0.7–1.3)
GLUCOSE SERPL-MCNC: 109 MG/DL (ref 65–100)
POTASSIUM SERPL-SCNC: 3.5 MMOL/L (ref 3.5–5.1)
SERVICE CMNT-IMP: ABNORMAL
SODIUM SERPL-SCNC: 143 MMOL/L (ref 136–145)

## 2021-09-24 PROCEDURE — 87506 IADNA-DNA/RNA PROBE TQ 6-11: CPT

## 2021-09-24 PROCEDURE — 87324 CLOSTRIDIUM AG IA: CPT

## 2021-09-24 PROCEDURE — 80048 BASIC METABOLIC PNL TOTAL CA: CPT

## 2021-09-24 PROCEDURE — 97110 THERAPEUTIC EXERCISES: CPT

## 2021-09-24 PROCEDURE — 36415 COLL VENOUS BLD VENIPUNCTURE: CPT

## 2021-09-24 PROCEDURE — 97535 SELF CARE MNGMENT TRAINING: CPT

## 2021-09-24 PROCEDURE — 74011250636 HC RX REV CODE- 250/636: Performed by: NURSE PRACTITIONER

## 2021-09-24 PROCEDURE — 97116 GAIT TRAINING THERAPY: CPT

## 2021-09-24 PROCEDURE — 74011250637 HC RX REV CODE- 250/637: Performed by: INTERNAL MEDICINE

## 2021-09-24 PROCEDURE — 74011000258 HC RX REV CODE- 258: Performed by: INTERNAL MEDICINE

## 2021-09-24 PROCEDURE — 65270000015 HC RM PRIVATE ONCOLOGY

## 2021-09-24 PROCEDURE — 97530 THERAPEUTIC ACTIVITIES: CPT

## 2021-09-24 PROCEDURE — 74011250636 HC RX REV CODE- 250/636: Performed by: INTERNAL MEDICINE

## 2021-09-24 PROCEDURE — 87177 OVA AND PARASITES SMEARS: CPT

## 2021-09-24 RX ORDER — LOPERAMIDE HYDROCHLORIDE 2 MG/1
2 CAPSULE ORAL
Status: DISCONTINUED | OUTPATIENT
Start: 2021-09-24 | End: 2021-09-30 | Stop reason: HOSPADM

## 2021-09-24 RX ADMIN — ATENOLOL 25 MG: 25 TABLET ORAL at 09:50

## 2021-09-24 RX ADMIN — AMLODIPINE BESYLATE 10 MG: 5 TABLET ORAL at 09:50

## 2021-09-24 RX ADMIN — OXYCODONE HYDROCHLORIDE AND ACETAMINOPHEN 500 MG: 500 TABLET ORAL at 09:50

## 2021-09-24 RX ADMIN — FINASTERIDE 5 MG: 5 TABLET, FILM COATED ORAL at 09:50

## 2021-09-24 RX ADMIN — TAMSULOSIN HYDROCHLORIDE 0.8 MG: 0.4 CAPSULE ORAL at 09:50

## 2021-09-24 RX ADMIN — Medication 3 MG: at 20:21

## 2021-09-24 RX ADMIN — CEFEPIME HYDROCHLORIDE 1 G: 1 INJECTION, POWDER, FOR SOLUTION INTRAMUSCULAR; INTRAVENOUS at 10:49

## 2021-09-24 RX ADMIN — Medication 3 MG: at 01:36

## 2021-09-24 RX ADMIN — HEPARIN SODIUM 5000 UNITS: 5000 INJECTION INTRAVENOUS; SUBCUTANEOUS at 22:08

## 2021-09-24 RX ADMIN — SODIUM CHLORIDE 75 ML/HR: 9 INJECTION, SOLUTION INTRAVENOUS at 01:37

## 2021-09-24 NOTE — PROGRESS NOTES
Problem: Self Care Deficits Care Plan (Adult)  Goal: *Acute Goals and Plan of Care (Insert Text)  Description: FUNCTIONAL STATUS PRIOR TO ADMISSION: Patient was modified independent using a single point cane for functional mobility. His daughter reports (per chart) that he has recently having difficulty with hygiene/thoroughness with bathing and is looking into the possibility of hiring someone to come and help. Otherwise he is able to manage basic self-care. HOME SUPPORT: The patient lived with wife but did not require assist. He reports having 2 local daughters that are also available to assist, one that provides transportation to/from appointments. Occupational Therapy Goals  Initiated 9/23/2021  1. Patient will perform grooming standing at the sink with supervision/distant S within 7 day(s). 2.  Patient will perform bathing with contact guard assist within 7 day(s). 3.  Patient will perform lower body dressing with supervision/SBA within 7 day(s). 4.  Patient will perform toilet transfers with modified independence within 7 day(s). 5.  Patient will perform all aspects of toileting with modified independence within 7 day(s). Outcome: Progressing Towards Goal     OCCUPATIONAL THERAPY TREATMENT  Patient: Nadine Garland (38 y.o. male)  Date: 9/24/2021  Diagnosis: Sepsis ([facility] [A41.9] <principal problem not specified>       Precautions:    Chart, occupational therapy assessment, plan of care, and goals were reviewed. ASSESSMENT  Patient continues with skilled OT services and is progressing towards goals. Patient continues to present with confusion, decreased activity tolerance, and decreased strength. He is received in bed, pleasant, cooperative and agreeable to participate. He is able to transfer to EOB with SBA and demonstrates intact sitting balance to manage LB ADLs with overall SBA to min A in prep for sitting OOB.  He completes ADL transfers with overall SBA/CGA and is receptive to cues for improved technique. Met with PT and supportive dtr at end of session, dtr reporting pt needs additional support with regards to personal hygiene (will wear same clothes for days and not shower) and family is putting a plan in place to provide additional support/supervision. Current Level of Function Impacting Discharge (ADLs): SBA to min A for ADLs    Other factors to consider for discharge: supportive family; cognition         PLAN :  Patient continues to benefit from skilled intervention to address the above impairments. Continue treatment per established plan of care to address goals. Recommend with staff: OOB to chair for meals with A x 1; ADLs with assist as needed; mobility to bathroom for toileting    Recommend next OT session: bathroom ADLs; standing tolerance    Recommendation for discharge: (in order for the patient to meet his/her long term goals)  Occupational therapy at least 2 days/week in the home     This discharge recommendation:  Has been made in collaboration with the attending provider and/or case management    IF patient discharges home will need the following DME: TBD       SUBJECTIVE:   Patient stated Theresa Espinal can do it, I think.  -donning shoes    OBJECTIVE DATA SUMMARY:   Cognitive/Behavioral Status:  Neurologic State: Alert  Orientation Level: Oriented to person;Oriented to place  Cognition: Follows commands  Perception: Appears intact  Perseveration: No perseveration noted  Safety/Judgement: Decreased awareness of need for safety; Fall prevention    Functional Mobility and Transfers for ADLs:  Bed Mobility:  Supine to Sit: Stand-by assistance; Additional time  Sit to Supine:  (pt seated )  Scooting: Supervision    Transfers:  Sit to Stand: Stand-by assistance;Contact guard assistance     Bed to Chair: Contact guard assistance    Balance:  Sitting: Intact  Standing: Impaired; Without support  Standing - Static: Good  Standing - Dynamic : Fair    ADL Intervention:  Lower Body Dressing Assistance  Shoes with Cloth Laces: Minimum assistance; Moderate assistance (A to tie laces; pt able to slip shoes on at EOB)  Leg Crossed Method Used: No  Position Performed: Seated edge of bed  Cues: Don;Physical assistance; Tactile cues provided;Verbal cues provided;Visual cues provided    Cognitive Retraining  Safety/Judgement: Decreased awareness of need for safety; Fall prevention    Pain:  Pt reporting no pain    Activity Tolerance:   Good    After treatment patient left in no apparent distress:   Sitting in chair, Call bell within reach and Caregiver / family present    COMMUNICATION/COLLABORATION:   The patients plan of care was discussed with: Physical therapist, Registered nurse and Case management.      Gilford Ivan, OT  Time Calculation: 23 mins

## 2021-09-24 NOTE — PROGRESS NOTES
Hospitalist Progress Note    NAME: Eleno Galvan   :  1933   MRN:  [id number]     I reviewed pertinent labs and imaging, and discussed /agreed on the plan of care with Dr. Abhay Shoemaker. Assessment / Plan:  Sepsis with Lactic Acidosis (WBC 16.2, Lactate 2.2, )  r/t Complicated Urinary Tract Infection in setting of Chronic Rosales (x3 years) with History of BPH Follows with Urology monthly to change rosales   -Previous cultures in May 2020 grew Serratia and Proteus both sensitive to cefepime.   -Rosales was changed in ED on arrival   -Continue Cefepime - Day 3  -Urine Culture - SERRATIA MARCESCENS   -UC is resistant - will need to complete IV abx while IP  -Blood Cultures  - GRAM NEGATIVE DIPLOCOCCI GROWING IN ALL 4 BOTTLES prelim   -Repeat Blood Cultures drawn  - NG so far    -Continue tamsulosin and finasteride   -PT/OT recommendations -    -Discharge after completion of 7 days of IV abx     Diarrhea   -Send stool panel  -Send C diff if stool qualifies  -Add imodium and probiotic      Hypertension  -Continue amlodipine and atenolol   -Hold PTA olmesartan and chlorthalidone with slight VALERIE  -Monitor BP      Acute Kidney Injury Cr 2.13  Chronic Kidney Disease Baseline Cr 1.4-1.7  Solitary Right Kidney   -Cr 1.65 - trending down    -Gentle IVF hydration  -Renal US - Solitary right kidney without hydronephrosis      Hypokalemia - resolved     18.5 - 24.9 Normal weight / Body mass index is 20.82 kg/m². Estimated discharge date:   Barriers: complete IV abx     Code status: Full  Prophylaxis: Lovenox  Recommended Disposition:  PT, OT, RN     Subjective:     Chief Complaint / Reason for Physician Visit  Patient seen bedside, no complaints. Discussed plan of care, patient verbalized agreement/understanding. Discussed with RN events overnight.      Review of Systems:  Symptom Y/N Comments  Symptom Y/N Comments   Fever/Chills n   Chest Pain n    Poor Appetite n   Edema n    Cough n Abdominal Pain n    Sputum n   Joint Pain n    SOB/COBOS n   Pruritis/Rash n    Nausea/vomit n   Tolerating PT/OT y    Diarrhea n   Tolerating Diet y    Constipation n   Other       Could NOT obtain due to:      Objective:     VITALS:   Last 24hrs VS reviewed since prior progress note. Most recent are:  Patient Vitals for the past 24 hrs:   Temp Pulse Resp BP SpO2   09/24/21 0740 97.9 °F (36.6 °C) 72 18 119/60 99 %   09/23/21 2046 97.4 °F (36.3 °C) 82 18 130/68 99 %   09/23/21 1500 97.5 °F (36.4 °C) 82 18 (!) 105/51 99 %     No intake or output data in the 24 hours ending 09/24/21 1216     I had a face to face encounter and independently examined this patient on 9/24/2021, as outlined below:  PHYSICAL EXAM:  General: WD, WN. Alert, cooperative, elderly male in no acute distress    EENT:  EOMI. Anicteric sclerae. MMM  Resp:  CTA bilaterally, no wheezing or rales. No accessory muscle use  CV:  Regular  rhythm,  No edema  GI:  Soft, Non distended, Non tender. +Bowel sounds  Neurologic:  Alert and oriented X 3, normal speech,   Psych:   Good insight. Not anxious nor agitated  Skin:  No rashes. No jaundice    Reviewed most current lab test results and cultures  YES  Reviewed most current radiology test results   YES  Review and summation of old records today    NO  Reviewed patient's current orders and MAR    YES  PMH/ reviewed - no change compared to H&P  ________________________________________________________________________  Care Plan discussed with:    Comments   Patient x    Family      RN x    Care Manager x    Consultant                        Multidiciplinary team rounds were held today with , nursing, pharmacist and clinical coordinator. Patient's plan of care was discussed; medications were reviewed and discharge planning was addressed.      ________________________________________________________________________  Total NON critical care TIME:  25   Minutes    Total CRITICAL CARE TIME Spent: Minutes non procedure based      Comments   >50% of visit spent in counseling and coordination of care x    ________________________________________________________________________  Queenie Villatoro NP     Procedures: see electronic medical records for all procedures/Xrays and details which were not copied into this note but were reviewed prior to creation of Plan. LABS:  I reviewed today's most current labs and imaging studies.   Pertinent labs include:  Recent Labs     09/23/21 0424 09/22/21  0634   WBC 14.2* 16.4*   HGB 12.0* 12.6   HCT 36.8 38.0    180     Recent Labs     09/24/21  0406 09/23/21 0424 09/22/21  0634    142 139   K 3.5 3.7 3.1*   * 111* 108   CO2 22 23 24   * 87 89   BUN 44* 43* 38*   CREA 1.65* 2.09* 2.13*   CA 8.4* 7.9* 8.8   MG  --  2.1  --    PHOS  --  2.8  --    ALB  --   --  2.9*   TBILI  --   --  1.2*   ALT  --   --  24       Signed: Queenie Villatoro NP

## 2021-09-24 NOTE — PROGRESS NOTES
Problem: Mobility Impaired (Adult and Pediatric)  Goal: *Acute Goals and Plan of Care (Insert Text)  Description: FUNCTIONAL STATUS PRIOR TO ADMISSION: Patient was modified independent using a single point cane for functional mobility. Often uses no device in the home, per patient report. HOME SUPPORT PRIOR TO ADMISSION: The patient lived with wife but did not require assist. Per chart, daughter has been looking into getting a personal care assistant due to slight decline with patient's ADL/hygiene. Physical Therapy Goals  Initiated 9/23/2021  1. Patient will move from supine to sit and sit to supine  in bed with independence within 7 day(s). 2.  Patient will transfer from bed to chair and chair to bed with modified independence using the least restrictive device within 7 day(s). 3.  Patient will perform sit to stand with modified independence within 7 day(s). 4.  Patient will ambulate with modified independence for 50 feet with the least restrictive device within 7 day(s). 5.  Patient will ascend/descend 10 stairs with 1-2 handrail(s) with supervision/set-up within 7 day(s). Outcome: Progressing Towards Goal  PHYSICAL THERAPY TREATMENT  Patient: Crotez Thomas (23 y.o. male)  Date: 9/24/2021  Diagnosis: Sepsis ([facility] [A41.9] <principal problem not specified>       Precautions:    Chart, physical therapy assessment, plan of care and goals were reviewed. ASSESSMENT  Patient continues with skilled PT services and is progressing towards goals. Patient is pleasant , cooperative. He was on the bed pan when PT arrived - RN in to get stool sample. Pt rolled to edge of bed and sat up with standby assist, stood with SBA and ambulated with RW around room x 2 total of 55 ft. Pt cleared obstacles, turned safely and other than flexed posture did great with ambulation. Pt states he uses a can at home but it may be best for patient to use walker as he is probably safer with rolling walker.  Spoke with daughter who indicated that they are putting a plan in place for more supervision fpr her father especially in regards to hygiene. This will hopefully be enough to prevent need for HHPT. Also , pts wife is 15 years younger than patient which is helpful. Pt thinks he owns a walker and it is at house. .     Current Level of Function Impacting Discharge (mobility/balance): uses HHA or RW for ambulation , with CGA or SBA    Other factors to consider for discharge:          PLAN :  Patient continues to benefit from skilled intervention to address the above impairments. Continue treatment per established plan of care. to address goals. Recommendation for discharge: (in order for the patient to meet his/her long term goals)  No skilled physical therapy/ follow up rehabilitation needs identified at this time. This discharge recommendation:  Has not yet been discussed the attending provider and/or case management    IF patient discharges home will need the following DME: patient owns DME required for discharge       SUBJECTIVE:   Patient stated My wife is with me at home.     OBJECTIVE DATA SUMMARY:   Critical Behavior:  Neurologic State: Alert  Orientation Level: Oriented to person, Oriented to place  Cognition: Follows commands  Safety/Judgement: Decreased awareness of need for safety, Fall prevention  Functional Mobility Training:  Bed Mobility:     Supine to Sit: Stand-by assistance; Additional time  Sit to Supine:  (pt seated )  Scooting: Supervision        Transfers:  Sit to Stand: Stand-by assistance;Contact guard assistance  Stand to Sit: Stand-by assistance        Bed to Chair: Contact guard assistance                    Balance:  Sitting: Intact  Standing: Impaired; Without support  Standing - Static: Good  Standing - Dynamic : Fair  Ambulation/Gait Training:  Distance (ft): 55 Feet (ft)  Assistive Device: Gait belt;Walker, rolling (+1 for IV)  Ambulation - Level of Assistance: Contact guard assistance Gait Abnormalities: Decreased step clearance  Right Side Weight Bearing: Full  Left Side Weight Bearing: Full  Base of Support: Widened     Speed/Maria Luisa: Pace decreased (<100 feet/min); Shuffled  Step Length: Right shortened;Left shortened                    Stairs: Therapeutic Exercises:   Sitting knee extensions x 20  Marching in chair - hip flexion x 20  Ankle pumps/circles x 20  Pain Rating:  No c/o    Activity Tolerance:   Good   - pulse 100bpm after ambulation    After treatment patient left in no apparent distress:   Sitting in chair    COMMUNICATION/COLLABORATION:   The patients plan of care was discussed with: Occupational therapist and Registered nurse.      Vishnu Pan, PT   Time Calculation: 29 mins

## 2021-09-24 NOTE — PROGRESS NOTES
End of Shift Note    Bedside shift change report given to Dao (oncoming nurse) by Clarissa Martines (offgoing nurse). Report included the following information SBAR, Kardex and MAR    Shift worked:  7p-7a     Shift summary and any significant changes:     Scheduled medications were given, see MAR. Patient did ask for a sedative, Melatonin was given, see PRN MAR. Rosales care was done. IV has been flushed and patient is getting maintenance fluid. Patient teaching and routine rounding has been done. Concerns for physician to address:       Zone phone for oncoming shift:          Activity:  Activity Level: Up with Assistance  Number times ambulated in hallways past shift: 0  Number of times OOB to chair past shift: 0    Cardiac:   Cardiac Monitoring: No      Cardiac Rhythm: Sinus Rhythm    Access:   Current line(s): PIV     Genitourinary:   Urinary status: rosales    Respiratory:   O2 Device: None (Room air)  Chronic home O2 use?: NO  Incentive spirometer at bedside: NO     GI:  Last Bowel Movement Date: 09/23/21  Current diet:  ADULT DIET Regular  DIET ONE TIME MESSAGE  Passing flatus: YES  Tolerating current diet: YES       Pain Management:   Patient states pain is manageable on current regimen: YES    Skin:  Martinez Score: 18  Interventions: internal/external urinary devices    Patient Safety:  Fall Score:  Total Score: 4  Interventions: bed/chair alarm       Length of Stay:  Expected LOS: 4d 16h  Actual LOS: 2      Clarissa Martines

## 2021-09-25 ENCOUNTER — APPOINTMENT (OUTPATIENT)
Dept: GENERAL RADIOLOGY | Age: 86
DRG: 698 | End: 2021-09-25
Attending: NURSE PRACTITIONER
Payer: MEDICARE

## 2021-09-25 LAB
ANION GAP SERPL CALC-SCNC: 7 MMOL/L (ref 5–15)
BACTERIA SPEC CULT: ABNORMAL
BUN SERPL-MCNC: 34 MG/DL (ref 6–20)
BUN/CREAT SERPL: 25 (ref 12–20)
C DIFF GDH STL QL: NEGATIVE
C DIFF TOX A+B STL QL IA: NEGATIVE
CALCIUM SERPL-MCNC: 8.5 MG/DL (ref 8.5–10.1)
CAMPYLOBACTER SPECIES, DNA: NEGATIVE
CHLORIDE SERPL-SCNC: 112 MMOL/L (ref 97–108)
CO2 SERPL-SCNC: 24 MMOL/L (ref 21–32)
CREAT SERPL-MCNC: 1.38 MG/DL (ref 0.7–1.3)
ENTEROTOXIGEN E COLI, DNA: NEGATIVE
GLUCOSE SERPL-MCNC: 100 MG/DL (ref 65–100)
INTERPRETATION: NORMAL
P SHIGELLOIDES DNA STL QL NAA+PROBE: NEGATIVE
POTASSIUM SERPL-SCNC: 3.6 MMOL/L (ref 3.5–5.1)
SALMONELLA SPECIES, DNA: NEGATIVE
SERVICE CMNT-IMP: ABNORMAL
SHIGA TOXIN PRODUCING, DNA: NEGATIVE
SHIGELLA SP+EIEC IPAH STL QL NAA+PROBE: NEGATIVE
SODIUM SERPL-SCNC: 143 MMOL/L (ref 136–145)
TROPONIN I SERPL-MCNC: <0.05 NG/ML
VIBRIO SPECIES, DNA: NEGATIVE
Y. ENTEROCOLITICA, DNA: NEGATIVE

## 2021-09-25 PROCEDURE — 80048 BASIC METABOLIC PNL TOTAL CA: CPT

## 2021-09-25 PROCEDURE — 74011250637 HC RX REV CODE- 250/637: Performed by: NURSE PRACTITIONER

## 2021-09-25 PROCEDURE — 65270000015 HC RM PRIVATE ONCOLOGY

## 2021-09-25 PROCEDURE — 84484 ASSAY OF TROPONIN QUANT: CPT

## 2021-09-25 PROCEDURE — 74011250636 HC RX REV CODE- 250/636: Performed by: INTERNAL MEDICINE

## 2021-09-25 PROCEDURE — 71045 X-RAY EXAM CHEST 1 VIEW: CPT

## 2021-09-25 PROCEDURE — 93005 ELECTROCARDIOGRAM TRACING: CPT

## 2021-09-25 PROCEDURE — 74011000258 HC RX REV CODE- 258: Performed by: INTERNAL MEDICINE

## 2021-09-25 PROCEDURE — 74011250636 HC RX REV CODE- 250/636: Performed by: NURSE PRACTITIONER

## 2021-09-25 PROCEDURE — 74011250637 HC RX REV CODE- 250/637: Performed by: INTERNAL MEDICINE

## 2021-09-25 PROCEDURE — 36415 COLL VENOUS BLD VENIPUNCTURE: CPT

## 2021-09-25 RX ADMIN — HEPARIN SODIUM 5000 UNITS: 5000 INJECTION INTRAVENOUS; SUBCUTANEOUS at 06:40

## 2021-09-25 RX ADMIN — CEFEPIME HYDROCHLORIDE 1 G: 1 INJECTION, POWDER, FOR SOLUTION INTRAMUSCULAR; INTRAVENOUS at 11:35

## 2021-09-25 RX ADMIN — SODIUM CHLORIDE 75 ML/HR: 9 INJECTION, SOLUTION INTRAVENOUS at 04:45

## 2021-09-25 RX ADMIN — FINASTERIDE 5 MG: 5 TABLET, FILM COATED ORAL at 09:29

## 2021-09-25 RX ADMIN — HEPARIN SODIUM 5000 UNITS: 5000 INJECTION INTRAVENOUS; SUBCUTANEOUS at 16:59

## 2021-09-25 RX ADMIN — HEPARIN SODIUM 5000 UNITS: 5000 INJECTION INTRAVENOUS; SUBCUTANEOUS at 21:26

## 2021-09-25 RX ADMIN — TAMSULOSIN HYDROCHLORIDE 0.8 MG: 0.4 CAPSULE ORAL at 09:29

## 2021-09-25 RX ADMIN — AMLODIPINE BESYLATE 10 MG: 5 TABLET ORAL at 09:30

## 2021-09-25 RX ADMIN — Medication 1 LOZENGE: at 21:26

## 2021-09-25 RX ADMIN — OXYCODONE HYDROCHLORIDE AND ACETAMINOPHEN 500 MG: 500 TABLET ORAL at 09:30

## 2021-09-25 RX ADMIN — ATENOLOL 25 MG: 25 TABLET ORAL at 09:29

## 2021-09-25 RX ADMIN — Medication 1 CAPSULE: at 09:29

## 2021-09-25 RX ADMIN — Medication 3 MG: at 21:26

## 2021-09-25 NOTE — PROGRESS NOTES
Hospitalist Progress Note    NAME: Dory Castelan   :  1933   MRN:  [id number]     I reviewed pertinent labs and imaging, and discussed /agreed on the plan of care with Dr. Mayelin Livingston. Assessment / Plan:  Sepsis with Lactic Acidosis (WBC 16.2, Lactate 2.2, )  r/t Complicated Urinary Tract Infection in setting of Chronic Rosales (x3 years) with History of BPH   Follows with Urology monthly to change rosales   Gram Negative Bacteremia   -Previous cultures in May 2020 grew Serratia and Proteus both sensitive to cefepime.   -Rosales was changed in ED on arrival   -Continue Cefepime - Day 4  -Urine Culture - SERRATIA MARCESCENS   -UC/BC is resistant - will need to complete IV abx while IP  -Blood Cultures  - SERRATIA MARCESCENS   -Repeat Blood Cultures drawn  - NG so far    -Continue tamsulosin and finasteride   -PT/OT recommendations -    -Discharge after completion of 7 days of IV abx     Diarrhea - resolving   -Await stool studies, C diff also sent   -Doubt C diff as now stools are formed   -Add imodium and probiotic      Hypertension  -Continue amlodipine and atenolol   -Hold PTA olmesartan and chlorthalidone with slight VALERIE  -Monitor BP - so far -130s     Acute Kidney Injury Cr 2.13  Chronic Kidney Disease Baseline Cr 1.4-1.7  Solitary Right Kidney   -Cr 1.38 - at baseline     -S/p IVF hydration   -Renal US - Solitary right kidney without hydronephrosis      Hypokalemia - resolved     18.5 - 24.9 Normal weight / Body mass index is 20.82 kg/m². Estimated discharge date:   Barriers: complete IV abx     Code status: Full  Prophylaxis: Lovenox  Recommended Disposition:  PT, OT, RN     Subjective:     Chief Complaint / Reason for Physician Visit  Patient seen bedside, no complaints. Discussed plan of care, patient verbalized agreement/understanding. Discussed with RN events overnight.      Review of Systems:  Symptom Y/N Comments  Symptom Y/N Comments   Fever/Chills n Chest Pain n    Poor Appetite n   Edema n    Cough n   Abdominal Pain n    Sputum n   Joint Pain n    SOB/COBOS n   Pruritis/Rash n    Nausea/vomit n   Tolerating PT/OT y    Diarrhea n   Tolerating Diet y    Constipation n   Other       Could NOT obtain due to:      Objective:     VITALS:   Last 24hrs VS reviewed since prior progress note. Most recent are:  Patient Vitals for the past 24 hrs:   Temp Pulse Resp BP SpO2   09/25/21 0906 98.1 °F (36.7 °C) 68 18 130/76 98 %   09/24/21 2329 98.7 °F (37.1 °C) 61 18 137/87 97 %   09/24/21 1959 97.5 °F (36.4 °C) 71 18 (!) 142/88 98 %   09/24/21 1543 97.9 °F (36.6 °C) 79 18 118/69 97 %       Intake/Output Summary (Last 24 hours) at 9/25/2021 0936  Last data filed at 9/25/2021 0255  Gross per 24 hour   Intake    Output 1000 ml   Net -1000 ml        I had a face to face encounter and independently examined this patient on 9/25/2021, as outlined below:  PHYSICAL EXAM:  General: WD, WN. Alert, cooperative, elderly male in no acute distress    EENT:  EOMI. Anicteric sclerae. MMM  Resp:  CTA bilaterally, no wheezing or rales. No accessory muscle use  CV:  Regular  rhythm,  No edema  GI:  Soft, Non distended, Non tender. +Bowel sounds  Neurologic:  Alert and oriented X 3, normal speech,   Psych:   Good insight. Not anxious nor agitated  Skin:  No rashes. No jaundice    Reviewed most current lab test results and cultures  YES  Reviewed most current radiology test results   YES  Review and summation of old records today    NO  Reviewed patient's current orders and MAR    YES  PMH/SH reviewed - no change compared to H&P  ________________________________________________________________________  Care Plan discussed with:    Comments   Patient x    Family      RN x    Care Manager x    Consultant                        Multidiciplinary team rounds were held today with , nursing, pharmacist and clinical coordinator.   Patient's plan of care was discussed; medications were reviewed and discharge planning was addressed. ________________________________________________________________________  Total NON critical care TIME:  25   Minutes    Total CRITICAL CARE TIME Spent:   Minutes non procedure based      Comments   >50% of visit spent in counseling and coordination of care x    ________________________________________________________________________  Az Molina NP     Procedures: see electronic medical records for all procedures/Xrays and details which were not copied into this note but were reviewed prior to creation of Plan. LABS:  I reviewed today's most current labs and imaging studies.   Pertinent labs include:  Recent Labs     09/23/21  0424   WBC 14.2*   HGB 12.0*   HCT 36.8        Recent Labs     09/25/21  0451 09/24/21  0406 09/23/21  0424    143 142   K 3.6 3.5 3.7   * 111* 111*   CO2 24 22 23    109* 87   BUN 34* 44* 43*   CREA 1.38* 1.65* 2.09*   CA 8.5 8.4* 7.9*   MG  --   --  2.1   PHOS  --   --  2.8       Signed: Az Molina NP weakness

## 2021-09-25 NOTE — PROGRESS NOTES
End of Shift Note    Bedside shift change report given to Dao (oncoming nurse) by Linda Tamez RN (offgoing nurse). Report included the following information SBAR, Kardex and MAR    Shift worked:  [id number]     Shift summary and any significant changes:     Patient requested sleep medication. PRN melatonin was given, see PRN MAR. Patient did not complain of pain during the shift. IV line is flushed and patent. Rosales care was provided. Morning labs are drawn. Frequent rounding has been done. Concerns for physician to address:       Zone phone for oncoming shift:          Activity:  Activity Level: Up with Assistance  Number times ambulated in hallways past shift: 0  Number of times OOB to chair past shift: 0    Cardiac:   Cardiac Monitoring: No      Cardiac Rhythm: Sinus Rhythm    Access:   Current line(s): PIV     Genitourinary:   Urinary status: rosales    Respiratory:   O2 Device: None (Room air)  Chronic home O2 use?: NO  Incentive spirometer at bedside: NO     GI:  Last Bowel Movement Date: 09/24/21  Current diet:  ADULT DIET Regular  DIET ONE TIME MESSAGE  Passing flatus: YES  Tolerating current diet: YES       Pain Management:   Patient states pain is manageable on current regimen: YES    Skin:  Martinez Score: 18  Interventions: internal/external urinary devices    Patient Safety:  Fall Score:  Total Score: 4  Interventions: bed/chair alarm, gripper socks and pt to call before getting OOB       Length of Stay:  Expected LOS: 4d 16h  Actual LOS: 2      Linda Tamez RN

## 2021-09-25 NOTE — PROGRESS NOTES
Transition of Care Plan:     RUR: 11  Disposition: Home w/ spouse once stable. STEFANI is 9/27/21. Therapy saw Pt and PT stated no recommendations. OT stated HH ? Let us know if you think HH is needed at discharge. Follow up appointments: PCP  DME needed: Pt has a cane and shower chair   Transportation at Discharge: Pt's daughter, Wanda Colebrook or means to access home: Daughter will have access to home         IM Medicare Letter: Will need 2nd [facility] letter prior to d/c  Is patient a BCPI-A Bundle:  N/A                    If yes, was Bundle Letter given?:     Caregiver Contact: Pt's daughter, Smitha Zurita) [phone]  Discharge Caregiver contacted prior to discharge? Unit CM will contact prior to DC. CM will continue to monitor discharge plan.      Micah Presume, Weekend CM   Ext [phone]

## 2021-09-26 LAB
ANION GAP SERPL CALC-SCNC: 6 MMOL/L (ref 5–15)
ATRIAL RATE: 73 BPM
BUN SERPL-MCNC: 26 MG/DL (ref 6–20)
BUN/CREAT SERPL: 20 (ref 12–20)
CALCIUM SERPL-MCNC: 8.5 MG/DL (ref 8.5–10.1)
CALCULATED P AXIS, ECG09: 30 DEGREES
CALCULATED R AXIS, ECG10: -5 DEGREES
CALCULATED T AXIS, ECG11: 17 DEGREES
CHLORIDE SERPL-SCNC: 110 MMOL/L (ref 97–108)
CO2 SERPL-SCNC: 24 MMOL/L (ref 21–32)
COMMENT, HOLDF: NORMAL
CREAT SERPL-MCNC: 1.33 MG/DL (ref 0.7–1.3)
DIAGNOSIS, 93000: NORMAL
GLUCOSE SERPL-MCNC: 90 MG/DL (ref 65–100)
P-R INTERVAL, ECG05: 154 MS
POTASSIUM SERPL-SCNC: 3.7 MMOL/L (ref 3.5–5.1)
Q-T INTERVAL, ECG07: 422 MS
QRS DURATION, ECG06: 86 MS
QTC CALCULATION (BEZET), ECG08: 464 MS
SAMPLES BEING HELD,HOLD: NORMAL
SODIUM SERPL-SCNC: 140 MMOL/L (ref 136–145)
VENTRICULAR RATE, ECG03: 73 BPM

## 2021-09-26 PROCEDURE — 74011250637 HC RX REV CODE- 250/637: Performed by: NURSE PRACTITIONER

## 2021-09-26 PROCEDURE — 74011250636 HC RX REV CODE- 250/636: Performed by: NURSE PRACTITIONER

## 2021-09-26 PROCEDURE — 74011250637 HC RX REV CODE- 250/637: Performed by: INTERNAL MEDICINE

## 2021-09-26 PROCEDURE — 74011250636 HC RX REV CODE- 250/636: Performed by: INTERNAL MEDICINE

## 2021-09-26 PROCEDURE — 36415 COLL VENOUS BLD VENIPUNCTURE: CPT

## 2021-09-26 PROCEDURE — 74011000258 HC RX REV CODE- 258: Performed by: INTERNAL MEDICINE

## 2021-09-26 PROCEDURE — 80048 BASIC METABOLIC PNL TOTAL CA: CPT

## 2021-09-26 PROCEDURE — 65270000015 HC RM PRIVATE ONCOLOGY

## 2021-09-26 PROCEDURE — 74011000258 HC RX REV CODE- 258: Performed by: NURSE PRACTITIONER

## 2021-09-26 RX ADMIN — Medication 3 MG: at 21:41

## 2021-09-26 RX ADMIN — AMLODIPINE BESYLATE 10 MG: 5 TABLET ORAL at 09:54

## 2021-09-26 RX ADMIN — FINASTERIDE 5 MG: 5 TABLET, FILM COATED ORAL at 09:54

## 2021-09-26 RX ADMIN — TAMSULOSIN HYDROCHLORIDE 0.8 MG: 0.4 CAPSULE ORAL at 09:53

## 2021-09-26 RX ADMIN — CEFEPIME HYDROCHLORIDE 1 G: 1 INJECTION, POWDER, FOR SOLUTION INTRAMUSCULAR; INTRAVENOUS at 10:00

## 2021-09-26 RX ADMIN — OXYCODONE HYDROCHLORIDE AND ACETAMINOPHEN 500 MG: 500 TABLET ORAL at 09:54

## 2021-09-26 RX ADMIN — HEPARIN SODIUM 5000 UNITS: 5000 INJECTION INTRAVENOUS; SUBCUTANEOUS at 21:41

## 2021-09-26 RX ADMIN — ATENOLOL 25 MG: 25 TABLET ORAL at 09:54

## 2021-09-26 RX ADMIN — CEFEPIME HYDROCHLORIDE 1 G: 1 INJECTION, POWDER, FOR SOLUTION INTRAMUSCULAR; INTRAVENOUS at 21:41

## 2021-09-26 RX ADMIN — Medication 1 CAPSULE: at 09:53

## 2021-09-26 RX ADMIN — HEPARIN SODIUM 5000 UNITS: 5000 INJECTION INTRAVENOUS; SUBCUTANEOUS at 09:54

## 2021-09-26 RX ADMIN — HEPARIN SODIUM 5000 UNITS: 5000 INJECTION INTRAVENOUS; SUBCUTANEOUS at 16:17

## 2021-09-26 NOTE — PROGRESS NOTES
Hospitalist Progress Note    NAME: Betty Ledbetter   :  1933   MRN:  [id number]     I reviewed pertinent labs and imaging, and discussed /agreed on the plan of care with Dr. Li Murray. Assessment / Plan:  Sepsis with Lactic Acidosis (WBC 16.2, Lactate 2.2, )  r/t Complicated Urinary Tract Infection in setting of Chronic Rosales (x3 years) with History of BPH   Follows with Urology monthly to change rosales   Gram Negative Bacteremia   -Previous cultures in May 2020 grew Serratia and Proteus both sensitive to cefepime.   -Rosales was changed in ED on arrival   -Continue Cefepime - Day 5  -Urine Culture - SERRATIA MARCESCENS   -UC/BC is resistant - will need to complete IV abx while IP  -Blood Cultures  - SERRATIA MARCESCENS   -Repeat Blood Cultures drawn  - NG so far    -Continue tamsulosin and finasteride   -PT/OT recommendations -    -Discharge after completion of 7 days of IV abx     Diarrhea - resolved   -Stool studies negative so far, negative C diff  -Continue PRN imodium and probiotic      Hypertension  -Continue amlodipine and atenolol   -Hold PTA olmesartan and chlorthalidone with slight VALERIE  -Monitor BP - so far -130s     Acute Kidney Injury Cr 2.13  Chronic Kidney Disease Baseline Cr 1.4-1.7  Solitary Right Kidney   -Cr 1.33 - at baseline     -S/p IVF hydration   -Renal US - Solitary right kidney without hydronephrosis      Hypokalemia - resolved     18.5 - 24.9 Normal weight / Body mass index is 20.82 kg/m². Estimated discharge date:   Barriers: complete IV abx     Code status: Full  Prophylaxis: Lovenox  Recommended Disposition:  PT, OT, RN     Subjective:     Chief Complaint / Reason for Physician Visit  Patient seen bedside, no complaints. Discussed plan of care, patient verbalized agreement/understanding. Discussed with RN events overnight.      Review of Systems:  Symptom Y/N Comments  Symptom Y/N Comments   Fever/Chills n   Chest Pain n    Poor Appetite n Edema n    Cough n   Abdominal Pain n    Sputum n   Joint Pain n    SOB/COBOS n   Pruritis/Rash n    Nausea/vomit n   Tolerating PT/OT y    Diarrhea n   Tolerating Diet y    Constipation n   Other       Could NOT obtain due to:      Objective:     VITALS:   Last 24hrs VS reviewed since prior progress note. Most recent are:  Patient Vitals for the past 24 hrs:   Temp Pulse Resp BP SpO2   09/25/21 2341 98.6 °F (37 °C) 65 18 134/77 99 %   09/25/21 1939 98 °F (36.7 °C) 78 18 139/78 100 %   09/25/21 1524 97.8 °F (36.6 °C) 75 18 120/68 98 %   09/25/21 1322 97.8 °F (36.6 °C) 77 20 121/76 97 %   09/25/21 0906 98.1 °F (36.7 °C) 68 18 130/76 98 %       Intake/Output Summary (Last 24 hours) at 9/26/2021 0904  Last data filed at 9/25/2021 1707  Gross per 24 hour   Intake    Output 500 ml   Net -500 ml        I had a face to face encounter and independently examined this patient on 9/26/2021, as outlined below:  PHYSICAL EXAM:  General: WD, WN. Alert, cooperative, elderly male in no acute distress    EENT:  EOMI. Anicteric sclerae. MMM  Resp:  CTA bilaterally, no wheezing or rales. No accessory muscle use  CV:  Regular  rhythm,  No edema  GI:  Soft, Non distended, Non tender. +Bowel sounds  Neurologic:  Alert and oriented X 3, normal speech,   Psych:   Good insight. Not anxious nor agitated  Skin:  No rashes. No jaundice    Reviewed most current lab test results and cultures  YES  Reviewed most current radiology test results   YES  Review and summation of old records today    NO  Reviewed patient's current orders and MAR    YES  PMH/SH reviewed - no change compared to H&P  ________________________________________________________________________  Care Plan discussed with:    Comments   Patient x    Family      RN x    Care Manager x    Consultant                        Multidiciplinary team rounds were held today with , nursing, pharmacist and clinical coordinator.   Patient's plan of care was discussed; medications were reviewed and discharge planning was addressed. ________________________________________________________________________  Total NON critical care TIME:  25   Minutes    Total CRITICAL CARE TIME Spent:   Minutes non procedure based      Comments   >50% of visit spent in counseling and coordination of care x    ________________________________________________________________________  Vilma Hensley NP     Procedures: see electronic medical records for all procedures/Xrays and details which were not copied into this note but were reviewed prior to creation of Plan. LABS:  I reviewed today's most current labs and imaging studies. Pertinent labs include:  No results for input(s): WBC, HGB, HCT, PLT, HGBEXT, HCTEXT, PLTEXT, HGBEXT, HCTEXT, PLTEXT in the last 72 hours.   Recent Labs     09/26/21  0625 09/25/21  0451 09/24/21  0406    143 143   K 3.7 3.6 3.5   * 112* 111*   CO2 24 24 22   GLU 90 100 109*   BUN 26* 34* 44*   CREA 1.33* 1.38* 1.65*   CA 8.5 8.5 8.4*       Signed: Vilma Hensley NP

## 2021-09-26 NOTE — PROGRESS NOTES
End of shift report given to Unique Aguilar (oncoming nurse from [facility] (outgoing nurse). Report included the following information SBAR, MAR, Kardex, and Recent Results. Pt remained stable during shift. Pt took all scheduled meds per [facility]. Pt complained of dry, tickle in throat, messaged NP and throat drops were ordered. Safety rounding and toileting needs were done.

## 2021-09-26 NOTE — PROGRESS NOTES
Problem: Falls - Risk of  Goal: *Absence of Falls  Description: Document Garry Bare Fall Risk and appropriate interventions in the flowsheet.   Outcome: Progressing Towards Goal  Note: Fall Risk Interventions:  Mobility Interventions: Patient to call before getting OOB    Mentation Interventions: Adequate sleep, hydration, pain control    Medication Interventions: Bed/chair exit alarm    Elimination Interventions: Call light in reach    History of Falls Interventions: Room close to nurse's station

## 2021-09-26 NOTE — PROGRESS NOTES
Pharmacy Automatic Renal Dosing Adjustment     Labs:  Recent Labs     21  0625 21  0451 21  0406   CREA 1.33* 1.38* 1.65*   BUN 26* 34* 44*     Temp (24hrs), Av.2 °F (36.8 °C), Min:97.4 °F (36.3 °C), Max:98.6 °F (37 °C)      Creatinine Clearance (mL/min) or Dialysis: 40.9 mL/min (IBW)    Impression/Plan:   Cefepime has been adjusted from q24h to q12h based on the renal dosing protocol. Pharmacy will follow daily and adjust medications as appropriate for renal function.     Thank you,  JOSEFINA Martin

## 2021-09-26 NOTE — PROGRESS NOTES
End of Shift Note    Bedside shift change report given to Grecia Sanchez (oncoming nurse) by Emily Diaz RN (offgoing nurse). Report included the following information SBAR    Shift worked:  [phone]     Shift summary and any significant changes: Followed POC as ordered. Patient ID'd with two identifiers. Prioritized safety at all times. Bed alarm plugged in and activated. Pt. oriented to call bell. Practiced infection prevention and hand hygiene. Managed pain as ordered. Clustered care while rounding hourly. Assessed pt., monitored vital signs, and administered medications. Encouraged patient to turn and/or shift weight. Assisted with ADL's, mobility, toileting, and hygiene. Encouraged CHG infection prevention treatment. Provided pt. education and employed therapeutic communication. Collaborated as part of the health care team. Advocated for pt. Monitored I/O's and tracked calorie consumption. Monitored lab values. Preserved IV access. Fall and skin precautions. Turned patient. Rosales catheter care. No acute events, vital signs stable. Pt. denied cardiac discomfort, nausea, and SOB. Pt denied pain. A&OX3-4. Pt reports full sensation. No sensory deficits. Positive pulses. Active bowel sounds. Lungs clear to diminished bilaterally. Rosales catheter. . Small liquid BM. Concerns for physician to address:       Zone phone for oncoming shift:  [phone]     Activity:  Activity Level:  Up with Assistance  Number times ambulated in hallways past shift: 0  Number of times OOB to chair past shift: 0    Cardiac:   Cardiac Monitoring: No      Cardiac Rhythm: Sinus Rhythm    Access:   Current line(s): PIV     Genitourinary:   Urinary status: rosales    Respiratory:   O2 Device: None (Room air)  Chronic home O2 use?: NO  Incentive spirometer at bedside: YES     GI:  Last Bowel Movement Date: 09/26/21  Current diet:  ADULT DIET Regular  DIET ONE TIME MESSAGE  Passing flatus: YES  Tolerating current diet: YES       Pain Management: Patient states pain is manageable on current regimen: YES    Skin:  Martinez Score: 19  Interventions: speciality bed, float heels, increase time out of bed, foam dressing, PT/OT consult, limit briefs and internal/external urinary devices    Patient Safety:  Fall Score:  Total Score: 3  Interventions: bed/chair alarm, assistive device (walker, cane, etc), gripper socks, pt to call before getting OOB and stay with me (per policy)  High Fall Risk: Yes    Length of Stay:  Expected LOS: 4d 16h  Actual LOS: 4      Korina iDaz RN

## 2021-09-27 PROCEDURE — 74011250637 HC RX REV CODE- 250/637: Performed by: INTERNAL MEDICINE

## 2021-09-27 PROCEDURE — 74011250637 HC RX REV CODE- 250/637: Performed by: NURSE PRACTITIONER

## 2021-09-27 PROCEDURE — 74011000258 HC RX REV CODE- 258: Performed by: INTERNAL MEDICINE

## 2021-09-27 PROCEDURE — 74011250636 HC RX REV CODE- 250/636: Performed by: INTERNAL MEDICINE

## 2021-09-27 PROCEDURE — 65270000015 HC RM PRIVATE ONCOLOGY

## 2021-09-27 PROCEDURE — 97116 GAIT TRAINING THERAPY: CPT

## 2021-09-27 PROCEDURE — 74011250636 HC RX REV CODE- 250/636: Performed by: NURSE PRACTITIONER

## 2021-09-27 RX ADMIN — Medication 1 LOZENGE: at 16:47

## 2021-09-27 RX ADMIN — TAMSULOSIN HYDROCHLORIDE 0.8 MG: 0.4 CAPSULE ORAL at 09:41

## 2021-09-27 RX ADMIN — Medication 3 MG: at 20:36

## 2021-09-27 RX ADMIN — FINASTERIDE 5 MG: 5 TABLET, FILM COATED ORAL at 09:41

## 2021-09-27 RX ADMIN — CEFEPIME HYDROCHLORIDE 1 G: 1 INJECTION, POWDER, FOR SOLUTION INTRAMUSCULAR; INTRAVENOUS at 09:41

## 2021-09-27 RX ADMIN — AMLODIPINE BESYLATE 10 MG: 5 TABLET ORAL at 09:41

## 2021-09-27 RX ADMIN — HEPARIN SODIUM 5000 UNITS: 5000 INJECTION INTRAVENOUS; SUBCUTANEOUS at 22:41

## 2021-09-27 RX ADMIN — CEFEPIME HYDROCHLORIDE 1 G: 1 INJECTION, POWDER, FOR SOLUTION INTRAMUSCULAR; INTRAVENOUS at 22:41

## 2021-09-27 RX ADMIN — HEPARIN SODIUM 5000 UNITS: 5000 INJECTION INTRAVENOUS; SUBCUTANEOUS at 06:34

## 2021-09-27 RX ADMIN — OXYCODONE HYDROCHLORIDE AND ACETAMINOPHEN 500 MG: 500 TABLET ORAL at 09:41

## 2021-09-27 RX ADMIN — HEPARIN SODIUM 5000 UNITS: 5000 INJECTION INTRAVENOUS; SUBCUTANEOUS at 16:44

## 2021-09-27 RX ADMIN — Medication 1 CAPSULE: at 09:41

## 2021-09-27 RX ADMIN — ATENOLOL 25 MG: 25 TABLET ORAL at 09:41

## 2021-09-27 NOTE — PROGRESS NOTES
Bedside and Verbal shift change report given to BOB Marie (oncoming nurse) by Jennifer Bull (offgoing nurse). Report included the following information SBAR, Kardex, Intake/Output, MAR and Recent Results.

## 2021-09-27 NOTE — PROGRESS NOTES
Problem: Mobility Impaired (Adult and Pediatric)  Goal: *Acute Goals and Plan of Care (Insert Text)  Description: FUNCTIONAL STATUS PRIOR TO ADMISSION: Patient was modified independent using a single point cane for functional mobility. Often uses no device in the home, per patient report. HOME SUPPORT PRIOR TO ADMISSION: The patient lived with wife but did not require assist. Per chart, daughter has been looking into getting a personal care assistant due to slight decline with patient's ADL/hygiene. Physical Therapy Goals  Initiated 9/23/2021  1. Patient will move from supine to sit and sit to supine  in bed with independence within 7 day(s). 2.  Patient will transfer from bed to chair and chair to bed with modified independence using the least restrictive device within 7 day(s). 3.  Patient will perform sit to stand with modified independence within 7 day(s). 4.  Patient will ambulate with modified independence for 50 feet with the least restrictive device within 7 day(s). 5.  Patient will ascend/descend 10 stairs with 1-2 handrail(s) with supervision/set-up within 7 day(s). Outcome: Progressing Towards Goal   PHYSICAL THERAPY TREATMENT  Patient: Maria Fernanda Barnes (36 y.o. male)  Date: 9/27/2021  [facility]: [facility] [A41.9] <principal problem not specified>       Precautions:    Chart, physical therapy assessment, plan of care and goals were reviewed. ASSESSMENT  Patient continues with skilled PT services and is progressing towards goals. He demonstrates the ability to transition supine to sit without difficulty. Patient demonstrates good sitting balance. He ambulates with slightly decreased coordination with use of RW intermittently bumping R LE in to walker leg. He demonstrates no overt LOB. Patient would benefit from RW use at home due to decreased floor clearance and shuffling gait increasing risk for falls. Patient declines stating he prefers to use of [facility] and may have a RW at home. Current Level of Function Impacting Discharge (mobility/balance): CGA     Other factors to consider for discharge: medical stability, decreased balance, decreased strength/endurance          PLAN :  Patient continues to benefit from skilled intervention to address the above impairments. Continue treatment per established plan of care. to address goals. Recommendation for discharge: (in order for the patient to meet his/her long term goals)  Physical therapy at least 2 days/week in the home AND ensure assist and/or supervision for safety with functional mobility     This discharge recommendation:  Has been made in collaboration with the attending provider and/or case management    IF patient discharges home will need the following DME: patient owns DME required for discharge       SUBJECTIVE:   Patient stated I don't get around much at home.     OBJECTIVE DATA SUMMARY:   Critical Behavior:  Neurologic State: Alert  Orientation Level: Oriented to person, Oriented to place, Oriented to situation  Cognition: Follows commands  Safety/Judgement: Decreased awareness of need for safety, Fall prevention  Functional Mobility Training:  Bed Mobility:     Supine to Sit: Bed Modified;Stand-by assistance     Scooting: Stand-by assistance        Transfers:  Sit to Stand: Stand-by assistance  Stand to Sit: Stand-by assistance                             Balance:     Ambulation/Gait Training:  Distance (ft): 55 Feet (ft)  Assistive Device: Gait belt;Walker, rolling  Ambulation - Level of Assistance: Contact guard assistance        Gait Abnormalities: Decreased step clearance        Base of Support: Widened     Speed/Maria Luisa: Pace decreased (<100 feet/min)  Step Length: Right shortened;Left shortened                    Stairs:               Therapeutic Exercises:     Pain Rating:      Activity Tolerance:   Fair    After treatment patient left in no apparent distress:   Sitting in chair, Heels elevated for pressure relief, and Call bell within reach    COMMUNICATION/COLLABORATION:   The patients plan of care was discussed with: Registered nurse.      Brenton Valles, PT   Time Calculation: 18 mins

## 2021-09-27 NOTE — PROGRESS NOTES
Spiritual Care Assessment/Progress Note  [facility]      NAME: Jeffery Rios      MRN: [id number]  AGE: 80 y.o.  SEX: male  Gnosticism Affiliation: Christianity   Language: English     9/27/2021     Total Time (in minutes): 14     Spiritual Assessment begun in [facility] through conversation with:         [x]Patient        [] Family    [] Friend(s)        Reason for Consult: Initial/Spiritual assessment, patient floor     Spiritual beliefs: (Please include comment if needed)     [x] Identifies with a monique tradition:         [x] Supported by a monique community:            [] Claims no spiritual orientation:           [] Seeking spiritual identity:                [] Adheres to an individual form of spirituality:           [] Not able to assess:                           Identified resources for coping:      [x] Prayer                               [] Music                  [] Guided Imagery     [] Family/friends                 [] Pet visits     [] Devotional reading                         [] Unknown     [] Other:                                               Interventions offered during this visit: (See comments for more details)    Patient Interventions: Affirmation of emotions/emotional suffering, Catharsis/review of pertinent events in supportive environment, Iconic (affirming the presence of God/Higher Power), Initial/Spiritual assessment, patient floor, Affirmation of monique, Normalization of emotional/spiritual concerns, Prayer (assurance of), Gnosticism beliefs/image of God discussed           Plan of Care:     [] Support spiritual and/or cultural needs    [] Support AMD and/or advance care planning process      [] Support grieving process   [] Coordinate Rites and/or Rituals    [] Coordination with community clergy   [x] No spiritual needs identified at this time   [] Detailed Plan of Care below (See Comments)  [] Make referral to Music Therapy  [] Make referral to Pet Therapy [] Make referral to Addiction services  [] Make referral to [facility]  [] Make referral to Spiritual Care Partner  [] No future visits requested        [x] Follow up upon further referrals     Comments:  Reviewed chart prior to visit on Oncology unit for spiritual assessment. No family/friends present. Patient requested to have his meal tray removed from in front of him along with the tray table. He also expressed need for assistance from nurse or PCT. Patient shared that he belongs to a [employer]. He expressed no spiritual needs or concerns at this time. Offered assurance of prayer and bedside presence. Notified staff of patient's need.      Amanda St, COLIN, [facility], Staff [address]    [address] Paging Service  [phone]

## 2021-09-27 NOTE — PROGRESS NOTES
Hospitalist Progress Note    NAME: Sharyle Furlough   :  1933   MRN:  [id number]     I reviewed pertinent labs and imaging, and discussed /agreed on the plan of care with Dr. Pa Walker. Assessment / Plan:  Sepsis with Lactic Acidosis (WBC 16.2, Lactate 2.2, )  r/t Complicated Urinary Tract Infection in setting of Chronic Rosales (x3 years) with History of BPH   Follows with Urology monthly to change rosales   Gram Negative Bacteremia   -Previous cultures in May 2020 grew Serratia and Proteus both sensitive to cefepime.   -Rosales was changed in ED on arrival   -Continue Cefepime - Day 6  -Urine Culture - SERRATIA MARCESCENS   -UC/BC is resistant - will need to complete IV abx while IP  -Blood Cultures  - SERRATIA MARCESCENS   -Repeat Blood Cultures drawn  - NG so far    -Continue tamsulosin and finasteride   -PT/OT recommendations - [facility]   -Discharge after completion of 7 days of IV abx. Last dose of abx due at 8 pm  - will plan for DC home with [facility] morning of     Diarrhea - resolved   -Stool studies negative so far, negative C diff  -Continue PRN imodium and probiotic      Hypertension  -Continue amlodipine and atenolol   -Hold PTA olmesartan and chlorthalidone with slight VALERIE  -Monitor BP - so far -130s     Acute Kidney Injury Cr 2.13  Chronic Kidney Disease Baseline Cr 1.4-1.7  Solitary Right Kidney   -Cr 1.33 - at baseline     -S/p IVF hydration   -Renal US - Solitary right kidney without hydronephrosis      Hypokalemia - resolved     18.5 - 24.9 Normal weight / Body mass index is 20.82 kg/m². Estimated discharge date:   Barriers: complete IV abx     Code status: Full  Prophylaxis: Lovenox  Recommended Disposition:  PT, OT, RN     Subjective:     Chief Complaint / Reason for Physician Visit  Patient seen bedside, no complaints. Discussed plan of care, patient verbalized agreement/understanding. Discussed with RN events overnight.      Review of Systems:  Symptom Y/N Comments  Symptom Y/N Comments   Fever/Chills n   Chest Pain n    Poor Appetite n   Edema n    Cough n   Abdominal Pain n    Sputum n   Joint Pain n    SOB/COBOS n   Pruritis/Rash n    Nausea/vomit n   Tolerating PT/OT y    Diarrhea n   Tolerating Diet y    Constipation n   Other       Could NOT obtain due to:      Objective:     VITALS:   Last 24hrs VS reviewed since prior progress note. Most recent are:  Patient Vitals for the past 24 hrs:   Temp Pulse Resp BP SpO2   09/27/21 0738 98.3 °F (36.8 °C) 66 18 137/76 99 %   09/26/21 2358 98.3 °F (36.8 °C) 63 18 (!) 143/81 96 %   09/26/21 1945 98.6 °F (37 °C) 77 18 (!) 153/90    09/26/21 1608 97.4 °F (36.3 °C) 77 20 138/73 96 %       Intake/Output Summary (Last 24 hours) at 9/27/2021 1150  Last data filed at 9/27/2021 0640  Gross per 24 hour   Intake    Output 1400 ml   Net -1400 ml        I had a face to face encounter and independently examined this patient on 9/27/2021, as outlined below:  PHYSICAL EXAM:  General: WD, WN. Alert, cooperative, elderly male in no acute distress    EENT:  EOMI. Anicteric sclerae. MMM  Resp:  CTA bilaterally, no wheezing or rales. No accessory muscle use  CV:  Regular  rhythm,  No edema  GI:  Soft, Non distended, Non tender. +Bowel sounds  Neurologic:  Alert and oriented X 3, normal speech,   Psych:   Good insight. Not anxious nor agitated  Skin:  No rashes. No jaundice    Reviewed most current lab test results and cultures  YES  Reviewed most current radiology test results   YES  Review and summation of old records today    NO  Reviewed patient's current orders and MAR    YES  PMH/SH reviewed - no change compared to H&P  ________________________________________________________________________  Care Plan discussed with:    Comments   Patient x    Family      RN x    Care Manager x    Consultant                        Multidiciplinary team rounds were held today with , nursing, pharmacist and clinical coordinator.   Patient's plan of care was discussed; medications were reviewed and discharge planning was addressed. ________________________________________________________________________  Total NON critical care TIME:  25   Minutes    Total CRITICAL CARE TIME Spent:   Minutes non procedure based      Comments   >50% of visit spent in counseling and coordination of care x    ________________________________________________________________________  Fany Moe NP     Procedures: see electronic medical records for all procedures/Xrays and details which were not copied into this note but were reviewed prior to creation of Plan. LABS:  I reviewed today's most current labs and imaging studies. Pertinent labs include:  No results for input(s): WBC, HGB, HCT, PLT, HGBEXT, HCTEXT, PLTEXT, HGBEXT, HCTEXT, PLTEXT in the last 72 hours.   Recent Labs     09/26/21  0625 09/25/21  0451    143   K 3.7 3.6   * 112*   CO2 24 24   GLU 90 100   BUN 26* 34*   CREA 1.33* 1.38*   CA 8.5 8.5       Signed: Fany Moe NP

## 2021-09-27 NOTE — PROGRESS NOTES
End of Shift Note    Bedside shift change report given to [address] [place] [address] (oncoming nurse) by Jose L Infante RN (offgoing nurse). Report included the following information SBAR    Shift worked:  [phone]     Shift summary and any significant changes: Followed POC as ordered. Patient ID'd with two identifiers. Prioritized safety at all times. Bed alarm plugged in and activated. Pt. oriented to call bell. Practiced infection prevention and hand hygiene. Managed pain as ordered. Clustered care while rounding hourly. Assessed pt., monitored vital signs, and administered medications. Encouraged patient to turn and/or shift weight. Assisted with ADL's, mobility, toileting, and hygiene. Encouraged CHG infection prevention treatment. Provided pt. education and employed therapeutic communication. Collaborated as part of the health care team. Advocated for pt. Monitored I/O's and tracked calorie consumption. Monitored lab values. Preserved IV access. Fall and skin precautions. Turned patient. Changed linens. Rosales catheter care. No acute events, vital signs stable. Pt. denied cardiac discomfort, nausea, and SOB. Pt denied pain. A&OX3-4. Pt reports full sensation. No sensory deficits. Positive pulses. Active bowel sounds. Lungs coarse to diminished bilaterally. Rosales. BM. Concerns for physician to address:       Zone phone for oncoming shift:  [phone]       Activity:  Activity Level:  Up with Assistance  Number times ambulated in hallways past shift: 0  Number of times OOB to chair past shift: 1    Cardiac:   Cardiac Monitoring: No      Cardiac Rhythm: Sinus Rhythm    Access:   Current line(s): PIV     Genitourinary:   Urinary status: rosales    Respiratory:   O2 Device: None (Room air)  Chronic home O2 use?: NO  Incentive spirometer at bedside: YES     GI:  Last Bowel Movement Date: 09/26/21  Current diet:  ADULT DIET Regular  DIET ONE TIME MESSAGE  Passing flatus: YES  Tolerating current diet: YES       Pain Management:   Patient states pain is manageable on current regimen: YES    Skin:  Martinez Score: 20  Interventions: increase time out of bed and PT/OT consult    Patient Safety:  Fall Score:  Total Score: 3  Interventions: bed/chair alarm, assistive device (walker, cane, etc), gripper socks, pt to call before getting OOB and stay with me (per policy)  High Fall Risk: Yes    Length of Stay:  Expected LOS: 4d 16h  Actual LOS: 5      Aniya Rubalcava RN

## 2021-09-28 PROCEDURE — 74011250637 HC RX REV CODE- 250/637: Performed by: NURSE PRACTITIONER

## 2021-09-28 PROCEDURE — 74011250636 HC RX REV CODE- 250/636

## 2021-09-28 PROCEDURE — 74011000250 HC RX REV CODE- 250: Performed by: NURSE PRACTITIONER

## 2021-09-28 PROCEDURE — 74011000258 HC RX REV CODE- 258: Performed by: INTERNAL MEDICINE

## 2021-09-28 PROCEDURE — 97116 GAIT TRAINING THERAPY: CPT

## 2021-09-28 PROCEDURE — 94640 AIRWAY INHALATION TREATMENT: CPT

## 2021-09-28 PROCEDURE — 74011250637 HC RX REV CODE- 250/637: Performed by: INTERNAL MEDICINE

## 2021-09-28 PROCEDURE — 77030027138 HC INCENT SPIROMETER -A

## 2021-09-28 PROCEDURE — 65270000015 HC RM PRIVATE ONCOLOGY

## 2021-09-28 PROCEDURE — 74011250636 HC RX REV CODE- 250/636: Performed by: INTERNAL MEDICINE

## 2021-09-28 PROCEDURE — 74011250636 HC RX REV CODE- 250/636: Performed by: NURSE PRACTITIONER

## 2021-09-28 RX ORDER — FUROSEMIDE 10 MG/ML
INJECTION INTRAMUSCULAR; INTRAVENOUS
Status: COMPLETED
Start: 2021-09-28 | End: 2021-09-28

## 2021-09-28 RX ORDER — IPRATROPIUM BROMIDE AND ALBUTEROL SULFATE 2.5; .5 MG/3ML; MG/3ML
3 SOLUTION RESPIRATORY (INHALATION)
Status: DISCONTINUED | OUTPATIENT
Start: 2021-09-28 | End: 2021-09-30 | Stop reason: HOSPADM

## 2021-09-28 RX ADMIN — HEPARIN SODIUM 5000 UNITS: 5000 INJECTION INTRAVENOUS; SUBCUTANEOUS at 14:08

## 2021-09-28 RX ADMIN — Medication 1 CAPSULE: at 09:02

## 2021-09-28 RX ADMIN — HEPARIN SODIUM 5000 UNITS: 5000 INJECTION INTRAVENOUS; SUBCUTANEOUS at 06:37

## 2021-09-28 RX ADMIN — AMLODIPINE BESYLATE 10 MG: 5 TABLET ORAL at 09:03

## 2021-09-28 RX ADMIN — FUROSEMIDE 40 MG: 10 INJECTION, SOLUTION INTRAMUSCULAR; INTRAVENOUS at 09:03

## 2021-09-28 RX ADMIN — FINASTERIDE 5 MG: 5 TABLET, FILM COATED ORAL at 09:02

## 2021-09-28 RX ADMIN — HEPARIN SODIUM 5000 UNITS: 5000 INJECTION INTRAVENOUS; SUBCUTANEOUS at 21:25

## 2021-09-28 RX ADMIN — IPRATROPIUM BROMIDE AND ALBUTEROL SULFATE 3 ML: .5; 3 SOLUTION RESPIRATORY (INHALATION) at 13:14

## 2021-09-28 RX ADMIN — CEFEPIME HYDROCHLORIDE 1 G: 1 INJECTION, POWDER, FOR SOLUTION INTRAMUSCULAR; INTRAVENOUS at 21:24

## 2021-09-28 RX ADMIN — OXYCODONE HYDROCHLORIDE AND ACETAMINOPHEN 500 MG: 500 TABLET ORAL at 09:03

## 2021-09-28 RX ADMIN — CEFEPIME HYDROCHLORIDE 1 G: 1 INJECTION, POWDER, FOR SOLUTION INTRAMUSCULAR; INTRAVENOUS at 09:02

## 2021-09-28 RX ADMIN — Medication 3 MG: at 21:24

## 2021-09-28 RX ADMIN — Medication 1 LOZENGE: at 00:30

## 2021-09-28 RX ADMIN — Medication 1 LOZENGE: at 08:05

## 2021-09-28 RX ADMIN — ATENOLOL 25 MG: 25 TABLET ORAL at 09:03

## 2021-09-28 RX ADMIN — TAMSULOSIN HYDROCHLORIDE 0.8 MG: 0.4 CAPSULE ORAL at 09:02

## 2021-09-28 NOTE — PROGRESS NOTES
End of Shift Note    Bedside shift change report given to Ewa Feliciano RN (oncoming nurse) by Keyanna Graf RN (offgoing nurse). Report included the following information SBAR, Kardex and MAR    Shift worked:  1900-0730     Shift summary and any significant changes:     Scheduled medications were given. Patient had 1 BM during the shift. IV line is flushed and patent. Hygiene care, including Rosales care was Provided. Frequent rounds have been done. Concerns for physician to address:       Zone phone for oncoming shift:          Activity:  Activity Level: Up with Assistance  Number times ambulated in hallways past shift: 0  Number of times OOB to chair past shift: 0    Cardiac:   Cardiac Monitoring: No      Cardiac Rhythm: Sinus Rhythm    Access:   Current line(s): PIV     Genitourinary:   Urinary status: rosales    Respiratory:   O2 Device: None (Room air)  Chronic home O2 use?: NO  Incentive spirometer at bedside: NO     GI:  Last Bowel Movement Date: 09/26/21  Current diet:  ADULT DIET Regular  DIET ONE TIME MESSAGE  Passing flatus: YES  Tolerating current diet: YES       Pain Management:   Patient states pain is manageable on current regimen: YES    Skin:  Martinez Score: 17  Interventions: speciality bed and internal/external urinary devices    Patient Safety:  Fall Score:  Total Score: 3  Interventions: bed/chair alarm, assistive device (walker, cane, etc), gripper socks and pt to call before getting OOB  High Fall Risk: Yes    Length of Stay:  Expected LOS: 4d 16h  Actual LOS: 2605 Cynthia Linder, RN

## 2021-09-28 NOTE — PROGRESS NOTES
Bedside and Verbal shift change report given to Dao (oncoming nurse) by Jairon (offgoing nurse). Report included the following information SBAR.

## 2021-09-28 NOTE — PROGRESS NOTES
Problem: Mobility Impaired (Adult and Pediatric)  Goal: *Acute Goals and Plan of Care (Insert Text)  Description: FUNCTIONAL STATUS PRIOR TO ADMISSION: Patient was modified independent using a single point cane for functional mobility. Often uses no device in the home, per patient report. HOME SUPPORT PRIOR TO ADMISSION: The patient lived with wife but did not require assist. Per chart, daughter has been looking into getting a personal care assistant due to slight decline with patient's ADL/hygiene. Physical Therapy Goals  Initiated 9/23/2021  1. Patient will move from supine to sit and sit to supine  in bed with independence within 7 day(s). 2.  Patient will transfer from bed to chair and chair to bed with modified independence using the least restrictive device within 7 day(s). 3.  Patient will perform sit to stand with modified independence within 7 day(s). 4.  Patient will ambulate with modified independence for 50 feet with the least restrictive device within 7 day(s). 5.  Patient will ascend/descend 10 stairs with 1-2 handrail(s) with supervision/set-up within 7 day(s). Outcome: Progressing Towards Goal   PHYSICAL THERAPY TREATMENT  Patient: Betty Ledbetter (37 y.o. male)  Date: 9/28/2021  Diagnosis: Sepsis ([email or web address] [A41.9] <principal problem not specified>       Precautions:    Chart, physical therapy assessment, plan of care and goals were reviewed. ASSESSMENT  Patient continues with skilled PT services and is progressing towards goals. He demonstrates the ability to ambulate with SPC today. Patient demonstrates improved posture and gait speed with use of SPC. No overt LOB is noted. Patient is left in bedside chair with all needs met.      Current Level of Function Impacting Discharge (mobility/balance): CGA    Other factors to consider for discharge: medical stability, decreased strength/endurance          PLAN :  Patient continues to benefit from skilled intervention to address the above impairments. Continue treatment per established plan of care. to address goals. Recommendation for discharge: (in order for the patient to meet his/her long term goals)  Physical therapy at least 2 days/week in the home AND ensure assist and/or supervision for safety with functional mobility     This discharge recommendation:  Has been made in collaboration with the attending provider and/or case management    IF patient discharges home will need the following DME: patient owns DME required for discharge       SUBJECTIVE:   Patient stated i'm doing alright.     OBJECTIVE DATA SUMMARY:   Critical Behavior:  Neurologic State: Alert  Orientation Level: Oriented to person, Oriented to place, Oriented to time, Disoriented to situation  Cognition: Follows commands  Safety/Judgement: Decreased awareness of need for safety, Fall prevention  Functional Mobility Training:  Bed Mobility:     Supine to Sit: Bed Modified;Supervision     Scooting: Supervision        Transfers:  Sit to Stand: Stand-by assistance  Stand to Sit: Stand-by assistance                             Balance:     Ambulation/Gait Training:  Distance (ft): 55 Feet (ft)  Assistive Device: Gait belt;Cane, straight  Ambulation - Level of Assistance: Contact guard assistance        Gait Abnormalities: Decreased step clearance        Base of Support: Widened     Speed/Maria Luisa: Accelerated  Step Length: Right shortened;Left shortened                    Stairs: Therapeutic Exercises:     Pain Rating:      Activity Tolerance:   Good    After treatment patient left in no apparent distress:   Sitting in chair, Call bell within reach, and Caregiver / family present    COMMUNICATION/COLLABORATION:   The patients plan of care was discussed with: Registered nurse.      Nataly Corbin, PT   Time Calculation: 10 mins

## 2021-09-28 NOTE — PROGRESS NOTES
Hospitalist Progress Note    NAME: Nadine Garland   :  1933   MRN:  [id number]     I reviewed pertinent labs and imaging, and discussed /agreed on the plan of care with Dr. Sendy Mariano. Assessment / Plan:  Sepsis with Lactic Acidosis (WBC 16.2, Lactate 2.2, )  r/t Complicated Urinary Tract Infection in setting of Chronic Rosales (x3 years) with History of BPH   Follows with Urology monthly to change rosales   Gram Negative Bacteremia   -Previous cultures in May 2020 grew Serratia and Proteus both sensitive to cefepime.   -Rosales was changed in ED on arrival   -Continue Cefepime - Day 7 - last dose tonight at 2200  -Urine Culture - SERRATIA MARCESCENS   -UC/BC is resistant - will need to complete IV abx while IP  -Blood Cultures  - SERRATIA MARCESCENS   -Repeat Blood Cultures drawn  - NG so far    -Continue tamsulosin and finasteride   -PT/OT recommendations - [facility]   -Discharge after completion of 7 days of IV abx. Last dose of abx due at 8 pm  - will plan for DC home with [facility] morning of     Diarrhea - resolved   -Stool studies negative so far, negative C diff  -Continue PRN imodium and probiotic      Hypertension  -Continue amlodipine and atenolol   -Hold PTA olmesartan and chlorthalidone with slight VALERIE  -Monitor BP - so far -130s     Acute Kidney Injury Cr 2.13  Chronic Kidney Disease Baseline Cr 1.4-1.7  Solitary Right Kidney   -Cr 1.33 - at baseline     -S/p IVF hydration   -Renal US - Solitary right kidney without hydronephrosis      Hypokalemia - resolved     18.5 - 24.9 Normal weight / Body mass index is 20.82 kg/m². Estimated discharge date:   Barriers: complete IV abx     Code status: Full  Prophylaxis: Lovenox  Recommended Disposition:  PT, OT, RN     Subjective:     Chief Complaint / Reason for Physician Visit  Patient seen bedside, no complaints. Discussed plan of care, patient verbalized agreement/understanding. Discussed with RN events overnight. Review of Systems:  Symptom Y/N Comments  Symptom Y/N Comments   Fever/Chills n   Chest Pain n    Poor Appetite n   Edema n    Cough n   Abdominal Pain n    Sputum n   Joint Pain n    SOB/COBOS n   Pruritis/Rash n    Nausea/vomit n   Tolerating PT/OT y    Diarrhea n   Tolerating Diet y    Constipation n   Other       Could NOT obtain due to:      Objective:     VITALS:   Last 24hrs VS reviewed since prior progress note. Most recent are:  Patient Vitals for the past 24 hrs:   Temp Pulse Resp BP SpO2   09/28/21 1112 99.2 °F (37.3 °C) 73 16 138/83 98 %   09/28/21 0722 98.1 °F (36.7 °C) 73 18 132/77 100 %   09/27/21 2005 98.1 °F (36.7 °C) 75 18 (!) 142/78 98 %   09/27/21 1511 98.4 °F (36.9 °C) 74 18 (!) 141/84 97 %       Intake/Output Summary (Last 24 hours) at 9/28/2021 1335  Last data filed at 9/28/2021 9196  Gross per 24 hour   Intake 575 ml   Output 400 ml   Net 175 ml        I had a face to face encounter and independently examined this patient on 9/28/2021, as outlined below:  PHYSICAL EXAM:  General: WD, WN. Alert, cooperative, elderly male in no acute distress    EENT:  EOMI. Anicteric sclerae. MMM  Resp:  CTA bilaterally, no wheezing or rales. No accessory muscle use  CV:  Regular  rhythm,  No edema  GI:  Soft, Non distended, Non tender. +Bowel sounds  Neurologic:  Alert and oriented X 3, normal speech,   Psych:   Good insight. Not anxious nor agitated  Skin:  No rashes.   No jaundice    Reviewed most current lab test results and cultures  YES  Reviewed most current radiology test results   YES  Review and summation of old records today    NO  Reviewed patient's current orders and MAR    YES  PMH/SH reviewed - no change compared to H&P  ________________________________________________________________________  Care Plan discussed with:    Comments   Patient x    Family      RN x    Care Manager x    Consultant                        Multidiciplinary team rounds were held today with , nursing, pharmacist and clinical coordinator. Patient's plan of care was discussed; medications were reviewed and discharge planning was addressed. ________________________________________________________________________  Total NON critical care TIME:  25   Minutes    Total CRITICAL CARE TIME Spent:   Minutes non procedure based      Comments   >50% of visit spent in counseling and coordination of care x    ________________________________________________________________________  Hi Nichols NP     Procedures: see electronic medical records for all procedures/Xrays and details which were not copied into this note but were reviewed prior to creation of Plan. LABS:  I reviewed today's most current labs and imaging studies. Pertinent labs include:  No results for input(s): WBC, HGB, HCT, PLT, HGBEXT, HCTEXT, PLTEXT, HGBEXT, HCTEXT, PLTEXT in the last 72 hours.   Recent Labs     09/26/21  0625      K 3.7   *   CO2 24   GLU 90   BUN 26*   CREA 1.33*   CA 8.5       Signed: Hi Nichols NP

## 2021-09-29 PROBLEM — Z97.8 CHRONIC INDWELLING FOLEY CATHETER: Status: ACTIVE | Noted: 2021-09-29

## 2021-09-29 PROBLEM — R19.7 DIARRHEA: Status: ACTIVE | Noted: 2021-09-29

## 2021-09-29 PROBLEM — T83.511A URINARY TRACT INFECTION ASSOCIATED WITH INDWELLING URETHRAL CATHETER (HCC): Status: ACTIVE | Noted: 2021-09-29

## 2021-09-29 PROBLEM — N39.0 URINARY TRACT INFECTION ASSOCIATED WITH INDWELLING URETHRAL CATHETER (HCC): Status: ACTIVE | Noted: 2021-09-29

## 2021-09-29 PROBLEM — N18.1 CKD (CHRONIC KIDNEY DISEASE), STAGE I: Status: ACTIVE | Noted: 2021-09-29

## 2021-09-29 LAB
BACTERIA SPEC CULT: NORMAL
O+P SPEC MICRO: NORMAL
O+P STL CONC: NORMAL
SERVICE CMNT-IMP: NORMAL
SPECIMEN SOURCE: NORMAL

## 2021-09-29 PROCEDURE — 65270000015 HC RM PRIVATE ONCOLOGY

## 2021-09-29 PROCEDURE — C1751 CATH, INF, PER/CENT/MIDLINE: HCPCS

## 2021-09-29 PROCEDURE — 74011250637 HC RX REV CODE- 250/637: Performed by: INTERNAL MEDICINE

## 2021-09-29 PROCEDURE — 77030018786 HC NDL GD F/USND BARD -B

## 2021-09-29 PROCEDURE — 76937 US GUIDE VASCULAR ACCESS: CPT

## 2021-09-29 PROCEDURE — 97116 GAIT TRAINING THERAPY: CPT

## 2021-09-29 PROCEDURE — 36573 INSJ PICC RS&I 5 YR+: CPT | Performed by: NURSE PRACTITIONER

## 2021-09-29 PROCEDURE — 74011000258 HC RX REV CODE- 258: Performed by: INTERNAL MEDICINE

## 2021-09-29 PROCEDURE — 74011250636 HC RX REV CODE- 250/636: Performed by: NURSE PRACTITIONER

## 2021-09-29 PROCEDURE — 74011250636 HC RX REV CODE- 250/636: Performed by: INTERNAL MEDICINE

## 2021-09-29 PROCEDURE — 74011250637 HC RX REV CODE- 250/637: Performed by: NURSE PRACTITIONER

## 2021-09-29 RX ORDER — AMLODIPINE BESYLATE 10 MG/1
10 TABLET ORAL DAILY
Qty: 30 TABLET | Refills: 0 | Status: SHIPPED | OUTPATIENT
Start: 2021-09-30 | End: 2021-10-30

## 2021-09-29 RX ORDER — HYDROCHLOROTHIAZIDE 25 MG/1
25 TABLET ORAL DAILY
Status: DISCONTINUED | OUTPATIENT
Start: 2021-09-30 | End: 2021-09-30 | Stop reason: HOSPADM

## 2021-09-29 RX ADMIN — HEPARIN SODIUM 5000 UNITS: 5000 INJECTION INTRAVENOUS; SUBCUTANEOUS at 21:45

## 2021-09-29 RX ADMIN — MEROPENEM 500 MG: 500 INJECTION, POWDER, FOR SOLUTION INTRAVENOUS at 12:52

## 2021-09-29 RX ADMIN — HEPARIN SODIUM 5000 UNITS: 5000 INJECTION INTRAVENOUS; SUBCUTANEOUS at 05:52

## 2021-09-29 RX ADMIN — FINASTERIDE 5 MG: 5 TABLET, FILM COATED ORAL at 09:31

## 2021-09-29 RX ADMIN — Medication 3 MG: at 21:37

## 2021-09-29 RX ADMIN — Medication 1 LOZENGE: at 11:29

## 2021-09-29 RX ADMIN — Medication 1 CAPSULE: at 09:32

## 2021-09-29 RX ADMIN — Medication 1 LOZENGE: at 20:13

## 2021-09-29 RX ADMIN — TAMSULOSIN HYDROCHLORIDE 0.8 MG: 0.4 CAPSULE ORAL at 09:31

## 2021-09-29 RX ADMIN — HEPARIN SODIUM 5000 UNITS: 5000 INJECTION INTRAVENOUS; SUBCUTANEOUS at 18:44

## 2021-09-29 RX ADMIN — ATENOLOL 25 MG: 25 TABLET ORAL at 09:31

## 2021-09-29 RX ADMIN — OXYCODONE HYDROCHLORIDE AND ACETAMINOPHEN 500 MG: 500 TABLET ORAL at 09:32

## 2021-09-29 RX ADMIN — MEROPENEM 500 MG: 500 INJECTION, POWDER, FOR SOLUTION INTRAVENOUS at 21:43

## 2021-09-29 RX ADMIN — AMLODIPINE BESYLATE 10 MG: 5 TABLET ORAL at 09:32

## 2021-09-29 NOTE — PROGRESS NOTES
Problem: Mobility Impaired (Adult and Pediatric)  Goal: *Acute Goals and Plan of Care (Insert Text)  Description: FUNCTIONAL STATUS PRIOR TO ADMISSION: Patient was modified independent using a single point cane for functional mobility. Often uses no device in the home, per patient report. HOME SUPPORT PRIOR TO ADMISSION: The patient lived with wife but did not require assist. Per chart, daughter has been looking into getting a personal care assistant due to slight decline with patient's ADL/hygiene. Physical Therapy Goals  Initiated 9/23/2021  1. Patient will move from supine to sit and sit to supine  in bed with independence within 7 day(s). 2.  Patient will transfer from bed to chair and chair to bed with modified independence using the least restrictive device within 7 day(s). 3.  Patient will perform sit to stand with modified independence within 7 day(s). 4.  Patient will ambulate with modified independence for 50 feet with the least restrictive device within 7 day(s). 5.  Patient will ascend/descend 10 stairs with 1-2 handrail(s) with supervision/set-up within 7 day(s). Outcome: Progressing Towards Goal    PHYSICAL THERAPY TREATMENT  Patient: Nadine Garland (44 y.o. male)  Date: 9/29/2021  Diagnosis: Sepsis ([facility] [A41.9] <principal problem not specified>       Precautions:    Chart, physical therapy assessment, plan of care and goals were reviewed. ASSESSMENT  Patient continues with skilled PT services and is progressing towards goals. Pt received supine in bed and willing to work with therapy. Pt reported that he was d/c to home at some point today and wanted help getting his clothes on, however he presents with rosales bag attached and was unable to at this time. Pt continued with bed mobility to L side EOB with CGA with good sitting balance with no UE support needed. Pt continued with STS from bedside reporting that he did not want to go in the hallway.  Pt continued with gait trianing in room with RW up to 28' with noted difficulties maintaining RW placement during amb. Pt reported using a SPC at home, pt continued with SPC in room up to 28' with improved gait, no LOB during session with slight SOB post amb. Pt completed session seated in recliner with call bell within reach and all needs met a the time. Rn notified of session. .     Current Level of Function Impacting Discharge (mobility/balance): SBA/CGA for all mobility, amb up to 28' with SPC     Other factors to consider for discharge: fall risk         PLAN :  Patient continues to benefit from skilled intervention to address the above impairments. Continue treatment per established plan of care. to address goals. Recommendation for discharge: (in order for the patient to meet his/her long term goals)  Physical therapy at least 2 days/week in the home AND ensure assist and/or supervision for safety with mobility    This discharge recommendation:  Has not yet been discussed the attending provider and/or case management    IF patient discharges home will need the following DME: patient owns DME required for discharge       SUBJECTIVE:   Patient stated I walk better with my cane.     OBJECTIVE DATA SUMMARY:   Critical Behavior:  Neurologic State: Alert  Orientation Level: Oriented X4  Cognition: Follows commands  Safety/Judgement: Decreased awareness of need for safety, Fall prevention  Functional Mobility Training:  Bed Mobility:  Supine to Sit: Contact guard assistance  Scooting: Stand-by assistance    Transfers:  Sit to Stand: Contact guard assistance  Stand to Sit: Contact guard assistance    Balance:  Sitting: Intact  Standing: Impaired  Standing - Static: Constant support;Good  Standing - Dynamic : Constant support;Good    Ambulation/Gait Training:  Distance (ft): 70 Feet (ft)  Assistive Device: Gait belt;Cane, quad;Walker, rolling  Ambulation - Level of Assistance: Contact guard assistance  Gait Abnormalities: Decreased step clearance; Path deviations  Base of Support: Widened  Speed/Maria Luisa: Pace decreased (<100 feet/min)  Step Length: Right shortened;Left shortened    Pain Rating:  No pain reported    Activity Tolerance:   Fair and requires rest breaks    After treatment patient left in no apparent distress:   Sitting in chair and Call bell within reach    COMMUNICATION/COLLABORATION:   The patients plan of care was discussed with: Registered nurse.      Aniya Antonio PTA   Time Calculation: 17 mins

## 2021-09-29 NOTE — DISCHARGE SUMMARY
Hospitalist Discharge Summary     Patient ID:  Jeffery Rios  [id number]  89 y.o.  6/13/1933 9/22/2021    PCP on record: Homer Garcia MD    Admit date: 9/22/2021  Discharge date and time: 9/30/2021    DISCHARGE DIAGNOSIS:    Sepsis with Lactic Acidosis  r/t Complicated Urinary Tract Infection in setting of Chronic Rosales  with History of BPH   Diarrhea - resolved Gram Negative Bacteremia   Acute Kidney Injury Cr 2.13  Chronic Kidney Disease Baseline Cr 1.4-1.7  Solitary Right Kidney     CONSULTATIONS:  None    Excerpted HPI from H&P of Julienne Anthony MD:  CHIEF COMPLAINT:  Urinary retention, fever     HISTORY OF PRESENT ILLNESS:     Isabel Moreno is a 80 y.o. male the past medical history significant for BPH (chronic rosales), hypertension and [address] who presents to the ED with urinary retention and fever. Patient had his Rosales catheter exchange yesterday. Since that time, she is noticed a decreased urine output. He was also complaining of some suprapubic tenderness as well as fevers at home. He came to the ED for further evaluation. He had his Rosales exchanged in the ED and he states that he felt better since that time. He has been having adequate urine output.     We were asked to admit for work up and evaluation of the above problems. ______________________________________________________________________  DISCHARGE SUMMARY/HOSPITAL COURSE:  for full details see H&P, daily progress notes, labs, consult notes. Farnaz Murrell y. o.M was admitted to [facility] on 9/22/2021 and treated for the following medical complaints:     Sepsis with Lactic Acidosis (WBC 16.2, Lactate 2.2, )  r/t Complicated Urinary Tract Infection in setting of Chronic Rosales (x3 years) with History of BPH   Follows with Urology monthly to change rosales     Gram Negative Bacteremia   -Previous cultures in May 2020 grew Serratia and Proteus both sensitive to cefepime.   -Rosales was changed in ED on arrival -Cont  Cefepime - Day 7  Yesterday  will cont due to resistance of bacteria    pt will need 14 today days of Abx from the neg cx ( 9/23)  End 10/6  - will get Midline placed   - transition to ertapemen 1 gm daily x 7 days    -Urine Culture - SERRATIA MARCESCENS   -Blood Cultures 9/22 - SERRATIA MARCESCENS   -Repeat Blood Cultures drawn 9/23 - NG so far   -Continue tamsulosin and finasteride   -PT/OT recommendations -     - will plan for DC home with [facility] morning of 9/29     Diarrhea - resolved   -Stool studies negative so far, negative C diff  -Continue PRN imodium and probiotic        Hypertension  -Continue amlodipine and atenolol   -Hold PTA olmesartan and  with slight VALERIE on admission   - will resume chlorthalidone  -Monitor BP - so far -130s     Acute Kidney Injury Cr 2.13  Chronic Kidney Disease Baseline Cr 1.4-1.7  Solitary Right Kidney   -Cr 1.33 - at baseline     -S/p IVF hydration   -Renal US - Solitary right kidney without hydronephrosis      Hypokalemia - resolved   - K 3.7       _______________________________________________________________________  Patient seen and examined by me on discharge day. Pertinent Findings:  Gen:    Not in distress  Chest: Clear lungs  CVS:   Regular rhythm. No edema  Abd:  Soft, not distended, not tender  Neuro:  Alert, oriented x 2   _______________________________________________________________________  DISCHARGE MEDICATIONS:   Current Discharge Medication List      START taking these medications    Details   amLODIPine (NORVASC) 10 mg tablet Take 1 Tablet by mouth daily for 30 days. Indications: high blood pressure  Qty: 30 Tablet, Refills: 0  Start date: 9/30/2021, End date: 10/30/2021      L.acid,para-B. bifidum-S.therm (RISAQUAD) 8 billion cell cap cap Take 1 Capsule by mouth daily for 30 days.   Qty: 30 Capsule, Refills: 0  Start date: 9/30/2021, End date: 10/30/2021         CONTINUE these medications which have NOT CHANGED    Details   tamsulosin (FLOMAX) 0.4 mg capsule Take 0.8 mg by mouth daily. finasteride (PROSCAR) 5 mg tablet Take 5 mg by mouth daily. acetaminophen (TylenoL) 325 mg tablet Take 650 mg by mouth every six (6) hours as needed for Pain. ascorbic acid (VITAMIN C) 500 mg chewable tablet Take 500 mg by mouth daily. melatonin 3 mg tablet Take 3 mg by mouth nightly as needed for Insomnia. atenolol-chlorthalidone (TENORETIC) 50-25 mg per tablet Take 1 Tab by mouth daily. STOP taking these medications       amLODIPine-Olmesartan 10-20 mg tab Comments:   Reason for Stopping:                 Patient Follow Up Instructions: Activity: PT/OT per Home Health  Diet: Resume previous diet  Wound Care: None needed    Follow-up with PCP in 1 week. Follow-up Information     Follow up With Specialties Details Why Contact Josh Moya MD Internal Medicine   [id number] [place] [phone]          ________________________________________________________________    Risk of deterioration: Moderate    Condition at Discharge:  Stable  __________________________________________________________________    Disposition  Home with family and home health services    ____________________________________________________________________    Code Status: Full Code  ___________________________________________________________________      Total time in minutes spent coordinating this discharge (includes going over instructions, follow-up, prescriptions, and preparing report for sign off to her PCP) :  35 minutes    Signed:  Cielo Bull NP

## 2021-09-29 NOTE — PROGRESS NOTES
Bedside and Verbal shift change report given to Deidra RN (oncoming nurse) by Candida Luo RN (offgoing nurse). Report included the following information SBAR, Kardex, Intake/Output and MAR.  Patient rested well this night sleeping at long intervals, no complaints voiced,

## 2021-09-29 NOTE — PROGRESS NOTES
I reviewed pertinent labs and imaging, and discussed /agreed on the plan of care with Dr. Kami Hinkle. Hospitalist Progress Note    NAME: Sunni Hernandez   :  1933   MRN:  [id number]       Assessment / Plan:  Sepsis with Lactic Acidosis (WBC 16.2, Lactate 2.2, )  r/t Complicated Urinary Tract Infection in setting of Chronic Rosales (x3 years) with History of BPH   Follows with Urology monthly to change rosales     Gram Negative Bacteremia   -Previous cultures in May 2020 grew Serratia and Proteus both sensitive to cefepime.   -Rosales was changed in ED on arrival   -Completed  Cefepime - Day 7  Yesterday will cont due to resistance of bacteria    pt will need 14 today days of Abx from the neg cx ( )  End 10/6  - will get Midline placed   -Urine Culture - SERRATIA MARCESCENS   -Blood Cultures  - SERRATIA MARCESCENS   -Repeat Blood Cultures drawn  - NGTD  so far    -Continue tamsulosin and finasteride   -PT/OT recommendations -      - will plan for DC home with  morning of      Diarrhea - resolved   -Stool studies negative so far, negative C diff  -Continue PRN imodium and probiotic      Hypertension  -Continue amlodipine and atenolol   -Hold PTA olmesartan and  with slight VALERIE on admission   - will resume chlorthalidone  -Monitor BP - so far -130s     Acute Kidney Injury Cr 2.13  Chronic Kidney Disease Baseline Cr 1.4-1.7  Solitary Right Kidney   -Cr 1.33 - at baseline     -S/p IVF hydration   -Renal US - Solitary right kidney without hydronephrosis      Hypokalemia - resolved   - K 3.7     CM to arrange [place] vs SNF for continued Abx if family unable to provide care at home . Pt stable for discharge      / Body mass index is 20.82 kg/m². Estimated discharge date:   Barriers:    Code status: Full  Prophylaxis: Hep SQ  Recommended Disposition: Home w/Family and HH PT, OT, RN     Subjective:     Chief Complaint / Reason for Physician Visit  \"Am I going home \".   Discussed with RN events overnight. Pt seen in bed discussed possible DC with cont Abx for 14 days . Daughter made aware     Review of Systems:  Symptom Y/N Comments  Symptom Y/N Comments   Fever/Chills n   Chest Pain n    Poor Appetite n   Edema n    Cough n   Abdominal Pain     Sputum n   Joint Pain n    SOB/COBOS n   Pruritis/Rash     Nausea/vomit n   Tolerating PT/OT     Diarrhea    Tolerating Diet y    Constipation    Other       Could NOT obtain due to:      Objective:     VITALS:   Last 24hrs VS reviewed since prior progress note. Most recent are:  Patient Vitals for the past 24 hrs:   Temp Pulse Resp BP SpO2   09/29/21 1541 98.2 °F (36.8 °C) 69 18 (!) 159/79 99 %   09/29/21 1252 98.5 °F (36.9 °C) 67 18 125/74 99 %   09/29/21 0749 98.3 °F (36.8 °C) 69 18 129/69 96 %   09/28/21 1949 98.3 °F (36.8 °C) 78 18 (!) 145/75 99 %       Intake/Output Summary (Last 24 hours) at 9/29/2021 1556  Last data filed at 9/29/2021 0556  Gross per 24 hour   Intake    Output 1150 ml   Net -1150 ml        I had a face to face encounter and independently examined this patient on 9/29/2021, as outlined below:  PHYSICAL EXAM:  General: WD, WN. Alert, elderly  no acute distress    EENT:  EOMI. Anicteric sclerae. MMM, missing teeth   Resp:   no wheezing or rales. No accessory muscle use  CV:  Regular  rhythm,  No edema  GI:  Soft, Non distended, Non tender. +Bowel sounds  Neurologic:  Alert and oriented X 3, normal speech,   Psych:   Not anxious nor agitated  Skin:  No rashes.   No jaundice WNL  For ethnicity     Reviewed most current lab test results and cultures  YES  Reviewed most current radiology test results   YES  Review and summation of old records today    NO  Reviewed patient's current orders and MAR    YES  PMH/SH reviewed - no change compared to H&P  ________________________________________________________________________  Care Plan discussed with:    Comments   Patient x    Family      RN x    Care Manager     Consultant Multidiciplinary team rounds were held today with , nursing, pharmacist and clinical coordinator. Patient's plan of care was discussed; medications were reviewed and discharge planning was addressed. ________________________________________________________________________  Total NON critical care TIME:  30  Minutes    Total CRITICAL CARE TIME Spent:   Minutes non procedure based      Comments   >50% of visit spent in counseling and coordination of care     ________________________________________________________________________  Angelo Minor. Leanne Habermann, NP     Procedures: see electronic medical records for all procedures/Xrays and details which were not copied into this note but were reviewed prior to creation of Plan. LABS: NO RECENT LABS TO REVIEW   I reviewed today's most current labs and imaging studies. Pertinent labs include:  No results for input(s): WBC, HGB, HCT, PLT, HGBEXT, HCTEXT, PLTEXT in the last 72 hours. No results for input(s): NA, K, CL, CO2, GLU, BUN, CREA, CA, MG, PHOS, ALB, TBIL, TBILI, ALT, INR, INREXT in the last 72 hours. No lab exists for component: SGOT    Signed: Abigail A. Leanne Habermann, NP

## 2021-09-29 NOTE — PROGRESS NOTES
Indication: Serratia marcescens bacteremia, UTI  Day 1  Dose adjusted per renal function to 500  mg q8h (est CrCL ~37 ml/min)  Cefepime Rx 9/22 - 9/29    Thank you,    Cherylene Imperial.  HERLINDA Min  [address]  [phone] (zone phone)

## 2021-09-29 NOTE — PROGRESS NOTES
Transition of Care Plan:     RUR: 10%  Disposition: Home w/ spouse, home IV abx   Follow up appointments: PCP  DME needed: N/A  Transportation at Discharge: Pt's daughter, April   [place] or means to access home: Daughter will have access to home         IM Medicare Letter: Will need 2nd IMM letter prior to d/c  Is patient a BCPI-A Bundle:  N/A                    If yes, was Bundle Letter given?:     Caregiver Contact: Pt's daughterLatanya [phone]  Discharge Caregiver contacted prior to discharge? yes    Chart reviewed. Aware of discharge order written for 9/29 which has now been changed to 9/30. Consult received for home IV abx. Noted that patient has Medicare Part A only, therefore does not have coverage for home IV abx infusion, HHC, or OPIC. Pt will have to pay out of pocket for home IV abx infusion. Placed call to [phone] today to check cost of medication out of pocket. Cost is $80.00 per day. For a total of 7 days, pt will be responsible for paying $560.00. Pt is able to sign up for payment plan for either 6 months or a year. Since pt does not have coverage for home health care, [facility] states that they can provide home health SN visits for $120.00 per visit, or pt can come to the office and be seen at no charge. Referral sent via [employer] today and is pending final review. Pt awaiting midline to be placed prior to d/c.    CM placed call to daughterChanelle, who is currently in [place] and is planning to catch a flight home tomorrow to be available to assist pt at discharge. Initial concerned voiced of being able to manage home IV abx. Explored option of temporary SNF placement under Medicare Part A, however family declined. Anticipate d/c home tomorrow. Family will transport home once home IV abx and payment plan are arranged. Midline to be placed. Will continue to follow.     KIKI Castañeda   Care Manager, [address]  [phone]

## 2021-09-29 NOTE — PROGRESS NOTES
Midline insertion procedure note:  Patient has very limited vascular access. Procedure explained to patient along with risks and benefits. Procedure teaching completed. Pre-procedure assessment done. Patient denies questions or concerns at this time. Midline sign placed over the [facility]. Maximum sterile barrier precautions observed throughout procedure. Lidocaine 1% 3ml sc injected to site prior to access the vein. Cannulated right basilic vein using ultrasound guidance. Inserted 4.5 Fr. single lumen midline to right upper arm. Blood return verified and flushed with 20ml normal saline. Sterile dressing applied with Biopatch, StatLock and occlusive dressing as per protocol. Curos caps applied to port. Patient tolerated procedure well with minimal blood loss. Reason for access : Long-Term Antibiotics  Complications related to insertion : None  See nursing message on [employer] for midline reminders. Midline is CT and MRI compatible. Inserted by : Timothy Ureña / Vascular Access Nurse  Assisted by : Ravi Reyes RN, [facility] / Vascular Access Nurse  Total Catheter Length:  15 cm  Internal Catheter Length : 13 cm   External Length : 2 cm   Right upper arm circumference :  26.5 cm  Catheter occupies 9% of vein. Type of Midline: ARROW  Ref#: [id number]  Lot#: [id number]  Expiration Date: 2022-09-30  Informed primary nurse BOB Gay midline is ready for use and to hang new infusion tubing prior to use.     Juanito Watson RN   Vascular Access Nurse

## 2021-09-30 VITALS
OXYGEN SATURATION: 96 % | RESPIRATION RATE: 18 BRPM | DIASTOLIC BLOOD PRESSURE: 73 MMHG | SYSTOLIC BLOOD PRESSURE: 133 MMHG | TEMPERATURE: 97.9 F | HEIGHT: 71 IN | HEART RATE: 59 BPM | BODY MASS INDEX: 20.9 KG/M2 | WEIGHT: 149.25 LBS

## 2021-09-30 PROCEDURE — 74011250636 HC RX REV CODE- 250/636: Performed by: NURSE PRACTITIONER

## 2021-09-30 PROCEDURE — 74011250637 HC RX REV CODE- 250/637: Performed by: NURSE PRACTITIONER

## 2021-09-30 PROCEDURE — 74011250636 HC RX REV CODE- 250/636: Performed by: INTERNAL MEDICINE

## 2021-09-30 PROCEDURE — 74011250637 HC RX REV CODE- 250/637: Performed by: INTERNAL MEDICINE

## 2021-09-30 PROCEDURE — 74011000258 HC RX REV CODE- 258: Performed by: INTERNAL MEDICINE

## 2021-09-30 RX ADMIN — HEPARIN SODIUM 5000 UNITS: 5000 INJECTION INTRAVENOUS; SUBCUTANEOUS at 05:47

## 2021-09-30 RX ADMIN — Medication 1 CAPSULE: at 09:11

## 2021-09-30 RX ADMIN — OXYCODONE HYDROCHLORIDE AND ACETAMINOPHEN 500 MG: 500 TABLET ORAL at 09:11

## 2021-09-30 RX ADMIN — FINASTERIDE 5 MG: 5 TABLET, FILM COATED ORAL at 09:11

## 2021-09-30 RX ADMIN — TAMSULOSIN HYDROCHLORIDE 0.8 MG: 0.4 CAPSULE ORAL at 09:11

## 2021-09-30 RX ADMIN — ATENOLOL 25 MG: 25 TABLET ORAL at 09:11

## 2021-09-30 RX ADMIN — MEROPENEM 500 MG: 500 INJECTION, POWDER, FOR SOLUTION INTRAVENOUS at 05:43

## 2021-09-30 RX ADMIN — HYDROCHLOROTHIAZIDE 25 MG: 25 TABLET ORAL at 09:11

## 2021-09-30 RX ADMIN — AMLODIPINE BESYLATE 10 MG: 5 TABLET ORAL at 09:11

## 2021-09-30 NOTE — PROGRESS NOTES
Problem: Falls - Risk of  Goal: *Absence of Falls  Description: Document Maxwell Mahendra Fall Risk and appropriate interventions in the flowsheet.   Outcome: Progressing Towards Goal  Note: Fall Risk Interventions:  Mobility Interventions: Communicate number of staff needed for ambulation/transfer, Patient to call before getting OOB, PT Consult for mobility concerns    Mentation Interventions: Adequate sleep, hydration, pain control, Door open when patient unattended, Eyeglasses and hearing aids    Medication Interventions: Bed/chair exit alarm, Patient to call before getting OOB    Elimination Interventions: Call light in reach, Patient to call for help with toileting needs    History of Falls Interventions: Door open when patient unattended         Problem: Patient Education: Go to Patient Education Activity  Goal: Patient/Family Education  Outcome: Progressing Towards Goal     Problem: Patient Education: Go to Patient Education Activity  Goal: Patient/Family Education  Outcome: Progressing Towards Goal     Problem: Breathing Pattern - Ineffective  Goal: *Absence of hypoxia  Outcome: Progressing Towards Goal

## 2021-09-30 NOTE — PROGRESS NOTES
Transition of Care Plan:     RUR: 10%  Disposition: Home w/ spouse, home IV abx ([facility])  Follow up appointments: PCP  DME needed: N/A  Transportation at Discharge: Pt's granddaughterTali at [address] or means to access home: yes      IM Medicare Letter: reviewed on 9/29/21  Is patient a BCPI-A Bundle:  N/A                    If yes, was Bundle Letter given?:     Caregiver Contact: Pt's daughterKarl [phone]  Discharge Caregiver contacted prior to discharge? yes    Chart reviewed. CM aware of discharge order. Spoke with Bharti Levine at [facility] infusion ([phone]). Bharti Levine is meeting with pt's granddaughterTali, today at bedside at 10:30AM for IV abx education/teaching prior to d/c. Bharti Levine will plan to deliver the abx this AM during visit. Family has opted to take pt to the office for lab draws which will be at no cost to patient rather than home health visits which is $120.00 per visit. Midline has been placed. Tali who is an RN, will transport pt home at time of d/c today. PCP appointment made for 10/7 with Dr. Jason Alberts. See AVS for details. CM placed call to daughterRaul, to review the above. She verbalized understanding and is agreeable to d/c plan. No further questions/concerns voiced at this time. Medicare pt has received, reviewed, and signed 2nd IM letter informing them of their right to appeal the discharge. Signed copy has been placed on pt bedside chart. Pt provided with copy of letter to keep. Pt is ready for d/c from a CM standpoint. Assigned RN informed. Care Management Interventions  PCP Verified by CM: Yes  Palliative Care Criteria Met (RRAT>21 & CHF Dx)?: No  Mode of Transport at Discharge:  Other (see comment) (family)  Transition of Care Consult (CM Consult): Discharge Planning  Discharge Durable Medical Equipment: No  Physical Therapy Consult: Yes  Occupational Therapy Consult: Yes  Speech Therapy Consult: No  Support Systems: Child(tammy), Other Family Member(s), Spouse/Significant Other, Friend/Neighbor  Confirm Follow Up Transport: Family  The Plan for Transition of Care is Related to the Following Treatment Goals : home with IV abx infusion  The Patient and/or Patient Representative was Provided with a Choice of Provider and Agrees with the Discharge Plan?: Yes  Name of the Patient Representative Who was Provided with a Choice of Provider and Agrees with the Discharge Plan: Rebekah Jordan (daughter)  Freedom of Choice List was Provided with Basic Dialogue that Supports the Patient's Individualized Plan of Care/Goals, Treatment Preferences and Shares the Quality Data Associated with the Providers?: No   Resource Information Provided?: No  Discharge Location  Discharge Placement: Home with infusion therapy    KIKI Barclay   Care Manager, [facility]  [phone]

## 2021-09-30 NOTE — PROGRESS NOTES
Bedside and Verbal shift change report given to Deidra ROJAS (oncoming nurse) by Roe Schreiber RN (offgoing nurse). Report included the following information SBAR, Kardex, Intake/Output, MAR and Recent Results.  Patient rested well this night sleeping at long intervals

## 2021-09-30 NOTE — PROGRESS NOTES
End of Shift Note    Bedside shift change report given to Garima Jackson RN  (oncoming nurse) by Sandi Franco RN (offgoing nurse). Report included the following information SBAR, Kardex, Intake/Output and MAR    Shift worked:  [id number]     Shift summary and any significant changes:     Patient stable through shift, no complaints of pain, all scheduled meds and frequent rounding provided. Patient had midline inserted today for antibiotics at home after discharge. Concerns for physician to address:       Zone phone for oncoming shift:          Activity:  Activity Level: Up with Assistance  Number times ambulated in hallways past shift: 0  Number of times OOB to chair past shift: 0    Cardiac:   Cardiac Monitoring: No      Cardiac Rhythm: Sinus Rhythm    Access:   Current line(s): PIV and midline     Genitourinary:   Urinary status: rosales    Respiratory:   O2 Device: None (Room air)  Chronic home O2 use?: NO  Incentive spirometer at bedside: NO  Actual Volume (ml): 250 ml  GI:  Last Bowel Movement Date: 09/28/21  Current diet:  ADULT DIET Regular  DIET ONE TIME MESSAGE  Passing flatus: YES  Tolerating current diet: YES       Pain Management:   Patient states pain is manageable on current regimen: YES    Skin:  Martinez Score: 18  Interventions: float heels, increase time out of bed, PT/OT consult and internal/external urinary devices    Patient Safety:  Fall Score:  Total Score: 3  Interventions: assistive device (walker, cane, etc), gripper socks and pt to call before getting OOB  High Fall Risk: Yes    Length of Stay:  Expected LOS: 4d 16h  Actual LOS: [address] [facility], [facility]

## 2021-10-01 ENCOUNTER — PATIENT OUTREACH (OUTPATIENT)
Dept: CASE MANAGEMENT | Age: 86
End: 2021-10-01

## 2021-10-05 NOTE — PROGRESS NOTES
Care Transitions Outreach Attempt    Call within 2 business days of discharge: Yes   Attempted to reach patient for transitions of care follow up. Unable to reach patient. Patient: Dory Castelan Patient : 1933 MRN: [id number]    Last Discharge [address]       Complaint Diagnosis Description Type Department Provider    21 Urinary Catheter Problem Sepsis with acute renal failure without septic shock, due to unspecified organism, unspecified acute renal failure type ([facility] [address] . .. ED to Hosp-Admission (Discharged) (ADMIT) Tracie Mccoy MD; ALBINA Joy. Was this an external facility discharge? No    Noted following upcoming appointments from discharge chart review:   [address] follow up appointment(s): No future appointments. Non-[facility] follow up appointment(s): PCP 10/7    10/4/2021  Unable to reach patient for [facility] call. VM left. CTN to attempt to reach in 5 business days.  AR

## 2021-10-14 ENCOUNTER — PATIENT OUTREACH (OUTPATIENT)
Dept: CASE MANAGEMENT | Age: 86
End: 2021-10-14

## 2021-10-20 NOTE — PROGRESS NOTES
10/14/2021  Unable to reach patient for [facility] call. Episode resolved at this time.  AR

## 2021-12-18 ENCOUNTER — HOSPITAL ENCOUNTER (EMERGENCY)
Age: 86
Discharge: HOME | End: 2021-12-18
Attending: EMERGENCY MEDICINE
Payer: COMMERCIAL

## 2021-12-18 VITALS
HEIGHT: 71 IN | BODY MASS INDEX: 21.14 KG/M2 | WEIGHT: 151.01 LBS | DIASTOLIC BLOOD PRESSURE: 65 MMHG | HEART RATE: 76 BPM | SYSTOLIC BLOOD PRESSURE: 124 MMHG | RESPIRATION RATE: 18 BRPM | OXYGEN SATURATION: 100 % | TEMPERATURE: 97.4 F

## 2021-12-18 DIAGNOSIS — T83.9XXA PROBLEM WITH FOLEY CATHETER, INITIAL ENCOUNTER (HCC): Primary | ICD-10-CM

## 2021-12-18 DIAGNOSIS — N39.0 ACUTE UTI: ICD-10-CM

## 2021-12-18 LAB
APPEARANCE UR: SLIGHT
BACTERIA URNS QL MICRO: ABNORMAL /HPF
BILIRUB UR QL: NEGATIVE
COLOR UR: ABNORMAL
EPITH CASTS URNS QL MICRO: ABNORMAL /LPF
GLUCOSE UR STRIP.AUTO-MCNC: NEGATIVE MG/DL
HGB UR QL STRIP: ABNORMAL
HYALINE CASTS URNS QL MICRO: ABNORMAL /LPF (ref 0–5)
KETONES UR QL STRIP.AUTO: NEGATIVE MG/DL
LEUKOCYTE ESTERASE UR QL STRIP.AUTO: ABNORMAL
NITRITE UR QL STRIP.AUTO: NEGATIVE
PH UR STRIP: 5.5 [PH] (ref 5–8)
PROT UR STRIP-MCNC: 30 MG/DL
RBC #/AREA URNS HPF: ABNORMAL /HPF (ref 0–5)
SP GR UR REFRACTOMETRY: 1.02 (ref 1–1.03)
UA: UC IF INDICATED,UAUC: ABNORMAL
UROBILINOGEN UR QL STRIP.AUTO: 1 EU/DL (ref 0.2–1)
WBC URNS QL MICRO: >100 /HPF (ref 0–4)

## 2021-12-18 PROCEDURE — 81001 URINALYSIS AUTO W/SCOPE: CPT

## 2021-12-18 PROCEDURE — 51702 INSERT TEMP BLADDER CATH: CPT

## 2021-12-18 PROCEDURE — 87186 SC STD MICRODIL/AGAR DIL: CPT

## 2021-12-18 PROCEDURE — 99282 EMERGENCY DEPT VISIT SF MDM: CPT

## 2021-12-18 PROCEDURE — 87077 CULTURE AEROBIC IDENTIFY: CPT

## 2021-12-18 PROCEDURE — 74011250637 HC RX REV CODE- 250/637: Performed by: EMERGENCY MEDICINE

## 2021-12-18 PROCEDURE — 87086 URINE CULTURE/COLONY COUNT: CPT

## 2021-12-18 RX ORDER — CEPHALEXIN 250 MG/1
500 CAPSULE ORAL
Status: COMPLETED | OUTPATIENT
Start: 2021-12-18 | End: 2021-12-18

## 2021-12-18 RX ORDER — CEPHALEXIN 500 MG/1
500 CAPSULE ORAL 4 TIMES DAILY
Qty: 28 CAPSULE | Refills: 0 | Status: SHIPPED | OUTPATIENT
Start: 2021-12-18 | End: 2021-12-25

## 2021-12-18 RX ADMIN — CEPHALEXIN 500 MG: 250 CAPSULE ORAL at 21:42

## 2021-12-19 NOTE — ED PROVIDER NOTES
EMERGENCY DEPARTMENT HISTORY AND PHYSICAL EXAM      Date: 12/18/2021  Patient Name: Charu Dorado    History of Presenting Illness     Chief Complaint   Patient presents with    Urinary Catheter Problem     pt has indwelling rosales catheter. it is not working right and is leaking for a few days. catheter last changed on November 22nd       History Provided By: Patient and Patient's Daughter    HPI: Charu Dorado, 80 y.o. male with PMHx significant for BPH with Rosales catheter in place for urinary retention who presents with a chief complaint of his Rosales catheter not working. He states there is been leakage around it and no urine is going into the bag. Was last changed 11/22 and is typically changed once monthly. He also reports associated suprapubic abdominal discomfort. No nausea or vomiting. No fevers. PCP: Maribel Wilkins MD    There are no other complaints, changes, or physical findings at this time. Current Outpatient Medications   Medication Sig Dispense Refill    cephALEXin (Keflex) 500 mg capsule Take 1 Capsule by mouth four (4) times daily for 7 days. 28 Capsule 0    tamsulosin (FLOMAX) 0.4 mg capsule Take 0.8 mg by mouth daily.  finasteride (PROSCAR) 5 mg tablet Take 5 mg by mouth daily.  acetaminophen (TylenoL) 325 mg tablet Take 650 mg by mouth every six (6) hours as needed for Pain.  ascorbic acid (VITAMIN C) 500 mg chewable tablet Take 500 mg by mouth daily.  melatonin 3 mg tablet Take 3 mg by mouth nightly as needed for Insomnia.  atenolol-chlorthalidone (TENORETIC) 50-25 mg per tablet Take 1 Tab by mouth daily.        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    Hypertension     Prostate enlargement      Past Surgical History:  Past Surgical History:   Procedure Laterality Date    HX ORTHOPAEDIC Right 2008    hip replacement/[facility]/Dr Maggi Richards PREMALIG/BENIGN SKIN LESION EXCISION  07/13/2018    excision of left flank      Family History:  Family History   Problem Relation Age of Onset    Cancer Daughter         Breast ca - stage 1    Hypertension Mother      Social History:  Social History     Tobacco Use    Smoking status: Never Smoker    Smokeless tobacco: Never Used   Substance Use Topics    Alcohol use: No    Drug use: No     Allergies:  No Known Allergies  Review of Systems   Review of Systems   Genitourinary: Positive for difficulty urinating. All other systems reviewed and are negative. Physical Exam   Physical Exam  Vitals and nursing note reviewed. Constitutional:       General: He is not in acute distress. Appearance: He is well-developed. HENT:      Head: Normocephalic and atraumatic. Eyes:      Conjunctiva/sclera: Conjunctivae normal.      Pupils: Pupils are equal, round, and reactive to light. Cardiovascular:      Rate and Rhythm: Normal rate and regular rhythm. Pulmonary:      Effort: Pulmonary effort is normal. No respiratory distress. Breath sounds: Normal breath sounds. No stridor. Abdominal:      General: There is no distension. Palpations: Abdomen is soft. Tenderness: There is abdominal tenderness in the suprapubic area. Genitourinary:     Comments: Jett in place without urine in the bag  Musculoskeletal:         General: Normal range of motion. Cervical back: Normal range of motion. Skin:     General: Skin is warm and dry. Neurological:      Mental Status: He is alert and oriented to person, place, and time.        Diagnostic Study Results   Labs -     Recent Results (from the past 12 hour(s))   URINALYSIS W/ REFLEX CULTURE    Collection Time: 12/18/21  8:39 PM    Specimen: Urine   Result Value Ref Range    Color DARK YELLOW      Appearance SLIGHT (A) CLEAR      Specific gravity 1.019 1.003 - 1.030      pH (UA) 5.5 5.0 - 8.0      Protein 30 (A) NEG mg/dL    Glucose Negative NEG mg/dL Ketone Negative NEG mg/dL    Bilirubin Negative NEG      Blood MODERATE (A) NEG      Urobilinogen 1.0 0.2 - 1.0 EU/dL    Nitrites Negative NEG      Leukocyte Esterase LARGE (A) NEG      WBC >100 (H) 0 - 4 /hpf    RBC 5-10 0 - 5 /hpf    Epithelial cells FEW FEW /lpf    Bacteria 3+ (A) NEG /hpf    UA:UC IF INDICATED URINE CULTURE ORDERED (A) CNI      Hyaline cast 0-2 0 - 5 /lpf       Radiologic Studies -   No orders to display     No results found. Medical Decision Making   I am the first provider for this patient. I reviewed the vital signs, available nursing notes, past medical history, past surgical history, family history and social history. Vital Signs-Reviewed the patient's vital signs. Patient Vitals for the past 12 hrs:   Temp Pulse Resp BP SpO2   12/18/21 1654 97.4 °F (36.3 °C) 76 18 124/65 100 %       Pulse Oximetry Analysis - 100% on ra      Records Reviewed: Nursing Notes and Old Medical Records    Provider Notes (Medical Decision Making):   Patient presents with a chief complaint of Jett catheter problem. He has no urine in the bag he does have some suprapubic tenderness I am concerned that the Jett catheter is malfunctioning. Will replace the catheter, send urine sample. Patient otherwise well-appearing with stable vital signs and no fever. I do not feel additional lab work or imaging is needed at this time. ED Course:   Initial assessment performed. The patients presenting problems have been discussed, and they are in agreement with the care plan formulated and outlined with them. I have encouraged them to ask questions as they arise throughout their visit. Jett catheter replaced by RN. Now draining clear yellow urine. Urinalysis concerning for infection. Will start antibiotics and discharge with outpatient follow-up and strict ED return precautions.        Procedures:  Procedures    Critical Care:  none    Disposition:  Discharge Note:  The patient has been re-evaluated and is ready for discharge. Reviewed available results with patient. Counseled patient on diagnosis and care plan. Patient has expressed understanding, and all questions have been answered. Patient agrees with plan and agrees to follow up as recommended, or to return to the ED if their symptoms worsen. Discharge instructions have been provided and explained to the patient, along with reasons to return to the ED. PLAN:  1. Discharge Medication List as of 12/18/2021  9:15 PM      START taking these medications    Details   cephALEXin (Keflex) 500 mg capsule Take 1 Capsule by mouth four (4) times daily for 7 days. , Normal, Disp-28 Capsule, R-0         CONTINUE these medications which have NOT CHANGED    Details   tamsulosin (FLOMAX) 0.4 mg capsule Take 0.8 mg by mouth daily. , Historical Med      finasteride (PROSCAR) 5 mg tablet Take 5 mg by mouth daily. , Historical Med      acetaminophen (TylenoL) 325 mg tablet Take 650 mg by mouth every six (6) hours as needed for Pain., Historical Med      ascorbic acid (VITAMIN C) 500 mg chewable tablet Take 500 mg by mouth daily. , Historical Med      melatonin 3 mg tablet Take 3 mg by mouth nightly as needed for Insomnia., Historical Med      atenolol-chlorthalidone (TENORETIC) 50-25 mg per tablet Take 1 Tab by mouth daily. , Historical Med           2. Follow-up Information     Follow up With Specialties Details Why Contact Info    Zabrina Wilkes MD Internal Medicine Schedule an appointment as soon as possible for a visit   [address] [place] [address] 9  [address] [facility],  [address]  [phone]      [facility] EMERGENCY DEPT Emergency Medicine  As needed, If symptoms worsen [address] [place] [address]  [address] [facility]  [phone]        Return to ED if worse     Diagnosis     Clinical Impression:   1. Problem with Jett catheter, initial encounter ([facility] [address]    2.  Acute UTI            Please note that this dictation was completed with [employer], the [employer] voice recognition software. Quite often unanticipated grammatical, syntax, homophones, and other interpretive errors are inadvertently transcribed by the computer software. Please disregard these errors.   Please excuse any errors that have escaped final proofreading

## 2021-12-26 RX ORDER — SULFAMETHOXAZOLE AND TRIMETHOPRIM 800; 160 MG/1; MG/1
1 TABLET ORAL 2 TIMES DAILY
Qty: 14 TABLET | Refills: 0 | Status: SHIPPED | OUTPATIENT
Start: 2021-12-26 | End: 2022-01-02

## 2022-03-18 PROBLEM — Z97.8 CHRONIC INDWELLING FOLEY CATHETER: Status: ACTIVE | Noted: 2021-09-29

## 2022-03-19 PROBLEM — N39.0 URINARY TRACT INFECTION ASSOCIATED WITH INDWELLING URETHRAL CATHETER (HCC): Status: ACTIVE | Noted: 2021-09-29

## 2022-03-19 PROBLEM — L98.8 DRAINING CUTANEOUS SINUS TRACT: Status: ACTIVE | Noted: 2018-07-12

## 2022-03-19 PROBLEM — N18.1 CKD (CHRONIC KIDNEY DISEASE), STAGE I: Status: ACTIVE | Noted: 2021-09-29

## 2022-03-19 PROBLEM — T83.511A URINARY TRACT INFECTION ASSOCIATED WITH INDWELLING URETHRAL CATHETER (HCC): Status: ACTIVE | Noted: 2021-09-29

## 2022-03-19 PROBLEM — A41.9 SEPSIS (HCC): Status: ACTIVE | Noted: 2021-09-22

## 2022-03-20 PROBLEM — R19.7 DIARRHEA: Status: ACTIVE | Noted: 2021-09-29

## 2023-05-11 RX ORDER — ATENOLOL AND CHLORTHALIDONE TABLET 50; 25 MG/1; MG/1
1 TABLET ORAL DAILY
COMMUNITY

## 2023-05-11 RX ORDER — ACETAMINOPHEN 325 MG/1
TABLET ORAL EVERY 6 HOURS PRN
COMMUNITY

## 2023-05-11 RX ORDER — LANOLIN ALCOHOL/MO/W.PET/CERES
CREAM (GRAM) TOPICAL
COMMUNITY

## 2023-05-11 RX ORDER — TAMSULOSIN HYDROCHLORIDE 0.4 MG/1
CAPSULE ORAL DAILY
COMMUNITY

## 2023-05-11 RX ORDER — FINASTERIDE 5 MG/1
5 TABLET, FILM COATED ORAL DAILY
COMMUNITY

## 2023-09-08 ENCOUNTER — HOSPITAL ENCOUNTER (EMERGENCY)
Facility: HOSPITAL | Age: 88
Discharge: HOME | End: 2023-09-08
Attending: EMERGENCY MEDICINE
Payer: COMMERCIAL

## 2023-09-08 VITALS
WEIGHT: 145 LBS | DIASTOLIC BLOOD PRESSURE: 86 MMHG | SYSTOLIC BLOOD PRESSURE: 137 MMHG | OXYGEN SATURATION: 100 % | TEMPERATURE: 98.1 F | BODY MASS INDEX: 20.3 KG/M2 | HEIGHT: 71 IN | RESPIRATION RATE: 18 BRPM | HEART RATE: 96 BPM

## 2023-09-08 DIAGNOSIS — T83.091A OBSTRUCTION OF FOLEY CATHETER, INITIAL ENCOUNTER (HCC): Primary | ICD-10-CM

## 2023-09-08 PROCEDURE — 99283 EMERGENCY DEPT VISIT LOW MDM: CPT

## 2023-09-08 PROCEDURE — 51798 US URINE CAPACITY MEASURE: CPT

## 2023-09-08 PROCEDURE — 51702 INSERT TEMP BLADDER CATH: CPT

## 2023-09-08 ASSESSMENT — PAIN - FUNCTIONAL ASSESSMENT: PAIN_FUNCTIONAL_ASSESSMENT: 0-10

## 2023-09-08 ASSESSMENT — PAIN SCALES - GENERAL: PAINLEVEL_OUTOF10: 9

## 2023-09-08 NOTE — ED PROVIDER NOTES
[facility] EMERGENCY DEPT  EMERGENCY DEPARTMENT ENCOUNTER       Pt Name: Mo Marie  MRN: [id number]  [address] [facility] 6/13/1933  Date of evaluation: 9/8/2023  Provider: Madie Zuniga DO   PCP: Davian Garay MD  Note Started: 6:48 AM EDT 9/8/23     CHIEF COMPLAINT       Chief Complaint   Patient presents with    Urinary Catheter     Pt wheeled into triage, brought by family for concern about needing his carpenter catheter changed. Pt usually has it changed by [facility] every 30 days, but pt was unable to get an appointment, so it has been longer than 30 days. Pt also reporting his catheter is clogged, reporting it is not draining. HISTORY OF PRESENT ILLNESS: 1 or more elements      History From: patient/patient's daughter, History limited by: none     Mo Marie is a 80 y.o. male presents to the emergency department secondary to urinary catheter issues. Patient has had a longstanding indwelling Carpenter catheter that is changed out every 30 days by [place] [facility]. Patient had missed his last appointment with him secondary to transportation issues. Patient's catheter stopped draining last night and patient was restless throughout the night with poor sleep secondary to pain and discomfort. No reported fever or chills. Patient states he has some suprapubic discomfort rated a 9 out of 10. He denies any nausea, vomiting diarrhea or constipation. Patient with no other complaints. Please See MDM for Additional Details of the HPI/PMH  Nursing Notes were all reviewed and agreed with or any disagreements were addressed in the HPI. REVIEW OF SYSTEMS        Positives and Pertinent negatives as per HPI.     PAST HISTORY     Past Medical History:  Past Medical History:   Diagnosis Date    Arthritis     Cancer ([address]) 04/2019    renal    Hypertension     Prostate enlargement        Past Surgical History:  Past Surgical History:   Procedure Laterality Date    ORTHOPEDIC SURGERY Right 2008    hip

## 2023-09-09 ENCOUNTER — HOSPITAL ENCOUNTER (EMERGENCY)
Facility: HOSPITAL | Age: 88
Discharge: HOME | End: 2023-09-09
Attending: STUDENT IN AN ORGANIZED HEALTH CARE EDUCATION/TRAINING PROGRAM
Payer: COMMERCIAL

## 2023-09-09 VITALS
WEIGHT: 145.06 LBS | DIASTOLIC BLOOD PRESSURE: 77 MMHG | RESPIRATION RATE: 18 BRPM | HEART RATE: 87 BPM | HEIGHT: 71 IN | SYSTOLIC BLOOD PRESSURE: 149 MMHG | BODY MASS INDEX: 20.31 KG/M2 | TEMPERATURE: 98.2 F | OXYGEN SATURATION: 99 %

## 2023-09-09 DIAGNOSIS — T83.9XXA PROBLEM WITH FOLEY CATHETER, INITIAL ENCOUNTER (HCC): Primary | ICD-10-CM

## 2023-09-09 PROCEDURE — 99283 EMERGENCY DEPT VISIT LOW MDM: CPT

## 2023-09-09 PROCEDURE — 51702 INSERT TEMP BLADDER CATH: CPT

## 2023-09-09 ASSESSMENT — PAIN DESCRIPTION - LOCATION: LOCATION: GROIN;ABDOMEN

## 2023-09-09 ASSESSMENT — PAIN SCALES - GENERAL: PAINLEVEL_OUTOF10: 8

## 2023-09-10 NOTE — ED PROVIDER NOTES
worsen    Return to ED if worse     Diagnosis     Clinical Impression: Acute Murphy catheter problem               Britton Bunch MD  09/09/23 8183

## 2023-09-12 ENCOUNTER — HOSPITAL ENCOUNTER (EMERGENCY)
Facility: HOSPITAL | Age: 88
Discharge: HOME | End: 2023-09-12
Attending: EMERGENCY MEDICINE
Payer: COMMERCIAL

## 2023-09-12 VITALS
RESPIRATION RATE: 16 BRPM | OXYGEN SATURATION: 99 % | SYSTOLIC BLOOD PRESSURE: 150 MMHG | HEART RATE: 84 BPM | DIASTOLIC BLOOD PRESSURE: 78 MMHG | TEMPERATURE: 98.4 F

## 2023-09-12 DIAGNOSIS — T83.9XXA PROBLEM WITH FOLEY CATHETER, INITIAL ENCOUNTER (HCC): Primary | ICD-10-CM

## 2023-09-12 PROCEDURE — 99282 EMERGENCY DEPT VISIT SF MDM: CPT

## 2023-09-12 NOTE — ED PROVIDER NOTES
[facility] EMERGENCY DEPT  EMERGENCY DEPARTMENT ENCOUNTER       Pt Name: Mike Adams  MRN: [id number]  [address] [facility] 6/13/1933  Date of evaluation: 9/12/2023  Provider: Conchis Farris DO   PCP: Danni Topete MD  Note Started: 3:51 AM EDT 9/12/23     CHIEF COMPLAINT       Chief Complaint   Patient presents with    Urinary Catheter     Reports lower abdominal pain with decreased output from urinary catheter. Onset 2200. HISTORY OF PRESENT ILLNESS: 1 or more elements      History From: Patient, History limited by: none     Mike Adams is a 80 y.o. male presenting the emergency department with decreased urine output from Murphy catheter, has chronic indwelling Murphy catheter, had one place just the other day. No fever. he tried to change the bag himself at home today       Please See MDM for Additional Details of the HPI/PMH  Nursing Notes were all reviewed and agreed with or any disagreements were addressed in the HPI. REVIEW OF SYSTEMS        Positives and Pertinent negatives as per HPI.     PAST HISTORY     Past Medical History:  Past Medical History:   Diagnosis Date    Arthritis     Cancer ([address]) 04/2019    renal    Hypertension     Prostate enlargement        Past Surgical History:  Past Surgical History:   Procedure Laterality Date    ORTHOPEDIC SURGERY Right 2008    [facility]Dr Karyle Doles    PRE-MALIGNANT / BENIGN SKIN LESION EXCISION  07/13/2018    excision of left flank        Family History:  Family History   Problem Relation Age of Onset    Hypertension Mother     Cancer Daughter         Breast ca - stage 1       Social History:  Social History     Tobacco Use    Smoking status: Never    Smokeless tobacco: Never   Substance Use Topics    Alcohol use: No    Drug use: No       Allergies:  No Known Allergies    CURRENT MEDICATIONS      Discharge Medication List as of 9/12/2023  3:52 AM        CONTINUE these medications which have NOT CHANGED    Details   acetaminophen (TYLENOL) 325 MG

## 2023-09-12 NOTE — ED NOTES
leg bag removed from carpenter cath, 10cc balloon deflated, carpenter advanced into bladder, 10cc balloon re inflated, carpenter draining appropriately. Carpenter attached to new leg bag.       Adilia Junior RN  09/12/23 8775

## 2024-04-07 ENCOUNTER — HOSPITAL ENCOUNTER (INPATIENT)
Facility: HOSPITAL | Age: 89
LOS: 4 days | Discharge: NURSING, REHAB OR LONG-TERM CARE | End: 2024-04-12
Attending: EMERGENCY MEDICINE | Admitting: STUDENT IN AN ORGANIZED HEALTH CARE EDUCATION/TRAINING PROGRAM
Payer: MEDICARE

## 2024-04-07 ENCOUNTER — APPOINTMENT (OUTPATIENT)
Facility: HOSPITAL | Age: 89
End: 2024-04-07
Payer: MEDICARE

## 2024-04-07 DIAGNOSIS — R29.6 FALLS FREQUENTLY: ICD-10-CM

## 2024-04-07 DIAGNOSIS — H81.10 BENIGN PAROXYSMAL POSITIONAL VERTIGO, UNSPECIFIED LATERALITY: Primary | ICD-10-CM

## 2024-04-07 DIAGNOSIS — R29.90 STROKE-LIKE SYMPTOMS: ICD-10-CM

## 2024-04-07 LAB
ALBUMIN SERPL-MCNC: 3.3 G/DL (ref 3.5–5)
ALBUMIN/GLOB SERPL: 0.7 (ref 1.1–2.2)
ALP SERPL-CCNC: 89 U/L (ref 45–117)
ALT SERPL-CCNC: 15 U/L (ref 12–78)
ANION GAP SERPL CALC-SCNC: 5 MMOL/L (ref 5–15)
AST SERPL-CCNC: 19 U/L (ref 15–37)
BASOPHILS # BLD: 0 K/UL (ref 0–0.1)
BASOPHILS NFR BLD: 0 % (ref 0–1)
BILIRUB SERPL-MCNC: 0.5 MG/DL (ref 0.2–1)
BUN SERPL-MCNC: 45 MG/DL (ref 6–20)
BUN/CREAT SERPL: 14 (ref 12–20)
CALCIUM SERPL-MCNC: 9.2 MG/DL (ref 8.5–10.1)
CHLORIDE SERPL-SCNC: 108 MMOL/L (ref 97–108)
CO2 SERPL-SCNC: 23 MMOL/L (ref 21–32)
CREAT SERPL-MCNC: 3.28 MG/DL (ref 0.7–1.3)
DIFFERENTIAL METHOD BLD: ABNORMAL
EOSINOPHIL # BLD: 0 K/UL (ref 0–0.4)
EOSINOPHIL NFR BLD: 0 % (ref 0–7)
ERYTHROCYTE [DISTWIDTH] IN BLOOD BY AUTOMATED COUNT: 12.8 % (ref 11.5–14.5)
GLOBULIN SER CALC-MCNC: 4.7 G/DL (ref 2–4)
GLUCOSE BLD STRIP.AUTO-MCNC: 108 MG/DL (ref 65–117)
GLUCOSE SERPL-MCNC: 101 MG/DL (ref 65–100)
HCT VFR BLD AUTO: 33.9 % (ref 36.6–50.3)
HGB BLD-MCNC: 10.9 G/DL (ref 12.1–17)
IMM GRANULOCYTES # BLD AUTO: 0 K/UL (ref 0–0.04)
IMM GRANULOCYTES NFR BLD AUTO: 0 % (ref 0–0.5)
INR PPP: 1.1 (ref 0.9–1.1)
LYMPHOCYTES # BLD: 1.2 K/UL (ref 0.8–3.5)
LYMPHOCYTES NFR BLD: 24 % (ref 12–49)
MAGNESIUM SERPL-MCNC: 2.2 MG/DL (ref 1.6–2.4)
MCH RBC QN AUTO: 30.8 PG (ref 26–34)
MCHC RBC AUTO-ENTMCNC: 32.2 G/DL (ref 30–36.5)
MCV RBC AUTO: 95.8 FL (ref 80–99)
MONOCYTES # BLD: 0.4 K/UL (ref 0–1)
MONOCYTES NFR BLD: 8 % (ref 5–13)
NEUTS BAND NFR BLD MANUAL: 1 %
NEUTS SEG # BLD: 3.2 K/UL (ref 1.8–8)
NEUTS SEG NFR BLD: 67 % (ref 32–75)
NRBC # BLD: 0 K/UL (ref 0–0.01)
NRBC BLD-RTO: 0 PER 100 WBC
PLATELET # BLD AUTO: 244 K/UL (ref 150–400)
PMV BLD AUTO: 9.8 FL (ref 8.9–12.9)
POTASSIUM SERPL-SCNC: 4.1 MMOL/L (ref 3.5–5.1)
PROT SERPL-MCNC: 8 G/DL (ref 6.4–8.2)
PROTHROMBIN TIME: 10.9 SEC (ref 9–11.1)
RBC # BLD AUTO: 3.54 M/UL (ref 4.1–5.7)
RBC MORPH BLD: ABNORMAL
SERVICE CMNT-IMP: NORMAL
SODIUM SERPL-SCNC: 136 MMOL/L (ref 136–145)
TROPONIN I SERPL HS-MCNC: 9 NG/L (ref 0–76)
WBC # BLD AUTO: 4.8 K/UL (ref 4.1–11.1)
WBC MORPH BLD: ABNORMAL

## 2024-04-07 PROCEDURE — 83735 ASSAY OF MAGNESIUM: CPT

## 2024-04-07 PROCEDURE — 2580000003 HC RX 258: Performed by: EMERGENCY MEDICINE

## 2024-04-07 PROCEDURE — 85025 COMPLETE CBC W/AUTO DIFF WBC: CPT

## 2024-04-07 PROCEDURE — 85610 PROTHROMBIN TIME: CPT

## 2024-04-07 PROCEDURE — 70450 CT HEAD/BRAIN W/O DYE: CPT

## 2024-04-07 PROCEDURE — 80053 COMPREHEN METABOLIC PANEL: CPT

## 2024-04-07 PROCEDURE — 99285 EMERGENCY DEPT VISIT HI MDM: CPT

## 2024-04-07 PROCEDURE — 36415 COLL VENOUS BLD VENIPUNCTURE: CPT

## 2024-04-07 PROCEDURE — 6370000000 HC RX 637 (ALT 250 FOR IP): Performed by: EMERGENCY MEDICINE

## 2024-04-07 PROCEDURE — 84484 ASSAY OF TROPONIN QUANT: CPT

## 2024-04-07 PROCEDURE — 82962 GLUCOSE BLOOD TEST: CPT

## 2024-04-07 PROCEDURE — 71045 X-RAY EXAM CHEST 1 VIEW: CPT

## 2024-04-07 RX ORDER — ASPIRIN 325 MG
325 TABLET ORAL
Status: COMPLETED | OUTPATIENT
Start: 2024-04-07 | End: 2024-04-07

## 2024-04-07 RX ORDER — ONDANSETRON 2 MG/ML
4 INJECTION INTRAMUSCULAR; INTRAVENOUS EVERY 6 HOURS PRN
Status: DISCONTINUED | OUTPATIENT
Start: 2024-04-07 | End: 2024-04-12 | Stop reason: HOSPADM

## 2024-04-07 RX ORDER — 0.9 % SODIUM CHLORIDE 0.9 %
500 INTRAVENOUS SOLUTION INTRAVENOUS ONCE
Status: COMPLETED | OUTPATIENT
Start: 2024-04-07 | End: 2024-04-07

## 2024-04-07 RX ORDER — ACETAMINOPHEN 325 MG/1
650 TABLET ORAL EVERY 4 HOURS PRN
Status: DISCONTINUED | OUTPATIENT
Start: 2024-04-07 | End: 2024-04-12 | Stop reason: HOSPADM

## 2024-04-07 RX ADMIN — ASPIRIN 325 MG: 325 TABLET ORAL at 22:06

## 2024-04-07 RX ADMIN — SODIUM CHLORIDE 500 ML: 9 INJECTION, SOLUTION INTRAVENOUS at 20:00

## 2024-04-07 ASSESSMENT — PAIN SCALES - PAIN ASSESSMENT IN ADVANCED DEMENTIA (PAINAD)
TOTALSCORE: 0
BODYLANGUAGE: RELAXED
FACIALEXPRESSION: SMILING OR INEXPRESSIVE
BREATHING: NORMAL
CONSOLABILITY: NO NEED TO CONSOLE

## 2024-04-07 ASSESSMENT — LIFESTYLE VARIABLES
HOW MANY STANDARD DRINKS CONTAINING ALCOHOL DO YOU HAVE ON A TYPICAL DAY: PATIENT DOES NOT DRINK
HOW OFTEN DO YOU HAVE A DRINK CONTAINING ALCOHOL: NEVER

## 2024-04-07 NOTE — ED NOTES
Shift change report given to Mari WARD. Opportunities for questions/ concerns addressed at this time.

## 2024-04-08 ENCOUNTER — APPOINTMENT (OUTPATIENT)
Facility: HOSPITAL | Age: 89
End: 2024-04-08
Payer: MEDICARE

## 2024-04-08 PROBLEM — R53.1 GENERALIZED WEAKNESS: Status: ACTIVE | Noted: 2024-04-08

## 2024-04-08 PROBLEM — H81.10 BENIGN PAROXYSMAL POSITIONAL VERTIGO: Status: ACTIVE | Noted: 2024-04-08

## 2024-04-08 PROBLEM — R29.6 FALLS FREQUENTLY: Status: ACTIVE | Noted: 2024-04-08

## 2024-04-08 PROBLEM — R42 DIZZINESS: Status: ACTIVE | Noted: 2024-04-08

## 2024-04-08 PROBLEM — R29.90 STROKE-LIKE SYMPTOMS: Status: ACTIVE | Noted: 2024-04-08

## 2024-04-08 LAB
ALBUMIN SERPL-MCNC: 2.8 G/DL (ref 3.5–5)
ALBUMIN/GLOB SERPL: 0.7 (ref 1.1–2.2)
ALP SERPL-CCNC: 77 U/L (ref 45–117)
ALT SERPL-CCNC: 14 U/L (ref 12–78)
ANION GAP SERPL CALC-SCNC: 6 MMOL/L (ref 5–15)
APPEARANCE UR: ABNORMAL
AST SERPL-CCNC: 16 U/L (ref 15–37)
BACTERIA URNS QL MICRO: ABNORMAL /HPF
BILIRUB SERPL-MCNC: 0.9 MG/DL (ref 0.2–1)
BILIRUB UR QL: NEGATIVE
BUN SERPL-MCNC: 42 MG/DL (ref 6–20)
BUN/CREAT SERPL: 15 (ref 12–20)
CALCIUM SERPL-MCNC: 8.7 MG/DL (ref 8.5–10.1)
CHLORIDE SERPL-SCNC: 111 MMOL/L (ref 97–108)
CO2 SERPL-SCNC: 20 MMOL/L (ref 21–32)
COLOR UR: ABNORMAL
COMMENT:: NORMAL
CREAT SERPL-MCNC: 2.72 MG/DL (ref 0.7–1.3)
CREAT UR-MCNC: 78.3 MG/DL
EKG ATRIAL RATE: 72 BPM
EKG DIAGNOSIS: NORMAL
EKG P AXIS: 64 DEGREES
EKG P-R INTERVAL: 160 MS
EKG Q-T INTERVAL: 410 MS
EKG QRS DURATION: 82 MS
EKG QTC CALCULATION (BAZETT): 448 MS
EKG R AXIS: -4 DEGREES
EKG T AXIS: -12 DEGREES
EKG VENTRICULAR RATE: 72 BPM
EPITH CASTS URNS QL MICRO: ABNORMAL /LPF
ERYTHROCYTE [DISTWIDTH] IN BLOOD BY AUTOMATED COUNT: 12.9 % (ref 11.5–14.5)
FERRITIN SERPL-MCNC: 489 NG/ML (ref 26–388)
GLOBULIN SER CALC-MCNC: 4.1 G/DL (ref 2–4)
GLUCOSE SERPL-MCNC: 77 MG/DL (ref 65–100)
GLUCOSE UR STRIP.AUTO-MCNC: NEGATIVE MG/DL
HCT VFR BLD AUTO: 30.8 % (ref 36.6–50.3)
HEMOCCULT STL QL: NEGATIVE
HGB BLD-MCNC: 9.8 G/DL (ref 12.1–17)
HGB UR QL STRIP: ABNORMAL
IRON SATN MFR SERPL: 15 % (ref 20–50)
IRON SERPL-MCNC: 34 UG/DL (ref 35–150)
KETONES UR QL STRIP.AUTO: NEGATIVE MG/DL
LEUKOCYTE ESTERASE UR QL STRIP.AUTO: ABNORMAL
MAGNESIUM SERPL-MCNC: 2 MG/DL (ref 1.6–2.4)
MCH RBC QN AUTO: 31.8 PG (ref 26–34)
MCHC RBC AUTO-ENTMCNC: 31.8 G/DL (ref 30–36.5)
MCV RBC AUTO: 99 FL (ref 80–99)
NITRITE UR QL STRIP.AUTO: NEGATIVE
NRBC # BLD: 0 K/UL (ref 0–0.01)
NRBC BLD-RTO: 0 PER 100 WBC
PH UR STRIP: 6.5 (ref 5–8)
PHOSPHATE SERPL-MCNC: 3.1 MG/DL (ref 2.6–4.7)
PLATELET # BLD AUTO: 242 K/UL (ref 150–400)
PMV BLD AUTO: 10.1 FL (ref 8.9–12.9)
POTASSIUM SERPL-SCNC: 3.8 MMOL/L (ref 3.5–5.1)
PROT SERPL-MCNC: 6.9 G/DL (ref 6.4–8.2)
PROT UR STRIP-MCNC: 30 MG/DL
RBC # BLD AUTO: 3.08 M/UL (ref 4.1–5.7)
RBC #/AREA URNS HPF: ABNORMAL /HPF (ref 0–5)
SODIUM SERPL-SCNC: 137 MMOL/L (ref 136–145)
SODIUM UR-SCNC: 95 MMOL/L
SP GR UR REFRACTOMETRY: 1.01
SPECIMEN HOLD: NORMAL
TIBC SERPL-MCNC: 234 UG/DL (ref 250–450)
URINE CULTURE IF INDICATED: ABNORMAL
UROBILINOGEN UR QL STRIP.AUTO: 1 EU/DL (ref 0.2–1)
WBC # BLD AUTO: 4.7 K/UL (ref 4.1–11.1)
WBC URNS QL MICRO: >100 /HPF (ref 0–4)
YEAST URNS QL MICRO: PRESENT

## 2024-04-08 PROCEDURE — 6370000000 HC RX 637 (ALT 250 FOR IP): Performed by: INTERNAL MEDICINE

## 2024-04-08 PROCEDURE — 95816 EEG AWAKE AND DROWSY: CPT | Performed by: PSYCHIATRY & NEUROLOGY

## 2024-04-08 PROCEDURE — 2580000003 HC RX 258: Performed by: STUDENT IN AN ORGANIZED HEALTH CARE EDUCATION/TRAINING PROGRAM

## 2024-04-08 PROCEDURE — 81001 URINALYSIS AUTO W/SCOPE: CPT

## 2024-04-08 PROCEDURE — 83735 ASSAY OF MAGNESIUM: CPT

## 2024-04-08 PROCEDURE — 1100000003 HC PRIVATE W/ TELEMETRY

## 2024-04-08 PROCEDURE — 97161 PT EVAL LOW COMPLEX 20 MIN: CPT

## 2024-04-08 PROCEDURE — 6370000000 HC RX 637 (ALT 250 FOR IP): Performed by: STUDENT IN AN ORGANIZED HEALTH CARE EDUCATION/TRAINING PROGRAM

## 2024-04-08 PROCEDURE — 80053 COMPREHEN METABOLIC PANEL: CPT

## 2024-04-08 PROCEDURE — 83550 IRON BINDING TEST: CPT

## 2024-04-08 PROCEDURE — 36415 COLL VENOUS BLD VENIPUNCTURE: CPT

## 2024-04-08 PROCEDURE — 6360000002 HC RX W HCPCS: Performed by: STUDENT IN AN ORGANIZED HEALTH CARE EDUCATION/TRAINING PROGRAM

## 2024-04-08 PROCEDURE — 92610 EVALUATE SWALLOWING FUNCTION: CPT

## 2024-04-08 PROCEDURE — 84100 ASSAY OF PHOSPHORUS: CPT

## 2024-04-08 PROCEDURE — 97116 GAIT TRAINING THERAPY: CPT

## 2024-04-08 PROCEDURE — 83540 ASSAY OF IRON: CPT

## 2024-04-08 PROCEDURE — 82272 OCCULT BLD FECES 1-3 TESTS: CPT

## 2024-04-08 PROCEDURE — 82570 ASSAY OF URINE CREATININE: CPT

## 2024-04-08 PROCEDURE — 87086 URINE CULTURE/COLONY COUNT: CPT

## 2024-04-08 PROCEDURE — 87088 URINE BACTERIA CULTURE: CPT

## 2024-04-08 PROCEDURE — 85027 COMPLETE CBC AUTOMATED: CPT

## 2024-04-08 PROCEDURE — 97535 SELF CARE MNGMENT TRAINING: CPT

## 2024-04-08 PROCEDURE — 97165 OT EVAL LOW COMPLEX 30 MIN: CPT

## 2024-04-08 PROCEDURE — 84300 ASSAY OF URINE SODIUM: CPT

## 2024-04-08 PROCEDURE — 82728 ASSAY OF FERRITIN: CPT

## 2024-04-08 PROCEDURE — 70551 MRI BRAIN STEM W/O DYE: CPT

## 2024-04-08 PROCEDURE — 99223 1ST HOSP IP/OBS HIGH 75: CPT | Performed by: PSYCHIATRY & NEUROLOGY

## 2024-04-08 PROCEDURE — 87186 SC STD MICRODIL/AGAR DIL: CPT

## 2024-04-08 PROCEDURE — 95816 EEG AWAKE AND DROWSY: CPT

## 2024-04-08 PROCEDURE — 76770 US EXAM ABDO BACK WALL COMP: CPT

## 2024-04-08 RX ORDER — SODIUM CHLORIDE 0.9 % (FLUSH) 0.9 %
5-40 SYRINGE (ML) INJECTION PRN
Status: DISCONTINUED | OUTPATIENT
Start: 2024-04-08 | End: 2024-04-12 | Stop reason: HOSPADM

## 2024-04-08 RX ORDER — ATENOLOL AND CHLORTHALIDONE TABLET 50; 25 MG/1; MG/1
1 TABLET ORAL DAILY
Status: DISCONTINUED | OUTPATIENT
Start: 2024-04-08 | End: 2024-04-08

## 2024-04-08 RX ORDER — TAMSULOSIN HYDROCHLORIDE 0.4 MG/1
0.4 CAPSULE ORAL DAILY
Status: DISCONTINUED | OUTPATIENT
Start: 2024-04-08 | End: 2024-04-12 | Stop reason: HOSPADM

## 2024-04-08 RX ORDER — ONDANSETRON 4 MG/1
4 TABLET, ORALLY DISINTEGRATING ORAL EVERY 8 HOURS PRN
Status: DISCONTINUED | OUTPATIENT
Start: 2024-04-08 | End: 2024-04-12 | Stop reason: HOSPADM

## 2024-04-08 RX ORDER — ATENOLOL 25 MG/1
50 TABLET ORAL DAILY
Status: DISCONTINUED | OUTPATIENT
Start: 2024-04-08 | End: 2024-04-12 | Stop reason: HOSPADM

## 2024-04-08 RX ORDER — ASPIRIN 81 MG/1
81 TABLET, CHEWABLE ORAL DAILY
Status: DISCONTINUED | OUTPATIENT
Start: 2024-04-08 | End: 2024-04-12 | Stop reason: HOSPADM

## 2024-04-08 RX ORDER — CASTOR OIL AND BALSAM, PERU 788; 87 MG/G; MG/G
OINTMENT TOPICAL 2 TIMES DAILY
Status: DISCONTINUED | OUTPATIENT
Start: 2024-04-08 | End: 2024-04-12 | Stop reason: HOSPADM

## 2024-04-08 RX ORDER — CHLORTHALIDONE 25 MG/1
25 TABLET ORAL DAILY
Status: DISCONTINUED | OUTPATIENT
Start: 2024-04-08 | End: 2024-04-12 | Stop reason: HOSPADM

## 2024-04-08 RX ORDER — HEPARIN SODIUM 5000 [USP'U]/ML
5000 INJECTION, SOLUTION INTRAVENOUS; SUBCUTANEOUS EVERY 8 HOURS SCHEDULED
Status: DISCONTINUED | OUTPATIENT
Start: 2024-04-08 | End: 2024-04-12 | Stop reason: HOSPADM

## 2024-04-08 RX ORDER — ROSUVASTATIN CALCIUM 10 MG/1
10 TABLET, COATED ORAL NIGHTLY
Status: DISCONTINUED | OUTPATIENT
Start: 2024-04-08 | End: 2024-04-12 | Stop reason: HOSPADM

## 2024-04-08 RX ORDER — SODIUM CHLORIDE 0.9 % (FLUSH) 0.9 %
5-40 SYRINGE (ML) INJECTION EVERY 12 HOURS SCHEDULED
Status: DISCONTINUED | OUTPATIENT
Start: 2024-04-08 | End: 2024-04-12 | Stop reason: HOSPADM

## 2024-04-08 RX ORDER — SODIUM CHLORIDE 9 MG/ML
INJECTION, SOLUTION INTRAVENOUS CONTINUOUS
Status: DISCONTINUED | OUTPATIENT
Start: 2024-04-08 | End: 2024-04-12 | Stop reason: HOSPADM

## 2024-04-08 RX ORDER — ASPIRIN 300 MG/1
300 SUPPOSITORY RECTAL DAILY
Status: DISCONTINUED | OUTPATIENT
Start: 2024-04-08 | End: 2024-04-12 | Stop reason: HOSPADM

## 2024-04-08 RX ORDER — SODIUM CHLORIDE 9 MG/ML
INJECTION, SOLUTION INTRAVENOUS PRN
Status: DISCONTINUED | OUTPATIENT
Start: 2024-04-08 | End: 2024-04-12 | Stop reason: HOSPADM

## 2024-04-08 RX ORDER — ONDANSETRON 2 MG/ML
4 INJECTION INTRAMUSCULAR; INTRAVENOUS EVERY 6 HOURS PRN
Status: DISCONTINUED | OUTPATIENT
Start: 2024-04-08 | End: 2024-04-12 | Stop reason: HOSPADM

## 2024-04-08 RX ORDER — POLYETHYLENE GLYCOL 3350 17 G/17G
17 POWDER, FOR SOLUTION ORAL DAILY PRN
Status: DISCONTINUED | OUTPATIENT
Start: 2024-04-08 | End: 2024-04-12 | Stop reason: HOSPADM

## 2024-04-08 RX ADMIN — HEPARIN SODIUM 5000 UNITS: 5000 INJECTION INTRAVENOUS; SUBCUTANEOUS at 11:08

## 2024-04-08 RX ADMIN — HEPARIN SODIUM 5000 UNITS: 5000 INJECTION INTRAVENOUS; SUBCUTANEOUS at 22:40

## 2024-04-08 RX ADMIN — SODIUM CHLORIDE: 9 INJECTION, SOLUTION INTRAVENOUS at 00:47

## 2024-04-08 RX ADMIN — Medication: at 11:07

## 2024-04-08 RX ADMIN — SODIUM CHLORIDE: 9 INJECTION, SOLUTION INTRAVENOUS at 14:25

## 2024-04-08 RX ADMIN — ASPIRIN 81 MG: 81 TABLET, CHEWABLE ORAL at 09:55

## 2024-04-08 RX ADMIN — SODIUM CHLORIDE: 9 INJECTION, SOLUTION INTRAVENOUS at 02:42

## 2024-04-08 RX ADMIN — TAMSULOSIN HYDROCHLORIDE 0.4 MG: 0.4 CAPSULE ORAL at 09:55

## 2024-04-08 RX ADMIN — SODIUM CHLORIDE, PRESERVATIVE FREE 10 ML: 5 INJECTION INTRAVENOUS at 22:40

## 2024-04-08 RX ADMIN — ROSUVASTATIN 10 MG: 20 TABLET, FILM COATED ORAL at 00:52

## 2024-04-08 RX ADMIN — Medication: at 22:41

## 2024-04-08 RX ADMIN — HEPARIN SODIUM 5000 UNITS: 5000 INJECTION INTRAVENOUS; SUBCUTANEOUS at 00:52

## 2024-04-08 RX ADMIN — SODIUM CHLORIDE, PRESERVATIVE FREE 10 ML: 5 INJECTION INTRAVENOUS at 09:55

## 2024-04-08 RX ADMIN — HEPARIN SODIUM 5000 UNITS: 5000 INJECTION INTRAVENOUS; SUBCUTANEOUS at 17:10

## 2024-04-08 RX ADMIN — ROSUVASTATIN 10 MG: 20 TABLET, FILM COATED ORAL at 22:40

## 2024-04-08 ASSESSMENT — PAIN SCALES - PAIN ASSESSMENT IN ADVANCED DEMENTIA (PAINAD)
BREATHING: NORMAL
BODYLANGUAGE: RELAXED
BREATHING: NORMAL
CONSOLABILITY: NO NEED TO CONSOLE
TOTALSCORE: 0
FACIALEXPRESSION: SMILING OR INEXPRESSIVE

## 2024-04-08 NOTE — PLAN OF CARE
End of Shift Note    Bedside shift change report given to ED Rutherford (Oncoming nurse) by Daisy Sandoval RN (Offgoing nurse). Report included the following information: SBAR, Kardex, Cardiac Rhythm sinus rhythm/sinus tachycardia, and Alarm Parameters.      Shift worked:  7p - 7a   Shift summary and any significant changes:    Received patient from ED and assumed care. Patient is A&O X 4, oriented to room and unit.  Pt has a Chronic indwelling carpenter catheter placed by Urology OP. Education provided to patient and family will be at bedside today to complete admission questions for pt.. Vitals and labs obtained.     Concerns for physician to address:  none   Zone phone for oncoming shift:        Patient Information  Steve Maharaj  89 y.o.  4/7/2024  6:44 PM by Karin No MD. Steve Maharaj was admitted from home.    Problem List  Patient Active Problem List    Diagnosis Date Noted    Stroke-like symptoms 04/08/2024    Chronic indwelling Carpenter catheter 09/29/2021    Urinary tract infection associated with indwelling urethral catheter (HCC) 09/29/2021    CKD (chronic kidney disease), stage I 09/29/2021    Diarrhea 09/29/2021    Sepsis ([facility]) 09/22/2021    Draining cutaneous sinus tract 07/12/2018     Past Medical History:   Diagnosis Date    Arthritis     Cancer ([facility]) 04/2019    renal    Hypertension     Prostate enlargement        Core Measures:  CVA: Yes    Activity:   Number times ambulated in hallways past shift:   Number of times OOB to chair past shift:   Cardiac:   Cardiac Monitoring: Yes    Access:   Current line(s):PIV  Genitourinary:   Urinary status: Chronic Carpenter place OP by Urology    Respiratory:   O2 Device: None (Room air)    GI:   Current diet:  ADULT DIET; Regular    Tolerating current diet: Yes   Pain Management:   Patient states pain is manageable on current regimen:     Skin:  Mateusz Scale Score: 23  Interventions:     Patient Safety:  Fall Score: Green Total Score:

## 2024-04-08 NOTE — PROGRESS NOTES
Overnight admission.  Briefly patient is here with multiple falls with ambulatory dysfunction, history of dizziness, hearing and visual problem, rule out stroke, ELIAS on CKD, mild normocytic anemia, HTN, BPH.  MRI brain, neurology consult, PT OT, speech swallow eval, renal ultrasound pending.  Vital signs are stable.  Patient getting gentle hydration.  Repeat lab work this morning shows BUN/creatinine of 42/2.7 which is trending down.  Patient currently getting EEG.  Updated daughter at the bedside.  Will continue to follow-up with the patient.

## 2024-04-08 NOTE — PLAN OF CARE
Problem: Occupational Therapy - Adult  Goal: By Discharge: Performs self-care activities at highest level of function for planned discharge setting.  See evaluation for individualized goals.  Description: FUNCTIONAL STATUS PRIOR TO ADMISSION:  Pt states his daughter comes over everyday to help him get dressed and another comes to help him sponge bathe at bed level. Requires assistance for all ADLs and transfers. Unclear of specifics due to pt being very [place].   Receives Help From: Family, ADL Assistance: Needs assistance, Bath: Maximum assistance, Dressing: Maximum assistance, Grooming: Moderate assistance, Feeding: Independent, Toileting: Needs assistance, Homemaking Assistance: Needs assistance, Ambulation Assistance: Needs assistance, Transfer Assistance: Needs assistance, Active : No     HOME SUPPORT: Patient lived with wife and received help from daughters.    Occupational Therapy Goals:  Initiated 4/8/2024  1.  Patient will perform grooming with Stand by Assist standing at sink within 7 day(s).  2.  Patient will perform upper body dressing with Stand by Assist within 7 day(s).  3.  Patient will perform lower body dressing with Stand by Assist within 7 day(s).  4.  Patient will perform toilet transfers with Contact Guard Assist  within 7 day(s).  5.  Patient will perform all aspects of toileting with Contact Guard Assist within 7 day(s).  6.  Patient will participate in upper extremity therapeutic exercise/activities with Minimal Assist for 5 minutes within 7 day(s).    7.  Patient will utilize energy conservation techniques during functional activities with verbal cues within 7 day(s).  Outcome: Not Progressing     OCCUPATIONAL THERAPY EVALUATION    Patient: Steve Maharaj (89 y.o. male)  Date: 4/8/2024  Primary Diagnosis: Falls frequently [R29.6]  Stroke-like symptoms [R29.90]  Benign paroxysmal positional vertigo, unspecified laterality [H81.10]         Precautions: Fall Risk, Bed Alarm               Supervision  Scooting: Independent    Transfers:      Transfer Training  Transfer Training: Yes  Sit to Stand: Contact-guard assistance  Stand to Sit: Contact-guard assistance  Bed to Chair: Contact-guard assistance;Adaptive equipment (RW)          Functional Mobility: Contact guard assistance;Increased time to complete;Adaptive equipment          Balance:   Standing: Impaired  Balance  Sitting: Impaired  Sitting - Static: Good (unsupported)  Sitting - Dynamic: Good (unsupported);Fair (occasional)  Standing: Impaired  Standing - Static: Good;Constant support  Standing - Dynamic: Good;Fair;Constant support      ADL Assessment:   Feeding: Setup     Grooming: Setup  Grooming Skilled Clinical Factors: seated    UE Bathing: Dependent/Total     Skin Care: Bath wipes;Chlorhexidine wipes    LE Bathing: Dependent/Total     UE Dressing: Moderate assistance     LE Dressing: Moderate assistance  LE Dressing Skilled Clinical Factors: to don R sock    Toileting: Moderate assistance  Toileting Skilled Clinical Factors: carpenter catheter    Functional Mobility: Contact guard assistance;Increased time to complete;Adaptive equipment  Functional Mobility Skilled Clinical Factors: RW    ADL Intervention and task modifications:  Pt educated on safe transfer techniques, with specific emphasis on proper hand placement to push up from seated surface rather than attempt to pull self up, fully positioning self in-front of desired seated location, feeling chair on back of legs and reaching back with 1-2 UE to slowly lower self to seated position.      Fugl-Campa Assessment of Motor Recovery after Stroke:     Reflex Activity  Flexors/Biceps/Fingers: Can be elicited  Extensors/Triceps: Can be elicited  Reflex Subtotal: 4    Volitional Movement Within Synergies  Shoulder Retraction: Partial  Shoulder Elevation: Partial  Shoulder Abduction (90 degrees): Full  Shoulder External Rotation: Full  Elbow Flexion: Full  Forearm Supination: Full  Shoulder

## 2024-04-08 NOTE — PROGRESS NOTES
Orders received, chart reviewed and patient evaluated by physical therapy. Pending progression with skilled acute physical therapy, recommend:  Continue to assess pending progress SNF vs HHPT if family assist increased     Recommend with nursing OOB to chair 3x/day and walking daily with Min assist and rolling walker. Thank you for completing as able in order to maintain patient strength, endurance and independence.     Full evaluation to follow.

## 2024-04-08 NOTE — ED PROVIDER NOTES
[facility] EMERGENCY DEPT  EMERGENCY DEPARTMENT ENCOUNTER       Pt Name: Steve Maharaj  MRN: [id number]  Birthdate 6/13/1933  Date of evaluation: 4/7/2024  Provider: Joon Moss DO   PCP: Joseph Ugarte MD  Note Started: 12:04 AM EDT 4/8/24     CHIEF COMPLAINT       Chief Complaint   Patient presents with    Fall     Fell Friday and fell twice yesterday. He is very weak. No loc. He loses his balance and falls. Bp at home was fine, denies chest pain or short of breath. When he stands he gets dizzy and weak. Has a good appetite. Hearing has gotten worse in past couple of weeks. He is not on blood thinners. Pt does have permanent carpenter catheter        HISTORY OF PRESENT ILLNESS: 1 or more elements      History From: Patient/family, History limited by: none     Steve Maharaj is a 89 y.o. male presents to the emergency department for evaluation of dizziness with multiple falls.       Please See MDM for Additional Details of the HPI/PMH  Nursing Notes were all reviewed and agreed with or any disagreements were addressed in the HPI.     REVIEW OF SYSTEMS        Positives and Pertinent negatives as per HPI.    PAST HISTORY     Past Medical History:  Past Medical History:   Diagnosis Date    Arthritis     Cancer (HCC) 04/2019    renal    Hypertension     Prostate enlargement        Past Surgical History:  Past Surgical History:   Procedure Laterality Date    ORTHOPEDIC SURGERY Right 2008    hip replacement/[facility]/Dr Bowman    PRE-MALIGNANT / BENIGN SKIN LESION EXCISION  07/13/2018    excision of left flank        Family History:  Family History   Problem Relation Age of Onset    Hypertension Mother     Cancer Daughter         Breast ca - stage 1       Social History:  Social History     Tobacco Use    Smoking status: Never    Smokeless tobacco: Never   Substance Use Topics    Alcohol use: No    Drug use: No       Allergies:  No Known Allergies    CURRENT MEDICATIONS      Previous Medications    ACETAMINOPHEN  Will obtain CBC to evaluate for anemia CMP to evaluate electrolytes and kidney function.  INR troponin magnesium.  Will obtain CT head imaging.  Patient with a history of CKD will hold on CTA.  Patient with no current dizziness.  He states it is with standing up and attempted to ambulate.    CT head shows periventricular white matter disease.  No acute infarct or hemorrhage.  Chest x-ray unremarkable.  Labs unremarkable with exception of creatinine 3.28 however last creatinine in the system is from 2 years ago.  IV fluids have been ordered.  Will order 325 aspirin.  Given patient's age and comorbidities will admit for MRI to evaluate for posterior stroke.    FINAL IMPRESSION     1. Benign paroxysmal positional vertigo, unspecified laterality    2. Falls frequently          DISPOSITION/PLAN   Steve Maharaj's  results have been reviewed with him.  He has been counseled regarding his diagnosis, treatment, and plan.  He verbally conveys understanding and agreement of the signs, symptoms, diagnosis, treatment and prognosis and additionally agrees to follow up as discussed.  He also agrees with the care-plan and conveys that all of his questions have been answered.     CLINICAL IMPRESSION    Admit Note: Pt is being admitted by hospitalist. The results of their tests and reason(s) for their admission have been discussed with pt and/or available family. They convey agreement and understanding for the need to be admitted and for the admission diagnosis.         I am the Primary Clinician of Record.   Joon Moss DO (electronically signed)    (Please note that parts of this dictation were completed with voice recognition software. Quite often unanticipated grammatical, syntax, homophones, and other interpretive errors are inadvertently transcribed by the computer software. Please disregards these errors. Please excuse any errors that have escaped final proofreading.)         Joon Moss DO  04/08/24 0009

## 2024-04-08 NOTE — H&P
Hospitalist Admission Note    NAME:   Steve Maharaj   : 1933   MRN: [id number]     Date/Time: 2024 12:26 AM    Patient PCP: Joseph Ugarte MD    ______________________________________________________________________  Given the patient's current clinical presentation, I have a high level of concern for decompensation if discharged from the emergency department.  Complex decision making was performed, which includes reviewing the patient's available past medical records, laboratory results, and x-ray films.       My assessment of this patient's clinical condition and my plan of care is as follows.    Assessment / Plan:  Multiple falls with ambulatory dysfunction  History of dizziness, hearing and vision problem rule out strokelike symptoms  CT scan of the head-1. No evidence of acute infarct or intracranial hemorrhage.  2. 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     Patient admitted to medical unit with telemetry  Since the symptoms were more than 48 hours stroke code was not called on this patient  Received a dose of aspirin in the ED  Will start patient on statin  Follow-up lipid and A1c  Follow-up MRI  Neurology to be consulted  PT OT speech eval  Fall precautions  NIHSS per shift    ELIAS on CKD  Will get urine studies  Start patient on gentle hydration    Mild anemia-normocytic  Hemoglobin 10.9  Get iron studies, occult blood  No current complaints of acute blood loss    Chronic  Hypertension enlargement of prostate  Continue home medications      Medical Decision Making:   I personally reviewed labs: CBC BMP  I personally reviewed imaging: CT head-chronic changes no strokelike changes  I personally reviewed EKG: Normal sinus rhythm  Toxic drug monitoring: None  Discussed case with: ED provider. After discussion I am in agreement that acuity of patient's medical condition necessitates hospital stay.      Code Status: Full  DVT

## 2024-04-08 NOTE — CARE COORDINATION
With Spouse   Type of Home House   Home Layout One level   Home Equipment Cane;Walker, rolling;Wheelchair-manual   Receives Help From Family   ADL Assistance Needs assistance   Ambulation Assistance Needs assistance   Transfer Assistance Needs assistance   Active  No   Patient's  Info Pt's DTR to transport   Discharge Planning   Type of Residence House   Living Arrangements Family Members   Current Services Prior To Admission Skilled Nursing Facility;Home Care   Potential Assistance Needed Other (Comment)   Potential Assistance Purchasing Medications No   Patient expects to be discharged to: Skilled nursing facility   Services At/After Discharge   Transition of Care Consult (CM Consult) SNF   Services At/After Discharge Skilled Nursing Facility (SNF)   [place] Resource Information Provided? No  (N/a)   Mode of Transport at Discharge BLS   Confirm Follow Up Transport Family   Condition of Participation: Discharge Planning   The Plan for Transition of Care is related to the following treatment goals: SNF to continue working toward strength and mobility goals   The Patient and/or Patient Representative was provided with a Choice of Provider? Patient Representative   Name of the Patient Representative who was provided with the Choice of Provider and agrees with the Discharge Plan?  Faby Guallpa; SHAUNNA   The Patient and/Or Patient Representative agree with the Discharge Plan? Yes   Freedom of Choice list was provided with basic dialogue that supports the patient's individualized plan of care/goals, treatment preferences, and shares the quality data associated with the providers?  Yes         Advance Care Planning     General Advance Care Planning (ACP) Conversation    Date of Conversation: 4/8/2024  Conducted with: Patient with Decision Making Capacity    Healthcare Decision Maker:    Primary Decision Maker: Kareen Maharaj - Spouse - [phone]  Click here to complete Healthcare Decision Makers including  selection of the Healthcare Decision Maker Relationship (ie \"Primary\").   Today we documented Decision Maker(s) consistent with Legal Next of Kin hierarchy.    Content/Action Overview:  Has NO ACP documents-Information provided  Reviewed DNR/DNI and patient elects Full Code (Attempt Resuscitation)      Length of Voluntary ACP Conversation in minutes:  <16 minutes (Non-Billable)    NATY Alcantara MSW  Care Management  [facility]  [phone]

## 2024-04-08 NOTE — PLAN OF CARE
Problem: Physical Therapy - Adult  Goal: By Discharge: Performs mobility at highest level of function for planned discharge setting.  See evaluation for individualized goals.  Description: FUNCTIONAL STATUS PRIOR TO ADMISSION: Pt states his daughter comes over everyday to help him get dressed and another comes to help him sponge bathe at bed level. Requires assistance for all ADLs and transfers. Unclear of specifics due to pt being very [place].     HOME SUPPORT PRIOR TO ADMISSION: The patient lived with wife, daughters came by to provide assistance.    Physical Therapy Goals  Initiated 4/8/2024  1.  Patient will move from supine to sit and sit to supine, scoot up and down, and roll side to side in bed with modified independence within 7 day(s).    2.  Patient will perform sit to stand with supervision/set-up within 7 day(s).  3.  Patient will transfer from bed to chair and chair to bed with supervision/set-up using the least restrictive device within 7 day(s).  4.  Patient will ambulate with supervision/set-up for 50 feet with the least restrictive device within 7 day(s).   Outcome: Progressing     PHYSICAL THERAPY EVALUATION    Patient: Steve Maharaj (89 y.o. male)  Date: 4/8/2024  Primary Diagnosis: Falls frequently [R29.6]  Stroke-like symptoms [R29.90]  Benign paroxysmal positional vertigo, unspecified laterality [H81.10]       Precautions: Restrictions/Precautions: Fall Risk, Bed Alarm                      ASSESSMENT :   Pt seen for PT evaluation today, admitted with multiple falls, dizziness, LE weakness and vision change. Pt presents with impaired coordination and BLE strength, required close S to sit at EOB. Sit <> stand from EOB performed with RW and CGA, cueing for hand placement and sequencing. Pt ambulated bed > chair with RW for UE support, CGA, demos forward flexed posture and decreased B step length and foot clearance. Upon return to sit in bedside chair, pt deferred further mobility. Pt left  outcome / POC LOW Complexity : 1-2 Standardized tests and measures addressing body structure, function, activity limitation and / or participation in recreation  LOW Complexity : Stable, uncomplicated  AM-PAC  LOW    Based on the above components, the patient evaluation is determined to be of the following complexity level: Low

## 2024-04-08 NOTE — PROCEDURES
EEG REPORT    Patient Name: Steve Maharaj  : 1933  Age: 89 y.o.    Ordering physician: Dr. De Jesus  Date of EE2024   11:10 - 11:32  Diagnosis: dizziness, presyncope, memory loss  Interpreting physician: Tom Camacho D.O. FAAN    Procedure: EEG    CLINICAL INDICATION: The patient is a 89 y.o. male who is being evaluated for baseline electro cerebral activities and to rule out seizure focus.      Current Facility-Administered Medications   Medication Dose Route Frequency    sodium chloride flush 0.9 % injection 5-40 mL  5-40 mL IntraVENous 2 times per day    sodium chloride flush 0.9 % injection 5-40 mL  5-40 mL IntraVENous PRN    0.9 % sodium chloride infusion   IntraVENous PRN    ondansetron (ZOFRAN-ODT) disintegrating tablet 4 mg  4 mg Oral Q8H PRN    Or    ondansetron (ZOFRAN) injection 4 mg  4 mg IntraVENous Q6H PRN    polyethylene glycol (GLYCOLAX) packet 17 g  17 g Oral Daily PRN    heparin (porcine) injection 5,000 Units  5,000 Units SubCUTAneous 3 times per day    rosuvastatin (CRESTOR) tablet 10 mg  10 mg Oral Nightly    aspirin chewable tablet 81 mg  81 mg Oral Daily    Or    aspirin suppository 300 mg  300 mg Rectal Daily    0.9 % sodium chloride infusion   IntraVENous Continuous    tamsulosin (FLOMAX) capsule 0.4 mg  0.4 mg Oral Daily    atenolol (TENORMIN) tablet 50 mg  50 mg Oral Daily    And    chlorthalidone (HYGROTON) tablet 25 mg  25 mg Oral Daily    balsum peru-castor oil (VENELEX) ointment   Topical BID    acetaminophen (TYLENOL) tablet 650 mg  650 mg Oral Q4H PRN    ondansetron (ZOFRAN) injection 4 mg  4 mg IntraVENous Q6H PRN           DESCRIPTION OF PROCEDURE:     This is a digitally recorded electroencephalogram  Electrodes were applied in accordance with the international 10-20 system of electrode placement.    18 channels of scalp EEG are recorded  A channel was used for EoG  Another channel was used for ECG   The data is stored digitally and reviewed in reformatted

## 2024-04-08 NOTE — PLAN OF CARE
End of Shift Note    Bedside shift change report given to ED Carballo (Oncoming nurse) by ED Rutherford (Offgoing nurse). Report included the following information: SBAR, Kardex, Cardiac Rhythm sinus rhythm/sinus tachycardia, and Alarm Parameters.      Shift worked: Days   Shift summary and any significant changes:    Patient Murphy inserted 3 wks ago with Urology, Coude catheter replaced this morning and UA with reflex sent.  Labs collected.  Need stool. OT/PT rec 4X a week therapy. Need MRI.  UA was positive and patient MRI screening sheet sent.    Concerns for physician to address:  none   Zone phone for oncoming shift:   [phone]     Patient Information  Steve Maharaj  89 y.o.  4/7/2024  6:44 PM by Karin No MD. Steve Maharaj was admitted from home.    Problem List  Patient Active Problem List    Diagnosis Date Noted    Stroke-like symptoms 04/08/2024    Benign paroxysmal positional vertigo 04/08/2024    Falls frequently 04/08/2024    Dizziness 04/08/2024    Generalized weakness 04/08/2024    Chronic indwelling Murphy catheter 09/29/2021    Urinary tract infection associated with indwelling urethral catheter (HCC) 09/29/2021    CKD (chronic kidney disease), stage I 09/29/2021    Diarrhea 09/29/2021    Sepsis ([facility]) 09/22/2021    Draining cutaneous sinus tract 07/12/2018     Past Medical History:   Diagnosis Date    Arthritis     Cancer (HCC) 04/2019    renal    Hypertension     Prostate enlargement        Core Measures:  CVA: Yes    Activity:   Number times ambulated in hallways past shift:   Number of times OOB to chair past shift:   Cardiac:   Cardiac Monitoring: Yes    Access:   Current line(s):PIV  Genitourinary:   Urinary status: Chronic Murphy place OP by Urology    Respiratory:   O2 Device: None (Room air)    GI:   Current diet:  ADULT DIET; Regular    Tolerating current diet: Yes   Pain Management:   Patient states pain is manageable on current regimen:     Skin:  Mateusz Scale Score:  23  Interventions:     Patient Safety:  Fall Score: Green Total Score: 85  Interventions: bed/chair alarm    DVT prophylaxis:  DVT prophylaxis Med: Lovenox    Wounds: (If Applicable)  Wounds: N/A    Active Consults:  IP CONSULT TO NEUROLOGY    Length of Stay:  Expected LOS: 4  Actual LOS: 1  Discharge Plan: Home/SNF/IPR when medically stable    ED Rutherford

## 2024-04-08 NOTE — ED NOTES
Patient's family leaving at this time, patient's daughter Faby would like to be updated when patient is roomed to a unit

## 2024-04-08 NOTE — PROGRESS NOTES
MRI PENDING    Completion of MRI Screening Sheet    Fax to [phone] when completed    Please call [phone]  When this is done    Thank You

## 2024-04-08 NOTE — CONSULTS
Neurology Note    Patient ID:  Steve Maharaj  [id number]  89 y.o.  6/13/1933      Date of Consultation:  April 8, 2024    Referring Physician: Dr. De Jesus    Reason for Consultation:  falls, dizziness      Assessment and Plan:    The patient is a 89-year-old male who presents with worsening generalized weakness and intermittent bouts of dizziness.  His examination does reveal chronic visual and hearing difficulties.  There was no focal sensory or motor deficit.    Generalized weakness with intermittent bouts of dizziness:  Head CT with no acute abnormality  Differential includes systemic impact of metabolic derangements, systemic impact of infectious etiology, benign positional vertigo, cardiac arrhythmia, intermittent atypical seizures, TIA, sarcopenia.  This very well may be multifactorial in etiology  Brain MRI ordered  EEG ordered  Continue patient on telemetry  Carotid Doppler study ordered  I agree with continuing 81 mg aspirin  Lipid panel and A1c ordered  Continue working to correct metabolic derangements  Evaluate for underlying systemic infection and treat aggressively  PT and OT    Chronic kidney disease  Hypertension  Enlarged prostate    Neurology will continue to follow closely in this complicated patient with ongoing neurological symptoms necessitating additional testing. Updated orders placed.  Care plan discussed with primary team, Dr. De Jesus           Subjective: falls       History of Present Illness:   Steve Maharaj is a 89 y.o. male who presented to the emergency department after having multiple falls over the past couple days. he did not hit his head or loss consciousness.  He had felt generally weak.  He is also concerned about worsening vision and hearing.  The patient has a difficult time giving a detailed history.  He states he just has not felt well.  There is no specific focal numbness, tingling, or weakness.  He does complain of significant dizziness.  He states that the room is

## 2024-04-08 NOTE — PLAN OF CARE
(FOIS): 7--Total oral diet with no restrictions    After treatment:   Patient left in no apparent distress in bed, Call bell left within reach, and Nursing notified    COMMUNICATION/EDUCATION:   Patient was educated regarding role of SLP. He demonstrated Fair understanding due to hard of hearing status.    The patient's plan of care including recommendations, planned interventions, and recommended diet changes were discussed with: Registered nurse    Patient/family have participated as able in goal setting and plan of care and Patient/family agree to work toward stated goals and plan of care    Thank you,  BHASKAR Reaves  Minutes: 15    Problem: SLP Adult - Impaired Swallowing  Goal: By Discharge: Advance to least restrictive diet without signs or symptoms of aspiration for planned discharge setting.  See evaluation for individualized goals.  Description: Speech Pathology Goals  Initiated 4/8/2024     1. Patient will tolerate least restrictive diet without signs of aspiration or decline in respiratory status within 7 days.  Outcome: Progressing

## 2024-04-09 LAB
25(OH)D3 SERPL-MCNC: 29.9 NG/ML (ref 30–100)
ANION GAP SERPL CALC-SCNC: 7 MMOL/L (ref 5–15)
BUN SERPL-MCNC: 35 MG/DL (ref 6–20)
BUN/CREAT SERPL: 15 (ref 12–20)
CALCIUM SERPL-MCNC: 8.1 MG/DL (ref 8.5–10.1)
CHLORIDE SERPL-SCNC: 114 MMOL/L (ref 97–108)
CHOLEST SERPL-MCNC: 109 MG/DL
CO2 SERPL-SCNC: 20 MMOL/L (ref 21–32)
CREAT SERPL-MCNC: 2.4 MG/DL (ref 0.7–1.3)
ERYTHROCYTE [DISTWIDTH] IN BLOOD BY AUTOMATED COUNT: 12.9 % (ref 11.5–14.5)
EST. AVERAGE GLUCOSE BLD GHB EST-MCNC: 85 MG/DL
GLUCOSE SERPL-MCNC: 119 MG/DL (ref 65–100)
HBA1C MFR BLD: 4.6 % (ref 4–5.6)
HCT VFR BLD AUTO: 30.3 % (ref 36.6–50.3)
HDLC SERPL-MCNC: 28 MG/DL
HDLC SERPL: 3.9 (ref 0–5)
HGB BLD-MCNC: 9.5 G/DL (ref 12.1–17)
LDLC SERPL CALC-MCNC: 62.6 MG/DL (ref 0–100)
MAGNESIUM SERPL-MCNC: 2.2 MG/DL (ref 1.6–2.4)
MCH RBC QN AUTO: 31.6 PG (ref 26–34)
MCHC RBC AUTO-ENTMCNC: 31.4 G/DL (ref 30–36.5)
MCV RBC AUTO: 100.7 FL (ref 80–99)
NRBC # BLD: 0 K/UL (ref 0–0.01)
NRBC BLD-RTO: 0 PER 100 WBC
PHOSPHATE SERPL-MCNC: 3.1 MG/DL (ref 2.6–4.7)
PLATELET # BLD AUTO: 228 K/UL (ref 150–400)
PMV BLD AUTO: 9.7 FL (ref 8.9–12.9)
POTASSIUM SERPL-SCNC: 3.6 MMOL/L (ref 3.5–5.1)
RBC # BLD AUTO: 3.01 M/UL (ref 4.1–5.7)
SODIUM SERPL-SCNC: 141 MMOL/L (ref 136–145)
TRIGL SERPL-MCNC: 92 MG/DL
TSH SERPL DL<=0.05 MIU/L-ACNC: 2.07 UIU/ML (ref 0.36–3.74)
VIT B12 SERPL-MCNC: 304 PG/ML (ref 193–986)
VLDLC SERPL CALC-MCNC: 18.4 MG/DL
WBC # BLD AUTO: 5.5 K/UL (ref 4.1–11.1)

## 2024-04-09 PROCEDURE — 85027 COMPLETE CBC AUTOMATED: CPT

## 2024-04-09 PROCEDURE — 6360000002 HC RX W HCPCS: Performed by: STUDENT IN AN ORGANIZED HEALTH CARE EDUCATION/TRAINING PROGRAM

## 2024-04-09 PROCEDURE — 87040 BLOOD CULTURE FOR BACTERIA: CPT

## 2024-04-09 PROCEDURE — 51702 INSERT TEMP BLADDER CATH: CPT

## 2024-04-09 PROCEDURE — 2580000003 HC RX 258: Performed by: INTERNAL MEDICINE

## 2024-04-09 PROCEDURE — 6360000002 HC RX W HCPCS: Performed by: INTERNAL MEDICINE

## 2024-04-09 PROCEDURE — 36415 COLL VENOUS BLD VENIPUNCTURE: CPT

## 2024-04-09 PROCEDURE — 82607 VITAMIN B-12: CPT

## 2024-04-09 PROCEDURE — 6370000000 HC RX 637 (ALT 250 FOR IP): Performed by: NURSE PRACTITIONER

## 2024-04-09 PROCEDURE — 82306 VITAMIN D 25 HYDROXY: CPT

## 2024-04-09 PROCEDURE — 80061 LIPID PANEL: CPT

## 2024-04-09 PROCEDURE — 83735 ASSAY OF MAGNESIUM: CPT

## 2024-04-09 PROCEDURE — 84100 ASSAY OF PHOSPHORUS: CPT

## 2024-04-09 PROCEDURE — 1100000003 HC PRIVATE W/ TELEMETRY

## 2024-04-09 PROCEDURE — 97535 SELF CARE MNGMENT TRAINING: CPT | Performed by: OCCUPATIONAL THERAPIST

## 2024-04-09 PROCEDURE — 83036 HEMOGLOBIN GLYCOSYLATED A1C: CPT

## 2024-04-09 PROCEDURE — 99232 SBSQ HOSP IP/OBS MODERATE 35: CPT | Performed by: PSYCHIATRY & NEUROLOGY

## 2024-04-09 PROCEDURE — 84443 ASSAY THYROID STIM HORMONE: CPT

## 2024-04-09 PROCEDURE — 2580000003 HC RX 258: Performed by: STUDENT IN AN ORGANIZED HEALTH CARE EDUCATION/TRAINING PROGRAM

## 2024-04-09 PROCEDURE — 80048 BASIC METABOLIC PNL TOTAL CA: CPT

## 2024-04-09 PROCEDURE — 6370000000 HC RX 637 (ALT 250 FOR IP): Performed by: STUDENT IN AN ORGANIZED HEALTH CARE EDUCATION/TRAINING PROGRAM

## 2024-04-09 RX ORDER — LANOLIN ALCOHOL/MO/W.PET/CERES
3 CREAM (GRAM) TOPICAL NIGHTLY PRN
Status: DISCONTINUED | OUTPATIENT
Start: 2024-04-09 | End: 2024-04-12 | Stop reason: HOSPADM

## 2024-04-09 RX ORDER — 0.9 % SODIUM CHLORIDE 0.9 %
500 INTRAVENOUS SOLUTION INTRAVENOUS ONCE
Status: COMPLETED | OUTPATIENT
Start: 2024-04-09 | End: 2024-04-09

## 2024-04-09 RX ADMIN — SODIUM CHLORIDE 500 ML: 9 INJECTION, SOLUTION INTRAVENOUS at 15:38

## 2024-04-09 RX ADMIN — ASPIRIN 81 MG: 81 TABLET, CHEWABLE ORAL at 10:27

## 2024-04-09 RX ADMIN — Medication: at 20:22

## 2024-04-09 RX ADMIN — TAMSULOSIN HYDROCHLORIDE 0.4 MG: 0.4 CAPSULE ORAL at 10:27

## 2024-04-09 RX ADMIN — HEPARIN SODIUM 5000 UNITS: 5000 INJECTION INTRAVENOUS; SUBCUTANEOUS at 20:22

## 2024-04-09 RX ADMIN — MELATONIN 3 MG: at 21:08

## 2024-04-09 RX ADMIN — HEPARIN SODIUM 5000 UNITS: 5000 INJECTION INTRAVENOUS; SUBCUTANEOUS at 05:26

## 2024-04-09 RX ADMIN — ATENOLOL 50 MG: 25 TABLET ORAL at 10:25

## 2024-04-09 RX ADMIN — SODIUM CHLORIDE, PRESERVATIVE FREE 10 ML: 5 INJECTION INTRAVENOUS at 20:22

## 2024-04-09 RX ADMIN — SODIUM CHLORIDE, PRESERVATIVE FREE 10 ML: 5 INJECTION INTRAVENOUS at 10:27

## 2024-04-09 RX ADMIN — SODIUM CHLORIDE: 9 INJECTION, SOLUTION INTRAVENOUS at 21:59

## 2024-04-09 RX ADMIN — ROSUVASTATIN 10 MG: 20 TABLET, FILM COATED ORAL at 20:22

## 2024-04-09 RX ADMIN — Medication: at 10:27

## 2024-04-09 RX ADMIN — HEPARIN SODIUM 5000 UNITS: 5000 INJECTION INTRAVENOUS; SUBCUTANEOUS at 15:10

## 2024-04-09 RX ADMIN — SODIUM CHLORIDE: 9 INJECTION, SOLUTION INTRAVENOUS at 02:24

## 2024-04-09 RX ADMIN — SODIUM CHLORIDE 1000 MG: 900 INJECTION INTRAVENOUS at 11:36

## 2024-04-09 NOTE — PLAN OF CARE
Problem: Occupational Therapy - Adult  Goal: By Discharge: Performs self-care activities at highest level of function for planned discharge setting.  See evaluation for individualized goals.  Description: FUNCTIONAL STATUS PRIOR TO ADMISSION:  Pt states his daughter comes over everyday to help him get dressed and another comes to help him sponge bathe at bed level. Requires assistance for all ADLs and transfers. Unclear of specifics due to pt being very [place].   Receives Help From: Family, ADL Assistance: Needs assistance, Bath: Maximum assistance, Dressing: Maximum assistance, Grooming: Moderate assistance, Feeding: Independent, Toileting: Needs assistance, Homemaking Assistance: Needs assistance, Ambulation Assistance: Needs assistance, Transfer Assistance: Needs assistance, Active : No     HOME SUPPORT: Patient lived with wife and received help from daughters.    Occupational Therapy Goals:  Initiated 4/8/2024  1.  Patient will perform grooming with Stand by Assist standing at sink within 7 day(s).  2.  Patient will perform upper body dressing with Stand by Assist within 7 day(s).  3.  Patient will perform lower body dressing with Stand by Assist within 7 day(s).  4.  Patient will perform toilet transfers with Contact Guard Assist  within 7 day(s).  5.  Patient will perform all aspects of toileting with Contact Guard Assist within 7 day(s).  6.  Patient will participate in upper extremity therapeutic exercise/activities with Minimal Assist for 5 minutes within 7 day(s).    7.  Patient will utilize energy conservation techniques during functional activities with verbal cues within 7 day(s).  Outcome: Progressing  OCCUPATIONAL THERAPY TREATMENT  Patient: Steve Maharaj (89 y.o. male)  Date: 4/9/2024  Primary Diagnosis: Falls frequently [R29.6]  Stroke-like symptoms [R29.90]  Benign paroxysmal positional vertigo, unspecified laterality [H81.10]       Precautions: Fall Risk, Bed Alarm                Chart,  decreased balance/orthostasis    Toileting: Minimal assistance  Toileting Skilled Clinical Factors: balance for gown management, min assist for throughness for BM clean up in standing, unable to perform seated on commode    Activity Tolerance:   Fair , requires rest breaks, and signs and symptoms of orthostatic hypotension  Please refer to the flowsheet for vital signs taken during this treatment.    After treatment:   Patient left in no apparent distress sitting up in chair, Call bell within reach, Bed/ chair alarm activated, Caregiver / family present, and Updated patient's board on functional status and mobility recommendations    COMMUNICATION/EDUCATION:   The patient's plan of care was discussed with: registered nurse and patient and his daughter    Patient Education  Education Given To: Patient;Family  Education Provided: Role of Therapy;Plan of Care;ADL Adaptive Strategies;Fall Prevention Strategies  Education Method: Verbal  Barriers to Learning: Hearing  Education Outcome: Continued education needed    Thank you for this referral.  Dixie Jiang OTR/L  Minutes: 39

## 2024-04-09 NOTE — PROGRESS NOTES
Hospitalist Progress Note    NAME:   Steve Maharaj   : 1933   MRN: [id number]     Date/Time: 2024 8:53 AM  Patient PCP: Joseph Ugarte MD    Estimated discharge date: 1 to 2 days  Barriers: Carotid Dopplers      Assessment / Plan:  Multiple falls with ambulatory dysfunction  History of dizziness, hearing and vision problem rule out strokelike symptoms  CT scan of the head-1. No evidence of acute infarct or intracranial hemorrhage.  2. 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   Patient admitted to medical unit with telemetry  Since the symptoms were more than 48 hours stroke code was not called on this patient  Received a dose of aspirin in the ED  Will start patient on statin  Follow-up lipid and A1c  Follow-up MRI  Neurology to be consulted  PT OT speech eval  Fall precautions  NIHSS per shift  : MRI brain, neurology consult, PT OT, speech swallow eval, renal ultrasound pending.  Vital signs are stable. Patient currently getting EEG.  Updated daughter at the bedside.  : MRI brain shows the followin. Evaluation is limited by motion. Axial FLAIR images were not provided. No acute intracranial abnormality.  2. Generalized parenchymal volume loss and mild chronic microvascular ischemic disease.  Patient looks more alert, awake, eating breakfast/lunch this morning.  Patient is still dizzy intermittently.  Will hold chlorthalidone.  Will give 500 cc of normal saline bolus.    Acute UTI  Urinalysis is positive and patient symptoms are likely explained by acute UTI.  Will collect 2 sets of blood cultures and start on IV Rocephin.     ELIAS on CKD  Status post left nephrectomy  Will get urine studies  Start patient on gentle hydration  : Patient getting gentle hydration.  Repeat lab work this morning shows BUN/creatinine of 42/2.7 which is trending down.  : Renal ultrasound shows increased echogenicity of right kidney  consistent with medical renal disease, no right hydronephrosis, status post left nephrectomy.  BUN/creatinine of 35/2.4 and kidney function is still getting better.  Baseline creatinine is between 1.3-2.0.  Hold chlorthalidone for now.     Mild anemia-normocytic  Hemoglobin 10.9  Get iron studies, occult blood  No current complaints of acute blood loss     Hypertension  BPH  Continue home medications        Medical Decision Making:   I personally reviewed labs: CBC, BMP, magnesium, phosphorus  I personally reviewed imaging: MRI brain, renal ultrasound  I personally reviewed EKG:  Toxic drug monitoring: H&H while patient is on SQ heparin.  Discussed case with: Patient, RN        Code Status: Full code  DVT Prophylaxis: SQ heparin  GI Prophylaxis:    Subjective:     Chief Complaint / Reason for Physician Visit  \"F/u for multiple falls with ambulatory dysfunction, history of dizziness, hearing and visual problem, rule out stroke, ELIAS on CKD, mild normocytic anemia, HTN, BPH.\".  Discussed with RN events overnight.       Objective:     VITALS:   Last 24hrs VS reviewed since prior progress note. Most recent are:  Patient Vitals for the past 24 hrs:   BP Temp Temp src Pulse Resp SpO2   04/09/24 0759 129/69 98.2 °F (36.8 °C) Axillary 60 20 100 %   04/09/24 0315 123/63 97.3 °F (36.3 °C) Oral 63 20 100 %   04/08/24 2315 104/63 97.7 °F (36.5 °C) -- 70 16 100 %   04/08/24 2043 125/78 97.3 °F (36.3 °C) Axillary 69 18 100 %   04/08/24 1849 121/73 97.7 °F (36.5 °C) -- 74 18 100 %   04/08/24 1450 120/74 98.1 °F (36.7 °C) -- 60 -- 96 %   04/08/24 1050 118/76 97.2 °F (36.2 °C) -- 61 -- 96 %   04/08/24 0932 117/70 -- -- 64 -- --   04/08/24 0931 129/76 -- -- 75 -- --   04/08/24 0929 129/76 -- -- 75 -- --   04/08/24 0925 123/69 -- -- 63 -- --   04/08/24 0919 109/67 -- -- -- -- --         Intake/Output Summary (Last 24 hours) at 4/9/2024 0853  Last data filed at 4/9/2024 0540  Gross per 24 hour   Intake 2224.09 ml   Output 1050 ml   Net

## 2024-04-09 NOTE — PLAN OF CARE
End of Shift Note    Bedside shift change report given to ED Ortiz (Oncoming nurse) by ED Rutherford (Offgoing nurse). Report included the following information: SBAR, Kardex, Cardiac Rhythm sinus rhythm/sinus tachycardia, and Alarm Parameters.      Shift worked: Nights    Shift summary and any significant changes:    MRI completed. Pt noted to have intermittent confusion during the night easily reoriented. Murphy care completed. No acute shift events.  CM working on SNF placement.     Concerns for physician to address:  UTI + need abx ?     NEURO or hospitalist please call daughter instead of wife. Wife unable to retain all the information. Daughter Faby [phone]   Zone phone for oncoming shift:   [phone]     Patient Information  Steve Maharaj  89 y.o.  4/7/2024  6:44 PM by Karin No MD. Steve Maharaj was admitted from home.    Problem List  Patient Active Problem List    Diagnosis Date Noted    Stroke-like symptoms 04/08/2024    Benign paroxysmal positional vertigo 04/08/2024    Falls frequently 04/08/2024    Dizziness 04/08/2024    Generalized weakness 04/08/2024    Chronic indwelling Murphy catheter 09/29/2021    Urinary tract infection associated with indwelling urethral catheter (HCC) 09/29/2021    CKD (chronic kidney disease), stage I 09/29/2021    Diarrhea 09/29/2021    Sepsis ([facility]) 09/22/2021    Draining cutaneous sinus tract 07/12/2018     Past Medical History:   Diagnosis Date    Arthritis     Cancer (HCC) 04/2019    renal    Hypertension     Prostate enlargement        Core Measures:  CVA: Yes    Activity:  Activity: In bedNumber times ambulated in hallways past shift:   Number of times OOB to chair past shift:   Cardiac:   Cardiac Monitoring: Yes    Access:   Current line(s):PIV  Genitourinary:   Urinary status: Chronic Murphy place OP by Urology    Respiratory:   O2 Device: None (Room air)    GI:   Current diet:  ADULT DIET; Regular    Tolerating current diet: Yes   Pain Management:    Patient states pain is manageable on current regimen:     Skin:  Mateusz Scale Score: 18  Interventions:     Patient Safety:  Fall Score: Green Total Score: 85  Interventions: bed/chair alarm    DVT prophylaxis:  DVT prophylaxis Med: Lovenox    Wounds: (If Applicable)  Wounds: N/A    Active Consults:  IP CONSULT TO NEUROLOGY    Length of Stay:  Expected LOS: 4  Actual LOS: 1  Discharge Plan: Home/SNF/IPR when medically stable    ED Rutherford

## 2024-04-09 NOTE — PROGRESS NOTES
End of Shift Note     Bedside shift change report given to ED Carballo (Oncoming nurse) by ED Ortiz (Offgoing nurse). Report included the following information: SBAR, Kardex, Cardiac Rhythm sinus rhythm/sinus tachycardia, and Alarm Parameters.        Shift worked: days   Shift summary and any significant changes:     Patient started on IV antibiotics  OT worked with patient  Patient had multiple episodes of dizziness, MD aware, 500ml NS bolus given     Concerns for physician to address:     Zone phone for oncoming shift:   [phone]      Patient Information  Steve Maharaj  89 y.o.  4/7/2024  6:44 PM by Karin No MD. Steve Maharaj was admitted from home.     Problem List       Patient Active Problem List     Diagnosis Date Noted    Stroke-like symptoms 04/08/2024    Benign paroxysmal positional vertigo 04/08/2024    Falls frequently 04/08/2024    Dizziness 04/08/2024    Generalized weakness 04/08/2024    Chronic indwelling Murphy catheter 09/29/2021    Urinary tract infection associated with indwelling urethral catheter (HCC) 09/29/2021    CKD (chronic kidney disease), stage I 09/29/2021    Diarrhea 09/29/2021    Sepsis ([facility]) 09/22/2021    Draining cutaneous sinus tract 07/12/2018      Past Medical History        Past Medical History:   Diagnosis Date    Arthritis      Cancer ([facility]) 04/2019     renal    Hypertension      Prostate enlargement              Core Measures:  CVA: Yes     Activity:  Activity: In bedNumber times ambulated in hallways past shift:   Number of times OOB to chair past shift:   Cardiac:   Cardiac Monitoring: Yes     Access:   Current line(s):PIV  Genitourinary:   Urinary status: Chronic Murphy place OP by Urology     Respiratory:   O2 Device: None (Room air)     GI:   Current diet:  ADULT DIET; Regular     Tolerating current diet: Yes   Pain Management:   Patient states pain is manageable on current regimen:      Skin:  Mateusz Scale Score: 18  Interventions:     Patient Safety:  Fall

## 2024-04-09 NOTE — PROGRESS NOTES
Neurology Note    Patient ID:  Steve Maharaj  [id number]  89 y.o.  6/13/1933      Date of Consultation:  April 9, 2024      Assessment and Plan:    The patient is a 89-year-old male who presents with worsening generalized weakness and intermittent bouts of dizziness.  His examination does reveal chronic visual and hearing difficulties.  There was no focal sensory or motor deficit.    Generalized weakness with intermittent bouts of dizziness:  improving  Head CT with no acute abnormality  Brain mri with no evidence of an acute stroke  Eeg with no seizure focus  Symptoms are most likely multifactorial including systemic impact of metabolic derangements and UTI. Also with intermittent bouts of hypotension  Continue patient on telemetry  Carotid Doppler study ordered - if > 70% stenosis, consult vascular surgery  Continue 81 mg aspirin  Lipid panel and A1c at goal  Continue working to correct metabolic derangements  Continue treating UTI  PT and OT    Chronic kidney disease  Hypertension  Enlarged prostate    There is no other additional neurology recommendations at this time.  If questions arise, please not hesitate to contact me and I will return to see the patient.             Subjective: I still have a little dizziness       History of Present Illness:   Steve Maharaj is a 89 y.o. male who presented to the emergency department after having multiple falls over the past couple days. he did not hit his head or loss consciousness.  He had felt generally weak.  He is also concerned about worsening vision and hearing.  Since admission, the patient states that his dizziness has improved and is mild.  He has been eating a bit better.    Pertinent updated labs include a sodium 141, creatinine 2.4, vitamin B12 level 304.  Albumin 2.8.  AST is 16 and ALT of 14.  Vitamin D of 29.  White blood cell count 5.5, hemoglobin 9.5.  Platelets of 228    I did independently review the brain MRI from April 8, 2024.  There is no

## 2024-04-09 NOTE — CARE COORDINATION
Transition of Care Plan:    RUR: 14% (low RUR)  Prior Level of Functioning: Needing some assistance  Disposition: SNF  If SNF or IPR: Date FOC offered: 04/08/2024  Date FOC received: Pending family choice  Accepting facility: Pending referrals to be sent  Date authorization started with reference number:   Date authorization received and expires:   Follow up appointments: defer to SNF  DME needed: defer to SNF  Transportation at discharge: BLS   IM/IMM Medicare/ letter given: 2nd needed prior to d/c  Is patient a  and connected with [facility]? N/a   If yes, was  transfer form completed and [facility] notified? N/a  Caregiver Contact: Faby Guallpa DTR, [phone]  Discharge Caregiver contacted prior to discharge? CM to contact  Care Conference needed? Not at this time  Barriers to discharge: Placement, 3 midnight stay, medical clearance    0906 - CM to follow up with family today re: SNF choice. Pt will meet 3 midnight stay required by Medicare on 04/11.    NATY Olivas  Care Management  [facility]  [id number]

## 2024-04-10 ENCOUNTER — APPOINTMENT (OUTPATIENT)
Facility: HOSPITAL | Age: 89
End: 2024-04-10
Attending: PSYCHIATRY & NEUROLOGY
Payer: MEDICARE

## 2024-04-10 PROBLEM — I65.23 BILATERAL CAROTID ARTERY STENOSIS: Status: ACTIVE | Noted: 2024-04-10

## 2024-04-10 LAB
ANION GAP SERPL CALC-SCNC: 11 MMOL/L (ref 5–15)
BACTERIA SPEC CULT: ABNORMAL
BACTERIA SPEC CULT: ABNORMAL
BUN SERPL-MCNC: 25 MG/DL (ref 6–20)
BUN/CREAT SERPL: 13 (ref 12–20)
CALCIUM SERPL-MCNC: 8.1 MG/DL (ref 8.5–10.1)
CC UR VC: ABNORMAL
CHLORIDE SERPL-SCNC: 112 MMOL/L (ref 97–108)
CO2 SERPL-SCNC: 18 MMOL/L (ref 21–32)
CREAT SERPL-MCNC: 1.9 MG/DL (ref 0.7–1.3)
ECHO BSA: 1.83 M2
ERYTHROCYTE [DISTWIDTH] IN BLOOD BY AUTOMATED COUNT: 13 % (ref 11.5–14.5)
GLUCOSE SERPL-MCNC: 107 MG/DL (ref 65–100)
HCT VFR BLD AUTO: 28.3 % (ref 36.6–50.3)
HGB BLD-MCNC: 8.7 G/DL (ref 12.1–17)
LACTATE SERPL-SCNC: 0.9 MMOL/L (ref 0.4–2)
MAGNESIUM SERPL-MCNC: 1.8 MG/DL (ref 1.6–2.4)
MCH RBC QN AUTO: 30.6 PG (ref 26–34)
MCHC RBC AUTO-ENTMCNC: 30.7 G/DL (ref 30–36.5)
MCV RBC AUTO: 99.6 FL (ref 80–99)
NRBC # BLD: 0 K/UL (ref 0–0.01)
NRBC BLD-RTO: 0 PER 100 WBC
PHOSPHATE SERPL-MCNC: 2.6 MG/DL (ref 2.6–4.7)
PLATELET # BLD AUTO: 219 K/UL (ref 150–400)
PMV BLD AUTO: 9.9 FL (ref 8.9–12.9)
POTASSIUM SERPL-SCNC: 3.7 MMOL/L (ref 3.5–5.1)
RBC # BLD AUTO: 2.84 M/UL (ref 4.1–5.7)
SERVICE CMNT-IMP: ABNORMAL
SODIUM SERPL-SCNC: 141 MMOL/L (ref 136–145)
VAS LEFT CCA DIST EDV: 10.5 CM/S
VAS LEFT CCA DIST PSV: 66.6 CM/S
VAS LEFT CCA PROX EDV: 7.9 CM/S
VAS LEFT CCA PROX PSV: 207.6 CM/S
VAS LEFT ECA EDV: 0 CM/S
VAS LEFT ECA PSV: 53.2 CM/S
VAS LEFT ICA DIST EDV: 17.5 CM/S
VAS LEFT ICA DIST PSV: 46.7 CM/S
VAS LEFT ICA MID EDV: 14.9 CM/S
VAS LEFT ICA MID PSV: 49 CM/S
VAS LEFT ICA PROX EDV: 10.6 CM/S
VAS LEFT ICA PROX PSV: 36.9 CM/S
VAS LEFT ICA/CCA PSV: 0.7 NO UNITS
VAS LEFT SUBCLAVIAN PROX EDV: 5.5 CM/S
VAS LEFT SUBCLAVIAN PROX PSV: 91.1 CM/S
VAS LEFT VERTEBRAL EDV: 17.2 CM/S
VAS LEFT VERTEBRAL PSV: 48 CM/S
VAS RIGHT CCA DIST EDV: 7.6 CM/S
VAS RIGHT CCA DIST PSV: 65.8 CM/S
VAS RIGHT CCA PROX EDV: 10.8 CM/S
VAS RIGHT CCA PROX PSV: 146.8 CM/S
VAS RIGHT ECA EDV: 0 CM/S
VAS RIGHT ECA PSV: 61 CM/S
VAS RIGHT ICA DIST EDV: 11 CM/S
VAS RIGHT ICA DIST PSV: 39.7 CM/S
VAS RIGHT ICA MID EDV: 9 CM/S
VAS RIGHT ICA MID PSV: 35.6 CM/S
VAS RIGHT ICA PROX EDV: 11.3 CM/S
VAS RIGHT ICA PROX PSV: 45.3 CM/S
VAS RIGHT ICA/CCA PSV: 0.7 NO UNITS
VAS RIGHT SUBCLAVIAN PROX EDV: 0 CM/S
VAS RIGHT SUBCLAVIAN PROX PSV: 66.8 CM/S
VAS RIGHT VERTEBRAL EDV: 12 CM/S
VAS RIGHT VERTEBRAL PSV: 45.5 CM/S
WBC # BLD AUTO: 4.4 K/UL (ref 4.1–11.1)

## 2024-04-10 PROCEDURE — 97530 THERAPEUTIC ACTIVITIES: CPT

## 2024-04-10 PROCEDURE — 2580000003 HC RX 258: Performed by: INTERNAL MEDICINE

## 2024-04-10 PROCEDURE — 6360000002 HC RX W HCPCS: Performed by: STUDENT IN AN ORGANIZED HEALTH CARE EDUCATION/TRAINING PROGRAM

## 2024-04-10 PROCEDURE — 6370000000 HC RX 637 (ALT 250 FOR IP): Performed by: STUDENT IN AN ORGANIZED HEALTH CARE EDUCATION/TRAINING PROGRAM

## 2024-04-10 PROCEDURE — 93880 EXTRACRANIAL BILAT STUDY: CPT

## 2024-04-10 PROCEDURE — 36415 COLL VENOUS BLD VENIPUNCTURE: CPT

## 2024-04-10 PROCEDURE — 6370000000 HC RX 637 (ALT 250 FOR IP): Performed by: NURSE PRACTITIONER

## 2024-04-10 PROCEDURE — 1100000003 HC PRIVATE W/ TELEMETRY

## 2024-04-10 PROCEDURE — 84100 ASSAY OF PHOSPHORUS: CPT

## 2024-04-10 PROCEDURE — 6360000002 HC RX W HCPCS: Performed by: INTERNAL MEDICINE

## 2024-04-10 PROCEDURE — 92526 ORAL FUNCTION THERAPY: CPT

## 2024-04-10 PROCEDURE — 83605 ASSAY OF LACTIC ACID: CPT

## 2024-04-10 PROCEDURE — 97116 GAIT TRAINING THERAPY: CPT

## 2024-04-10 PROCEDURE — 83735 ASSAY OF MAGNESIUM: CPT

## 2024-04-10 PROCEDURE — 97535 SELF CARE MNGMENT TRAINING: CPT

## 2024-04-10 PROCEDURE — 85027 COMPLETE CBC AUTOMATED: CPT

## 2024-04-10 PROCEDURE — 80048 BASIC METABOLIC PNL TOTAL CA: CPT

## 2024-04-10 PROCEDURE — 2580000003 HC RX 258: Performed by: STUDENT IN AN ORGANIZED HEALTH CARE EDUCATION/TRAINING PROGRAM

## 2024-04-10 PROCEDURE — 93880 EXTRACRANIAL BILAT STUDY: CPT | Performed by: PSYCHIATRY & NEUROLOGY

## 2024-04-10 RX ADMIN — MELATONIN 3 MG: at 21:28

## 2024-04-10 RX ADMIN — HEPARIN SODIUM 5000 UNITS: 5000 INJECTION INTRAVENOUS; SUBCUTANEOUS at 21:28

## 2024-04-10 RX ADMIN — SODIUM CHLORIDE, PRESERVATIVE FREE 10 ML: 5 INJECTION INTRAVENOUS at 08:32

## 2024-04-10 RX ADMIN — Medication: at 21:32

## 2024-04-10 RX ADMIN — ATENOLOL 50 MG: 25 TABLET ORAL at 08:32

## 2024-04-10 RX ADMIN — Medication: at 08:32

## 2024-04-10 RX ADMIN — TAMSULOSIN HYDROCHLORIDE 0.4 MG: 0.4 CAPSULE ORAL at 08:33

## 2024-04-10 RX ADMIN — SODIUM CHLORIDE 1000 MG: 900 INJECTION INTRAVENOUS at 11:09

## 2024-04-10 RX ADMIN — POLYETHYLENE GLYCOL 3350 17 G: 17 POWDER, FOR SOLUTION ORAL at 11:07

## 2024-04-10 RX ADMIN — HEPARIN SODIUM 5000 UNITS: 5000 INJECTION INTRAVENOUS; SUBCUTANEOUS at 14:36

## 2024-04-10 RX ADMIN — SODIUM CHLORIDE, PRESERVATIVE FREE 10 ML: 5 INJECTION INTRAVENOUS at 21:32

## 2024-04-10 RX ADMIN — ASPIRIN 81 MG: 81 TABLET, CHEWABLE ORAL at 08:33

## 2024-04-10 RX ADMIN — HEPARIN SODIUM 5000 UNITS: 5000 INJECTION INTRAVENOUS; SUBCUTANEOUS at 05:32

## 2024-04-10 RX ADMIN — ROSUVASTATIN 10 MG: 20 TABLET, FILM COATED ORAL at 21:28

## 2024-04-10 NOTE — PROGRESS NOTES
End of Shift Note     Bedside shift change report given to ED Carballo (Oncoming nurse) by ED Ortiz (Offgoing nurse). Report included the following information: SBAR, Kardex, Cardiac Rhythm sinus rhythm/sinus tachycardia, and Alarm Parameters.        Shift worked: days   Shift summary and any significant changes:     CM following for DC needs  Continued antibiotics  PT/OT/SLP worked with patient                                 Concerns for physician to address:     Zone phone for oncoming shift:   [phone]      Patient Information  Steve Maharaj  89 y.o.  4/7/2024  6:44 PM by Karin No MD. Steve Maharaj was admitted from home.     Problem List          Patient Active Problem List     Diagnosis Date Noted    Stroke-like symptoms 04/08/2024    Benign paroxysmal positional vertigo 04/08/2024    Falls frequently 04/08/2024    Dizziness 04/08/2024    Generalized weakness 04/08/2024    Chronic indwelling Murphy catheter 09/29/2021    Urinary tract infection associated with indwelling urethral catheter (HCC) 09/29/2021    CKD (chronic kidney disease), stage I 09/29/2021    Diarrhea 09/29/2021    Sepsis ([facility]) 09/22/2021    Draining cutaneous sinus tract 07/12/2018      Past Medical History           Past Medical History:   Diagnosis Date    Arthritis      Cancer ([facility]) 04/2019     renal    Hypertension      Prostate enlargement              Core Measures:  CVA: Yes     Activity:  Activity: In bedNumber times ambulated in hallways past shift:   Number of times OOB to chair past shift:   Cardiac:   Cardiac Monitoring: Yes     Access:   Current line(s):PIV  Genitourinary:   Urinary status: Chronic Murphy place OP by Urology     Respiratory:   O2 Device: None (Room air)     GI:   Current diet:  ADULT DIET; Regular     Tolerating current diet: Yes   Pain Management:   Patient states pain is manageable on current regimen:      Skin:  Mateusz Scale Score: 18  Interventions:     Patient Safety:  Fall Score: Green Total Score:

## 2024-04-10 NOTE — PLAN OF CARE
129/77 Standing   04/10/24 1350 63 139/79 Sitting   04/10/24 1348 65 128/76 Sitting   04/10/24 1345 55 (!) 144/74 Supine              PLAN:  Patient continues to benefit from skilled intervention to address the above impairments.  Continue treatment per established plan of care.    Recommend with staff: ambulation to/from restroom with min A using RW and gait belt.    Recommend next PT session: continue per POC.    Recommendation for discharge: (in order for the patient to meet his/her long term goals): Therapy up to 5 days/week in Skilled nursing facility    Other factors to consider for discharge: no additional factors    IF patient discharges home will need the following DME:  HHPT, assistance for all mobility.       SUBJECTIVE:   Patient stated, \"I can walk back over there.\"    Pt received supine, agreeable to PT and cleared by RN.    OBJECTIVE DATA SUMMARY:   Critical Behavior:   WFL       Functional Mobility Training:  Bed Mobility:  Bed Mobility Training  Bed Mobility Training: Yes  Supine to Sit: Additional time;Stand-by assistance;Adaptive equipment  Scooting: Contact-guard assistance  Transfers:  Transfer Training  Sit to Stand: Contact-guard assistance;Minimum assistance (cues for UE placement 4/4 trials)  Stand to Sit: Minimum assistance  Bed to Chair: Minimum assistance;Additional time  Toilet Transfer: Minimum assistance  Balance:  Balance  Sitting: Without support  Sitting - Static: Good (unsupported)  Sitting - Dynamic: Good (unsupported)  Standing: With support (flexed trunk and knees, forward head)  Standing - Static: Fair  Standing - Dynamic: Fair   Ambulation/Gait Training:     Gait  Overall Level of Assistance: Minimum assistance;Contact-guard assistance  Distance (ft):  (15' + 25' + 12' with seated rests)  Assistive Device: Walker, rolling;Gait belt  Interventions: Verbal cues  Base of Support: Narrowed  Speed/Rosina: Pace decreased (< 100 feet/min)  Step Length: Left shortened;Right

## 2024-04-10 NOTE — PROGRESS NOTES
Hospitalist Progress Note    NAME:   Steve Maharaj   : 1933   MRN: [id number]     Date/Time: 4/10/2024 5:15 PM  Patient PCP: Joseph Ugarte MD    Estimated discharge date:   Barriers: Placement      Assessment / Plan:    Multiple falls with ambulatory dysfunction  History of dizziness, hearing and vision problem rule out strokelike symptoms  CT scan of the head-1. No evidence of acute infarct or intracranial hemorrhage.  2. 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   Daily ASA  Continue statin  LDL 62.6  Hemoglobin A1c 4.6  MRI with no acute abnormality  Neurology now signed off, believing multifactorial symptoms secondary to metabolic derangements and UTI with occasional hypotension.  PT OT recommending placement.  Fall precautions  NIHSS per shift    Acute UTI  Urinalysis is positive and patient symptoms are likely explained by acute UTI.    Cultures positive for alpha strep and Proteus-pansensitive.  Blood cultures [id number] tomorrow  Continue IV ceftriaxone-can transition to orals tomorrow     ELIAS on CKD  Status post left nephrectomy  Will get urine studies  Renal ultrasound shows increased echogenicity of right kidney consistent with medical renal disease, no right hydronephrosis, status post left nephrectomy.  Trend a.m. labs, hopefully will be close to baseline tomorrow a.m.     Mild anemia-normocytic  Hemoglobin 10.9  Get iron studies, occult blood  No current complaints of acute blood loss     Hypertension  BPH  Continue home medications    Medical Decision Making:   I personally reviewed labs: CBC, BMP  I personally reviewed imaging:    I personally reviewed EKG:  Toxic drug monitoring:    Discussed case with:          Code Status: Full code  DVT Prophylaxis: SQ heparin  GI Prophylaxis:    Subjective:     Chief Complaint / Reason for Physician Visit  Patient seen with daughter present at bedside.  No new  complaints.  Alert and oriented x 3 however intermittently confused as to why he is in the hospital.  Daughter verbalized understanding.  Discussed with RN events overnight.       Objective:     VITALS:   Last 24hrs VS reviewed since prior progress note. Most recent are:  Patient Vitals for the past 24 hrs:   BP Temp Temp src Pulse Resp SpO2   04/10/24 1410 (!) 145/79 -- -- 57 -- --   04/10/24 1354 132/80 -- -- 75 -- --   04/10/24 1352 129/77 -- -- 75 -- --   04/10/24 1350 139/79 -- -- 63 -- --   04/10/24 1348 128/76 -- -- 65 -- --   04/10/24 1345 (!) 144/74 -- -- 55 -- 98 %   04/10/24 0749 131/73 97.5 °F (36.4 °C) Oral 62 18 100 %   04/09/24 1912 124/85 97.2 °F (36.2 °C) Oral 77 18 99 %           Intake/Output Summary (Last 24 hours) at 4/10/2024 1715  Last data filed at 4/10/2024 0440  Gross per 24 hour   Intake 1540.22 ml   Output 1525 ml   Net 15.22 ml          I had a face to face encounter and independently examined this patient on 4/10/2024, as outlined below:  PHYSICAL EXAM:  General: Alert, cooperative  EENT:  EOMI. Anicteric sclerae.  Resp:  CTA bilaterally, no wheezing or rales.  No accessory muscle use  CV:  Regular  rhythm,  No edema  GI:  Soft, Non distended, Non tender.  +Bowel sounds  Neurologic:  Alert and oriented X 3, normal speech,   Psych:   Not anxious nor agitated  Skin:  No rashes.  No jaundice    Reviewed most current lab test results and cultures  YES  Reviewed most current radiology test results   YES  Review and summation of old records today    NO  Reviewed patient's current orders and MAR    YES  PMH/SH reviewed - no change compared to H&P    Procedures: see electronic medical records for all procedures/Xrays and details which were not copied into this note but were reviewed prior to creation of Plan.      LABS:  I reviewed today's most current labs and imaging studies.  Pertinent labs include:  Recent Labs     04/08/24  0751 04/09/24  0216 04/10/24  0208   WBC 4.7 5.5 4.4   HGB 9.8*

## 2024-04-10 NOTE — PLAN OF CARE
Speech LAnguage Pathology TREATMENT/DISCHARGE    Patient: Steve Maharaj (89 y.o. male)  Date: 4/10/2024  Primary Diagnosis: Falls frequently [R29.6]  Stroke-like symptoms [R29.90]  Benign paroxysmal positional vertigo, unspecified laterality [H81.10]       Precautions: Fall Risk, Bed Alarm                  ASSESSMENT :  Patient seen for dysphagia management on this date. Discussed with RN, who reports good tolerance on current diet. Patient continues with grossly functional mastication despite significantly limited natural dentition. No overt difficulty or overt signs of aspiration observed with any consistency trialed. Note motor speech presumed at baseline as MRI is negative.     Patient will be discharged from skilled speech-language pathology services at this time.     PLAN :  Recommendations and Planned Interventions:  Diet: Regular and thin liquids  -- Medication as tolerated  -- 1:1 assistance with PO intake, as needed  -- Alternate liquids/solids as needed    Acute SLP Services: No, patient will be discharged from acute skilled speech-language pathology at this time.    Discharge Recommendations: No, additional SLP treatment not indicated at discharge     SUBJECTIVE:   Patient stated, “I still get dizzy.”    OBJECTIVE:     Past Medical History:   Diagnosis Date    Arthritis     Cancer (HCC) 04/2019    renal    Hypertension     Prostate enlargement      Past Surgical History:   Procedure Laterality Date    ORTHOPEDIC SURGERY Right 2008    hip replacement/[facility], [facility]/Dr Bowman    PRE-MALIGNANT / BENIGN SKIN LESION EXCISION  07/13/2018    excision of left flank      Prior Level of Function/Home Situation:   Social/Functional History  Lives With: Spouse  Type of Home: House  Home Layout: One level  Bathroom Shower/Tub: Tub/Shower unit  Bathroom Toilet: Standard  Bathroom Equipment:  (sponge bathed by daughter?)  Home Equipment: Cane, Walker, rolling, Wheelchair-manual  Has the patient had two or more falls in  discharge setting.  See evaluation for individualized goals.  Description: Speech Pathology Goals  Initiated 4/8/2024     1. Patient will tolerate least restrictive diet without signs of aspiration or decline in respiratory status within 7 days.  MET 4/10/2024  Outcome: Completed

## 2024-04-10 NOTE — PROGRESS NOTES
Nursing contacted Nocturnist/cross cover provider and notified patient asking for sleep aid. No other concerns reported. VSS. Ordered melatonin hs 3mg prn. Patient denies any further complaints or concerns. No acute distress reported. Nursing to notify Hospitalist for further/continued concerns. Will remain available overnight for further concerns if nursing/patient needs. Will defer further evaluation/management to the day shift primary care team.    Non-billable note.

## 2024-04-10 NOTE — PLAN OF CARE
Problem: Occupational Therapy - Adult  Goal: By Discharge: Performs self-care activities at highest level of function for planned discharge setting.  See evaluation for individualized goals.  Description: FUNCTIONAL STATUS PRIOR TO ADMISSION:  Pt states his daughter comes over everyday to help him get dressed and another comes to help him sponge bathe at bed level. Requires assistance for all ADLs and transfers. Unclear of specifics due to pt being very [place].   Receives Help From: Family, ADL Assistance: Needs assistance, Bath: Maximum assistance, Dressing: Maximum assistance, Grooming: Moderate assistance, Feeding: Independent, Toileting: Needs assistance, Homemaking Assistance: Needs assistance, Ambulation Assistance: Needs assistance, Transfer Assistance: Needs assistance, Active : No     HOME SUPPORT: Patient lived with wife and received help from daughters.    Occupational Therapy Goals:  Initiated 4/8/2024  1.  Patient will perform grooming with Stand by Assist standing at sink within 7 day(s).  2.  Patient will perform upper body dressing with Stand by Assist within 7 day(s).  3.  Patient will perform lower body dressing with Stand by Assist within 7 day(s).  4.  Patient will perform toilet transfers with Contact Guard Assist  within 7 day(s).  5.  Patient will perform all aspects of toileting with Contact Guard Assist within 7 day(s).  6.  Patient will participate in upper extremity therapeutic exercise/activities with Minimal Assist for 5 minutes within 7 day(s).    7.  Patient will utilize energy conservation techniques during functional activities with verbal cues within 7 day(s).  Outcome: Progressing     OCCUPATIONAL THERAPY TREATMENT  Patient: Steve Maharaj (89 y.o. male)  Date: 4/10/2024  Primary Diagnosis: Falls frequently [R29.6]  Stroke-like symptoms [R29.90]  Benign paroxysmal positional vertigo, unspecified laterality [H81.10]       Precautions: Fall Risk, Bed Alarm                Chart,  - Static: Fair  Standing - Dynamic: Good;Fair;Constant support      ADL Intervention:    UE Dressing: Minimal assistance  UE Dressing Skilled Clinical Factors: to jasbir sahu covering back    Pain Rating:  No complaint/10   Pain Intervention(s):   pain is at a level acceptable to the patient      Activity Tolerance:   Fair   Please refer to the flowsheet for vital signs taken during this treatment.    After treatment:   Patient left in no apparent distress in bed, Call bell within reach, and left with RN    COMMUNICATION/EDUCATION:   The patient's plan of care was discussed with: registered nurse    Patient Education  Education Given To: Patient  Education Provided: Transfer Training;Fall Prevention Strategies  Education Method: Verbal  Barriers to Learning: Hearing  Education Outcome: Continued education needed    Thank you for this referral.  Kait Shin OT  Minutes: 16

## 2024-04-10 NOTE — PROGRESS NOTES
End of Shift Note     Bedside shift change report given to ED Ortiz (Oncoming nurse) by ED Carballo (Offgoing nurse). Report included the following information: SBAR, Kardex, Cardiac Rhythm sinus rhythm/sinus tachycardia, and Alarm Parameters.        Shift worked: Nights    Shift summary and any significant changes:     Pt had no issues or complaints overnight. Medicated per request with melatonin. Murphy care completed this am. Denies pain. IVF infusing.      Concerns for physician to address:     Zone phone for oncoming shift:   [phone]      Patient Information  Steve Maharaj  89 y.o.  4/7/2024  6:44 PM by Karin No MD. Steve Maharaj was admitted from home.     Problem List       Patient Active Problem List     Diagnosis Date Noted    Stroke-like symptoms 04/08/2024    Benign paroxysmal positional vertigo 04/08/2024    Falls frequently 04/08/2024    Dizziness 04/08/2024    Generalized weakness 04/08/2024    Chronic indwelling Murphy catheter 09/29/2021    Urinary tract infection associated with indwelling urethral catheter (HCC) 09/29/2021    CKD (chronic kidney disease), stage I 09/29/2021    Diarrhea 09/29/2021    Sepsis ([facility]) 09/22/2021    Draining cutaneous sinus tract 07/12/2018      Past Medical History        Past Medical History:   Diagnosis Date    Arthritis      Cancer ([facility]) 04/2019     renal    Hypertension      Prostate enlargement              Core Measures:  CVA: Yes     Activity:  Activity: In bedNumber times ambulated in hallways past shift:   Number of times OOB to chair past shift:   Cardiac:   Cardiac Monitoring: Yes     Access:   Current line(s):PIV  Genitourinary:   Urinary status: Chronic Murphy place OP by Urology     Respiratory:   O2 Device: None (Room air)     GI:   Current diet:  ADULT DIET; Regular     Tolerating current diet: Yes   Pain Management:   Patient states pain is manageable on current regimen:      Skin:  Mateusz Scale Score: 18  Interventions:     Patient

## 2024-04-10 NOTE — CARE COORDINATION
Transition of Care Plan:     RUR: 15% (low RUR)  Prior Level of Functioning: Needing some assistance  Disposition: SNF  If SNF or IPR: Date FOC offered: 04/08/2024  Date FOC received: 04/10/24  Accepting facility: Pending referrals to be sent  Date authorization started with reference number:   Date authorization received and expires:   Follow up appointments: defer to SNF  DME needed: defer to SNF  Transportation at discharge: BLS   IM/IMM Medicare/ letter given: 2nd needed prior to d/c  Is patient a [place] and connected with [facility]? N/a              If yes, was [place] transfer form completed and VA notified? N/a  Caregiver Contact: Faby Guallpa DTR, [phone]  Discharge Caregiver contacted prior to discharge? CM to contact  Care Conference needed? Not at this time  Barriers to discharge: Placement, 3 midnight stay    0850 - Choices left by DTR at bedside, referrals placed to [facility], [facility], and [facility]. Pt will meet 3 midnight stay 04/11. CM will follow-up for additional choices as needed.     1503 - [facility] declined, [facility] still pending, [facility] likely to accept - they are going to reach out to pt's family. CM continuing to follow.     1606 - Family would like to move forward with [facility] if they can accept, CM requesting update from facility and will follow. Possible d/c tomorrow pending bed availability. Could possibly need a UAI, family has recently applied for Medicaid.     NATY Olivas  Care Management  [facility]  [phone]

## 2024-04-11 LAB
ALBUMIN SERPL-MCNC: 2.5 G/DL (ref 3.5–5)
ANION GAP SERPL CALC-SCNC: 6 MMOL/L (ref 5–15)
BUN SERPL-MCNC: 19 MG/DL (ref 6–20)
BUN/CREAT SERPL: 11 (ref 12–20)
CALCIUM SERPL-MCNC: 8.1 MG/DL (ref 8.5–10.1)
CHLORIDE SERPL-SCNC: 115 MMOL/L (ref 97–108)
CO2 SERPL-SCNC: 18 MMOL/L (ref 21–32)
CREAT SERPL-MCNC: 1.67 MG/DL (ref 0.7–1.3)
GLUCOSE SERPL-MCNC: 99 MG/DL (ref 65–100)
PHOSPHATE SERPL-MCNC: 2.6 MG/DL (ref 2.6–4.7)
POTASSIUM SERPL-SCNC: 3.8 MMOL/L (ref 3.5–5.1)
SODIUM SERPL-SCNC: 139 MMOL/L (ref 136–145)

## 2024-04-11 PROCEDURE — 1100000003 HC PRIVATE W/ TELEMETRY

## 2024-04-11 PROCEDURE — 2580000003 HC RX 258: Performed by: INTERNAL MEDICINE

## 2024-04-11 PROCEDURE — 97116 GAIT TRAINING THERAPY: CPT

## 2024-04-11 PROCEDURE — 2580000003 HC RX 258: Performed by: STUDENT IN AN ORGANIZED HEALTH CARE EDUCATION/TRAINING PROGRAM

## 2024-04-11 PROCEDURE — 6370000000 HC RX 637 (ALT 250 FOR IP): Performed by: STUDENT IN AN ORGANIZED HEALTH CARE EDUCATION/TRAINING PROGRAM

## 2024-04-11 PROCEDURE — 6360000002 HC RX W HCPCS: Performed by: INTERNAL MEDICINE

## 2024-04-11 PROCEDURE — 80069 RENAL FUNCTION PANEL: CPT

## 2024-04-11 PROCEDURE — 6370000000 HC RX 637 (ALT 250 FOR IP): Performed by: NURSE PRACTITIONER

## 2024-04-11 PROCEDURE — 36415 COLL VENOUS BLD VENIPUNCTURE: CPT

## 2024-04-11 PROCEDURE — 6360000002 HC RX W HCPCS: Performed by: STUDENT IN AN ORGANIZED HEALTH CARE EDUCATION/TRAINING PROGRAM

## 2024-04-11 PROCEDURE — 97535 SELF CARE MNGMENT TRAINING: CPT

## 2024-04-11 RX ADMIN — MELATONIN 3 MG: at 21:37

## 2024-04-11 RX ADMIN — HEPARIN SODIUM 5000 UNITS: 5000 INJECTION INTRAVENOUS; SUBCUTANEOUS at 21:37

## 2024-04-11 RX ADMIN — SODIUM CHLORIDE, PRESERVATIVE FREE 10 ML: 5 INJECTION INTRAVENOUS at 09:30

## 2024-04-11 RX ADMIN — HEPARIN SODIUM 5000 UNITS: 5000 INJECTION INTRAVENOUS; SUBCUTANEOUS at 14:58

## 2024-04-11 RX ADMIN — ATENOLOL 50 MG: 25 TABLET ORAL at 09:29

## 2024-04-11 RX ADMIN — SODIUM CHLORIDE: 9 INJECTION, SOLUTION INTRAVENOUS at 15:08

## 2024-04-11 RX ADMIN — Medication: at 09:31

## 2024-04-11 RX ADMIN — TAMSULOSIN HYDROCHLORIDE 0.4 MG: 0.4 CAPSULE ORAL at 09:29

## 2024-04-11 RX ADMIN — SODIUM CHLORIDE 1000 MG: 900 INJECTION INTRAVENOUS at 09:42

## 2024-04-11 RX ADMIN — ROSUVASTATIN 10 MG: 20 TABLET, FILM COATED ORAL at 21:37

## 2024-04-11 RX ADMIN — SODIUM CHLORIDE: 9 INJECTION, SOLUTION INTRAVENOUS at 02:21

## 2024-04-11 RX ADMIN — Medication: at 21:43

## 2024-04-11 RX ADMIN — ASPIRIN 81 MG: 81 TABLET, CHEWABLE ORAL at 09:29

## 2024-04-11 RX ADMIN — POLYETHYLENE GLYCOL 3350 17 G: 17 POWDER, FOR SOLUTION ORAL at 15:07

## 2024-04-11 RX ADMIN — SODIUM CHLORIDE, PRESERVATIVE FREE 10 ML: 5 INJECTION INTRAVENOUS at 21:43

## 2024-04-11 RX ADMIN — HEPARIN SODIUM 5000 UNITS: 5000 INJECTION INTRAVENOUS; SUBCUTANEOUS at 05:11

## 2024-04-11 NOTE — PROGRESS NOTES
Clinic Care Coordination Contact  [facility]: Post-Discharge Note  SITUATION                                                      Admission:    Admission Date: 10/11/23   Reason for Admission: left-sided weakness and numbness  Discharge:   Discharge Date: 10/14/23  Discharge Diagnosis: Principal Problem:    Left-sided weakness  Active Problems:    Hyperlipemia    CKD (chronic kidney disease) stage 3, GFR 30-59 ml/min (H)    Acute ischemic stroke (H)    Hypertensive emergency    BACKGROUND                                                      Per hospital discharge summary and inpatient provider notes:    Fredy Garza is a 57 year old male with h/o  left-sided weakness and numbness     ASSESSMENT           Discharge Assessment  How are you doing now that you are home?: I think I have food poisoning. I ate lunch and 1/2 hour ago I threw up 3-4 times. my weakness is better, though, and I have no numbness  How are your symptoms? (Red Flag symptoms escalate to triage hotline per guidelines): Improved  Do you feel your condition is stable enough to be safe at home until your provider visit?: Yes  Does the patient have their discharge instructions? : Yes  Does the patient have questions regarding their discharge instructions? : No  Were you started on any new medications or were there changes to any of your previous medications? : No  Does the patient have all of their medications?: Yes  Do you have questions regarding any of your medications? : No  Do you have all of your needed medical supplies or equipment (DME)?  (i.e. oxygen tank, CPAP, cane, etc.): Yes (blood pressure monitor)  Discharge follow-up appointment scheduled within 14 calendar days? : Yes  Discharge Follow Up Appointment Date: 10/18/23  Discharge Follow Up Appointment Scheduled with?: Primary Care Provider         Post-op (Clinicians Only)  Did the patient have surgery or a procedure: No    Patient states he was doing well and worked today but felt  End of Shift Note     Bedside shift change report given to ED Boyd (Oncoming nurse) by ED Singh (Offgoing nurse). Report included the following information: SBAR, Kardex, Cardiac Rhythm sinus rhythm/sinus tachycardia, and Alarm Parameters.        Shift worked: 7p-7a   Shift summary and any significant changes:     Pt ambulated to bathroom with walker and standby. Indwelling urinary catheter patent and draining.Murphy care completed.Pending placement for discharge.                                 Concerns for physician to address:  None   Zone phone for oncoming shift:   [phone]      Patient Information  Steve Maharaj  89 y.o.  4/7/2024  6:44 PM by Karin No MD. Steve Maharaj was admitted from home.     Problem List          Patient Active Problem List     Diagnosis Date Noted    Stroke-like symptoms 04/08/2024    Benign paroxysmal positional vertigo 04/08/2024    Falls frequently 04/08/2024    Dizziness 04/08/2024    Generalized weakness 04/08/2024    Chronic indwelling Murphy catheter 09/29/2021    Urinary tract infection associated with indwelling urethral catheter (HCC) 09/29/2021    CKD (chronic kidney disease), stage I 09/29/2021    Diarrhea 09/29/2021    Sepsis ([facility]) 09/22/2021    Draining cutaneous sinus tract 07/12/2018      Past Medical History           Past Medical History:   Diagnosis Date    Arthritis      Cancer ([facility]) 04/2019     renal    Hypertension      Prostate enlargement              Core Measures:  CVA: Yes     Activity:  Activity: In bedNumber times ambulated in hallways past shift:   Number of times OOB to chair past shift:   Cardiac:   Cardiac Monitoring: Yes     Access:   Current line(s):PIV  Genitourinary:   Urinary status: Chronic Murphy place OP by Urology     Respiratory:   O2 Device: None (Room air)     GI:   Current diet:  ADULT DIET; Regular     Tolerating current diet: Yes   Pain Management:   Patient states pain is manageable on current regimen:      Skin:  Mateusz Scale  Score: 18  Interventions:     Patient Safety:  Fall Score: Green Total Score: 85  Interventions: bed/chair alarm     DVT prophylaxis:  DVT prophylaxis Med: Lovenox     Wounds: (If Applicable)  Wounds: N/A     Active Consults:  IP CONSULT TO NEUROLOGY     Length of Stay:  Expected LOS: 4  Actual LOS: 1  Discharge Plan: Home/SNF/IPR when medically stable                       ill and went home. Vomited x 3-4. No diarrhea. Wonders if he has food poisoning. He is laying down now and feels better. Advised to follow bland diet today. Patient hasn't checked his blood pressure but states his wife can check this and he is advised to bring readings to PCP visit on Wednesday. Taking lisinopril and lipitor as prescribed. Advised he should be hearing from neurology stroke  to schedule follow up appointment, as well as stroke RN to check in to see how he is doing. Patient with no questions or concerns at this time. [facility] provided to patient.       PLAN                                                      Outpatient Plan:    Follow up with primary care provider, Zain Roca, within 3-5days, to evaluate medication change and for hospital followup.  The following labs/tests are recommended: BMP and CBC, Blood pressure check.    Medication Therapy Management Referral  Pharmacist  This service is designed to help you get the most from your medications.  A specially trained pharmacist will work closely with you and your doctors  to solve any problems related to your medications and to help you get the  best results from taking them.  The Medication Therapy Management staff will call you to schedule an appointment.    Stroke Hospital Follow Up    [facility] will call you to coordinate care as prescribed by your provider. If you don t hear from a representative  within 2 business days, please call [phone].  Follow-up visits can be scheduled in [facility] by going to the 'Schedule an Appointment' activity in the menu where  you will see a ticket to schedule this visit. You can also call [phone] to schedule.    No future appointments.      For any urgent concerns, please contact our 24 hour nurse triage line: [phone] ([phone])         Yadira Ivan RN

## 2024-04-11 NOTE — PLAN OF CARE
Problem: Safety - Adult  Goal: Free from fall injury  Flowsheets (Taken 4/11/2024 1701)  Free From Fall Injury:   Instruct family/caregiver on patient safety   Based on caregiver fall risk screen, instruct family/caregiver to ask for assistance with transferring infant if caregiver noted to have fall risk factors

## 2024-04-11 NOTE — CARE COORDINATION
Transition of Care Plan:     RUR: 17% (low RUR)  Prior Level of Functioning: Needing some assistance  Disposition: SNF - [facility]  If SNF or IPR: Date FOC offered: 04/08/2024  Date FOC received: 04/10/24  Accepting facility: [facility]  Date authorization started with reference number:   Date authorization received and expires:   Follow up appointments: defer to SNF  DME needed: defer to SNF  Transportation at discharge: BLS   IM/IMM Medicare/ letter given: 2nd given 04/11  Is patient a [place] and connected with VA? N/a              If yes, was [place] transfer form completed and VA notified? N/a  Caregiver Contact: DESTINI RomeroR, [phone]  Discharge Caregiver contacted prior to discharge? CM contacted 04/11  Care Conference needed? Not at this time  Barriers to discharge: Bed availability     0850 - [facility] and [facility] declined, [facility] has accepted. [facility] reports they should have a bed available today, they will confirm with CM after morning meeting. Pt's DTR is updated and agreeable to moving forward with [facility].     0953 - [facility] reports they may not have a bed available until tomorrow, they will continue to keep CM up to date on bed availability. Avoidable days placed.     Transition of Care Plan to SNF/Rehab    Communication to Patient/Family:  Met with patient and family and they are agreeable to the transition plan. The Plan for Transition of Care is related to the following treatment goals: SNF    The Patient and/or patient representative was provided with a choice of provider and agrees  with the discharge plan.      Yes [x] No []    A Freedom of choice list was provided with basic dialogue that supports the patient's individualized plan of care/goals and shares the quality data associated with the providers.       Yes [x] No []    SNF/Rehab Transition:  Patient has been accepted to [facility] SNF/Rehab and meets criteria for admission.   Patient will

## 2024-04-11 NOTE — PROGRESS NOTES
Nursing contacted Nocturnist/cross cover provider and notified patient had 4 loose stools today s/p miralax, pt is asking for something to stop having BMs. No other concerns reported. VSS. At this time nurse to monitor as is likely 2/2 receiving miralax earlier in the day and will self- resolve. Will defer further evaluation/management to the day shift primary care team. Patient denies any further complaints or concerns. No acute distress reported. Nursing to notify Hospitalist for further/continued concerns. Will remain available overnight for further concerns if nursing/patient needs.   Non-billable note.

## 2024-04-11 NOTE — PLAN OF CARE
Problem: Physical Therapy - Adult  Goal: By Discharge: Performs mobility at highest level of function for planned discharge setting.  See evaluation for individualized goals.  Description: FUNCTIONAL STATUS PRIOR TO ADMISSION: Pt states his daughter comes over everyday to help him get dressed and another comes to help him sponge bathe at bed level. Requires assistance for all ADLs and transfers. Unclear of specifics due to pt being very [place].     HOME SUPPORT PRIOR TO ADMISSION: The patient lived with wife, daughters came by to provide assistance.    Physical Therapy Goals  Initiated 4/8/2024  1.  Patient will move from supine to sit and sit to supine, scoot up and down, and roll side to side in bed with modified independence within 7 day(s).    2.  Patient will perform sit to stand with supervision/set-up within 7 day(s).  3.  Patient will transfer from bed to chair and chair to bed with supervision/set-up using the least restrictive device within 7 day(s).  4.  Patient will ambulate with supervision/set-up for 50 feet with the least restrictive device within 7 day(s).   Outcome: Progressing   PHYSICAL THERAPY TREATMENT    Patient: Steve Maharaj (89 y.o. male)  Date: 4/11/2024  Diagnosis: Falls frequently [R29.6]  Stroke-like symptoms [R29.90]  Benign paroxysmal positional vertigo, unspecified laterality [H81.10] Stroke-like symptoms      Precautions: Fall Risk, Bed Alarm                      ASSESSMENT:  Patient continues to benefit from skilled PT services and is slowly progressing towards goals. He is received ambulating out of the restroom with PCT. Patient ambulates increased distance with use of RW. He demonstrates good gait speed and requires VC to keep the RW close to him. Patient is provided minimal assistance for safe hand and RW placement for sit to stand transfers.          PLAN:  Patient continues to benefit from skilled intervention to address the above impairments.  Continue treatment per

## 2024-04-11 NOTE — PLAN OF CARE
Problem: Pain  Goal: Verbalizes/displays adequate comfort level or baseline comfort level  Outcome: Progressing     Problem: Safety - Adult  Goal: Free from fall injury  Outcome: Progressing     Problem: Neurosensory - Adult  Goal: Absence of seizures  Outcome: Progressing     Problem: Neurosensory - Adult  Goal: Achieves maximal functionality and self care  Outcome: Progressing

## 2024-04-11 NOTE — PLAN OF CARE
Problem: Occupational Therapy - Adult  Goal: By Discharge: Performs self-care activities at highest level of function for planned discharge setting.  See evaluation for individualized goals.  Description: FUNCTIONAL STATUS PRIOR TO ADMISSION:  Pt states his daughter comes over everyday to help him get dressed and another comes to help him sponge bathe at bed level. Requires assistance for all ADLs and transfers. Unclear of specifics due to pt being very [place].   Receives Help From: Family, ADL Assistance: Needs assistance, Bath: Maximum assistance, Dressing: Maximum assistance, Grooming: Moderate assistance, Feeding: Independent, Toileting: Needs assistance, Homemaking Assistance: Needs assistance, Ambulation Assistance: Needs assistance, Transfer Assistance: Needs assistance, Active : No     HOME SUPPORT: Patient lived with wife and received help from daughters.    Occupational Therapy Goals:  Initiated 4/8/2024  1.  Patient will perform grooming with Stand by Assist standing at sink within 7 day(s).  2.  Patient will perform upper body dressing with Stand by Assist within 7 day(s).  3.  Patient will perform lower body dressing with Stand by Assist within 7 day(s).  4.  Patient will perform toilet transfers with Contact Guard Assist  within 7 day(s).  5.  Patient will perform all aspects of toileting with Contact Guard Assist within 7 day(s).  6.  Patient will participate in upper extremity therapeutic exercise/activities with Minimal Assist for 5 minutes within 7 day(s).    7.  Patient will utilize energy conservation techniques during functional activities with verbal cues within 7 day(s).  Outcome: Progressing     OCCUPATIONAL THERAPY TREATMENT  Patient: Steve Maharaj (89 y.o. male)  Date: 4/11/2024  Primary Diagnosis: Falls frequently [R29.6]  Stroke-like symptoms [R29.90]  Benign paroxysmal positional vertigo, unspecified laterality [H81.10]       Precautions: Fall Risk, Bed Alarm                Chart,

## 2024-04-11 NOTE — PROGRESS NOTES
Hospitalist Progress Note    NAME:   Steve Maharaj   : 1933   MRN: [id number]     Date/Time: 2024 6:00 PM  Patient PCP: Joseph Ugarte MD    Estimated discharge date:   Barriers: Placement      Assessment / Plan:    Multiple falls with ambulatory dysfunction  History of dizziness, hearing and vision problem rule out strokelike symptoms  CT scan of the head-1. No evidence of acute infarct or intracranial hemorrhage.  2. 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   Daily ASA  Continue statin  LDL 62.6  Hemoglobin A1c 4.6  MRI with no acute abnormality  Neurology now signed off, believing multifactorial symptoms secondary to metabolic derangements and UTI with occasional hypotension.  PT OT recommending placement.  Fall precautions  NIHSS per shift    Acute UTI  Urinalysis is positive and patient symptoms are likely explained by acute UTI.    Cultures positive for alpha strep and Proteus-pansensitive.  Blood cultures kkuleeg-irisht-ea tomorrow  Continue IV ceftriaxone-can transition to orals tomorrow     ELIAS on CKD  Status post left nephrectomy  Will get urine studies  Renal ultrasound shows increased echogenicity of right kidney consistent with medical renal disease, no right hydronephrosis, status post left nephrectomy.  Check a.m. labs  Ensure good outpatient follow-up routinely with Dr. Vitale-reviewed Murphy catheter care and maintenance today at bedside with daughter.     Mild anemia-normocytic  Hemoglobin 10.9  Get iron studies, occult blood  No current complaints of acute blood loss     Hypertension  BPH  Continue home medications    Medical Decision Making:   I personally reviewed labs: BMP  I personally reviewed imaging:    I personally reviewed EKG:  Toxic drug monitoring:    Discussed case with:          Code Status: Full code  DVT Prophylaxis: SQ heparin  GI Prophylaxis:    Subjective:     Chief Complaint / Reason for

## 2024-04-11 NOTE — PROGRESS NOTES
85  Interventions: bed/chair alarm     DVT prophylaxis:  DVT prophylaxis Med: Lovenox     Wounds: (If Applicable)  Wounds: N/A     Active Consults:  IP CONSULT TO NEUROLOGY     Length of Stay:  Expected LOS: 4  Actual LOS: 1  Discharge Plan: Home/SNF/IPR when medically stable

## 2024-04-12 VITALS
HEIGHT: 72 IN | OXYGEN SATURATION: 100 % | WEIGHT: 145 LBS | BODY MASS INDEX: 19.64 KG/M2 | DIASTOLIC BLOOD PRESSURE: 83 MMHG | RESPIRATION RATE: 18 BRPM | TEMPERATURE: 97.5 F | SYSTOLIC BLOOD PRESSURE: 152 MMHG | HEART RATE: 56 BPM

## 2024-04-12 LAB
ANION GAP SERPL CALC-SCNC: 4 MMOL/L (ref 5–15)
ANION GAP SERPL CALC-SCNC: 6 MMOL/L (ref 5–15)
BASOPHILS # BLD: 0 K/UL (ref 0–0.1)
BASOPHILS NFR BLD: 1 % (ref 0–1)
BUN SERPL-MCNC: 15 MG/DL (ref 6–20)
BUN SERPL-MCNC: 17 MG/DL (ref 6–20)
BUN/CREAT SERPL: 10 (ref 12–20)
BUN/CREAT SERPL: 9 (ref 12–20)
CALCIUM SERPL-MCNC: 8.3 MG/DL (ref 8.5–10.1)
CALCIUM SERPL-MCNC: 8.8 MG/DL (ref 8.5–10.1)
CHLORIDE SERPL-SCNC: 116 MMOL/L (ref 97–108)
CHLORIDE SERPL-SCNC: 116 MMOL/L (ref 97–108)
CO2 SERPL-SCNC: 19 MMOL/L (ref 21–32)
CO2 SERPL-SCNC: 19 MMOL/L (ref 21–32)
CREAT SERPL-MCNC: 1.6 MG/DL (ref 0.7–1.3)
CREAT SERPL-MCNC: 1.66 MG/DL (ref 0.7–1.3)
DIFFERENTIAL METHOD BLD: ABNORMAL
EOSINOPHIL # BLD: 0 K/UL (ref 0–0.4)
EOSINOPHIL NFR BLD: 0 % (ref 0–7)
ERYTHROCYTE [DISTWIDTH] IN BLOOD BY AUTOMATED COUNT: 13.4 % (ref 11.5–14.5)
GLUCOSE SERPL-MCNC: 93 MG/DL (ref 65–100)
GLUCOSE SERPL-MCNC: 96 MG/DL (ref 65–100)
HCT VFR BLD AUTO: 28.8 % (ref 36.6–50.3)
HGB BLD-MCNC: 9.1 G/DL (ref 12.1–17)
IMM GRANULOCYTES # BLD AUTO: 0.1 K/UL (ref 0–0.04)
IMM GRANULOCYTES NFR BLD AUTO: 1 % (ref 0–0.5)
INR PPP: 1 (ref 0.9–1.1)
LYMPHOCYTES # BLD: 1.2 K/UL (ref 0.8–3.5)
LYMPHOCYTES NFR BLD: 28 % (ref 12–49)
MAGNESIUM SERPL-MCNC: 1.6 MG/DL (ref 1.6–2.4)
MCH RBC QN AUTO: 30.6 PG (ref 26–34)
MCHC RBC AUTO-ENTMCNC: 31.6 G/DL (ref 30–36.5)
MCV RBC AUTO: 97 FL (ref 80–99)
MONOCYTES # BLD: 0.3 K/UL (ref 0–1)
MONOCYTES NFR BLD: 7 % (ref 5–13)
NEUTS SEG # BLD: 2.7 K/UL (ref 1.8–8)
NEUTS SEG NFR BLD: 63 % (ref 32–75)
NRBC # BLD: 0 K/UL (ref 0–0.01)
NRBC BLD-RTO: 0 PER 100 WBC
PLATELET # BLD AUTO: 255 K/UL (ref 150–400)
PMV BLD AUTO: 9.6 FL (ref 8.9–12.9)
POTASSIUM SERPL-SCNC: 3.8 MMOL/L (ref 3.5–5.1)
POTASSIUM SERPL-SCNC: 3.8 MMOL/L (ref 3.5–5.1)
PROTHROMBIN TIME: 10.9 SEC (ref 9–11.1)
RBC # BLD AUTO: 2.97 M/UL (ref 4.1–5.7)
SODIUM SERPL-SCNC: 139 MMOL/L (ref 136–145)
SODIUM SERPL-SCNC: 141 MMOL/L (ref 136–145)
WBC # BLD AUTO: 4.4 K/UL (ref 4.1–11.1)

## 2024-04-12 PROCEDURE — 80048 BASIC METABOLIC PNL TOTAL CA: CPT

## 2024-04-12 PROCEDURE — 6370000000 HC RX 637 (ALT 250 FOR IP): Performed by: STUDENT IN AN ORGANIZED HEALTH CARE EDUCATION/TRAINING PROGRAM

## 2024-04-12 PROCEDURE — 85610 PROTHROMBIN TIME: CPT

## 2024-04-12 PROCEDURE — 2580000003 HC RX 258: Performed by: STUDENT IN AN ORGANIZED HEALTH CARE EDUCATION/TRAINING PROGRAM

## 2024-04-12 PROCEDURE — 83735 ASSAY OF MAGNESIUM: CPT

## 2024-04-12 PROCEDURE — 85025 COMPLETE CBC W/AUTO DIFF WBC: CPT

## 2024-04-12 PROCEDURE — 36415 COLL VENOUS BLD VENIPUNCTURE: CPT

## 2024-04-12 RX ORDER — MECLIZINE HCL 12.5 MG/1
12.5 TABLET ORAL 3 TIMES DAILY PRN
Status: DISCONTINUED | OUTPATIENT
Start: 2024-04-12 | End: 2024-04-12 | Stop reason: HOSPADM

## 2024-04-12 RX ORDER — SULFAMETHOXAZOLE AND TRIMETHOPRIM 400; 80 MG/1; MG/1
1 TABLET ORAL EVERY 12 HOURS SCHEDULED
Status: DISCONTINUED | OUTPATIENT
Start: 2024-04-12 | End: 2024-04-12

## 2024-04-12 RX ORDER — ASPIRIN 81 MG/1
81 TABLET, CHEWABLE ORAL DAILY
Qty: 30 TABLET | Refills: 0 | Status: SHIPPED | OUTPATIENT
Start: 2024-04-13

## 2024-04-12 RX ORDER — SULFAMETHOXAZOLE AND TRIMETHOPRIM 400; 80 MG/1; MG/1
1 TABLET ORAL DAILY
Status: DISCONTINUED | OUTPATIENT
Start: 2024-04-13 | End: 2024-04-12 | Stop reason: HOSPADM

## 2024-04-12 RX ORDER — SULFAMETHOXAZOLE AND TRIMETHOPRIM 800; 160 MG/1; MG/1
1 TABLET ORAL EVERY 12 HOURS SCHEDULED
Status: DISCONTINUED | OUTPATIENT
Start: 2024-04-12 | End: 2024-04-12

## 2024-04-12 RX ORDER — SULFAMETHOXAZOLE AND TRIMETHOPRIM 400; 80 MG/1; MG/1
1 TABLET ORAL DAILY
Qty: 5 TABLET | Refills: 0 | Status: SHIPPED | OUTPATIENT
Start: 2024-04-13 | End: 2024-04-18

## 2024-04-12 RX ORDER — MECLIZINE HCL 12.5 MG/1
12.5 TABLET ORAL 3 TIMES DAILY PRN
Qty: 20 TABLET | Refills: 0 | Status: SHIPPED | OUTPATIENT
Start: 2024-04-12 | End: 2024-04-22

## 2024-04-12 RX ADMIN — ASPIRIN 81 MG: 81 TABLET, CHEWABLE ORAL at 09:06

## 2024-04-12 RX ADMIN — SODIUM CHLORIDE: 9 INJECTION, SOLUTION INTRAVENOUS at 04:43

## 2024-04-12 RX ADMIN — SULFAMETHOXAZOLE AND TRIMETHOPRIM 1 TABLET: 400; 80 TABLET ORAL at 13:23

## 2024-04-12 RX ADMIN — Medication: at 09:06

## 2024-04-12 RX ADMIN — TAMSULOSIN HYDROCHLORIDE 0.4 MG: 0.4 CAPSULE ORAL at 09:06

## 2024-04-12 RX ADMIN — MECLIZINE 12.5 MG: 12.5 TABLET ORAL at 13:24

## 2024-04-12 ASSESSMENT — PAIN SCALES - GENERAL: PAINLEVEL_OUTOF10: 0

## 2024-04-12 NOTE — PROGRESS NOTES
Physician Progress Note      PATIENT:               CAMELIA PADILLA  CSN #:                  [id number]  :                       1933  ADMIT DATE:       2024 6:44 PM  DISCH DATE:  RESPONDING  PROVIDER #:        Antonietta Garcia MD          QUERY TEXT:    Patient admitted with Generalized weakness and dizziness, noted to have CKD.   If possible, please document in progress notes and discharge summary if you   are evaluating and/or treating any of the following:    The medical record reflects the following:  Risk Factors: Hx: Renal CA S/p Left Nephrectomy; HTN; Prostate Enlargement;   Chronic Murphy  Clinical Indicators: () Bun/Cr: 45/3.28; GFR: 17; (): Bun/Cr: 42/2.72;   GFR: 22; (): Bun/Cr: 34/2.40; GFR: 25.......Previous admit (): Bun/Cr:   26/1.33; GFR: >60; US Retroperitoneal: Increased echogenicity of the right   kidney consistent with the clinical history of medical renal parenchymal   disease; Left nephrectomy       AP: \"ELIAS on CKD\".     IM PN: \": Renal ultrasound shows increased echogenicity of right kidney   consistent with medical renal disease, no right hydronephrosis, status post   left nephrectomy.  BUN/creatinine of 35/2.4 and kidney function is still getting better.    Baseline creatinine is between 1.3-2.0.  Hold chlorthalidone for now\".    Treatment: Monitor Cr.; Urine studies; Gentle hydration; US Retroperitoneal;    Thank you,    [email or web address]  Options provided:  -- CKD Stage 1 GFR>90  -- CKD Stage 2 GFR 60-90  -- CKD Stage 3a GFR 45-59  -- CKD Stage 3b GFR 30-44  -- CKD Stage 4 GFR 15-29  -- CKD Stage 5 GFR<15  -- ESRD  -- Other - I will add my own diagnosis  -- Disagree - Not applicable / Not valid  -- Disagree - Clinically unable to determine / Unknown  -- Refer to Clinical Documentation Reviewer    PROVIDER RESPONSE TEXT:    This patient has CKD Stage 4.    Query created by: Marya Pratt on 2024 8:57 AM      QUERY TEXT:    Pt  Rocephin\"    Treatment: UA; UCx; IVF; IV Abx; BCx x 2 sets    Thank you,    [email or web address]  Options provided:  -- UTI due to chronic indwelling urinary catheter  -- UTI not due to indwelling urinary catheter  -- Other - I will add my own diagnosis  -- Disagree - Not applicable / Not valid  -- Disagree - Clinically unable to determine / Unknown  -- Refer to Clinical Documentation Reviewer    PROVIDER RESPONSE TEXT:    UTI is due to the chronic indwelling urinary catheter.    Query created by: Marya Pratt on 4/11/2024 8:59 AM      Electronically signed by:  Antonietta Garcia MD 4/12/2024 1:59 PM

## 2024-04-12 NOTE — CARE COORDINATION
SNF - [facility]. Rooom # [id number], call report to [phone]. [facility] to transport at 1500. Clear from CM.     Transition of Care Plan:    RUR: 15% (moderate RUR)  Prior Level of Functioning: Needing some assistance   Disposition: SNF - [facility]   If SNF or IPR: Date FOC offered: 04/08/24  Date FOC received: 04/10/24  Accepting facility: [facility]   Date authorization started with reference number:   Date authorization received and expires:   Follow up appointments: defer to SNF   DME needed: defer to SNF   Transportation at discharge: [facility] @ 1500  IM/IMM Medicare/ letter given: 2nd given 04/11  Is patient a  and connected with [facility]? N/a   If yes, was  transfer form completed and [facility] notified? N/a  Caregiver Contact: Faby Guallpa; DTR; [phone]  Discharge Caregiver contacted prior to discharge? CM contacted  Care Conference needed? Not at this time  Barriers to discharge: None    0853 - Pt to d/c to [facility] today, room # 203A. Facility requesting transport at 1500. Transport packet complete and in chart, [facility] working to secure [facility]. CM to complete UAI for [facility] program at family's request.     1201 - Contacted by [facility]  at [phone]. UAI to be faxed to Kathy @ [phone] once approved.     1615 - UAI complete, pending MD signature. CM will fax once complete.     Transition of Care Plan to SNF/Rehab    Communication to Patient/Family:  Met with patient and family and they are agreeable to the transition plan. The Plan for Transition of Care is related to the following treatment goals: SNF    The Patient and/or patient representative was provided with a choice of provider and agrees  with the discharge plan.      Yes [x] No []    A Freedom of choice list was provided with basic dialogue that supports the patient's individualized plan of care/goals and shares the quality data associated with the providers.       Yes [x] No []    SNF/Rehab Transition:  Patient has

## 2024-04-12 NOTE — PROGRESS NOTES
Nursing contacted Nocturnist/cross cover provider and notified patient having some \"carlo blood\" reported in the carpenter, nurse reported the anchor was replaced to prevent pulling further. No other concerns reported.  VSS. Ordered stat cbc, bmp, mag, pt- pending. Heparin order sq dvt ppx placed on hold for now- can be readdressed this am with dayshift. Will defer urology consult to dayshift if pt continues to have bleeding in the carpenter. Suspected this is from pulling at the catheter at this time. Will see what h/h shows compared to prior. No pain or blood clots reported. Will defer further evaluation/management to the day shift primary care team. Will initiate SCDs while heparin sq on hold for dvt ppx. Patient denies any further complaints or concerns. No acute distress reported. Nursing to notify Hospitalist for further/continued concerns. Will remain available overnight for further concerns if nursing/patient needs.     Non-billable note.

## 2024-04-12 NOTE — DISCHARGE INSTRUCTIONS
All of your medications from before your hospitalization are the same EXCEPT:   Your new prescription for you to start is bactrim for your UTI and meclizine for dizziness.  Please take all of your medications as prescribed. If prescribed any medications, please read all pharmacy instructions and inserts. Inform your doctor and pharmacist about all other medications and alternative therapies.  Please follow up with your PCP in 1-2 weeks to be reassessed after your hospital stay.  Please also follow up with [facility].  If you start feeling any symptoms similar to what brought you into the hospital, please come back to the ED to be re-evaluated.

## 2024-04-12 NOTE — PLAN OF CARE
Problem: Pain  Goal: Verbalizes/displays adequate comfort level or baseline comfort level  Outcome: Progressing     Problem: Safety - Adult  Goal: Free from fall injury  4/11/2024 2259 by Samantha Hector RN  Outcome: Progressing  4/11/2024 1705 by Deb Mccurdy RN  Flowsheets (Taken 4/11/2024 1705)  Free From Fall Injury:   Instruct family/caregiver on patient safety   Based on caregiver fall risk screen, instruct family/caregiver to ask for assistance with transferring infant if caregiver noted to have fall risk factors     Problem: Discharge Planning  Goal: Discharge to home or other facility with appropriate resources  Outcome: Progressing

## 2024-04-12 NOTE — PROGRESS NOTES
End of Shift Note     Bedside shift change report given to ED Portillo (Oncoming nurse) by Samantha WARD (Offgoing nurse). Report included the following information: SBAR, Kardex, Cardiac Rhythm sinus rhythm/sinus tachycardia, and Alarm Parameters.        Shift worked: nights   Shift summary and any significant changes:     Harpreet bleeding from indwelling carpenter cath.LIP aware. Heparin held and labs obtained per order. Pt without symptoms.                                 Concerns for physician to address:     Zone phone for oncoming shift:   [phone]      Patient Information  Steve Maharaj  89 y.o.  4/7/2024  6:44 PM by Karin No MD. Steve Maharaj was admitted from home.     Problem List          Patient Active Problem List     Diagnosis Date Noted    Stroke-like symptoms 04/08/2024    Benign paroxysmal positional vertigo 04/08/2024    Falls frequently 04/08/2024    Dizziness 04/08/2024    Generalized weakness 04/08/2024    Chronic indwelling Carpenter catheter 09/29/2021    Urinary tract infection associated with indwelling urethral catheter (HCC) 09/29/2021    CKD (chronic kidney disease), stage I 09/29/2021    Diarrhea 09/29/2021    Sepsis (Formerly [facility]) 09/22/2021    Draining cutaneous sinus tract 07/12/2018      Past Medical History           Past Medical History:   Diagnosis Date    Arthritis      Cancer (Formerly [facility]) 04/2019     renal    Hypertension      Prostate enlargement              Core Measures:  CVA: Yes     Activity:  Activity: In bedNumber times ambulated in hallways past shift:   Number of times OOB to chair past shift:   Cardiac:   Cardiac Monitoring: Yes     Access:   Current line(s):PIV  Genitourinary:   Urinary status: Chronic Carpenter place OP by Urology     Respiratory:   O2 Device: None (Room air)     GI:   Current diet:  ADULT DIET; Regular     Tolerating current diet: Yes   Pain Management:   Patient states pain is manageable on current regimen:      Skin:  Mateusz Scale Score: 18  Interventions:     Patient

## 2024-04-12 NOTE — DISCHARGE SUMMARY
Discharge Summary    Name: Steve Maharaj  [id number]  YOB: 1933 (Age: 89 y.o.)   Date of Admission: 4/7/2024  Date of Discharge: 4/12/2024  Attending Physician: Antonietta Garcia MD    Discharge Diagnosis:     Multiple falls with ambulatory dysfunction  History of dizziness, hearing and vision problem rule out strokelike symptoms  UTI, secondary to chronic Murphy  ELIAS on CKD III  Status post left nephrectomy  Mild anemia-normocytic -of chronic disease  Hypertension  BPH    Consultations:  IP CONSULT TO NEUROLOGY      Brief Admission History/Reason for Admission Per Karin No MD:     Steve Maharaj is a 89 y.o.  male with PMHx significant as below was brought to the ED for evaluation after patient was found to have multiple falls since the past 2 days.  Per wife patient has been having falls in the past 2 however he has not mention to anybody about it.  Since Friday and Saturday patient had fallen about 2 times.  He has not hit his head or lost his consciousness during both the times.  Patient mentioned that his legs feel weak and then he falls after that.  He also mention his vision was different as he was not seeing properly.  His wife mentioned his hearing has been poor for a long time.  No complaints of any focal neurological deficit.  No seizure-like episode.  No tingling numbness of explained by the patient.     No recent chest pain palpitations or shortness of breath.  No neck stiffness.  No fever.     Review of systems-negative except above     In the ED, CT head was done and patient received a dose of aspirin  We were asked to admit for work up and evaluation of the above problems.     Brief Hospital Course by Main Problems:     Multiple falls with ambulatory dysfunction  History of dizziness, hearing and vision problem rule out strokelike symptoms  CT scan of the head-1. No evidence of acute infarct or intracranial hemorrhage.  2. Periventricular

## 2024-04-12 NOTE — PROGRESS NOTES
Report called and given to Ann Mayfield LPN at [facility].  Information given in SBAR format with imaging results included.  Patient to be picked up at 1500 by [facility].  Opportunity given to ask questions and all questions answered with contact information given incase there are any further questions.  Patient will be going with Murphy Catheter.

## 2024-04-12 NOTE — PLAN OF CARE
Problem: Discharge Planning  Goal: Discharge to home or other facility with appropriate resources  4/12/2024 1027 by Sangeeta Chew RN  Outcome: Completed  4/11/2024 2259 by Samantha Hector RN  Outcome: Progressing     Problem: Pain  Goal: Verbalizes/displays adequate comfort level or baseline comfort level  4/12/2024 1027 by Sangeeta Chew RN  Outcome: Completed  4/11/2024 2259 by Samantha Hector RN  Outcome: Progressing     Problem: Safety - Adult  Goal: Free from fall injury  4/12/2024 1027 by Sangeeta Chew RN  Outcome: Completed  4/11/2024 2259 by Samantha Hector RN  Outcome: Progressing     Problem: ABCDS Injury Assessment  Goal: Absence of physical injury  4/12/2024 1027 by Sangeeta Chew RN  Outcome: Completed  4/11/2024 2259 by Samantha Hector RN  Outcome: Progressing     Problem: Neurosensory - Adult  Goal: Achieves stable or improved neurological status  4/12/2024 1027 by Sangeeta Chew RN  Outcome: Completed  4/11/2024 2259 by Samantha Hector RN  Outcome: Progressing  Goal: Absence of seizures  4/12/2024 1027 by Sangeeta Chew RN  Outcome: Completed  4/11/2024 2259 by Samantha Hector RN  Outcome: Progressing  Goal: Remains free of injury related to seizures activity  4/12/2024 1027 by Sangeeta Chew RN  Outcome: Completed  4/11/2024 2259 by Samantha Hector RN  Outcome: Progressing  Goal: Achieves maximal functionality and self care  4/12/2024 1027 by Sangeeta Chew RN  Outcome: Completed  4/11/2024 2259 by Samantha Hector RN  Outcome: Progressing     Problem: Musculoskeletal - Adult  Goal: Return mobility to safest level of function  4/12/2024 1027 by Sangeeta Chew RN  Outcome: Completed  4/11/2024 2259 by Samantha Hector RN  Outcome: Progressing  Goal: Maintain proper alignment of affected body part  4/12/2024 1027 by Sangeeta Chew RN  Outcome: Completed  4/11/2024 2259 by Samantha Hector RN  Outcome: Progressing  Goal: Return ADL status to a safe level of function  4/12/2024 1027 by Sangeeta Chew,  RN  Outcome: Completed  4/11/2024 2259 by Samantha Hector RN  Outcome: Progressing     Problem: Genitourinary - Adult  Goal: Absence of urinary retention  4/12/2024 1027 by Sangeeta Chew RN  Outcome: Completed  4/11/2024 2259 by Samantha Hector RN  Outcome: Progressing  Goal: Urinary catheter remains patent  4/12/2024 1027 by Sangeeta Chew RN  Outcome: Completed  4/11/2024 2259 by Samantha Hector RN  Outcome: Progressing     Problem: Skin/Tissue Integrity - Adult  Goal: Skin integrity remains intact  4/12/2024 1027 by Sangeeta Chew RN  Outcome: Completed  4/11/2024 2259 by Samantha Hector RN  Outcome: Progressing  Goal: Incisions, wounds, or drain sites healing without S/S of infection  4/12/2024 1027 by Sangeeta Chew RN  Outcome: Completed  4/11/2024 2259 by Samantha Hector RN  Outcome: Progressing  Goal: Oral mucous membranes remain intact  4/12/2024 1027 by Sangeeta Chew RN  Outcome: Completed  4/11/2024 2259 by Samantha Hector RN  Outcome: Progressing     Problem: Skin/Tissue Integrity  Goal: Absence of new skin breakdown  Description: 1.  Monitor for areas of redness and/or skin breakdown  2.  Assess vascular access sites hourly  3.  Every 4-6 hours minimum:  Change oxygen saturation probe site  4.  Every 4-6 hours:  If on nasal continuous positive airway pressure, respiratory therapy assess nares and determine need for appliance change or resting period.  4/12/2024 1027 by Sangeeta Chew RN  Outcome: Completed  4/11/2024 2259 by Samantha Hector RN  Outcome: Progressing     Problem: Chronic Conditions and Co-morbidities  Goal: Patient's chronic conditions and co-morbidity symptoms are monitored and maintained or improved  4/12/2024 1027 by Sangeeta Chew RN  Outcome: Completed  4/11/2024 2259 by Samantha Hector RN  Outcome: Progressing

## 2024-04-14 LAB
BACTERIA SPEC CULT: NORMAL
BACTERIA SPEC CULT: NORMAL
SERVICE CMNT-IMP: NORMAL
SERVICE CMNT-IMP: NORMAL

## 2024-04-27 ENCOUNTER — HOSPITAL ENCOUNTER (EMERGENCY)
Facility: HOSPITAL | Age: 89
Discharge: HOME | End: 2024-04-27
Attending: STUDENT IN AN ORGANIZED HEALTH CARE EDUCATION/TRAINING PROGRAM
Payer: COMMERCIAL

## 2024-04-27 VITALS
OXYGEN SATURATION: 96 % | RESPIRATION RATE: 16 BRPM | TEMPERATURE: 98.7 F | SYSTOLIC BLOOD PRESSURE: 141 MMHG | DIASTOLIC BLOOD PRESSURE: 73 MMHG | HEART RATE: 87 BPM

## 2024-04-27 DIAGNOSIS — N30.01 ACUTE CYSTITIS WITH HEMATURIA: Primary | ICD-10-CM

## 2024-04-27 LAB
APPEARANCE UR: ABNORMAL
BACTERIA URNS QL MICRO: ABNORMAL /HPF
BILIRUB UR QL: NEGATIVE
COLOR UR: ABNORMAL
EPITH CASTS URNS QL MICRO: ABNORMAL /LPF
GLUCOSE UR STRIP.AUTO-MCNC: NEGATIVE MG/DL
HGB UR QL STRIP: ABNORMAL
KETONES UR QL STRIP.AUTO: NEGATIVE MG/DL
LEUKOCYTE ESTERASE UR QL STRIP.AUTO: ABNORMAL
NITRITE UR QL STRIP.AUTO: POSITIVE
PH UR STRIP: 5.5 (ref 5–8)
PROT UR STRIP-MCNC: 100 MG/DL
RBC #/AREA URNS HPF: ABNORMAL /HPF (ref 0–5)
SP GR UR REFRACTOMETRY: 1.01
URINE CULTURE IF INDICATED: ABNORMAL
UROBILINOGEN UR QL STRIP.AUTO: 1 EU/DL (ref 0.2–1)
WBC URNS QL MICRO: >100 /HPF (ref 0–4)

## 2024-04-27 PROCEDURE — 87088 URINE BACTERIA CULTURE: CPT

## 2024-04-27 PROCEDURE — 87186 SC STD MICRODIL/AGAR DIL: CPT

## 2024-04-27 PROCEDURE — 81001 URINALYSIS AUTO W/SCOPE: CPT

## 2024-04-27 PROCEDURE — 51702 INSERT TEMP BLADDER CATH: CPT

## 2024-04-27 PROCEDURE — 6370000000 HC RX 637 (ALT 250 FOR IP): Performed by: STUDENT IN AN ORGANIZED HEALTH CARE EDUCATION/TRAINING PROGRAM

## 2024-04-27 PROCEDURE — 99283 EMERGENCY DEPT VISIT LOW MDM: CPT

## 2024-04-27 PROCEDURE — 87086 URINE CULTURE/COLONY COUNT: CPT

## 2024-04-27 RX ORDER — CEFDINIR 300 MG/1
300 CAPSULE ORAL DAILY
Qty: 10 CAPSULE | Refills: 0 | Status: SHIPPED | OUTPATIENT
Start: 2024-04-27 | End: 2024-04-27

## 2024-04-27 RX ORDER — LIDOCAINE HYDROCHLORIDE 20 MG/ML
JELLY TOPICAL PRN
Status: DISCONTINUED | OUTPATIENT
Start: 2024-04-27 | End: 2024-04-28 | Stop reason: HOSPADM

## 2024-04-27 RX ORDER — OXYBUTYNIN CHLORIDE 5 MG/1
2.5 TABLET ORAL 3 TIMES DAILY PRN
Qty: 8 TABLET | Refills: 0 | Status: SHIPPED | OUTPATIENT
Start: 2024-04-27 | End: 2024-04-27

## 2024-04-27 RX ORDER — OXYBUTYNIN CHLORIDE 5 MG/1
2.5 TABLET ORAL 3 TIMES DAILY PRN
Qty: 8 TABLET | Refills: 0 | Status: SHIPPED | OUTPATIENT
Start: 2024-04-27 | End: 2024-05-02

## 2024-04-27 RX ORDER — CEFDINIR 300 MG/1
300 CAPSULE ORAL DAILY
Qty: 10 CAPSULE | Refills: 0 | Status: SHIPPED | OUTPATIENT
Start: 2024-04-27 | End: 2024-05-07

## 2024-04-27 RX ORDER — CEFDINIR 300 MG/1
300 CAPSULE ORAL ONCE
Status: COMPLETED | OUTPATIENT
Start: 2024-04-27 | End: 2024-04-27

## 2024-04-27 RX ADMIN — LIDOCAINE HYDROCHLORIDE: 20 JELLY TOPICAL at 20:30

## 2024-04-27 RX ADMIN — CEFDINIR 300 MG: 300 CAPSULE ORAL at 21:15

## 2024-04-28 NOTE — ED PROVIDER NOTES
EXCISION  07/13/2018    excision of left flank        Family History:  Family History   Problem Relation Age of Onset    Hypertension Mother     Cancer Daughter         Breast ca - stage 1       Social History:  Social History     Tobacco Use    Smoking status: Never    Smokeless tobacco: Never   Substance Use Topics    Alcohol use: No    Drug use: No       Allergies:  No Known Allergies    CURRENT MEDICATIONS      Discharge Medication List as of 4/27/2024  9:18 PM        CONTINUE these medications which have NOT CHANGED    Details   aspirin 81 MG chewable tablet Take 1 tablet by mouth daily, Disp-30 tablet, R-0Normal      acetaminophen (TYLENOL) 325 MG tablet Take by mouth every 6 hours as neededHistorical Med      atenolol-chlorthalidone (TENORETIC) 50-25 MG per tablet Take 1 tablet by mouth dailyHistorical Med      finasteride (PROSCAR) 5 MG tablet Take 1 tablet by mouth dailyHistorical Med      melatonin 3 MG TABS tablet Take by mouthHistorical Med      tamsulosin (FLOMAX) 0.4 MG capsule Take by mouth dailyHistorical Med             SCREENINGS               No data recorded         PHYSICAL EXAM      ED Triage Vitals [04/27/24 1908]   Enc Vitals Group      /86      Pulse 87      Respirations 16      Temp 98.7 °F (37.1 °C)      Temp Source Oral      SpO2 96 %      Weight       Height       Head Circumference       Peak Flow       Pain Score       Pain Loc       Pain Edu?       Excl. in GC?         Physical Exam  Vital signs reviewed and documented in EMR  Nontoxic and well-appearing  No acute distress  No tachycardia  Breathing symmetric, no acc muscle use  Abdomen nondistended, soft, nontender  Responding to commands  Murphy catheter in place with orange-colored urine  Bladder scan with 100mL     DIAGNOSTIC RESULTS   LABS:     Recent Results (from the past 24 hour(s))   Urinalysis with Reflex to Culture    Collection Time: 04/27/24  8:41 PM    Specimen: Urine   Result Value Ref Range    Color, UA DARK

## 2024-04-30 LAB
BACTERIA SPEC CULT: ABNORMAL
CC UR VC: ABNORMAL
SERVICE CMNT-IMP: ABNORMAL

## 2024-07-28 ENCOUNTER — HOSPITAL ENCOUNTER (EMERGENCY)
Facility: HOSPITAL | Age: 89
Discharge: HOME | End: 2024-07-28
Attending: EMERGENCY MEDICINE
Payer: COMMERCIAL

## 2024-07-28 VITALS
OXYGEN SATURATION: 99 % | BODY MASS INDEX: 19.64 KG/M2 | SYSTOLIC BLOOD PRESSURE: 137 MMHG | WEIGHT: 145 LBS | DIASTOLIC BLOOD PRESSURE: 65 MMHG | RESPIRATION RATE: 18 BRPM | TEMPERATURE: 98 F | HEIGHT: 72 IN | HEART RATE: 49 BPM

## 2024-07-28 DIAGNOSIS — T83.011A MALFUNCTION OF FOLEY CATHETER, INITIAL ENCOUNTER (HCC): Primary | ICD-10-CM

## 2024-07-28 PROCEDURE — 99283 EMERGENCY DEPT VISIT LOW MDM: CPT

## 2024-07-28 PROCEDURE — 51702 INSERT TEMP BLADDER CATH: CPT

## 2024-07-28 ASSESSMENT — LIFESTYLE VARIABLES
HOW OFTEN DO YOU HAVE A DRINK CONTAINING ALCOHOL: NEVER
HOW MANY STANDARD DRINKS CONTAINING ALCOHOL DO YOU HAVE ON A TYPICAL DAY: PATIENT DOES NOT DRINK

## 2024-07-28 NOTE — DISCHARGE INSTRUCTIONS
Please follow-up with your primary care doctor and urologist.  The Murphy catheter switch today.  Please return for new or worsening symptoms anytime.

## 2024-07-28 NOTE — ED NOTES
Discharge paperwork reviewed and provided to pt. Time was given to ask any questions or concerns which there were none att.      soft

## 2024-07-28 NOTE — ED TRIAGE NOTES
Pt presents in a wheelchair to ED via triage for report of his urinary catheter leaking at the penis. Pt's daughter last saw him around 9pm yesterday and it wasn't leaking; she believes it started leaking some time after until this morning. Her brother called her and told her it was leaking from his penis. Pt asymptomatic att with no complaints.     Provider at bedside for eval.

## 2024-07-28 NOTE — ED PROVIDER NOTES
Abdominal:      General: Abdomen is flat.      Palpations: There is no mass.      Tenderness: There is no abdominal tenderness.   Genitourinary:     Penis: Normal.       Comments: Catheter in place with yellow urine  Musculoskeletal:         General: No swelling.   Skin:     General: Skin is warm.   Neurological:      Mental Status: Mental status is at baseline.   Psychiatric:         Mood and Affect: Mood normal.          DIAGNOSTIC RESULTS   LABS:     No results found for this or any previous visit (from the past 24 hour(s)).    EKG: If performed, independent interpretation documented below in the MDM section     RADIOLOGY:  Non-plain film images such as CT, Ultrasound and MRI are read by the radiologist. Plain radiographic images are visualized and preliminarily interpreted by the ED Provider with the findings documented in the MDM section.     Interpretation per the Radiologist below, if available at the time of this note:     No orders to display        PROCEDURES   Unless otherwise noted below, none  Procedures     CRITICAL CARE TIME   0    EMERGENCY DEPARTMENT COURSE and DIFFERENTIAL DIAGNOSIS/MDM   Vitals:    Vitals:    07/28/24 1200 07/28/24 1230 07/28/24 1300 07/28/24 1400   BP: 122/65 126/64 126/66 137/65   Pulse: (!) 47 (!) 45 (!) 48 (!) 49   Resp:    18   Temp:       TempSrc:       SpO2: 99% 100% 99% 99%   Weight:       Height:            Patient was given the following medications:  Medications - No data to display    Medical Decision Making  89-year-old male with a history of BPH and chronic indwelling Murphy presents emergency department with a chief complaint of catheter problem.  His vitals are baseline.  No other complaints.   exam unremarkable.  Will flush catheter and bladder scan.  Catheter recently changed on Tuesday.  Given concern for colonization and no signs or symptoms of UTI, will not check UA.          CLINICAL MANAGEMENT TOOLS:        ED Course as of 07/28/24 1649   Sun Jul 28, 2024

## 2025-01-22 ENCOUNTER — ANESTHESIA EVENT (OUTPATIENT)
Facility: HOSPITAL | Age: 89
End: 2025-01-22
Payer: MEDICARE

## 2025-01-22 ENCOUNTER — APPOINTMENT (OUTPATIENT)
Facility: HOSPITAL | Age: 89
DRG: 853 | End: 2025-01-22
Payer: MEDICARE

## 2025-01-22 ENCOUNTER — ANESTHESIA (OUTPATIENT)
Facility: HOSPITAL | Age: 89
End: 2025-01-22
Payer: MEDICARE

## 2025-01-22 ENCOUNTER — HOSPITAL ENCOUNTER (INPATIENT)
Facility: HOSPITAL | Age: 89
LOS: 16 days | Discharge: HOSPICE | DRG: 853 | End: 2025-02-08
Attending: STUDENT IN AN ORGANIZED HEALTH CARE EDUCATION/TRAINING PROGRAM | Admitting: STUDENT IN AN ORGANIZED HEALTH CARE EDUCATION/TRAINING PROGRAM
Payer: MEDICARE

## 2025-01-22 DIAGNOSIS — N20.0 URINARY TRACT OBSTRUCTION BY KIDNEY STONE: Primary | ICD-10-CM

## 2025-01-22 DIAGNOSIS — N17.9 ACUTE RENAL FAILURE, UNSPECIFIED ACUTE RENAL FAILURE TYPE (HCC): ICD-10-CM

## 2025-01-22 DIAGNOSIS — N13.8 URINARY TRACT OBSTRUCTION BY KIDNEY STONE: Primary | ICD-10-CM

## 2025-01-22 DIAGNOSIS — R00.0 TACHYCARDIA: ICD-10-CM

## 2025-01-22 DIAGNOSIS — G93.41 METABOLIC ENCEPHALOPATHY: ICD-10-CM

## 2025-01-22 DIAGNOSIS — I48.0 PAROXYSMAL ATRIAL FIBRILLATION (HCC): ICD-10-CM

## 2025-01-22 LAB
ALBUMIN SERPL-MCNC: 2.2 G/DL (ref 3.5–5)
ALBUMIN/GLOB SERPL: 0.4 (ref 1.1–2.2)
ALP SERPL-CCNC: 86 U/L (ref 45–117)
ALT SERPL-CCNC: 12 U/L (ref 12–78)
ANION GAP SERPL CALC-SCNC: 12 MMOL/L (ref 2–12)
AST SERPL-CCNC: 18 U/L (ref 15–37)
BASOPHILS # BLD: 0 K/UL (ref 0–0.1)
BASOPHILS NFR BLD: 0 % (ref 0–1)
BILIRUB SERPL-MCNC: 1.2 MG/DL (ref 0.2–1)
BUN SERPL-MCNC: 73 MG/DL (ref 6–20)
BUN/CREAT SERPL: 10 (ref 12–20)
CALCIUM SERPL-MCNC: 7.9 MG/DL (ref 8.5–10.1)
CHLORIDE SERPL-SCNC: 108 MMOL/L (ref 97–108)
CO2 SERPL-SCNC: 21 MMOL/L (ref 21–32)
CREAT SERPL-MCNC: 7.28 MG/DL (ref 0.7–1.3)
DIFFERENTIAL METHOD BLD: ABNORMAL
EOSINOPHIL # BLD: 0 K/UL (ref 0–0.4)
EOSINOPHIL NFR BLD: 0 % (ref 0–7)
ERYTHROCYTE [DISTWIDTH] IN BLOOD BY AUTOMATED COUNT: 13.2 % (ref 11.5–14.5)
FLUAV RNA SPEC QL NAA+PROBE: NOT DETECTED
FLUBV RNA SPEC QL NAA+PROBE: NOT DETECTED
GLOBULIN SER CALC-MCNC: 5.1 G/DL (ref 2–4)
GLUCOSE SERPL-MCNC: 101 MG/DL (ref 65–100)
HCT VFR BLD AUTO: 35.4 % (ref 36.6–50.3)
HGB BLD-MCNC: 11.6 G/DL (ref 12.1–17)
IMM GRANULOCYTES # BLD AUTO: 0 K/UL (ref 0–0.04)
IMM GRANULOCYTES NFR BLD AUTO: 0 % (ref 0–0.5)
LIPASE SERPL-CCNC: 9 U/L (ref 13–75)
LYMPHOCYTES # BLD: 0.47 K/UL (ref 0.8–3.5)
LYMPHOCYTES NFR BLD: 5 % (ref 12–49)
MCH RBC QN AUTO: 31.2 PG (ref 26–34)
MCHC RBC AUTO-ENTMCNC: 32.8 G/DL (ref 30–36.5)
MCV RBC AUTO: 95.2 FL (ref 80–99)
MONOCYTES # BLD: 0.28 K/UL (ref 0–1)
MONOCYTES NFR BLD: 3 % (ref 5–13)
NEUTS BAND NFR BLD MANUAL: 1 %
NEUTS SEG # BLD: 8.55 K/UL (ref 1.8–8)
NEUTS SEG NFR BLD: 91 % (ref 32–75)
NRBC # BLD: 0 K/UL (ref 0–0.01)
NRBC BLD-RTO: 0 PER 100 WBC
PLATELET # BLD AUTO: 183 K/UL (ref 150–400)
PMV BLD AUTO: 10.5 FL (ref 8.9–12.9)
POTASSIUM SERPL-SCNC: 4 MMOL/L (ref 3.5–5.1)
PROT SERPL-MCNC: 7.3 G/DL (ref 6.4–8.2)
RBC # BLD AUTO: 3.72 M/UL (ref 4.1–5.7)
RBC MORPH BLD: ABNORMAL
SARS-COV-2 RNA RESP QL NAA+PROBE: NOT DETECTED
SODIUM SERPL-SCNC: 141 MMOL/L (ref 136–145)
SOURCE: NORMAL
TROPONIN I SERPL HS-MCNC: 12 NG/L (ref 0–76)
WBC # BLD AUTO: 9.3 K/UL (ref 4.1–11.1)
WBC MORPH BLD: ABNORMAL

## 2025-01-22 PROCEDURE — 80053 COMPREHEN METABOLIC PANEL: CPT

## 2025-01-22 PROCEDURE — 3700000000 HC ANESTHESIA ATTENDED CARE: Performed by: UROLOGY

## 2025-01-22 PROCEDURE — 2580000003 HC RX 258: Performed by: NURSE ANESTHETIST, CERTIFIED REGISTERED

## 2025-01-22 PROCEDURE — 7100000001 HC PACU RECOVERY - ADDTL 15 MIN: Performed by: UROLOGY

## 2025-01-22 PROCEDURE — 84484 ASSAY OF TROPONIN QUANT: CPT

## 2025-01-22 PROCEDURE — 74176 CT ABD & PELVIS W/O CONTRAST: CPT

## 2025-01-22 PROCEDURE — 2500000003 HC RX 250 WO HCPCS: Performed by: NURSE ANESTHETIST, CERTIFIED REGISTERED

## 2025-01-22 PROCEDURE — 96375 TX/PRO/DX INJ NEW DRUG ADDON: CPT

## 2025-01-22 PROCEDURE — 2709999900 HC NON-CHARGEABLE SUPPLY: Performed by: UROLOGY

## 2025-01-22 PROCEDURE — 87040 BLOOD CULTURE FOR BACTERIA: CPT

## 2025-01-22 PROCEDURE — 3600000015 HC SURGERY LEVEL 5 ADDTL 15MIN: Performed by: UROLOGY

## 2025-01-22 PROCEDURE — 99285 EMERGENCY DEPT VISIT HI MDM: CPT

## 2025-01-22 PROCEDURE — 87154 CUL TYP ID BLD PTHGN 6+ TRGT: CPT

## 2025-01-22 PROCEDURE — 6360000002 HC RX W HCPCS: Performed by: STUDENT IN AN ORGANIZED HEALTH CARE EDUCATION/TRAINING PROGRAM

## 2025-01-22 PROCEDURE — 36415 COLL VENOUS BLD VENIPUNCTURE: CPT

## 2025-01-22 PROCEDURE — 87636 SARSCOV2 & INF A&B AMP PRB: CPT

## 2025-01-22 PROCEDURE — 51702 INSERT TEMP BLADDER CATH: CPT

## 2025-01-22 PROCEDURE — 93005 ELECTROCARDIOGRAM TRACING: CPT | Performed by: STUDENT IN AN ORGANIZED HEALTH CARE EDUCATION/TRAINING PROGRAM

## 2025-01-22 PROCEDURE — 6360000002 HC RX W HCPCS: Performed by: NURSE ANESTHETIST, CERTIFIED REGISTERED

## 2025-01-22 PROCEDURE — 96374 THER/PROPH/DIAG INJ IV PUSH: CPT

## 2025-01-22 PROCEDURE — 83690 ASSAY OF LIPASE: CPT

## 2025-01-22 PROCEDURE — 96361 HYDRATE IV INFUSION ADD-ON: CPT

## 2025-01-22 PROCEDURE — 7100000000 HC PACU RECOVERY - FIRST 15 MIN: Performed by: UROLOGY

## 2025-01-22 PROCEDURE — 87186 SC STD MICRODIL/AGAR DIL: CPT

## 2025-01-22 PROCEDURE — 87077 CULTURE AEROBIC IDENTIFY: CPT

## 2025-01-22 PROCEDURE — 96365 THER/PROPH/DIAG IV INF INIT: CPT

## 2025-01-22 PROCEDURE — 2580000003 HC RX 258: Performed by: STUDENT IN AN ORGANIZED HEALTH CARE EDUCATION/TRAINING PROGRAM

## 2025-01-22 PROCEDURE — 3600000005 HC SURGERY LEVEL 5 BASE: Performed by: UROLOGY

## 2025-01-22 PROCEDURE — C1758 CATHETER, URETERAL: HCPCS | Performed by: UROLOGY

## 2025-01-22 PROCEDURE — 85025 COMPLETE CBC W/AUTO DIFF WBC: CPT

## 2025-01-22 PROCEDURE — 3700000001 HC ADD 15 MINUTES (ANESTHESIA): Performed by: UROLOGY

## 2025-01-22 PROCEDURE — C1769 GUIDE WIRE: HCPCS | Performed by: UROLOGY

## 2025-01-22 RX ORDER — HYDROMORPHONE HYDROCHLORIDE 1 MG/ML
0.5 INJECTION, SOLUTION INTRAMUSCULAR; INTRAVENOUS; SUBCUTANEOUS EVERY 5 MIN PRN
Status: DISCONTINUED | OUTPATIENT
Start: 2025-01-22 | End: 2025-01-23 | Stop reason: HOSPADM

## 2025-01-22 RX ORDER — OXYCODONE HYDROCHLORIDE 5 MG/1
5 TABLET ORAL
Status: DISCONTINUED | OUTPATIENT
Start: 2025-01-22 | End: 2025-01-23 | Stop reason: HOSPADM

## 2025-01-22 RX ORDER — DEXMEDETOMIDINE HYDROCHLORIDE 100 UG/ML
INJECTION, SOLUTION INTRAVENOUS
Status: DISCONTINUED | OUTPATIENT
Start: 2025-01-22 | End: 2025-01-23 | Stop reason: SDUPTHER

## 2025-01-22 RX ORDER — SODIUM CHLORIDE 0.9 % (FLUSH) 0.9 %
5-40 SYRINGE (ML) INJECTION PRN
Status: DISCONTINUED | OUTPATIENT
Start: 2025-01-22 | End: 2025-01-23 | Stop reason: HOSPADM

## 2025-01-22 RX ORDER — ONDANSETRON 2 MG/ML
4 INJECTION INTRAMUSCULAR; INTRAVENOUS ONCE
Status: COMPLETED | OUTPATIENT
Start: 2025-01-22 | End: 2025-01-22

## 2025-01-22 RX ORDER — PROCHLORPERAZINE EDISYLATE 5 MG/ML
5 INJECTION INTRAMUSCULAR; INTRAVENOUS
Status: DISCONTINUED | OUTPATIENT
Start: 2025-01-22 | End: 2025-01-23 | Stop reason: HOSPADM

## 2025-01-22 RX ORDER — 0.9 % SODIUM CHLORIDE 0.9 %
1000 INTRAVENOUS SOLUTION INTRAVENOUS ONCE
Status: COMPLETED | OUTPATIENT
Start: 2025-01-22 | End: 2025-01-23

## 2025-01-22 RX ORDER — SODIUM CHLORIDE 0.9 % (FLUSH) 0.9 %
5-40 SYRINGE (ML) INJECTION EVERY 12 HOURS SCHEDULED
Status: DISCONTINUED | OUTPATIENT
Start: 2025-01-22 | End: 2025-01-23 | Stop reason: HOSPADM

## 2025-01-22 RX ORDER — SODIUM CHLORIDE 9 MG/ML
INJECTION, SOLUTION INTRAVENOUS PRN
Status: DISCONTINUED | OUTPATIENT
Start: 2025-01-22 | End: 2025-01-23 | Stop reason: HOSPADM

## 2025-01-22 RX ORDER — PHENYLEPHRINE HCL IN 0.9% NACL 0.4MG/10ML
SYRINGE (ML) INTRAVENOUS
Status: DISCONTINUED | OUTPATIENT
Start: 2025-01-22 | End: 2025-01-23 | Stop reason: SDUPTHER

## 2025-01-22 RX ORDER — HYDROMORPHONE HYDROCHLORIDE 1 MG/ML
0.5 INJECTION, SOLUTION INTRAMUSCULAR; INTRAVENOUS; SUBCUTANEOUS ONCE
Status: COMPLETED | OUTPATIENT
Start: 2025-01-22 | End: 2025-01-22

## 2025-01-22 RX ORDER — ONDANSETRON 2 MG/ML
4 INJECTION INTRAMUSCULAR; INTRAVENOUS
Status: DISCONTINUED | OUTPATIENT
Start: 2025-01-22 | End: 2025-01-23 | Stop reason: HOSPADM

## 2025-01-22 RX ORDER — SODIUM CHLORIDE 9 MG/ML
INJECTION, SOLUTION INTRAVENOUS
Status: DISCONTINUED | OUTPATIENT
Start: 2025-01-22 | End: 2025-01-23 | Stop reason: SDUPTHER

## 2025-01-22 RX ORDER — FENTANYL CITRATE 50 UG/ML
25 INJECTION, SOLUTION INTRAMUSCULAR; INTRAVENOUS EVERY 5 MIN PRN
Status: DISCONTINUED | OUTPATIENT
Start: 2025-01-22 | End: 2025-01-23 | Stop reason: HOSPADM

## 2025-01-22 RX ORDER — 0.9 % SODIUM CHLORIDE 0.9 %
1000 INTRAVENOUS SOLUTION INTRAVENOUS ONCE
Status: DISCONTINUED | OUTPATIENT
Start: 2025-01-22 | End: 2025-01-30

## 2025-01-22 RX ORDER — NALOXONE HYDROCHLORIDE 0.4 MG/ML
INJECTION, SOLUTION INTRAMUSCULAR; INTRAVENOUS; SUBCUTANEOUS PRN
Status: DISCONTINUED | OUTPATIENT
Start: 2025-01-22 | End: 2025-01-23 | Stop reason: HOSPADM

## 2025-01-22 RX ADMIN — Medication 100 MCG: at 23:34

## 2025-01-22 RX ADMIN — HYDROMORPHONE HYDROCHLORIDE 0.5 MG: 1 INJECTION, SOLUTION INTRAMUSCULAR; INTRAVENOUS; SUBCUTANEOUS at 20:28

## 2025-01-22 RX ADMIN — ONDANSETRON 4 MG: 2 INJECTION, SOLUTION INTRAMUSCULAR; INTRAVENOUS at 20:25

## 2025-01-22 RX ADMIN — PHENYLEPHRINE HYDROCHLORIDE 40 MCG/MIN: 10 INJECTION INTRAVENOUS at 23:51

## 2025-01-22 RX ADMIN — Medication 10 MG: at 23:27

## 2025-01-22 RX ADMIN — SODIUM CHLORIDE 1000 ML: 9 INJECTION, SOLUTION INTRAVENOUS at 20:25

## 2025-01-22 RX ADMIN — PIPERACILLIN AND TAZOBACTAM 3375 MG: 3; .375 INJECTION, POWDER, LYOPHILIZED, FOR SOLUTION INTRAVENOUS at 22:47

## 2025-01-22 RX ADMIN — SODIUM CHLORIDE: 9 INJECTION, SOLUTION INTRAVENOUS at 23:20

## 2025-01-22 RX ADMIN — PROPOFOL 50 MCG/KG/MIN: 10 INJECTION, EMULSION INTRAVENOUS at 23:26

## 2025-01-22 RX ADMIN — DEXMEDETOMIDINE 10 MCG: 100 INJECTION, SOLUTION INTRAVENOUS at 23:27

## 2025-01-22 ASSESSMENT — PAIN SCALES - GENERAL: PAINLEVEL_OUTOF10: 8

## 2025-01-23 ENCOUNTER — HOSPITAL ENCOUNTER (INPATIENT)
Facility: HOSPITAL | Age: 89
Discharge: HOME | DRG: 853 | End: 2025-01-26
Payer: MEDICARE

## 2025-01-23 ENCOUNTER — APPOINTMENT (OUTPATIENT)
Facility: HOSPITAL | Age: 89
DRG: 853 | End: 2025-01-23
Payer: MEDICARE

## 2025-01-23 VITALS
RESPIRATION RATE: 26 BRPM | HEART RATE: 88 BPM | OXYGEN SATURATION: 95 % | DIASTOLIC BLOOD PRESSURE: 67 MMHG | SYSTOLIC BLOOD PRESSURE: 106 MMHG | TEMPERATURE: 98.3 F

## 2025-01-23 PROBLEM — N20.0 RENAL STONE: Status: ACTIVE | Noted: 2025-01-23

## 2025-01-23 LAB
ALBUMIN SERPL-MCNC: 2 G/DL (ref 3.5–5)
ALBUMIN/GLOB SERPL: 0.4 (ref 1.1–2.2)
ALP SERPL-CCNC: 81 U/L (ref 45–117)
ALT SERPL-CCNC: 10 U/L (ref 12–78)
ANION GAP SERPL CALC-SCNC: 10 MMOL/L (ref 2–12)
ANION GAP SERPL CALC-SCNC: 14 MMOL/L (ref 2–12)
AST SERPL-CCNC: 15 U/L (ref 15–37)
BASOPHILS # BLD: 0 K/UL (ref 0–0.1)
BASOPHILS # BLD: 0.07 K/UL (ref 0–0.1)
BASOPHILS NFR BLD: 0 % (ref 0–1)
BASOPHILS NFR BLD: 1 % (ref 0–1)
BILIRUB SERPL-MCNC: 1.4 MG/DL (ref 0.2–1)
BUN SERPL-MCNC: 77 MG/DL (ref 6–20)
BUN SERPL-MCNC: 84 MG/DL (ref 6–20)
BUN/CREAT SERPL: 12 (ref 12–20)
BUN/CREAT SERPL: 14 (ref 12–20)
CALCIUM SERPL-MCNC: 7.4 MG/DL (ref 8.5–10.1)
CALCIUM SERPL-MCNC: 7.6 MG/DL (ref 8.5–10.1)
CHLORIDE SERPL-SCNC: 112 MMOL/L (ref 97–108)
CHLORIDE SERPL-SCNC: 113 MMOL/L (ref 97–108)
CO2 SERPL-SCNC: 18 MMOL/L (ref 21–32)
CO2 SERPL-SCNC: 20 MMOL/L (ref 21–32)
CREAT SERPL-MCNC: 6.2 MG/DL (ref 0.7–1.3)
CREAT SERPL-MCNC: 6.67 MG/DL (ref 0.7–1.3)
DIFFERENTIAL METHOD BLD: ABNORMAL
DIFFERENTIAL METHOD BLD: ABNORMAL
EKG ATRIAL RATE: 79 BPM
EKG DIAGNOSIS: NORMAL
EKG P AXIS: 25 DEGREES
EKG P-R INTERVAL: 152 MS
EKG Q-T INTERVAL: 400 MS
EKG QRS DURATION: 84 MS
EKG QTC CALCULATION (BAZETT): 458 MS
EKG R AXIS: -12 DEGREES
EKG T AXIS: -23 DEGREES
EKG VENTRICULAR RATE: 79 BPM
EOSINOPHIL # BLD: 0 K/UL (ref 0–0.4)
EOSINOPHIL # BLD: 0 K/UL (ref 0–0.4)
EOSINOPHIL NFR BLD: 0 % (ref 0–7)
EOSINOPHIL NFR BLD: 0 % (ref 0–7)
ERYTHROCYTE [DISTWIDTH] IN BLOOD BY AUTOMATED COUNT: 13.6 % (ref 11.5–14.5)
ERYTHROCYTE [DISTWIDTH] IN BLOOD BY AUTOMATED COUNT: 13.9 % (ref 11.5–14.5)
GLOBULIN SER CALC-MCNC: 4.6 G/DL (ref 2–4)
GLUCOSE SERPL-MCNC: 61 MG/DL (ref 65–100)
GLUCOSE SERPL-MCNC: 72 MG/DL (ref 65–100)
HCT VFR BLD AUTO: 32.3 % (ref 36.6–50.3)
HCT VFR BLD AUTO: 33 % (ref 36.6–50.3)
HGB BLD-MCNC: 10.5 G/DL (ref 12.1–17)
HGB BLD-MCNC: 10.7 G/DL (ref 12.1–17)
IMM GRANULOCYTES # BLD AUTO: 0 K/UL (ref 0–0.04)
IMM GRANULOCYTES # BLD AUTO: 0 K/UL (ref 0–0.04)
IMM GRANULOCYTES NFR BLD AUTO: 0 % (ref 0–0.5)
IMM GRANULOCYTES NFR BLD AUTO: 0 % (ref 0–0.5)
LACTATE SERPL-SCNC: 2.3 MMOL/L (ref 0.4–2)
LYMPHOCYTES # BLD: 0.17 K/UL (ref 0.8–3.5)
LYMPHOCYTES # BLD: 0.44 K/UL (ref 0.8–3.5)
LYMPHOCYTES NFR BLD: 2 % (ref 12–49)
LYMPHOCYTES NFR BLD: 6 % (ref 12–49)
MAGNESIUM SERPL-MCNC: 2 MG/DL (ref 1.6–2.4)
MCH RBC QN AUTO: 31 PG (ref 26–34)
MCH RBC QN AUTO: 31.3 PG (ref 26–34)
MCHC RBC AUTO-ENTMCNC: 32.4 G/DL (ref 30–36.5)
MCHC RBC AUTO-ENTMCNC: 32.5 G/DL (ref 30–36.5)
MCV RBC AUTO: 95.3 FL (ref 80–99)
MCV RBC AUTO: 96.5 FL (ref 80–99)
MONOCYTES # BLD: 0.15 K/UL (ref 0–1)
MONOCYTES # BLD: 0.25 K/UL (ref 0–1)
MONOCYTES NFR BLD: 2 % (ref 5–13)
MONOCYTES NFR BLD: 3 % (ref 5–13)
NEUTS BAND NFR BLD MANUAL: 2 %
NEUTS BAND NFR BLD MANUAL: 3 %
NEUTS SEG # BLD: 6.74 K/UL (ref 1.8–8)
NEUTS SEG # BLD: 7.88 K/UL (ref 1.8–8)
NEUTS SEG NFR BLD: 89 % (ref 32–75)
NEUTS SEG NFR BLD: 92 % (ref 32–75)
NRBC # BLD: 0 K/UL (ref 0–0.01)
NRBC # BLD: 0 K/UL (ref 0–0.01)
NRBC BLD-RTO: 0 PER 100 WBC
NRBC BLD-RTO: 0 PER 100 WBC
NT PRO BNP: 8045 PG/ML
PHOSPHATE SERPL-MCNC: 5.3 MG/DL (ref 2.6–4.7)
PLATELET # BLD AUTO: 140 K/UL (ref 150–400)
PLATELET # BLD AUTO: 156 K/UL (ref 150–400)
PMV BLD AUTO: 10.7 FL (ref 8.9–12.9)
PMV BLD AUTO: 10.8 FL (ref 8.9–12.9)
POTASSIUM SERPL-SCNC: 3.4 MMOL/L (ref 3.5–5.1)
POTASSIUM SERPL-SCNC: 3.8 MMOL/L (ref 3.5–5.1)
PROCALCITONIN SERPL-MCNC: 52.48 NG/ML
PROT SERPL-MCNC: 6.6 G/DL (ref 6.4–8.2)
RBC # BLD AUTO: 3.39 M/UL (ref 4.1–5.7)
RBC # BLD AUTO: 3.42 M/UL (ref 4.1–5.7)
RBC MORPH BLD: ABNORMAL
RBC MORPH BLD: ABNORMAL
SODIUM SERPL-SCNC: 143 MMOL/L (ref 136–145)
SODIUM SERPL-SCNC: 144 MMOL/L (ref 136–145)
TROPONIN I SERPL HS-MCNC: 26 NG/L (ref 0–76)
WBC # BLD AUTO: 7.4 K/UL (ref 4.1–11.1)
WBC # BLD AUTO: 8.3 K/UL (ref 4.1–11.1)

## 2025-01-23 PROCEDURE — 80053 COMPREHEN METABOLIC PANEL: CPT

## 2025-01-23 PROCEDURE — 2700000000 HC OXYGEN THERAPY PER DAY

## 2025-01-23 PROCEDURE — 84100 ASSAY OF PHOSPHORUS: CPT

## 2025-01-23 PROCEDURE — BT1D1ZZ FLUOROSCOPY OF RIGHT KIDNEY, URETER AND BLADDER USING LOW OSMOLAR CONTRAST: ICD-10-PCS | Performed by: UROLOGY

## 2025-01-23 PROCEDURE — 6370000000 HC RX 637 (ALT 250 FOR IP): Performed by: STUDENT IN AN ORGANIZED HEALTH CARE EDUCATION/TRAINING PROGRAM

## 2025-01-23 PROCEDURE — 85025 COMPLETE CBC W/AUTO DIFF WBC: CPT

## 2025-01-23 PROCEDURE — 2580000003 HC RX 258

## 2025-01-23 PROCEDURE — 2500000003 HC RX 250 WO HCPCS: Performed by: STUDENT IN AN ORGANIZED HEALTH CARE EDUCATION/TRAINING PROGRAM

## 2025-01-23 PROCEDURE — 6360000004 HC RX CONTRAST MEDICATION: Performed by: UROLOGY

## 2025-01-23 PROCEDURE — 6360000004 HC RX CONTRAST MEDICATION: Performed by: RADIOLOGY

## 2025-01-23 PROCEDURE — 6360000002 HC RX W HCPCS

## 2025-01-23 PROCEDURE — 93005 ELECTROCARDIOGRAM TRACING: CPT | Performed by: STUDENT IN AN ORGANIZED HEALTH CARE EDUCATION/TRAINING PROGRAM

## 2025-01-23 PROCEDURE — 71045 X-RAY EXAM CHEST 1 VIEW: CPT

## 2025-01-23 PROCEDURE — 0T9330Z DRAINAGE OF RIGHT KIDNEY PELVIS WITH DRAINAGE DEVICE, PERCUTANEOUS APPROACH: ICD-10-PCS | Performed by: RADIOLOGY

## 2025-01-23 PROCEDURE — 74176 CT ABD & PELVIS W/O CONTRAST: CPT

## 2025-01-23 PROCEDURE — 6360000002 HC RX W HCPCS: Performed by: STUDENT IN AN ORGANIZED HEALTH CARE EDUCATION/TRAINING PROGRAM

## 2025-01-23 PROCEDURE — 2060000000 HC ICU INTERMEDIATE R&B

## 2025-01-23 PROCEDURE — 0TJB8ZZ INSPECTION OF BLADDER, VIA NATURAL OR ARTIFICIAL OPENING ENDOSCOPIC: ICD-10-PCS | Performed by: UROLOGY

## 2025-01-23 PROCEDURE — 84484 ASSAY OF TROPONIN QUANT: CPT

## 2025-01-23 PROCEDURE — 2580000003 HC RX 258: Performed by: STUDENT IN AN ORGANIZED HEALTH CARE EDUCATION/TRAINING PROGRAM

## 2025-01-23 PROCEDURE — 6360000002 HC RX W HCPCS: Performed by: RADIOLOGY

## 2025-01-23 PROCEDURE — 84145 PROCALCITONIN (PCT): CPT

## 2025-01-23 PROCEDURE — 83605 ASSAY OF LACTIC ACID: CPT

## 2025-01-23 PROCEDURE — 2709999900 IR GUIDED NEPHROSTOMY CATH PLACEMENT RIGHT

## 2025-01-23 PROCEDURE — 36415 COLL VENOUS BLD VENIPUNCTURE: CPT

## 2025-01-23 PROCEDURE — 83735 ASSAY OF MAGNESIUM: CPT

## 2025-01-23 PROCEDURE — 83880 ASSAY OF NATRIURETIC PEPTIDE: CPT

## 2025-01-23 RX ORDER — IOPAMIDOL 755 MG/ML
100 INJECTION, SOLUTION INTRAVASCULAR ONCE
Status: COMPLETED | OUTPATIENT
Start: 2025-01-23 | End: 2025-01-23

## 2025-01-23 RX ORDER — HEPARIN SODIUM 200 [USP'U]/100ML
200 INJECTION, SOLUTION INTRAVENOUS ONCE
Status: COMPLETED | OUTPATIENT
Start: 2025-01-23 | End: 2025-01-23

## 2025-01-23 RX ORDER — GLUCAGON 1 MG/ML
1 KIT INJECTION PRN
Status: DISCONTINUED | OUTPATIENT
Start: 2025-01-23 | End: 2025-02-08 | Stop reason: HOSPADM

## 2025-01-23 RX ORDER — LIDOCAINE HYDROCHLORIDE 20 MG/ML
20 INJECTION, SOLUTION INFILTRATION; PERINEURAL ONCE
Status: COMPLETED | OUTPATIENT
Start: 2025-01-23 | End: 2025-01-23

## 2025-01-23 RX ORDER — IPRATROPIUM BROMIDE AND ALBUTEROL SULFATE 2.5; .5 MG/3ML; MG/3ML
1 SOLUTION RESPIRATORY (INHALATION) EVERY 4 HOURS PRN
Status: DISCONTINUED | OUTPATIENT
Start: 2025-01-23 | End: 2025-02-08 | Stop reason: HOSPADM

## 2025-01-23 RX ORDER — SODIUM CHLORIDE 0.9 % (FLUSH) 0.9 %
5-40 SYRINGE (ML) INJECTION PRN
Status: DISCONTINUED | OUTPATIENT
Start: 2025-01-23 | End: 2025-02-08 | Stop reason: HOSPADM

## 2025-01-23 RX ORDER — ONDANSETRON 2 MG/ML
4 INJECTION INTRAMUSCULAR; INTRAVENOUS EVERY 6 HOURS PRN
Status: DISCONTINUED | OUTPATIENT
Start: 2025-01-23 | End: 2025-02-07

## 2025-01-23 RX ORDER — IOPAMIDOL 755 MG/ML
INJECTION, SOLUTION INTRAVASCULAR PRN
Status: DISCONTINUED | OUTPATIENT
Start: 2025-01-23 | End: 2025-01-23 | Stop reason: ALTCHOICE

## 2025-01-23 RX ORDER — ACETAMINOPHEN 325 MG/1
650 TABLET ORAL EVERY 6 HOURS PRN
Status: DISCONTINUED | OUTPATIENT
Start: 2025-01-23 | End: 2025-02-03

## 2025-01-23 RX ORDER — DEXTROSE MONOHYDRATE 100 MG/ML
INJECTION, SOLUTION INTRAVENOUS CONTINUOUS PRN
Status: DISCONTINUED | OUTPATIENT
Start: 2025-01-23 | End: 2025-02-08 | Stop reason: HOSPADM

## 2025-01-23 RX ORDER — TAMSULOSIN HYDROCHLORIDE 0.4 MG/1
0.4 CAPSULE ORAL DAILY
Status: DISCONTINUED | OUTPATIENT
Start: 2025-01-23 | End: 2025-02-08 | Stop reason: HOSPADM

## 2025-01-23 RX ORDER — SODIUM CHLORIDE FOR INHALATION 3 %
4 VIAL, NEBULIZER (ML) INHALATION PRN
Status: DISCONTINUED | OUTPATIENT
Start: 2025-01-23 | End: 2025-01-23

## 2025-01-23 RX ORDER — FENTANYL CITRATE 50 UG/ML
INJECTION, SOLUTION INTRAMUSCULAR; INTRAVENOUS PRN
Status: COMPLETED | OUTPATIENT
Start: 2025-01-23 | End: 2025-01-23

## 2025-01-23 RX ORDER — ACETAMINOPHEN 650 MG/1
650 SUPPOSITORY RECTAL EVERY 6 HOURS PRN
Status: DISCONTINUED | OUTPATIENT
Start: 2025-01-23 | End: 2025-02-03

## 2025-01-23 RX ORDER — FINASTERIDE 5 MG/1
5 TABLET, FILM COATED ORAL DAILY
Status: DISCONTINUED | OUTPATIENT
Start: 2025-01-23 | End: 2025-02-08 | Stop reason: HOSPADM

## 2025-01-23 RX ORDER — ASPIRIN 81 MG/1
81 TABLET, CHEWABLE ORAL DAILY
Status: DISCONTINUED | OUTPATIENT
Start: 2025-01-23 | End: 2025-02-08 | Stop reason: HOSPADM

## 2025-01-23 RX ORDER — SODIUM CHLORIDE 0.9 % (FLUSH) 0.9 %
5-40 SYRINGE (ML) INJECTION EVERY 12 HOURS SCHEDULED
Status: DISCONTINUED | OUTPATIENT
Start: 2025-01-23 | End: 2025-02-08 | Stop reason: HOSPADM

## 2025-01-23 RX ORDER — SODIUM CHLORIDE 9 MG/ML
INJECTION, SOLUTION INTRAVENOUS PRN
Status: DISCONTINUED | OUTPATIENT
Start: 2025-01-23 | End: 2025-02-08 | Stop reason: HOSPADM

## 2025-01-23 RX ORDER — 0.9 % SODIUM CHLORIDE 0.9 %
500 INTRAVENOUS SOLUTION INTRAVENOUS ONCE
Status: COMPLETED | OUTPATIENT
Start: 2025-01-23 | End: 2025-01-24

## 2025-01-23 RX ADMIN — SODIUM CHLORIDE, PRESERVATIVE FREE 40 MG: 5 INJECTION INTRAVENOUS at 23:59

## 2025-01-23 RX ADMIN — FINASTERIDE 5 MG: 5 TABLET, FILM COATED ORAL at 10:29

## 2025-01-23 RX ADMIN — PIPERACILLIN AND TAZOBACTAM 3375 MG: 3; .375 INJECTION, POWDER, LYOPHILIZED, FOR SOLUTION INTRAVENOUS at 05:36

## 2025-01-23 RX ADMIN — FENTANYL CITRATE 25 MCG: 50 INJECTION, SOLUTION INTRAMUSCULAR; INTRAVENOUS at 08:57

## 2025-01-23 RX ADMIN — SODIUM CHLORIDE, PRESERVATIVE FREE 10 ML: 5 INJECTION INTRAVENOUS at 10:29

## 2025-01-23 RX ADMIN — DEXTROSE MONOHYDRATE: 100 INJECTION, SOLUTION INTRAVENOUS at 21:49

## 2025-01-23 RX ADMIN — HEPARIN SODIUM IN SODIUM CHLORIDE 500 ML: 200 INJECTION INTRAVENOUS at 09:38

## 2025-01-23 RX ADMIN — ACETAMINOPHEN 650 MG: 325 TABLET ORAL at 02:45

## 2025-01-23 RX ADMIN — SODIUM CHLORIDE, PRESERVATIVE FREE 10 ML: 5 INJECTION INTRAVENOUS at 20:56

## 2025-01-23 RX ADMIN — ONDANSETRON 4 MG: 2 INJECTION INTRAMUSCULAR; INTRAVENOUS at 22:40

## 2025-01-23 RX ADMIN — ASPIRIN 81 MG: 81 TABLET, CHEWABLE ORAL at 10:29

## 2025-01-23 RX ADMIN — PIPERACILLIN AND TAZOBACTAM 3375 MG: 3; .375 INJECTION, POWDER, LYOPHILIZED, FOR SOLUTION INTRAVENOUS at 18:21

## 2025-01-23 RX ADMIN — IOPAMIDOL 100 ML: 755 INJECTION, SOLUTION INTRAVENOUS at 09:36

## 2025-01-23 RX ADMIN — TAMSULOSIN HYDROCHLORIDE 0.4 MG: 0.4 CAPSULE ORAL at 10:29

## 2025-01-23 RX ADMIN — SODIUM CHLORIDE 500 ML: 9 INJECTION, SOLUTION INTRAVENOUS at 22:41

## 2025-01-23 RX ADMIN — LIDOCAINE HYDROCHLORIDE 8 ML: 20 INJECTION, SOLUTION INFILTRATION; PERINEURAL at 09:36

## 2025-01-23 ASSESSMENT — PAIN SCALES - GENERAL: PAINLEVEL_OUTOF10: 0

## 2025-01-23 ASSESSMENT — PAIN - FUNCTIONAL ASSESSMENT: PAIN_FUNCTIONAL_ASSESSMENT: NONE - DENIES PAIN

## 2025-01-23 ASSESSMENT — PAIN DESCRIPTION - LOCATION: LOCATION: ABDOMEN

## 2025-01-23 NOTE — CARE COORDINATION
Care Management Initial Assessment       RUR: 16% Moderate RUR  Readmission? No  1st IM letter given? No-Patient access to provide   1st  letter given: No     01/23/25 0858   Service Assessment   Patient Orientation Other (see comment)  (Faby Guallpa (Child)  [phone])   Cognition Severely Impaired  (Faby Guallpa (Child) [phone])   History Provided By Child/Family  (Faby Guallpa (Child) [phone])   Primary Caregiver Family   Accompanied By/Relationship no one   Support Systems Children  (Faby Guallpa (Child) [phone] and April Carol Ann, [phone])   Patient's Healthcare Decision Maker is: Legal Next of Kin   PCP Verified by CM Yes   Last Visit to PCP Within last 6 months   Prior Functional Level Assistance with the following:;Bathing;Dressing;Toileting;Shopping;Housework;Cooking;Mobility   Current Functional Level Assistance with the following:;Shopping;Housework;Cooking;Toileting;Dressing;Bathing;Mobility   Can patient return to prior living arrangement Yes   Ability to make needs known: Good   Family able to assist with home care needs: Yes   Would you like for me to discuss the discharge plan with any other family members/significant others, and if so, who? Yes  (Faby Guallpa (Child) [phone] and April Carol Ann, [phone])   Financial Resources Medicare   Community Resources None   CM/SW Referral Disease Management Education   Social/Functional History   Lives With Spouse   Type of Home House   Home Layout One level   Home Access Stairs to enter without rails   Entrance Stairs - Number of Steps 2-3   Home Equipment Walker - Rolling;Cane   Active  No   Patient's  Info Faby Guallpa (Child) [phone] and April Carol Ann, [phone]   Discharge Planning   Type of Residence House   Living Arrangements Spouse/Significant Other   Current Services Prior To Admission None   Patient expects to be discharged to: Skilled nursing facility   Condition of Participation:  Care Planning     General Advance Care Planning (ACP) Conversation    Date of Conversation: 1/23/2025  Conducted with: Patient with Decision Making Capacity  Other persons present: None    Healthcare Decision Maker:   Primary Decision Maker: Kareen Maharaj - Spouse - [phone]       Content/Action Overview:  DECLINED ACP Conversation - will revisit periodically  Reviewed DNR/DNI and patient elects Full Code (Attempt Resuscitation)        Length of Voluntary ACP Conversation in minutes:  <16 minutes (Non-Billable)    Zachery Simms, RN  Case Management  [phone]

## 2025-01-23 NOTE — PROGRESS NOTES
Urology Progress Note    Patient: Steve Maharaj MRN: [id number]  SSN: [id number]    YOB: 1933  Age: 89 y.o.  Sex: male            Assessment:     Steve Maharaj is a 89 y.o. male with a history of *** s/p *** on ***.    Plan:     ***      Subjective:     ***    Objective:     /61   Pulse 83   Temp 97.4 °F (36.3 °C) (Oral)   Resp (!) 33   SpO2 95%       Intake/Output Summary (Last 24 hours) at 1/23/2025 1055  Last data filed at 1/23/2025 1038  Gross per 24 hour   Intake 322.81 ml   Output 505 ml   Net -182.19 ml       Physical Exam  General: NAD  HEENT: NC/AT, EOMI, MMM  Abdomen: soft, NTTP, nondistended, *** suprapubic fullness or tenderness  : ***, *** CVA tenderness  Neuro: Appropriate, no focal neurological deficits  Mood/Affect: normal    Recent Results (from the past 24 hour(s))   CBC with Auto Differential    Collection Time: 01/22/25  6:47 PM   Result Value Ref Range    WBC 9.3 4.1 - 11.1 K/uL    RBC 3.72 (L) 4.10 - 5.70 M/uL    Hemoglobin 11.6 (L) 12.1 - 17.0 g/dL    Hematocrit 35.4 (L) 36.6 - 50.3 %    MCV 95.2 80.0 - 99.0 FL    MCH 31.2 26.0 - 34.0 PG    MCHC 32.8 30.0 - 36.5 g/dL    RDW 13.2 11.5 - 14.5 %    Platelets 183 150 - 400 K/uL    MPV 10.5 8.9 - 12.9 FL    Nucleated RBCs 0.0 0  WBC    nRBC 0.00 0.00 - 0.01 K/uL    Neutrophils % 91 (H) 32.0 - 75.0 %    Band Neutrophils 1 %    Lymphocytes % 5 (L) 12.0 - 49.0 %    Monocytes % 3 (L) 5.0 - 13.0 %    Eosinophils % 0 0.0 - 7.0 %    Basophils % 0 0.0 - 1.0 %    Immature Granulocytes % 0 0.0 - 0.5 %    Neutrophils Absolute 8.55 (H) 1.80 - 8.00 K/UL    Lymphocytes Absolute 0.47 (L) 0.80 - 3.50 K/UL    Monocytes Absolute 0.28 0.00 - 1.00 K/UL    Eosinophils Absolute 0.00 0.00 - 0.40 K/UL    Basophils Absolute 0.00 0.00 - 0.10 K/UL    Immature Granulocytes Absolute 0.00 0.00 - 0.04 K/UL    Differential Type MANUAL      RBC Comment MACROCYTOSIS  1+        WBC Comment VACUOLATED  Chloride 113 (H) 97 - 108 mmol/L    CO2 20 (L) 21 - 32 mmol/L    Anion Gap 10 2 - 12 mmol/L    Glucose 72 65 - 100 mg/dL    BUN 77 (H) 6 - 20 MG/DL    Creatinine 6.67 (H) 0.70 - 1.30 MG/DL    BUN/Creatinine Ratio 12 12 - 20      Est, Glom Filt Rate 7 (L) >60 ml/min/1.73m2    Calcium 7.4 (L) 8.5 - 10.1 MG/DL    Total Bilirubin 1.4 (H) 0.2 - 1.0 MG/DL    ALT 10 (L) 12 - 78 U/L    AST 15 15 - 37 U/L    Alk Phosphatase 81 45 - 117 U/L    Total Protein 6.6 6.4 - 8.2 g/dL    Albumin 2.0 (L) 3.5 - 5.0 g/dL    Globulin 4.6 (H) 2.0 - 4.0 g/dL    Albumin/Globulin Ratio 0.4 (L) 1.1 - 2.2     CBC with Auto Differential    Collection Time: 01/23/25  5:47 AM   Result Value Ref Range    WBC 7.4 4.1 - 11.1 K/uL    RBC 3.42 (L) 4.10 - 5.70 M/uL    Hemoglobin 10.7 (L) 12.1 - 17.0 g/dL    Hematocrit 33.0 (L) 36.6 - 50.3 %    MCV 96.5 80.0 - 99.0 FL    MCH 31.3 26.0 - 34.0 PG    MCHC 32.4 30.0 - 36.5 g/dL    RDW 13.6 11.5 - 14.5 %    Platelets 156 150 - 400 K/uL    MPV 10.7 8.9 - 12.9 FL    Nucleated RBCs 0.0 0  WBC    nRBC 0.00 0.00 - 0.01 K/uL    Neutrophils % 89 (H) 32.0 - 75.0 %    Band Neutrophils 2 %    Lymphocytes % 6 (L) 12.0 - 49.0 %    Monocytes % 2 (L) 5.0 - 13.0 %    Eosinophils % 0 0.0 - 7.0 %    Basophils % 1 0.0 - 1.0 %    Immature Granulocytes % 0 0.0 - 0.5 %    Neutrophils Absolute 6.74 1.80 - 8.00 K/UL    Lymphocytes Absolute 0.44 (L) 0.80 - 3.50 K/UL    Monocytes Absolute 0.15 0.00 - 1.00 K/UL    Eosinophils Absolute 0.00 0.00 - 0.40 K/UL    Basophils Absolute 0.07 0.00 - 0.10 K/UL    Immature Granulocytes Absolute 0.00 0.00 - 0.04 K/UL    Differential Type MANUAL      RBC Comment NORMOCYTIC, NORMOCHROMIC         Imaging:  CT ABDOMEN PELVIS WO CONTRAST Additional Contrast? None   Final Result      1. There is a 5 mm obstructing stone in the right ureter. There is fluid   tracking posterior to the right ureter into the right retroperitoneum. Findings   may represent rupture of the ureter and urinoma formation.

## 2025-01-23 NOTE — PROGRESS NOTES
Name of procedure: Right nephrostomy tube    Sedation medications given:    Versed: none    Fentanyl: 25 mcg IV    Reversal Agent Used:no    Sedation tolerated: well    Sedation start:  0847    Sedation end:  0916    Vital Signs:stable  
Procedure reviewed with patient by DR. Lefty Hartman with Daughter. Opportunity to verbalize questions and concerns.  Consent obtained.   
TRANSFER - OUT REPORT:    Verbal report given to ED Jaramillo on Steve Maharaj  being transferred to [facility] for routine progression of patient care       Report consisted of patient's Situation, Background, Assessment and   Recommendations(SBAR).     Information from the following report(s) Nurse Handoff Report was reviewed with the receiving nurse.           Lines:   Peripheral IV 01/22/25 Right Antecubital (Active)   Site Assessment Clean, dry & intact 01/23/25 0230   Line Status Capped 01/23/25 0230   Line Care Connections checked and tightened 01/23/25 0230   Phlebitis Assessment No symptoms 01/23/25 0230   Infiltration Assessment 0 01/23/25 0230   Alcohol Cap Used Yes 01/23/25 0230   Dressing Status Clean, dry & intact 01/23/25 0230   Dressing Type Transparent 01/23/25 0230       Peripheral IV 01/22/25 Left Antecubital (Active)   Site Assessment Clean, dry & intact 01/23/25 0230   Line Status Capped;Flushed 01/23/25 0230   Line Care Connections checked and tightened 01/23/25 0230   Phlebitis Assessment No symptoms 01/23/25 0230   Infiltration Assessment 0 01/23/25 0230   Alcohol Cap Used Yes 01/23/25 0230   Dressing Status Clean, dry & intact 01/23/25 0230   Dressing Type Transparent 01/23/25 0230       Peripheral IV 01/23/25 Left;Posterior Hand (Active)        Opportunity for questions and clarification was provided.      Patient transported with:  O2 @ 2lpm       
no

## 2025-01-23 NOTE — OP NOTE
[facility]              [address] [place]  [address]                            OPERATIVE REPORT      PATIENT NAME: CAMELIA PADILLA              : 1933  MED REC NO: [id number]                       ROOM: OR  ACCOUNT NO: [id number]                       ADMIT DATE: 2025  PROVIDER: Jerson Padilla III, MD    DATE OF SERVICE:  2025    PREOPERATIVE DIAGNOSES:  Right ureteral stone of solitary kidney.  Acute renal failure.    POSTOPERATIVE DIAGNOSES:  Right ureteral stone of solitary kidney.  Acute renal failure.    PROCEDURES PERFORMED:  Cystoscopy, retrograde pyelogram, attempted ureteral stent Murphy catheter change.    SURGEON:  Jerson Padilla III, MD    ANESTHESIA:  General.    ESTIMATED BLOOD LOSS:  Minimal.    SPECIMENS REMOVED:  None.    INTRAOPERATIVE FINDINGS:  I am unable to place stent due to severe J hooking of the ureter.  The wire would not advance.  The tissue was friable and made visualization difficult.     COMPLICATIONS:  None.    IMPLANTS:  None.    INDICATIONS:  The patient is a 89-year-old  male found obtunded.  CT scan was done which showed a stone in solitary obstructing right kidney.  Creatinine is 7.6 from baseline of 1.6.  He is being brought to the operating room urgently for ureteral stent placement.    DESCRIPTION OF PROCEDURE:  After obtaining informed consent, the patient was brought back to the operating room, placed on the operating table in supine position.  Anesthesia was induced.  Airway was established without complication.  The patient was repositioned in dorsal lithotomy position, prepped and draped in the usual aseptic fashion.  After a final time-out, rigid cystoscope was inserted into the bladder.  Urethra was normal.  Prostate was severely obstructing.  Bladder neck patent.  No bladder lesions.  The bladder mucosa was friable.  There were multiple cellules and trabeculations.  The anatomy was  distorted.  After looking around for a while, I was able to identify what I thought was the ureteral orifice.  I advanced a Tiger Tail catheter, injected contrast.    Radiographic interpretation of retrograde pyelogram:  Fluoroscopic imaging revealed distal caliber ureter.  This appeared normal.  Stent was proximal on the CT scan.  I was unable to inject contrast higher up.  Thus I identified the distal ureter, which did appear to separate from where I injected suggesting severe J hooking.    Next, I attempted to advance a 0.035 Sensor wire, but it would simply just not advance.  I could not get it to go up where the contrast had been injected.  I then removed the cystoscope and advanced the semirigid ureteroscope.  Due to the small amount of bleeding, it made visualization difficult and I was unable to identify the ureteral orifice through the ureteroscope.  The scope was then removed and I re-entered the cystoscope.  We found the ureteral orifice and probed it with a wire.  Once again, I was not able to get it up.  I then sent a curved tip Glidewire and this would not go up either.  Because I cannot get it up, decision was made to abort.  The scope was removed and replaced with a 20-Maori silicone catheter.  Even though I did not ask for a 3 way, they gave me a 3 way.  I hand irrigated it and placed it to continuous bladder irrigation and drained light pink urine.  The patient was awoken from anesthesia, taken to recovery in healthy and stable condition having tolerated the procedure well.        MD ZAHRAA KHAN III/KHANH  D:  01/23/2025 00:18:08  T:  01/23/2025 00:44:40  JOB #:  [id number]

## 2025-01-23 NOTE — PROGRESS NOTES
End of Shift Note    Bedside shift change report given to Carmel WARD (oncoming nurse) by Maribell Kent RN (offgoing nurse).  Report included the following information SBAR, Intake/Output, MAR, Recent Results, and Cardiac Rhythm NSR    Shift worked:  7p-7a     Shift summary and any significant changes:     Pt admitted from PACU after failed ureter stent placement. Possible nephrostomy tube today. Still very little output in carpenter.     Concerns for physician to address:  xxx     Zone phone for oncoming shift:   [phone]       Activity:     Number times ambulated in hallways past shift: 0  Number of times OOB to chair past shift: 0    Cardiac:   Cardiac Monitoring: Yes      Cardiac Rhythm: Sinus rhythm    Access:  Current line(s): PIV     Genitourinary:   Urinary Status: Carpenter    Respiratory:   O2 Device: Nasal cannula  Chronic home O2 use?: YES  Incentive spirometer at bedside: NO    GI:  Last BM (including prior to admit): 01/22/25  Current diet:  Diet NPO Exceptions are: Sips of Water with Meds  Passing flatus: YES    Pain Management:   Patient states pain is manageable on current regimen: YES    Skin:  Mateusz Scale Score: 14  Interventions: Wound Offloading (Prevention Methods): Elevate heels, Pillows, Repositioning, Turning    Patient Safety:  Fall Risk: Nursing Judgement-Fall Risk High(Add Comments): Yes  Fall Risk Interventions  Nursing Judgement-Fall Risk High(Add Comments): Yes  Toilet Every 2 Hours-In Advance of Need: Yes  Hourly Visual Checks: Awake, In bed  Fall Visual Posted: Fall sign posted  Room Door Open: Yes  Alarm On: Bed  Patient Moved Closer to Nursing Station: No    Active Consults:   IP CONSULT TO UROLOGY  IP CONSULT TO INTERVENTIONAL RADIOLOGY  IP CONSULT TO NEPHROLOGY    Length of Stay:  Expected LOS: 3  Actual LOS: 0    Maribell Kent RN

## 2025-01-23 NOTE — ANESTHESIA PRE PROCEDURE
BMI:   Wt Readings from Last 3 Encounters:   07/28/24 65.8 kg (145 lb)   04/07/24 65.8 kg (145 lb)   09/09/23 65.8 kg (145 lb 1 oz)     There is no height or weight on file to calculate BMI.    CBC:   Lab Results   Component Value Date/Time    WBC 9.3 01/22/2025 06:47 PM    RBC 3.72 01/22/2025 06:47 PM    HGB 11.6 01/22/2025 06:47 PM    HCT 35.4 01/22/2025 06:47 PM    MCV 95.2 01/22/2025 06:47 PM    RDW 13.2 01/22/2025 06:47 PM     01/22/2025 06:47 PM       CMP:   Lab Results   Component Value Date/Time     01/22/2025 06:47 PM    K 4.0 01/22/2025 06:47 PM     01/22/2025 06:47 PM    CO2 21 01/22/2025 06:47 PM    BUN 73 01/22/2025 06:47 PM    CREATININE 7.28 01/22/2025 06:47 PM    GFRAA >60 09/26/2021 06:25 AM    AGRATIO 0.7 09/22/2021 06:34 AM    LABGLOM 7 01/22/2025 06:47 PM    LABGLOM 41 04/12/2024 06:44 AM    GLUCOSE 101 01/22/2025 06:47 PM    CALCIUM 7.9 01/22/2025 06:47 PM    BILITOT 1.2 01/22/2025 06:47 PM    ALKPHOS 86 01/22/2025 06:47 PM    ALKPHOS 106 09/22/2021 06:34 AM    AST 18 01/22/2025 06:47 PM    ALT 12 01/22/2025 06:47 PM       POC Tests: No results for input(s): \"POCGLU\", \"POCNA\", \"POCK\", \"POCCL\", \"POCBUN\", \"POCHEMO\", \"POCHCT\" in the last 72 hours.    Coags:   Lab Results   Component Value Date/Time    PROTIME 10.9 04/12/2024 06:44 AM    INR 1.0 04/12/2024 06:44 AM       HCG (If Applicable): No results found for: \"PREGTESTUR\", \"PREGSERUM\", \"HCG\", \"HCGQUANT\"     ABGs: No results found for: \"PHART\", \"PO2ART\", \"MQS3ISD\", \"YYP7QGE\", \"BEART\", \"H2SJVEMH\"     Type & Screen (If Applicable):  No results found for: \"ABORH\", \"LABANTI\"    Drug/Infectious Status (If Applicable):  No results found for: \"HIV\", \"HEPCAB\"    COVID-19 Screening (If Applicable):   Lab Results   Component Value Date/Time    COVID19 Not detected 01/22/2025 06:47 PM           Anesthesia Evaluation  Patient summary reviewed  Airway: Mallampati: III  TM distance: >3 FB   Neck ROM:

## 2025-01-23 NOTE — ED NOTES
This RN and Tsering WARD witnessed daughter Faby Salgado give verbal consent to OR. Proper documentation signed.

## 2025-01-23 NOTE — PROGRESS NOTES
4 Eyes Skin Assessment     NAME:  Steve Maharaj  YOB: 1933  MEDICAL RECORD NUMBER:  [id number]    The patient is being assessed for  Admission    I agree that at least one RN has performed a thorough Head to Toe Skin Assessment on the patient. ALL assessment sites listed below have been assessed.      Areas assessed by both nurses:    Head, Face, Ears, Shoulders, Back, Chest, Arms, Elbows, Hands, Sacrum. Buttock, Coccyx, Ischium, Legs. Feet and Heels, and Under Medical Devices         Does the Patient have a Wound? No noted wound(s)       Mateusz Prevention initiated by RN: Yes  Wound Care Orders initiated by RN: No    Pressure Injury (Stage 3,4, Unstageable, DTI, NWPT, and Complex wounds) if present, place Wound referral order by RN under : No    New Ostomies, if present place, Ostomy referral order under : No     Nurse 1 eSignature: Electronically signed by Maribell Kent RN on 1/23/25 at 3:27 AM EST    **SHARE this note so that the co-signing nurse can place an eSignature**    Nurse 2 eSignature: {Esignature:[id number]}

## 2025-01-23 NOTE — BRIEF OP NOTE
Brief Postoperative Note      Patient: Steve Maharaj  YOB: 1933  MRN: [id number]    Date of Procedure: 1/22/2025    Pre-Op Diagnosis Codes:      * Right ureteral stone [N20.1]    Post-Op Diagnosis: Same       Procedure(s):  CYSTOSCOPY retrograde    Surgeon(s):  Jerson Padilla III, MD    Assistant:  * No surgical staff found *    Anesthesia: * No anesthesia type entered *    Estimated Blood Loss (mL): Minimal    Complications: None    Specimens:   * No specimens in log *    Implants:  * No implants in log *      Drains:   Urinary Catheter 01/22/25 Murphy (Active)   $ Urethral catheter insertion $ Not inserted for procedure 01/22/25 1953   Catheter Indications Urinary retention (acute or chronic), continuous bladder irrigation or bladder outlet obstruction 01/22/25 1953   Site Assessment No urethral drainage;Red 01/22/25 1953   Collection Container Standard 01/22/25 1953   Securement Method Securing device (Describe) 01/22/25 1953       Findings:    Other Findings: unable to place stent due to severe J hooking of ureter    Dictation#: [id number]    I discussed with Dr. Hartman about right perc tube. BMP electrolytes are normal with normal K so He will do first case in AM.     Electronically signed by Jerson Padilla MD on 1/23/2025 at 12:11 AM

## 2025-01-23 NOTE — PROGRESS NOTES
Urology Progress Note    Patient: Steve Maharaj MRN: [id number]  SSN: [id number]    YOB: 1933  Age: 89 y.o.  Sex: male            Assessment:     Steve Maharaj is a 89 y.o. male seen in consultation for 5 mm obstructing stone in setting of solitary kidney, forniceal rupture.  Patient was taken to the OR for cysto, R ureteral stent placement which was unsuccessful, IR was the consulted for R PCN placement.    VSS, afebrile  No leukocytosis, cr- 6.67(7.28)  UA questionable, no UCX sent      Plan:     5 mm R obstructing stone in setting of solitary kidney  ELIAS  Now s/p R PCN placement  - maintain R PCN, will need close FU to plan for definitive treatment  - trend creatinine, nephrology following    Will see in am     Supervising MD: Dr. Padilla                Objective:     /65   Pulse 92   Temp 97.4 °F (36.3 °C) (Oral)   Resp 22   SpO2 95%       Intake/Output Summary (Last 24 hours) at 1/23/2025 1227  Last data filed at 1/23/2025 1038  Gross per 24 hour   Intake 322.81 ml   Output 505 ml   Net -182.19 ml       Physical Exam  General: NAD  HEENT: NC/AT, EOMI, MMM  Abdomen: soft, NTTP, nondistended, no suprapubic fullness or tenderness  : R PCN in place draining dark johanna urine, no flak pain, no CVA tenderness  Neuro: Appropriate, no focal neurological deficits  Mood/Affect: normal    Recent Results (from the past 24 hour(s))   CBC with Auto Differential    Collection Time: 01/22/25  6:47 PM   Result Value Ref Range    WBC 9.3 4.1 - 11.1 K/uL    RBC 3.72 (L) 4.10 - 5.70 M/uL    Hemoglobin 11.6 (L) 12.1 - 17.0 g/dL    Hematocrit 35.4 (L) 36.6 - 50.3 %    MCV 95.2 80.0 - 99.0 FL    MCH 31.2 26.0 - 34.0 PG    MCHC 32.8 30.0 - 36.5 g/dL    RDW 13.2 11.5 - 14.5 %    Platelets 183 150 - 400 K/uL    MPV 10.5 8.9 - 12.9 FL    Nucleated RBCs 0.0 0  WBC    nRBC 0.00 0.00 - 0.01 K/uL    Neutrophils % 91 (H) 32.0 - 75.0 %    Band Neutrophils 1 %

## 2025-01-23 NOTE — H&P
Interventional and Vascular Radiology History and Physical    Patient: Steve Maharaj 89 y.o. male       Chief Complaint: No chief complaint on file.      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    Hypertension     Prostate enlargement      Family History   Problem Relation Age of Onset    Hypertension Mother     Cancer Daughter         Breast ca - stage 1     Social History     Socioeconomic History    Marital status:      Spouse name: Not on file    Number of children: Not on file    Years of education: Not on file    Highest education level: Not on file   Occupational History    Not on file   Tobacco Use    Smoking status: Never    Smokeless tobacco: Never   Vaping Use    Vaping status: Never Used   Substance and Sexual Activity    Alcohol use: No    Drug use: No    Sexual activity: Not on file   Other Topics Concern    Not on file   Social History Narrative    Not on file     Social Determinants of Health     Financial Resource Strain: Not on file   Food Insecurity: No Food Insecurity (4/8/2024)    Hunger Vital Sign     Worried About Running Out of Food in the Last Year: Never true     Ran Out of Food in the Last Year: Never true   Transportation Needs: No Transportation Needs (4/8/2024)    PRAPARE - Transportation     Lack of Transportation (Medical): No     Lack of Transportation (Non-Medical): No   Physical Activity: Not on file   Stress: Not on file   Social Connections: Not on file   Intimate Partner Violence: Not on file   Housing Stability: Low Risk  (4/8/2024)    Housing Stability Vital Sign     Unable to Pay for Housing in the Last Year: No     Number of Places Lived in the Last Year: 1     Unstable Housing in the Last Year: No       Allergies:   Allergies   Allergen Reactions    Bee Venom      Other Reaction(s): SWELLING FROM BEESTINGS    Cephalexin        Current Medications:  No

## 2025-01-23 NOTE — CONSULTS
Department of Urology  Attending Consult Note      Reason for Consult:  right ureteral stone  Requesting Physician:  ER     CHIEF COMPLAINT:  right ureteral stone, solitary kidney    History Obtained From:  electronic medical record, ER staff    HISTORY OF PRESENT ILLNESS:                The patient is a 89 y.o. male with right solitary kidney with obstructing stone, CT done shows this and UI looked at images and agree with report. Cr is 6 from baseline of 1.6. serena renal fluid c/w forniceal rupture. With chronic indwellling carpenter.      Past Medical History:        Diagnosis Date    Arthritis     Cancer (HCC) 04/2019    renal    Hypertension     Prostate enlargement      Past Surgical History:        Procedure Laterality Date    ORTHOPEDIC SURGERY Right 2008    hip replacement/[facility]/Dr Bowman    PRE-MALIGNANT / BENIGN SKIN LESION EXCISION  07/13/2018    excision of left flank      Current Medications:   Current Facility-Administered Medications: sodium chloride 0.9 % bolus 1,000 mL, 1,000 mL, IntraVENous, Once  piperacillin-tazobactam (ZOSYN) 3,375 mg in sodium chloride 0.9 % 50 mL IVPB (mini-bag), 3,375 mg, IntraVENous, Once    Allergies:  Bee venom and Cephalexin    Social History:   Non contrib  Family History:       Problem Relation Age of Onset    Hypertension Mother     Cancer Daughter         Breast ca - stage 1     REVIEW OF SYSTEMS:  Unable to obtain, pt with AMS, obtunded    PHYSICAL EXAM:    VITALS:  BP (!) 110/58   Pulse 85   Temp 97.8 °F (36.6 °C) (Oral)   Resp 29   SpO2 90%   CONSTITUTIONAL:  obtunded  LUNGS:  No increased work of breathing, good air exchange, clear   CARDIOVASCULAR:  regular rate and rhythm,  ABDOMEN:  No scars, normal bowel sounds, soft, distended  GENITAL/URINARY:  indwelling carpenter  NEUROLOGIC:  obtunded  SKIN:  no bruising or bleeding and normal skin color, texture, turgor  DATA:  CBC:   Lab Results   Component Value Date/Time    WBC 9.3 01/22/2025 06:47 PM    RBC 3.72  01/22/2025 06:47 PM    HGB 11.6 01/22/2025 06:47 PM    HCT 35.4 01/22/2025 06:47 PM    MCV 95.2 01/22/2025 06:47 PM    MCH 31.2 01/22/2025 06:47 PM    MCHC 32.8 01/22/2025 06:47 PM    RDW 13.2 01/22/2025 06:47 PM     01/22/2025 06:47 PM    MPV 10.5 01/22/2025 06:47 PM     CMP:    Lab Results   Component Value Date/Time     01/22/2025 06:47 PM    K 4.0 01/22/2025 06:47 PM     01/22/2025 06:47 PM    CO2 21 01/22/2025 06:47 PM    BUN 73 01/22/2025 06:47 PM    CREATININE 7.28 01/22/2025 06:47 PM    GFRAA >60 09/26/2021 06:25 AM    AGRATIO 0.7 09/22/2021 06:34 AM    LABGLOM 7 01/22/2025 06:47 PM    LABGLOM 41 04/12/2024 06:44 AM    GLUCOSE 101 01/22/2025 06:47 PM    CALCIUM 7.9 01/22/2025 06:47 PM    BILITOT 1.2 01/22/2025 06:47 PM    ALKPHOS 86 01/22/2025 06:47 PM    ALKPHOS 106 09/22/2021 06:34 AM    AST 18 01/22/2025 06:47 PM    ALT 12 01/22/2025 06:47 PM     IMPRESSION/RECOMMENDATION:    Right solitary kidney with obstructing stone. Discussed with daughters and Faby Solis over the phone ( medical decision maker) that need to urgent stent since he is in renal failure with obstructed solitary kidney. Discussed right ureteral stent with associated risks including but not limited to bleeding, infection, sepsis, injury to kidney ureter bladder or other nearby structures, inability to place stent which would require IR perc tube placement. Dicussed that stent is NOT permanent and is temporary and needs to be replaced or removed in 4 months otherwise risks encrustation, infection and life threatening sepsis. She acknowledged a clear understanding and agreed to proceed without reservation with cysto right stent.     Patient will be admitted to hospitalitist for co morbidities and ARF

## 2025-01-23 NOTE — CONSULTS
Patient name: Steve Maharaj MRN: [id number]      [facility]  Initial Consult Note  Requested by: Dr. Manny Interiano  Reason: Evaluation and management of ELIAS/CKD  Source: mostly chart review    Assessment:  ELIAS- 2nd to obstructive uropathy in a solitary kidney + n/v + diuretic    R ureteral stone with mod R hydro- in solitary kidney. Urology could not place R ureteral stent (unsuccessful due to anatomy); s/p R PCN by IR on 1/23  Hx of L Nephrectomy due to RCC  HTN  Solitary R kidney  Metabolic acidosis  CKD-3B: Basln Cr around 1.6. due to HTN/Solitary kidney  BPH  Pyuria/presumed UTI      Discussion-ELIAS is improving.  Acidosis is mild.  Nonoliguric.    Plan/Recommendations:  Monitor urine output.  Expect ELIAS continue to slowly improve  IV antibiotics as per primary team.  Follow culture  Continue Proscar/Flomax  Continue to hold atenolol/HCTZ with soft BP  Avoid nephrotoxins  Daily labs  No acute indications for dialysis      HPI: Steve Maharaj is a 89 y.o. male with PMH significant for hypertension, history of left nephrectomy from RCC, BPH who presented with nausea, vomiting and right-sided abdominal pain.  He is found to have evidence of obstructive uropathy with a right ureteral stone with moderate right hydronephrosis.  He also has pyuria.  He was seen by urology and underwent cystoscopy/RPG with an attempt for right ureteral stent placement, which was not successful.  IR was consulted and patient underwent right PCNT    Is making urine.  Creatinine is starting to trend down.

## 2025-01-23 NOTE — H&P
Hospitalist Admission Note    NAME:Steve Maharaj   : 1933   MRN: [id number]     Date/Time: 2025 4:04 AM    Patient PCP: Joseph Ugarte MD    *Please be aware this note is formulated with assistance from Dragon voice-recognition dictation software. Please excuse any errors that may be present*    ______________________________________________________________________  Given the patient's current clinical presentation, I have a high level of concern for decompensation if discharged from the emergency department.  Complex decision making was performed, which includes reviewing the patient's available past medical records, laboratory results, and x-ray films.       My assessment of this patient's clinical condition and my plan of care is as follows.    Problem List:  Patient Active Problem List   Diagnosis    Chronic indwelling Carpenter catheter    Sepsis (HCC)    Urinary tract infection associated with indwelling urethral catheter (HCC)    CKD (chronic kidney disease), stage I    Draining cutaneous sinus tract    Diarrhea    Stroke-like symptoms    Benign paroxysmal positional vertigo    Falls frequently    Dizziness    Generalized weakness    Bilateral carotid artery stenosis    Renal stone         Assessment / Plan:    Obstructive right ureteral stone of solitary kidney  ELIAS on CKD III   Hx of prior nephrectomy s/p to RCC   - Cr 7.28 today from prior 1.60 2024   - Went to OR with Urology however no stent was able to be placed  - IR consulted by Urology for consideration of perc neph tube, plan to place in AM   - NPO  - Hold VTE ppx overnight   - Hold home atenolol-chlorthalidone in setting of UTI and soft BP   - Previously with chronic indwelling carpenter catheter, hx of recurrent UTI; started on IV zosyn in ED due to concern for UTI, will continue empirically for now pending repeat UA once able to be collected  - Repeat BMP in AM  - Nephrology consulted, appreciate assistance   - Pending  the setting of solitary kidney secondary to renal cell carcinoma.  He had a previous nephrectomy.  He was found to have an obstructing ureteral stone and went to the OR emergently with urology.    Past Medical History:   Diagnosis Date    Arthritis     Cancer (HCC) 04/2019    renal    Hypertension     Prostate enlargement         Past Surgical History:   Procedure Laterality Date    ORTHOPEDIC SURGERY Right 2008    hip replacement/[facility]/Dr Bowman    PRE-MALIGNANT / BENIGN SKIN LESION EXCISION  07/13/2018    excision of left flank        Social History     Tobacco Use    Smoking status: Never    Smokeless tobacco: Never   Substance Use Topics    Alcohol use: No        Family History   Problem Relation Age of Onset    Hypertension Mother     Cancer Daughter         Breast ca - stage 1       Allergies   Allergen Reactions    Bee Venom      Other Reaction(s): SWELLING FROM BEESTINGS    Cephalexin         Prior to Admission medications    Medication Sig Start Date End Date Taking? Authorizing Provider   oxyBUTYnin (DITROPAN) 5 MG tablet Take 0.5 tablets by mouth 3 times daily as needed (bladder spasms) 4/27/24 5/2/24  Iker Zambrano MD   aspirin 81 MG chewable tablet Take 1 tablet by mouth daily 4/13/24   Antonietta Garcia MD   acetaminophen (TYLENOL) 325 MG tablet Take by mouth every 6 hours as needed    Automatic Reconciliation, Ar   atenolol-chlorthalidone (TENORETIC) 50-25 MG per tablet Take 1 tablet by mouth daily    Automatic Reconciliation, Ar   finasteride (PROSCAR) 5 MG tablet Take 1 tablet by mouth daily    Automatic Reconciliation, Ar   melatonin 3 MG TABS tablet Take by mouth  Patient not taking: Reported on 4/8/2024    Automatic Reconciliation, Ar   tamsulosin (FLOMAX) 0.4 MG capsule Take by mouth daily    Automatic Reconciliation, Ar         Objective:   VITALS:    Patient Vitals for the past 24 hrs:   BP Temp Temp src Pulse Resp SpO2   01/23/25 0214 109/66 98 °F (36.7 °C) Oral 81 27 95 %

## 2025-01-23 NOTE — ED PROVIDER NOTES
[facility] EMERGENCY DEPARTMENT  EMERGENCY DEPARTMENT ENCOUNTER       Pt Name: Steve Maharaj  MRN: [id number]  Birthdate 6/13/1933  Date of evaluation: 1/22/2025  Provider: Iker Zambrano MD   PCP: Joseph Ugarte MD  Note Started: 8:38 PM EST 1/22/25     CHIEF COMPLAINT       Chief Complaint   Patient presents with    Malaise     Pt arrives via EMS from home for family's concern that for the last 5 days pt has been lethargic, N/V, not eating or drinking. Family is concerned about dehydration. Pt has a carpenter in place upon arrival. For EMS pt is A&Ox4.      HISTORY OF PRESENT ILLNESS: 1 or more elements      History From: patient, History limited by: none     Steve Maharaj is a 89 y.o. male with history of prostate enlargement requiring indwelling Carpenter who presents to the emergency department with lethargy over the past 5 days.  He has had nausea, vomiting as not eating or drinking anything.  Family is ultimately concerned about dehydration.  His Carpenter was changed by urology yesterday and since it was changed she has not had any urine output overall.  They deny any recent fevers, chills or sick contacts.    Please See MDM for Additional Details of the HPI/PMH  Nursing Notes were all reviewed and agreed with or any disagreements were addressed in the HPI.     REVIEW OF SYSTEMS        Positives and Pertinent negatives as per HPI.    PAST HISTORY     Past Medical History:  Past Medical History:   Diagnosis Date    Arthritis     Cancer (HCC) 04/2019    renal    Hypertension     Prostate enlargement        Past Surgical History:  Past Surgical History:   Procedure Laterality Date    ORTHOPEDIC SURGERY Right 2008    hip [facility]/Dr Bowman    PRE-MALIGNANT / BENIGN SKIN LESION EXCISION  07/13/2018    excision of left flank        Family History:  Family History   Problem Relation Age of Onset    Hypertension Mother     Cancer Daughter         Breast ca - stage 1       Social

## 2025-01-23 NOTE — ED NOTES
This RN and Anel BARRON RN removed pts carpenter and attempted to replace with a 16F. After I inserted the new catheter, minimal output was noted, and it was noted to be sediment and blood. In an attempt to clear the sediment, I inserted a 20F catheter. No output came out from the second insertion. Pt left to CT with catheter in place.

## 2025-01-23 NOTE — ANESTHESIA POSTPROCEDURE EVALUATION
Department of Anesthesiology  Postprocedure Note    Patient: Steve Maharaj  MRN: [id number]  YOB: 1933  Date of evaluation: 1/23/2025    Procedure Summary       Date: 01/22/25 Room / [facility]    Anesthesia Start: 2320 Anesthesia Stop: 01/23/25 0022    Procedure: CYSTOSCOPY (Right) Diagnosis:       Right ureteral stone      (Right ureteral stone [N20.1])    Providers: Jerson Padilla III, MD Responsible Provider: Delroy Valle MD    Anesthesia Type: MAC ASA Status: 4 - Emergent            Anesthesia Type: No value filed.    Jose Alejandro Phase I:      Jose Alejandro Phase II:      Anesthesia Post Evaluation    Patient location during evaluation: PACU  Patient participation: complete - patient participated  Level of consciousness: awake  Pain score: 0  Airway patency: patent  Nausea & Vomiting: no nausea and no vomiting  Cardiovascular status: blood pressure returned to baseline  Respiratory status: acceptable  Hydration status: stable  Multimodal analgesia pain management approach  Pain management: adequate    No notable events documented.

## 2025-01-24 ENCOUNTER — APPOINTMENT (OUTPATIENT)
Facility: HOSPITAL | Age: 89
DRG: 853 | End: 2025-01-24
Payer: MEDICARE

## 2025-01-24 LAB
ABO + RH BLD: NORMAL
ACB COMPLEX DNA BLD POS QL NAA+NON-PROBE: NOT DETECTED
ACCESSION NUMBER, LLC1M: ABNORMAL
ALBUMIN SERPL-MCNC: 1.6 G/DL (ref 3.5–5)
ALBUMIN SERPL-MCNC: 1.8 G/DL (ref 3.5–5)
ALBUMIN/GLOB SERPL: 0.3 (ref 1.1–2.2)
ALBUMIN/GLOB SERPL: 0.4 (ref 1.1–2.2)
ALP SERPL-CCNC: 82 U/L (ref 45–117)
ALP SERPL-CCNC: 87 U/L (ref 45–117)
ALT SERPL-CCNC: 12 U/L (ref 12–78)
ALT SERPL-CCNC: 13 U/L (ref 12–78)
ANION GAP SERPL CALC-SCNC: 11 MMOL/L (ref 2–12)
ANION GAP SERPL CALC-SCNC: 12 MMOL/L (ref 2–12)
AST SERPL-CCNC: 25 U/L (ref 15–37)
AST SERPL-CCNC: 39 U/L (ref 15–37)
B FRAGILIS DNA BLD POS QL NAA+NON-PROBE: NOT DETECTED
BASOPHILS # BLD: 0 K/UL (ref 0–0.1)
BASOPHILS # BLD: 0 K/UL (ref 0–0.1)
BASOPHILS NFR BLD: 0 % (ref 0–1)
BASOPHILS NFR BLD: 0 % (ref 0–1)
BILIRUB SERPL-MCNC: 1.2 MG/DL (ref 0.2–1)
BILIRUB SERPL-MCNC: 1.6 MG/DL (ref 0.2–1)
BIOFIRE TEST COMMENT: ABNORMAL
BLACTX-M ISLT/SPM QL: NOT DETECTED
BLAIMP ISLT/SPM QL: NOT DETECTED
BLAKPC ISLT/SPM QL: NOT DETECTED
BLAOXA-48-LIKE ISLT/SPM QL: NOT DETECTED
BLAVIM ISLT/SPM QL: NOT DETECTED
BLOOD GROUP ANTIBODIES SERPL: NORMAL
BUN SERPL-MCNC: 83 MG/DL (ref 6–20)
BUN SERPL-MCNC: 84 MG/DL (ref 6–20)
BUN/CREAT SERPL: 14 (ref 12–20)
BUN/CREAT SERPL: 16 (ref 12–20)
C ALBICANS DNA BLD POS QL NAA+NON-PROBE: NOT DETECTED
C AURIS DNA BLD POS QL NAA+NON-PROBE: NOT DETECTED
C GATTII+NEOFOR DNA BLD POS QL NAA+N-PRB: NOT DETECTED
C GLABRATA DNA BLD POS QL NAA+NON-PROBE: NOT DETECTED
C KRUSEI DNA BLD POS QL NAA+NON-PROBE: NOT DETECTED
C PARAP DNA BLD POS QL NAA+NON-PROBE: NOT DETECTED
C TROPICLS DNA BLD POS QL NAA+NON-PROBE: NOT DETECTED
CALCIUM SERPL-MCNC: 6.8 MG/DL (ref 8.5–10.1)
CALCIUM SERPL-MCNC: 7.3 MG/DL (ref 8.5–10.1)
CHLORIDE SERPL-SCNC: 112 MMOL/L (ref 97–108)
CHLORIDE SERPL-SCNC: 113 MMOL/L (ref 97–108)
CO2 SERPL-SCNC: 18 MMOL/L (ref 21–32)
CO2 SERPL-SCNC: 18 MMOL/L (ref 21–32)
CREAT SERPL-MCNC: 5.22 MG/DL (ref 0.7–1.3)
CREAT SERPL-MCNC: 5.86 MG/DL (ref 0.7–1.3)
DIFFERENTIAL METHOD BLD: ABNORMAL
DIFFERENTIAL METHOD BLD: ABNORMAL
E CLOAC COMP DNA BLD POS NAA+NON-PROBE: NOT DETECTED
E COLI DNA BLD POS QL NAA+NON-PROBE: NOT DETECTED
E FAECALIS DNA BLD POS QL NAA+NON-PROBE: NOT DETECTED
E FAECIUM DNA BLD POS QL NAA+NON-PROBE: NOT DETECTED
EKG ATRIAL RATE: 72 BPM
EKG ATRIAL RATE: 97 BPM
EKG DIAGNOSIS: NORMAL
EKG DIAGNOSIS: NORMAL
EKG P AXIS: 32 DEGREES
EKG P AXIS: 34 DEGREES
EKG P-R INTERVAL: 156 MS
EKG P-R INTERVAL: 156 MS
EKG Q-T INTERVAL: 328 MS
EKG Q-T INTERVAL: 396 MS
EKG QRS DURATION: 92 MS
EKG QRS DURATION: 94 MS
EKG QTC CALCULATION (BAZETT): 387 MS
EKG QTC CALCULATION (BAZETT): 502 MS
EKG R AXIS: 11 DEGREES
EKG R AXIS: 14 DEGREES
EKG T AXIS: -12 DEGREES
EKG T AXIS: -32 DEGREES
EKG VENTRICULAR RATE: 84 BPM
EKG VENTRICULAR RATE: 97 BPM
ENTEROBACTERALES DNA BLD POS NAA+N-PRB: DETECTED
EOSINOPHIL # BLD: 0 K/UL (ref 0–0.4)
EOSINOPHIL # BLD: 0 K/UL (ref 0–0.4)
EOSINOPHIL NFR BLD: 0 % (ref 0–7)
EOSINOPHIL NFR BLD: 0 % (ref 0–7)
ERYTHROCYTE [DISTWIDTH] IN BLOOD BY AUTOMATED COUNT: 14.1 % (ref 11.5–14.5)
ERYTHROCYTE [DISTWIDTH] IN BLOOD BY AUTOMATED COUNT: 14.2 % (ref 11.5–14.5)
GASTROCULT GAST QL: POSITIVE
GLOBULIN SER CALC-MCNC: 4.3 G/DL (ref 2–4)
GLOBULIN SER CALC-MCNC: 5.2 G/DL (ref 2–4)
GLUCOSE BLD STRIP.AUTO-MCNC: 118 MG/DL (ref 65–117)
GLUCOSE BLD STRIP.AUTO-MCNC: 157 MG/DL (ref 65–117)
GLUCOSE BLD STRIP.AUTO-MCNC: 98 MG/DL (ref 65–117)
GLUCOSE SERPL-MCNC: 131 MG/DL (ref 65–100)
GLUCOSE SERPL-MCNC: 151 MG/DL (ref 65–100)
GP B STREP DNA BLD POS QL NAA+NON-PROBE: NOT DETECTED
HAEM INFLU DNA BLD POS QL NAA+NON-PROBE: NOT DETECTED
HCT VFR BLD AUTO: 30.4 % (ref 36.6–50.3)
HCT VFR BLD AUTO: 30.9 % (ref 36.6–50.3)
HGB BLD-MCNC: 10 G/DL (ref 12.1–17)
HGB BLD-MCNC: 10.1 G/DL (ref 12.1–17)
IMM GRANULOCYTES # BLD AUTO: 0 K/UL (ref 0–0.04)
IMM GRANULOCYTES # BLD AUTO: 0 K/UL (ref 0–0.04)
IMM GRANULOCYTES NFR BLD AUTO: 0 % (ref 0–0.5)
IMM GRANULOCYTES NFR BLD AUTO: 0 % (ref 0–0.5)
K OXYTOCA DNA BLD POS QL NAA+NON-PROBE: NOT DETECTED
KLEBSIELLA SP DNA BLD POS QL NAA+NON-PRB: NOT DETECTED
KLEBSIELLA SP DNA BLD POS QL NAA+NON-PRB: NOT DETECTED
L MONOCYTOG DNA BLD POS QL NAA+NON-PROBE: NOT DETECTED
LACTATE SERPL-SCNC: 2.1 MMOL/L (ref 0.4–2)
LACTATE SERPL-SCNC: 2.2 MMOL/L (ref 0.4–2)
LYMPHOCYTES # BLD: 0.41 K/UL (ref 0.8–3.5)
LYMPHOCYTES # BLD: 0.85 K/UL (ref 0.8–3.5)
LYMPHOCYTES NFR BLD: 10 % (ref 12–49)
LYMPHOCYTES NFR BLD: 6 % (ref 12–49)
MAGNESIUM SERPL-MCNC: 2 MG/DL (ref 1.6–2.4)
MCH RBC QN AUTO: 31.1 PG (ref 26–34)
MCH RBC QN AUTO: 31.4 PG (ref 26–34)
MCHC RBC AUTO-ENTMCNC: 32.7 G/DL (ref 30–36.5)
MCHC RBC AUTO-ENTMCNC: 32.9 G/DL (ref 30–36.5)
MCV RBC AUTO: 94.4 FL (ref 80–99)
MCV RBC AUTO: 96 FL (ref 80–99)
METAMYELOCYTES NFR BLD MANUAL: 1 %
MONOCYTES # BLD: 0.21 K/UL (ref 0–1)
MONOCYTES # BLD: 0.26 K/UL (ref 0–1)
MONOCYTES NFR BLD: 3 % (ref 5–13)
MONOCYTES NFR BLD: 3 % (ref 5–13)
N MEN DNA BLD POS QL NAA+NON-PROBE: NOT DETECTED
NEUTS SEG # BLD: 6.21 K/UL (ref 1.8–8)
NEUTS SEG # BLD: 7.39 K/UL (ref 1.8–8)
NEUTS SEG NFR BLD: 87 % (ref 32–75)
NEUTS SEG NFR BLD: 90 % (ref 32–75)
NRBC # BLD: 0 K/UL (ref 0–0.01)
NRBC # BLD: 0 K/UL (ref 0–0.01)
NRBC BLD-RTO: 0 PER 100 WBC
NRBC BLD-RTO: 0 PER 100 WBC
P AERUGINOSA DNA BLD POS NAA+NON-PROBE: NOT DETECTED
PH GAST: 4 (ref 1.5–3.5)
PLATELET # BLD AUTO: 112 K/UL (ref 150–400)
PLATELET # BLD AUTO: 154 K/UL (ref 150–400)
PMV BLD AUTO: 11.4 FL (ref 8.9–12.9)
PMV BLD AUTO: 11.9 FL (ref 8.9–12.9)
POTASSIUM SERPL-SCNC: 3 MMOL/L (ref 3.5–5.1)
POTASSIUM SERPL-SCNC: 4.4 MMOL/L (ref 3.5–5.1)
PROT SERPL-MCNC: 5.9 G/DL (ref 6.4–8.2)
PROT SERPL-MCNC: 7 G/DL (ref 6.4–8.2)
PROTEUS SP DNA BLD POS QL NAA+NON-PROBE: DETECTED
RBC # BLD AUTO: 3.22 M/UL (ref 4.1–5.7)
RBC # BLD AUTO: 3.22 M/UL (ref 4.1–5.7)
RBC MORPH BLD: ABNORMAL
RBC MORPH BLD: ABNORMAL
RESISTANT GENE NDM BY PCR: NOT DETECTED
RESISTANT GENE TARGETS: ABNORMAL
S AUREUS DNA BLD POS QL NAA+NON-PROBE: NOT DETECTED
S AUREUS+CONS DNA BLD POS NAA+NON-PROBE: NOT DETECTED
S EPIDERMIDIS DNA BLD POS QL NAA+NON-PRB: NOT DETECTED
S LUGDUNENSIS DNA BLD POS QL NAA+NON-PRB: NOT DETECTED
S MALTOPHILIA DNA BLD POS QL NAA+NON-PRB: NOT DETECTED
S MARCESCENS DNA BLD POS NAA+NON-PROBE: NOT DETECTED
S PNEUM DNA BLD POS QL NAA+NON-PROBE: NOT DETECTED
S PYO DNA BLD POS QL NAA+NON-PROBE: NOT DETECTED
SALMONELLA DNA BLD POS QL NAA+NON-PROBE: NOT DETECTED
SERVICE CMNT-IMP: ABNORMAL
SERVICE CMNT-IMP: ABNORMAL
SERVICE CMNT-IMP: NORMAL
SODIUM SERPL-SCNC: 142 MMOL/L (ref 136–145)
SODIUM SERPL-SCNC: 142 MMOL/L (ref 136–145)
SPECIMEN EXP DATE BLD: NORMAL
STREPTOCOCCUS DNA BLD POS NAA+NON-PROBE: NOT DETECTED
WBC # BLD AUTO: 6.9 K/UL (ref 4.1–11.1)
WBC # BLD AUTO: 8.5 K/UL (ref 4.1–11.1)

## 2025-01-24 PROCEDURE — 93005 ELECTROCARDIOGRAM TRACING: CPT | Performed by: STUDENT IN AN ORGANIZED HEALTH CARE EDUCATION/TRAINING PROGRAM

## 2025-01-24 PROCEDURE — 82271 OCCULT BLOOD OTHER SOURCES: CPT

## 2025-01-24 PROCEDURE — 74177 CT ABD & PELVIS W/CONTRAST: CPT

## 2025-01-24 PROCEDURE — 83605 ASSAY OF LACTIC ACID: CPT

## 2025-01-24 PROCEDURE — 6360000004 HC RX CONTRAST MEDICATION: Performed by: RADIOLOGY

## 2025-01-24 PROCEDURE — 6360000002 HC RX W HCPCS: Performed by: STUDENT IN AN ORGANIZED HEALTH CARE EDUCATION/TRAINING PROGRAM

## 2025-01-24 PROCEDURE — 82962 GLUCOSE BLOOD TEST: CPT

## 2025-01-24 PROCEDURE — 86900 BLOOD TYPING SEROLOGIC ABO: CPT

## 2025-01-24 PROCEDURE — 99222 1ST HOSP IP/OBS MODERATE 55: CPT | Performed by: SURGERY

## 2025-01-24 PROCEDURE — 93005 ELECTROCARDIOGRAM TRACING: CPT | Performed by: HOSPITALIST

## 2025-01-24 PROCEDURE — 6360000002 HC RX W HCPCS

## 2025-01-24 PROCEDURE — 74018 RADEX ABDOMEN 1 VIEW: CPT

## 2025-01-24 PROCEDURE — 36415 COLL VENOUS BLD VENIPUNCTURE: CPT

## 2025-01-24 PROCEDURE — 2580000003 HC RX 258: Performed by: STUDENT IN AN ORGANIZED HEALTH CARE EDUCATION/TRAINING PROGRAM

## 2025-01-24 PROCEDURE — 86901 BLOOD TYPING SEROLOGIC RH(D): CPT

## 2025-01-24 PROCEDURE — 86850 RBC ANTIBODY SCREEN: CPT

## 2025-01-24 PROCEDURE — 85025 COMPLETE CBC W/AUTO DIFF WBC: CPT

## 2025-01-24 PROCEDURE — 2580000003 HC RX 258

## 2025-01-24 PROCEDURE — 2500000003 HC RX 250 WO HCPCS: Performed by: STUDENT IN AN ORGANIZED HEALTH CARE EDUCATION/TRAINING PROGRAM

## 2025-01-24 PROCEDURE — 2700000000 HC OXYGEN THERAPY PER DAY

## 2025-01-24 PROCEDURE — 80053 COMPREHEN METABOLIC PANEL: CPT

## 2025-01-24 PROCEDURE — 6360000004 HC RX CONTRAST MEDICATION: Performed by: NURSE PRACTITIONER

## 2025-01-24 PROCEDURE — 2060000000 HC ICU INTERMEDIATE R&B

## 2025-01-24 PROCEDURE — 83735 ASSAY OF MAGNESIUM: CPT

## 2025-01-24 PROCEDURE — 6370000000 HC RX 637 (ALT 250 FOR IP): Performed by: STUDENT IN AN ORGANIZED HEALTH CARE EDUCATION/TRAINING PROGRAM

## 2025-01-24 RX ORDER — IPRATROPIUM BROMIDE AND ALBUTEROL SULFATE 2.5; .5 MG/3ML; MG/3ML
1 SOLUTION RESPIRATORY (INHALATION)
Status: DISCONTINUED | OUTPATIENT
Start: 2025-01-25 | End: 2025-01-25

## 2025-01-24 RX ORDER — DIATRIZOATE MEGLUMINE AND DIATRIZOATE SODIUM 660; 100 MG/ML; MG/ML
30 SOLUTION ORAL; RECTAL
Status: COMPLETED | OUTPATIENT
Start: 2025-01-24 | End: 2025-01-24

## 2025-01-24 RX ORDER — IOPAMIDOL 755 MG/ML
100 INJECTION, SOLUTION INTRAVASCULAR
Status: COMPLETED | OUTPATIENT
Start: 2025-01-24 | End: 2025-01-24

## 2025-01-24 RX ADMIN — PIPERACILLIN AND TAZOBACTAM 3375 MG: 3; .375 INJECTION, POWDER, LYOPHILIZED, FOR SOLUTION INTRAVENOUS at 06:42

## 2025-01-24 RX ADMIN — SODIUM CHLORIDE, PRESERVATIVE FREE 10 ML: 5 INJECTION INTRAVENOUS at 09:24

## 2025-01-24 RX ADMIN — SODIUM CHLORIDE, PRESERVATIVE FREE 40 MG: 5 INJECTION INTRAVENOUS at 09:22

## 2025-01-24 RX ADMIN — TAMSULOSIN HYDROCHLORIDE 0.4 MG: 0.4 CAPSULE ORAL at 09:24

## 2025-01-24 RX ADMIN — PIPERACILLIN AND TAZOBACTAM 3375 MG: 3; .375 INJECTION, POWDER, LYOPHILIZED, FOR SOLUTION INTRAVENOUS at 17:09

## 2025-01-24 RX ADMIN — SODIUM CHLORIDE, PRESERVATIVE FREE 10 ML: 5 INJECTION INTRAVENOUS at 21:15

## 2025-01-24 RX ADMIN — DIATRIZOATE MEGLUMINE AND DIATRIZOATE SODIUM 30 ML: 660; 100 LIQUID ORAL; RECTAL at 14:49

## 2025-01-24 RX ADMIN — FINASTERIDE 5 MG: 5 TABLET, FILM COATED ORAL at 09:24

## 2025-01-24 RX ADMIN — IOPAMIDOL 100 ML: 755 INJECTION, SOLUTION INTRAVENOUS at 16:28

## 2025-01-24 ASSESSMENT — PAIN SCALES - GENERAL
PAINLEVEL_OUTOF10: 0

## 2025-01-24 NOTE — PROGRESS NOTES
End of Shift Note    Bedside shift change report given to Tadeo WARD (oncoming nurse) by Maribell Kent RN (offgoing nurse).  Report included the following information SBAR, Intake/Output, MAR, Recent Results, and Cardiac Rhythm NSR    Shift worked:  7p-7a     Shift summary and any significant changes:     Pt c/o abd/chest pain. Abd distended. Called RR for acute change. ABD CT showed gastric distention. Inserted NG tube for decompression    Pt removed NG tube twice prior to hooking up suction. Placed bedside sitter per provider. NG tube now on low intermittent suctioning.    Blood cultures came back positive as well.   Concerns for physician to address:  xxx     Zone phone for oncoming shift:   xxx       Activity:  Level of Assistance: Maximum assist, patient does 25-49%  Number times ambulated in hallways past shift: 0  Number of times OOB to chair past shift: 0    Cardiac:   Cardiac Monitoring: Yes      Cardiac Rhythm: Sinus rhythm    Access:  Current line(s): PIV     Genitourinary:   Urinary Status: Murphy    Respiratory:   O2 Device: Nasal cannula  Chronic home O2 use?: NO  Incentive spirometer at bedside: N/A    GI:  Last BM (including prior to admit): 01/23/25  Current diet:  Diet NPO  Passing flatus: YES    Pain Management:   Patient states pain is manageable on current regimen: N/A    Skin:  Mateusz Scale Score: 14  Interventions: Wound Offloading (Prevention Methods): Repositioning, Pillows, Turning    Patient Safety:  Fall Risk: Nursing Judgement-Fall Risk High(Add Comments): Yes  Fall Risk Interventions  Nursing Judgement-Fall Risk High(Add Comments): Yes  Toilet Every 2 Hours-In Advance of Need: Yes  Hourly Visual Checks: In bed, Awake  Fall Visual Posted: Fall sign posted  Room Door Open: Yes  Alarm On: Bed  Patient Moved Closer to Nursing Station: No    Active Consults:   IP CONSULT TO UROLOGY  IP CONSULT TO INTERVENTIONAL RADIOLOGY  IP CONSULT TO NEPHROLOGY  IP CONSULT TO GENERAL SURGERY  IP CONSULT TO

## 2025-01-24 NOTE — PROGRESS NOTES
End of Shift Note    Bedside shift change report given to Carla (oncoming nurse) by BETINA CHAPPELL RN (offgoing nurse).  Report included the following information SBAR, Kardex, and MAR    Shift worked:  7a-7p     Shift summary and any significant changes:     Nephrostomy tube placed in right flank, good output.     Concerns for physician to address:    Zone phone for oncoming shift:          Activity:  Level of Assistance: Moderate assist, patient does 50-74%  Number times ambulated in hallways past shift: 0  Number of times OOB to chair past shift: 0    Cardiac:   Cardiac Monitoring: Yes      Cardiac Rhythm: Sinus rhythm    Access:  Current line(s): PIV     Genitourinary:   Urinary Status: Murphy    Respiratory:   O2 Device: Nasal cannula  Chronic home O2 use?: NO  Incentive spirometer at bedside: NO    GI:  Last BM (including prior to admit): 01/23/25  Current diet:  ADULT DIET; Regular  Passing flatus: YES    Pain Management:   Patient states pain is manageable on current regimen: N/A    Skin:  Mateusz Scale Score: 15  Interventions: Wound Offloading (Prevention Methods): Repositioning    Patient Safety:  Fall Risk: Nursing Judgement-Fall Risk High(Add Comments): Yes  Fall Risk Interventions  Nursing Judgement-Fall Risk High(Add Comments): Yes  Toilet Every 2 Hours-In Advance of Need: Yes  Hourly Visual Checks: In bed, Awake  Fall Visual Posted: Fall sign posted  Room Door Open: Yes  Alarm On: Bed  Patient Moved Closer to Nursing Station: No    Active Consults:   IP CONSULT TO UROLOGY  IP CONSULT TO INTERVENTIONAL RADIOLOGY  IP CONSULT TO NEPHROLOGY    Length of Stay:  Expected LOS: 4  Actual LOS: 0    BETINA CHAPPELL RN

## 2025-01-24 NOTE — CONSULTS
Subjective:       Steve Maharaj is a 89 y.o. male who presents for evaluation of ileus. He has a pmhx significant for HTN enlarged prostate requiring indwelling Murphy, presented to the hospital 1/22 with lethargy, nausea, vomiting and decrease oral intake. 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 Found to have obstructing ureteral stone, forniceal rupture, and taken to OR for cysto. R ureteral stent placement was unsuccessful so IR was consulted for R PCN placement.      1/23/25: CTAP: IMPRESSION:  1. Moderate gaseous gastric distention and mildly dilated proximal small bowel loops with gradual tapering suggesting ileus. No intraperitoneal free air.     2. Small right pleural effusion with right lower lobe airspace opacity that may  represent atelectasis, infection, or aspiration.     3. Right percutaneous nephrostomy tube and a solitary right kidney without  hydronephrosis. A 5 mm calculus is again demonstrated in the proximal right  ureter with surrounding ureteral inflammation.     4. Right retroperitoneal fluid and stranding is unchanged extending to surround the right ureter.    Per his daughter, he had a bowel movement yesterday. He is still complaining of abdominal pain but she also reports he likes pain medications. No nausea/vomiting.     Past Medical History:   Diagnosis Date    Arthritis     Cancer (HCC) 04/2019    renal    Hypertension     Prostate enlargement      Past Surgical History:   Procedure Laterality Date    CYSTOSCOPY Right 1/22/2025    CYSTOSCOPY performed by Jerson Padilla III, MD at [facility] MAIN OR    IR GUIDED NEPHROSTOMY CATH PLACEMENT RIGHT  1/23/2025    IR GUIDED NEPHROSTOMY CATH PLACEMENT RIGHT 1/23/2025 [facility] RAD ANGIO IR    ORTHOPEDIC SURGERY Right 2008    hip replacement/[facility]/Dr Bowman    PRE-MALIGNANT / BENIGN SKIN LESION EXCISION  07/13/2018    excision of left flank      Social History     Socioeconomic History    Marital status:

## 2025-01-24 NOTE — PROGRESS NOTES
Nocturnist/cross cover provider called to room 2315 at approximately 2037 as RRT was reported to be called for chest pain.     Patient Vitals for the past 8 hrs:   BP Temp Temp src Pulse Resp SpO2   01/23/25 1900 102/68 97.6 °F (36.4 °C) Oral (!) 101 (!) 35 96 %   01/23/25 1842 -- -- -- (!) 101 (!) 31 --   01/23/25 1502 (!) 94/58 98 °F (36.7 °C) Oral 92 (!) 32 --       Intake/Output Summary (Last 24 hours) at 1/23/2025 2049  Last data filed at 1/23/2025 1842  Gross per 24 hour   Intake 502.81 ml   Output 905 ml   Net -402.19 ml         BACKGROUND/ SITUATION:  Attending provider following patient: Dr Lara    89 y.o. male h/o  has a past medical history of Arthritis, Cancer (HCC) (04/2019), Hypertension, and Prostate enlargement.   admitted 1/22/2025 for Urinary tract obstruction by kidney stone [N20.0, N13.8]  Acute renal failure, unspecified acute renal failure type (HCC) [N17.9]  Renal stone [N20.0].  See prior hospitalist group notes for complete details of course of treatment.      FINDINGS:  Evaluated patient at bedside, he is complaining of CP/LUQ abd pain, worsening with palpation. Last bm this morning. Nephrostomy tube 200 cc output this evening and carpenter in place with minimal output. Patient is wheezy, resp rate 35, on 3L NC, shallow respirations. Abd distended, nontender, hypoactive bowel sounds. Patient is a poor historian, A/Ox1. Patient denies any other concerns or complaints on exam.     -EKG upon my initial review revealed SR with PACs- awaiting final cardiology reading.   -CXR upon my initial review revealed low lung volumes, air under diaphragm- awaiting final radiology reading.    Physical Examination:   General appearance: alert, appears SOB  Head: Atraumatic, normocephalic  Eyes: PERRL. EOMI bilaterally. No scleral icterus.    ENT: Oral mucosa is moist and free of lesions.  No tracheal deviation.  No JVD.    Respiratory: Tachypnea. wheezes, shallow, on 3L NC  Cardiovascular: Regular rate and  probability of imminent or life threatening deterioration in the patient's condition. This care involved high complexity decision making to assess, manipulate, and support vital system functions, to treat this degree of vital organ system failure, and to prevent further life threatening deterioration of the patient’s condition.    Signed: KAUSHIK Bhandari - NP  [facility] - Internal Medicine Hospitalist/Nocturnist  1/23/2025 8:49 PM    Please do not hesitate to reach out to myself or assigned provider on-call via [facility] Mobile paging system with questions or concerns.

## 2025-01-24 NOTE — CARE COORDINATION
Transition of Care Plan:    RUR: 16% Moderate RUR  Prior Level of Functioning: patient requires assistance with most activities of daily living (ADLs).  Disposition: home with follow up; pending PT/OT recommendation   REYES: 1/27  If SNF or IPR: Date FOC offered: na  Date FOC received:   Accepting facility:   Date authorization started with reference number:   Date authorization received and expires:   Follow up appointments: pcp/specialist   DME needed: none  Transportation at discharge: the patient's family   IM/IMM Medicare/ letter given: 2nd IM upon discharge  Is patient a [place] and connected with [facility]? na   If yes, was  transfer form completed and VA notified? na  Caregiver Contact: The patient's main support system are Faby Guallpa (Child) [phone] and April Carol Ann [phone]   Discharge Caregiver contacted prior to discharge? contacted  Care Conference needed? no  Barriers to discharge: medical clearance     The  (CM) noted that there are currently no new needs or concerns at this time. However, the CM emphasized their continued availability and willingness to assist should any needs or issues arise in the future.      The patient resides in a single-story home with their spouse and has 2-3 steps to navigate. The patient's designated responsible party (DTR) reports that the patient requires assistance with most activities of daily living (ADLs). The patient utilizes a cane, rolling walker (RW), but needs a  wheelchair (WC) at home. They report not using oxygen or CPAP at home and confirm that they are supported by family members. The patient is not currently driving.            The patient's main support system are Faby Guallpa (Child) [phone] and April Carol Ann [phone]     Zachery Simms RN  Case Management  [phone]

## 2025-01-24 NOTE — PROGRESS NOTES
Hospitalist Progress Note    NAME:   Steve Maharaj   : 1933   MRN: [id number]     Date/Time: 2025 2:00 PM  Patient PCP: Joseph Ugarte MD    Patient was lying on the bed.  Denies abdominal pain in the beginning however on palpation of the abdomen there was a tenderness noted.  Patient was having NG tube in place denies nausea and vomiting.  Denies worsening shortness of breath and chest pain.   Plan of care communicated with the patient in detail and all questions were answered.     Call patient's wife and updated her about plan of care.  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 A 5 mm calculus is again demonstrated in the proximal right  ureter with surrounding ureteral inflammation.    - Cr 7.28 on admission  from prior 1.60 2024   - Urology could not place R ureteral stent (unsuccessful due to anatomy); s/p R PCN by IR on    -Nephrology on board, appreciate recommendation, creatinine improving slowly  -Urology on the board, appreciate recommendation, maintain right PCN, will need close follow-up to plan for definitive treatment  -Daily renal function panel, CBC and magnesium for electrolyte monitoring and for infection monitoring  -Continue Zosyn      Ileus  25: CTAP : Moderate gaseous gastric distention and mildly dilated proximal small bowel loops with gradual tapering suggesting ileus. No intraperitoneal free air.   Continue pantoprazole 40 IV twice daily  Appreciate gastroenterology recommendation  General Surgery on the board, appreciate recommendations  NG tube in place  Continue to be n.p.o.  Patient need repeat CT AP with oral contrast      Hypertension  Soft

## 2025-01-24 NOTE — PLAN OF CARE
Problem: Gastrointestinal - Adult  Goal: Maintains adequate nutritional intake  1/24/2025 0134 by Maribell Kent RN  Outcome: Not Progressing     Problem: Gastrointestinal - Adult  Goal: Minimal or absence of nausea and vomiting  1/24/2025 1035 by Tadeo Melvin, RN  Outcome: Progressing  1/24/2025 0134 by Maribell Kent RN  Outcome: Not Progressing  Goal: Maintains adequate nutritional intake  1/24/2025 0134 by Maribell Kent RN  Outcome: Not Progressing

## 2025-01-24 NOTE — PROGRESS NOTES
Name: Steve Maharaj   MRN: [id number]  : 1933      Assessment:  ELIAS- 2nd to obstructive uropathy in a solitary kidney + n/v + diuretic     R ureteral stone with mod R hydro- in solitary kidney. 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 due to HTN/Solitary kidney  BPH  Pyuria/presumed UTI  Dark brown emesis/Ileus        Discussion-ELIAS is improving.  UOP of 900 ml recorded in last 24 hrs. Acidosis is mild.  Nonoliguric. Now has ?UGI bleed and Ileus. On IV PPI     Plan/Recommendations:  Monitor urine output.  Expect ELIAS continue to slowly improve  IV antibiotics as per primary team.  Follow culture  Continue Proscar/Flomax  Continue to hold atenolol/HCTZ with soft BP  Avoid nephrotoxins  Daily labs  No acute indications for dialysis  Cautious PRN replacement of K  IV PPI    Subjective:  +dark brown emesis earlier  Resting       Exam:  /60   Pulse 77   Temp 98.7 °F (37.1 °C)   Resp (!) 31   SpO2 95%     Elderly frail, ill appearing in NAD  Clear  RRR  Soft, hypoactive BS  No edema    Labs/Data:    Lab Results   Component Value Date    WBC 6.9 2025    HGB 10.0 (L) 2025    HCT 30.4 (L) 2025    MCV 94.4 2025     (L) 2025       Lab Results   Component Value Date/Time     2025 10:37 AM    K 3.0 2025 10:37 AM     2025 10:37 AM    CO2 18 2025 10:37 AM    BUN 84 2025 10:37 AM    CREATININE 5.22 2025 10:37 AM    GLUCOSE 151 2025 10:37 AM    CALCIUM 6.8 2025 10:37 AM    LABGLOM 10 2025 10:37 AM    LABGLOM 41 2024 06:44 AM        Wt Readings from Last 3 Encounters:   24 65.8 kg (145 lb)

## 2025-01-24 NOTE — CONSULTS
small right pleural effusion with  right lower lobe airspace opacity. The left pleural space and left lower lung  are clear. There is stable cardiomegaly. There is no pericardial effusion.    There is a percutaneous nephrostomy tube in a solitary right kidney without  hydronephrosis. There is a stable 8 mm calculus in the proximal right ureter.  There is stable periureteral inflammation extending into the pelvis. Right  kidney cysts are stable.    Right retroperitoneal fluid and stranding which extend to surround the right  ureter are unchanged.    The liver, spleen, pancreas, and adrenal glands are normal.  The gall bladder is  present  without intra- or extra-hepatic biliary dilatation.    There is moderate gastric distention. Mildly dilated fluid containing small  bowel loops in the upper abdomen measuring up to 3.1 cm in diameter with gradual  tapering. There are no dilated bowel loops. The appendix is not visualized.    There are no enlarged lymph nodes. There is no free air. The aorta tapers  without aneurysm.    The urinary bladder is nondistended with a Murphy catheter in place. There is no  pelvic mass. The prostate is surgically absent.    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 There is stable degenerative changes in the  spine and left hip.    Impression  1. Moderate gaseous gastric distention and mildly dilated proximal small bowel  loops with gradual tapering suggesting ileus. No intraperitoneal free air.    2. Small right pleural effusion with right lower lobe airspace opacity that may  represent atelectasis, infection, or aspiration.    3. Right percutaneous nephrostomy tube and a solitary right kidney without  hydronephrosis. A 5 mm calculus is again demonstrated in the proximal right  ureter with surrounding ureteral inflammation.    4. Right retroperitoneal fluid and stranding is unchanged extending to surround  the right ureter.        Electronically signed by Dennis Casey      Xray Result (most  Reported? Taking?   acetaminophen (TYLENOL) 325 MG tablet   Yes No   Sig: Take by mouth every 6 hours as needed   aspirin 81 MG chewable tablet   No No   Sig: Take 1 tablet by mouth daily   atenolol-chlorthalidone (TENORETIC) 50-25 MG per tablet   Yes No   Sig: Take 1 tablet by mouth daily   finasteride (PROSCAR) 5 MG tablet   Yes No   Sig: Take 1 tablet by mouth daily   melatonin 3 MG TABS tablet   Yes No   Sig: Take by mouth   Patient not taking: Reported on 4/8/2024   oxyBUTYnin (DITROPAN) 5 MG tablet   No No   Sig: Take 0.5 tablets by mouth 3 times daily as needed (bladder spasms)   tamsulosin (FLOMAX) 0.4 MG capsule   Yes No   Sig: Take by mouth daily      Facility-Administered Medications: None       Hospital Medications:  Current Facility-Administered Medications   Medication Dose Route Frequency    sodium chloride flush 0.9 % injection 5-40 mL  5-40 mL IntraVENous 2 times per day    sodium chloride flush 0.9 % injection 5-40 mL  5-40 mL IntraVENous PRN    0.9 % sodium chloride infusion   IntraVENous PRN    ondansetron (ZOFRAN) injection 4 mg  4 mg IntraVENous Q6H PRN    acetaminophen (TYLENOL) tablet 650 mg  650 mg Oral Q6H PRN    Or    acetaminophen (TYLENOL) suppository 650 mg  650 mg Rectal Q6H PRN    [Held by provider] aspirin chewable tablet 81 mg  81 mg Oral Daily    finasteride (PROSCAR) tablet 5 mg  5 mg Oral Daily    tamsulosin (FLOMAX) capsule 0.4 mg  0.4 mg Oral Daily    piperacillin-tazobactam (ZOSYN) 3,375 mg in sodium chloride 0.9 % 50 mL IVPB (mini-bag)  3,375 mg IntraVENous Q12H    ipratropium 0.5 mg-albuterol 2.5 mg (DUONEB) nebulizer solution 1 Dose  1 Dose Inhalation Q4H PRN    glucose chewable tablet 16 g  4 tablet Oral PRN    dextrose bolus 10% 125 mL  125 mL IntraVENous PRN    Or    dextrose bolus 10% 250 mL  250 mL IntraVENous PRN    glucagon injection 1 mg  1 mg SubCUTAneous PRN    dextrose 10 % infusion   IntraVENous Continuous PRN    pantoprazole (PROTONIX) 40 mg in sodium chloride

## 2025-01-24 NOTE — SIGNIFICANT EVENT
RAPID RESPONSE TEAM    Overhead rapid response paged to room # 2315/01  at 2021    Reason for rapid response: Chest Pain.     Initial assessment: Pt tachypnic sitting up in bed w/ complaint of acute onset LUQ pain/ chest pain. Pt is unable to describe to CP in detail. Pt w/ notable abd distention w/ resonate LUQ to percussion.      - EKG   - KUB concerning for possible bowl perforation or obstruction CT abd ordered     Aberjackson, NP at bedside, orders received for the following Interventions:   EKG, Troponin, BMP, mag, Phos, BNP, CBC, lactic, procal and CXR.       Outcome:   pt to remain in room # 2315/01       Please call with any questions or concerns    Tomasz De Paz RN  Rapid Response Team  Ext [phone]

## 2025-01-24 NOTE — PROGRESS NOTES
Patient: Steve Maharaj MRN: [id number]  SSN: [id number]    YOB: 1933  Age: 89 y.o.  Sex: male        ADMITTED: 2025 to Shanique Lara MD by Manny Interiano MD for Urinary tract obstruction by kidney stone [N20.0, N13.8]  Acute renal failure, unspecified acute renal failure type (HCC) [N17.9]  Renal stone [N20.0]  POD# 2 Days Post-Op Procedure(s):  CYSTOSCOPY    Steve Maharaj is doing fair today.  There was concern for gross hematuria this morning.  His urine is largely diverted to right PCNT and this is yellow and clear.  He was seen by us in consultation for 5 mm obstructing stone in setting of solitary kidney, forniceal rupture.  Currently AMS and has sitter.    He was taken to the OR 2025 for cysto, R ureteral stent placement which was unsuccessful.  This is when IR was then consulted for R PCNT placement.    Afebrile. No leukocytosis. creatinine 5.22 from 5.86 from 6.20 from 6.67 from 7.28. UA questionable. No UCX sent.    I perform manual bladder irrigation gently but patient had frequent bladder spasms with this and it was difficult to perform however I was able to clear output to clear and pink.  No significant blood clot.  Urine largely diverted to right PCNT.     Vitals: Temp (24hrs), Av °F (36.7 °C), Min:97.4 °F (36.3 °C), Max:98.7 °F (37.1 °C)    Blood pressure 107/64, pulse 76, temperature 98.7 °F (37.1 °C), resp. rate (!) 34, SpO2 95%.    Intake and Output:   1901 -  0700  In: 512.8 [P.O.:180; I.V.:310]  Out: 1405 [Urine:1400]  No intake/output data recorded.  STEWART Output lats 24 hrs: No data found.   STEWART Output last 8 hrs: No data found.  BM over last 24 hrs: No data found.    Physical Exam  General: NAD, pleasantly confused  Respiratory: no distress, breathing easily, room air  Abdomen: soft, no distention; non-tender to palpation  : no CVA tenderness, see HPI  Neuro: Alert  Skin: warm, dry  Extremities: moves all, full ROM    Labs:  CBC:   Lab Results

## 2025-01-24 NOTE — PLAN OF CARE
Problem: Gastrointestinal - Adult  Goal: Minimal or absence of nausea and vomiting  Outcome: Not Progressing  Goal: Maintains adequate nutritional intake  Outcome: Not Progressing     Problem: Safety - Adult  Goal: Free from fall injury  Outcome: Progressing     Problem: Pain  Goal: Verbalizes/displays adequate comfort level or baseline comfort level  Outcome: Progressing     Problem: Respiratory - Adult  Goal: Achieves optimal ventilation and oxygenation  Outcome: Progressing     Problem: Gastrointestinal - Adult  Goal: Maintains or returns to baseline bowel function  Outcome: Progressing     Problem: Genitourinary - Adult  Goal: Absence of urinary retention  Outcome: Progressing  Goal: Urinary catheter remains patent  Outcome: Progressing     Problem: Hematologic - Adult  Goal: Maintains hematologic stability  Outcome: Progressing     Problem: Gastrointestinal - Adult  Goal: Minimal or absence of nausea and vomiting  Outcome: Not Progressing  Goal: Maintains adequate nutritional intake  Outcome: Not Progressing

## 2025-01-25 LAB
ALBUMIN SERPL-MCNC: 1.7 G/DL (ref 3.5–5)
ALBUMIN/GLOB SERPL: 0.3 (ref 1.1–2.2)
ALP SERPL-CCNC: 127 U/L (ref 45–117)
ALT SERPL-CCNC: 27 U/L (ref 12–78)
ANION GAP SERPL CALC-SCNC: 10 MMOL/L (ref 2–12)
AST SERPL-CCNC: 76 U/L (ref 15–37)
BASOPHILS # BLD: 0 K/UL (ref 0–0.1)
BASOPHILS NFR BLD: 0 % (ref 0–1)
BILIRUB SERPL-MCNC: 1.7 MG/DL (ref 0.2–1)
BUN SERPL-MCNC: 88 MG/DL (ref 6–20)
BUN/CREAT SERPL: 18 (ref 12–20)
CALCIUM SERPL-MCNC: 7.9 MG/DL (ref 8.5–10.1)
CHLORIDE SERPL-SCNC: 115 MMOL/L (ref 97–108)
CO2 SERPL-SCNC: 20 MMOL/L (ref 21–32)
CREAT SERPL-MCNC: 4.82 MG/DL (ref 0.7–1.3)
DIFFERENTIAL METHOD BLD: ABNORMAL
EOSINOPHIL # BLD: 0 K/UL (ref 0–0.4)
EOSINOPHIL NFR BLD: 0 % (ref 0–7)
ERYTHROCYTE [DISTWIDTH] IN BLOOD BY AUTOMATED COUNT: 14 % (ref 11.5–14.5)
GLOBULIN SER CALC-MCNC: 5.3 G/DL (ref 2–4)
GLUCOSE BLD STRIP.AUTO-MCNC: 74 MG/DL (ref 65–117)
GLUCOSE SERPL-MCNC: 70 MG/DL (ref 65–100)
HCT VFR BLD AUTO: 30.6 % (ref 36.6–50.3)
HGB BLD-MCNC: 10 G/DL (ref 12.1–17)
IMM GRANULOCYTES # BLD AUTO: 0 K/UL (ref 0–0.04)
IMM GRANULOCYTES NFR BLD AUTO: 0 % (ref 0–0.5)
LYMPHOCYTES # BLD: 0.52 K/UL (ref 0.8–3.5)
LYMPHOCYTES NFR BLD: 6 % (ref 12–49)
MCH RBC QN AUTO: 30.6 PG (ref 26–34)
MCHC RBC AUTO-ENTMCNC: 32.7 G/DL (ref 30–36.5)
MCV RBC AUTO: 93.6 FL (ref 80–99)
MONOCYTES # BLD: 0.34 K/UL (ref 0–1)
MONOCYTES NFR BLD: 4 % (ref 5–13)
NEUTS SEG # BLD: 7.74 K/UL (ref 1.8–8)
NEUTS SEG NFR BLD: 90 % (ref 32–75)
NRBC # BLD: 0 K/UL (ref 0–0.01)
NRBC BLD-RTO: 0 PER 100 WBC
PLATELET # BLD AUTO: 119 K/UL (ref 150–400)
PMV BLD AUTO: 11.2 FL (ref 8.9–12.9)
POTASSIUM SERPL-SCNC: 3.6 MMOL/L (ref 3.5–5.1)
PROT SERPL-MCNC: 7 G/DL (ref 6.4–8.2)
RBC # BLD AUTO: 3.27 M/UL (ref 4.1–5.7)
RBC MORPH BLD: ABNORMAL
SERVICE CMNT-IMP: NORMAL
SODIUM SERPL-SCNC: 145 MMOL/L (ref 136–145)
WBC # BLD AUTO: 8.6 K/UL (ref 4.1–11.1)

## 2025-01-25 PROCEDURE — 2580000003 HC RX 258: Performed by: STUDENT IN AN ORGANIZED HEALTH CARE EDUCATION/TRAINING PROGRAM

## 2025-01-25 PROCEDURE — 6360000002 HC RX W HCPCS

## 2025-01-25 PROCEDURE — 6370000000 HC RX 637 (ALT 250 FOR IP)

## 2025-01-25 PROCEDURE — 85025 COMPLETE CBC W/AUTO DIFF WBC: CPT

## 2025-01-25 PROCEDURE — 6360000002 HC RX W HCPCS: Performed by: INTERNAL MEDICINE

## 2025-01-25 PROCEDURE — 2580000003 HC RX 258: Performed by: INTERNAL MEDICINE

## 2025-01-25 PROCEDURE — 93005 ELECTROCARDIOGRAM TRACING: CPT | Performed by: STUDENT IN AN ORGANIZED HEALTH CARE EDUCATION/TRAINING PROGRAM

## 2025-01-25 PROCEDURE — 87040 BLOOD CULTURE FOR BACTERIA: CPT

## 2025-01-25 PROCEDURE — 36415 COLL VENOUS BLD VENIPUNCTURE: CPT

## 2025-01-25 PROCEDURE — 80053 COMPREHEN METABOLIC PANEL: CPT

## 2025-01-25 PROCEDURE — 2500000003 HC RX 250 WO HCPCS: Performed by: STUDENT IN AN ORGANIZED HEALTH CARE EDUCATION/TRAINING PROGRAM

## 2025-01-25 PROCEDURE — 6360000002 HC RX W HCPCS: Performed by: STUDENT IN AN ORGANIZED HEALTH CARE EDUCATION/TRAINING PROGRAM

## 2025-01-25 PROCEDURE — 2580000003 HC RX 258

## 2025-01-25 PROCEDURE — 82962 GLUCOSE BLOOD TEST: CPT

## 2025-01-25 PROCEDURE — 2060000000 HC ICU INTERMEDIATE R&B

## 2025-01-25 RX ADMIN — PIPERACILLIN AND TAZOBACTAM 3375 MG: 3; .375 INJECTION, POWDER, LYOPHILIZED, FOR SOLUTION INTRAVENOUS at 22:30

## 2025-01-25 RX ADMIN — SODIUM CHLORIDE, PRESERVATIVE FREE 40 MG: 5 INJECTION INTRAVENOUS at 00:24

## 2025-01-25 RX ADMIN — POTASSIUM CHLORIDE: 2 INJECTION, SOLUTION, CONCENTRATE INTRAVENOUS at 11:08

## 2025-01-25 RX ADMIN — PIPERACILLIN AND TAZOBACTAM 3375 MG: 3; .375 INJECTION, POWDER, LYOPHILIZED, FOR SOLUTION INTRAVENOUS at 12:45

## 2025-01-25 RX ADMIN — SODIUM CHLORIDE, PRESERVATIVE FREE 10 ML: 5 INJECTION INTRAVENOUS at 22:28

## 2025-01-25 RX ADMIN — SODIUM CHLORIDE, PRESERVATIVE FREE 40 MG: 5 INJECTION INTRAVENOUS at 12:43

## 2025-01-25 RX ADMIN — PHENOL 1 SPRAY: 1.5 LIQUID ORAL at 22:27

## 2025-01-25 RX ADMIN — SODIUM CHLORIDE, PRESERVATIVE FREE 40 MG: 5 INJECTION INTRAVENOUS at 22:27

## 2025-01-25 ASSESSMENT — PAIN SCALES - GENERAL
PAINLEVEL_OUTOF10: 0
PAINLEVEL_OUTOF10: 8
PAINLEVEL_OUTOF10: 0

## 2025-01-25 ASSESSMENT — PAIN DESCRIPTION - DESCRIPTORS: DESCRIPTORS: SORE

## 2025-01-25 ASSESSMENT — PAIN DESCRIPTION - ORIENTATION: ORIENTATION: MID

## 2025-01-25 ASSESSMENT — PAIN SCALES - WONG BAKER
WONGBAKER_NUMERICALRESPONSE: NO HURT

## 2025-01-25 ASSESSMENT — PAIN - FUNCTIONAL ASSESSMENT: PAIN_FUNCTIONAL_ASSESSMENT: ACTIVITIES ARE NOT PREVENTED

## 2025-01-25 ASSESSMENT — PAIN DESCRIPTION - LOCATION: LOCATION: THROAT

## 2025-01-25 NOTE — PROGRESS NOTES
Patient: Steve Maharaj MRN: [id number]  SSN: [id number]    YOB: 1933  Age: 89 y.o.  Sex: male        ADMITTED: 2025 to Shanique Lara MD by Manny Interiano MD for Urinary tract obstruction by kidney stone [N20.0, N13.8]  Acute renal failure, unspecified acute renal failure type (HCC) [N17.9]  Renal stone [N20.0]  POD# 3 Days Post-Op Procedure(s):  CYSTOSCOPY    Steve Maharaj is doing fair this morning.Ptwith NGT in place , he is s/p R PCN . R Perc drain with johanna urine in bag , Pt denies back or flank pain .    Cr 4.82 ( 5.22)  Uop  1350    Vitals: Temp (24hrs), Av °F (36.7 °C), Min:97.8 °F (36.6 °C), Max:98.3 °F (36.8 °C)    Blood pressure (!) 98/57, pulse 65, temperature 98 °F (36.7 °C), temperature source Oral, resp. rate 27, SpO2 94%.    Intake and Output:   1901 -  0700  In: 1388.1 [I.V.:796.9]  Out: 1950 [Urine:1850]   0701 -  1900  In: -   Out: 325 [Urine:325]  STEWART Output lats 24 hrs: No data found.   STEWART Output last 8 hrs: No data found.  BM over last 24 hrs: No data found.    Exam:   Gen: NAD  CV: extremities well perfused  Lungs: nonlabored respirations. Symmetric chest expansion  Ext: no edema  Abdomen: soft NTND NG tube in place  : Left PERC tube    Labs:  CBC:   Lab Results   Component Value Date/Time    WBC 8.6 2025 02:53 AM    HCT 30.6 2025 02:53 AM     2025 02:53 AM     BMP:   Lab Results   Component Value Date/Time     2025 02:53 AM    K 3.6 2025 02:53 AM     2025 02:53 AM    CO2 20 2025 02:53 AM    BUN 88 2025 02:53 AM     Cultures:   Results       Procedure Component Value Units Date/Time    Culture, Blood 1 [[id number]] Collected: 25    Order Status: Completed Specimen: Blood Updated: 25 0937     Special Requests --        NO SPECIAL REQUESTS  RIGHT  HAND       Culture NO GROWTH <24 HRS       Culture, Blood 2 [[id number]]     Order Status: Sent Specimen: Blood

## 2025-01-25 NOTE — PROGRESS NOTES
End of Shift Note    Bedside shift change report given to Diana WARD (oncoming nurse) by Maribell Kent RN (offgoing nurse).  Report included the following information SBAR, Intake/Output, MAR, Recent Results, and Cardiac Rhythm NSR    Shift worked:  7p-7a     Shift summary and any significant changes:     Ng tube now draining green bile, very minimal. Multiple bowel movements overnight. Stomach less distended.    Nephrostomy still has good output    Pt has short episode of possible Afib RVR for about a minute but converted. Notified MD, no new orders     Concerns for physician to address:  N/a     Zone phone for oncoming shift:   [phone]       Activity:  Level of Assistance: Maximum assist, patient does 25-49%  Number times ambulated in hallways past shift: 0  Number of times OOB to chair past shift: 0    Cardiac:   Cardiac Monitoring: Yes      Cardiac Rhythm: Sinus rhythm    Access:  Current line(s): PIV     Genitourinary:   Urinary Status: Draining (nephrostomy)    Respiratory:   O2 Device: None (Room air)  Chronic home O2 use?: NO  Incentive spirometer at bedside: N/A    GI:  Last BM (including prior to admit): 01/24/25  Current diet:  Diet NPO  Passing flatus: YES    Pain Management:   Patient states pain is manageable on current regimen: N/A    Skin:  Mateusz Scale Score: 14  Interventions: Wound Offloading (Prevention Methods): Pillows, Repositioning, Turning, Elevate heels    Patient Safety:  Fall Risk: Nursing Judgement-Fall Risk High(Add Comments): Yes  Fall Risk Interventions  Nursing Judgement-Fall Risk High(Add Comments): Yes  Toilet Every 2 Hours-In Advance of Need: Yes  Hourly Visual Checks: Awake, In bed  Fall Visual Posted: Armband  Room Door Open: Deferred to promote rest  Alarm On: Bed  Patient Moved Closer to Nursing Station: No    Active Consults:   IP CONSULT TO UROLOGY  IP CONSULT TO NEPHROLOGY  IP CONSULT TO GENERAL SURGERY  IP CONSULT TO GI    Length of Stay:  Expected LOS: 6  Actual LOS:

## 2025-01-25 NOTE — PROGRESS NOTES
Name: Steve Maharaj   MRN: [id number]  : 1933      Assessment:  ELIAS- 2nd to obstructive uropathy in a solitary kidney + n/v + diuretic     R ureteral stone with mod R hydro- in solitary kidney. 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 due to HTN/Solitary kidney  BPH  Pyuria/presumed UTI  Dark brown emesis/Ileus  Hypernatremia:borderline        Discussion-ELIAS is slowly improving.  Cr down to 4.8mg/dl today. UOP up to 1,350 ml yesterday. Acidosis is mild.  Borderline hypernatremia noted. Now has ?UGI bleed and Ileus. On IV PPI     Plan/Recommendations:  No indication for RRT with ongoing renal recovery (albeit very slow)  Start gentle IV D5W + 20meq/L Kcl at 50cc/hr  Monitor urine output.    IV antibiotics as per primary team.  Follow culture  Continue Proscar/Flomax  Continue to hold atenolol/HCTZ with soft BP  Avoid nephrotoxins  Daily labs  IV PPI    Subjective:  Sitter at bedside. NGT to suction  Some hypoglycemia noted      Exam:  BP (!) 98/57   Pulse 65   Temp 97.8 °F (36.6 °C) (Oral)   Resp 27   SpO2 94%     Elderly frail, ill appearing in NAD  NGT  Clear  RRR  Soft, hypoactive BS  R PCN  No edema    Labs/Data:    Lab Results   Component Value Date    WBC 8.6 2025    HGB 10.0 (L) 2025    HCT 30.6 (L) 2025    MCV 93.6 2025     (L) 2025       Lab Results   Component Value Date/Time     2025 02:53 AM    K 3.6 2025 02:53 AM     2025 02:53 AM    CO2 20 2025 02:53 AM    BUN 88 2025 02:53 AM    CREATININE 4.82 2025 02:53 AM    GLUCOSE 70 2025 02:53 AM    CALCIUM 7.9 2025 02:53 AM    LABGLOM 11

## 2025-01-25 NOTE — PLAN OF CARE
Problem: Safety - Adult  Goal: Free from fall injury  Outcome: Progressing     Problem: Pain  Goal: Verbalizes/displays adequate comfort level or baseline comfort level  Outcome: Progressing     Problem: Respiratory - Adult  Goal: Achieves optimal ventilation and oxygenation  Outcome: Progressing     Problem: Gastrointestinal - Adult  Goal: Minimal or absence of nausea and vomiting  Outcome: Progressing  Goal: Maintains or returns to baseline bowel function  Outcome: Progressing     Problem: Genitourinary - Adult  Goal: Absence of urinary retention  Outcome: Progressing  Goal: Urinary catheter remains patent  Outcome: Progressing     Problem: Infection - Adult  Goal: Absence of infection at discharge  Outcome: Progressing  Goal: Absence of infection during hospitalization  Outcome: Progressing     Problem: Hematologic - Adult  Goal: Maintains hematologic stability  Outcome: Progressing

## 2025-01-25 NOTE — PLAN OF CARE
Problem: Safety - Adult  Goal: Free from fall injury  Outcome: Progressing     Problem: Pain  Goal: Verbalizes/displays adequate comfort level or baseline comfort level  Outcome: Progressing     Problem: Discharge Planning  Goal: Discharge to home or other facility with appropriate resources  Outcome: Progressing     Problem: Skin/Tissue Integrity  Goal: Absence of new skin breakdown  Description: 1.  Monitor for areas of redness and/or skin breakdown  2.  Assess vascular access sites hourly  3.  Every 4-6 hours minimum:  Change oxygen saturation probe site  4.  Every 4-6 hours:  If on nasal continuous positive airway pressure, respiratory therapy assess nares and determine need for appliance change or resting period.  Outcome: Progressing     Problem: Respiratory - Adult  Goal: Achieves optimal ventilation and oxygenation  Outcome: Progressing     Problem: Gastrointestinal - Adult  Goal: Minimal or absence of nausea and vomiting  Outcome: Progressing  Goal: Maintains or returns to baseline bowel function  Outcome: Progressing  Goal: Maintains adequate nutritional intake  Outcome: Progressing  Goal: Establish and maintain optimal ostomy function  Outcome: Progressing     Problem: Genitourinary - Adult  Goal: Absence of urinary retention  Outcome: Progressing  Goal: Urinary catheter remains patent  Outcome: Progressing     Problem: Infection - Adult  Goal: Absence of infection at discharge  Outcome: Progressing  Goal: Absence of infection during hospitalization  Outcome: Progressing  Goal: Absence of fever/infection during anticipated neutropenic period  Outcome: Progressing     Problem: Hematologic - Adult  Goal: Maintains hematologic stability  Outcome: Progressing

## 2025-01-25 NOTE — PROGRESS NOTES
Hospitalist Progress Note    NAME:   Steve Maharaj   : 1933   MRN: [id number]     Date/Time: 2025 1:45 PM  Patient PCP: Joseph Ugarte MD    Patient reported he is feeling better today.  Abdominal pain is improving.  Denies nausea or vomiting.  Reported he is thristy.   Daughter was also present at bedside.  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 A 5 mm calculus is again demonstrated in the proximal right  ureter with surrounding ureteral inflammation.    - Cr 7.28 on admission  from prior .2024   - Urology could not place R ureteral stent (unsuccessful due to anatomy); s/p R PCN by IR on    -Nephrology on board, appreciate recommendation, creatinine improving slowly  -Started on IV D5W +20 mEq KCl at 50 cc/h by nephrology  -Urology on the board, appreciate recommendation, maintain right PCN, will need close follow-up to plan for definitive treatment  -Daily renal function panel, CBC and magnesium for electrolyte monitoring and for infection monitoring  -Continue Zosyn  Blood culture positive for Enterobacter and Proteus  Repeat blood culture ordered  Infectious disease consult placed  PT, OT consult placed         Ileus  25: CTAP : Moderate gaseous gastric distention and mildly dilated proximal small bowel loops with gradual tapering suggesting ileus. No intraperitoneal free air.   Continue pantoprazole 40 IV twice daily  Appreciate gastroenterology recommendation  General Surgery on the board, appreciate recommendations  NG tube in place, suction  Continue to be n.p.o.        Hypertension  Soft blood  jaundice    Reviewed most current lab test results and cultures  YES  Reviewed most current radiology test results   YES  Review and summation of old records today    NO  Reviewed patient's current orders and MAR    YES  PMH/SH reviewed - no change compared to H&P    Procedures: see electronic medical records for all procedures/Xrays and details which were not copied into this note but were reviewed prior to creation of Plan.      LABS:  I reviewed today's most current labs and imaging studies.  Pertinent labs include:  Recent Labs     01/24/25 0137 01/24/25  1037 01/25/25  0253   WBC 8.5 6.9 8.6   HGB 10.1* 10.0* 10.0*   HCT 30.9* 30.4* 30.6*    112* 119*     Recent Labs     01/23/25 2051 01/24/25 0137 01/24/25 1037 01/25/25  0253    142 142 145   K 3.4* 4.4 3.0* 3.6   * 112* 113* 115*   CO2 18* 18* 18* 20*   GLUCOSE 61* 131* 151* 70   BUN 84* 83* 84* 88*   CREATININE 6.20* 5.86* 5.22* 4.82*   CALCIUM 7.6* 7.3* 6.8* 7.9*   MG 2.0  --  2.0  --    PHOS 5.3*  --   --   --    BILITOT  --  1.6* 1.2* 1.7*   AST  --  39* 25 76*   ALT  --  13 12 27       Signed: Shanique Lara MD

## 2025-01-25 NOTE — PROGRESS NOTES
Surgery    Following for colonic ileus vs SBO.    CT shows sigmoid dilation without evidence of volvulus.   Had a BM today.   NGT output decreasing.    On exam he is comfortable  Abdomen non tender in all quadrants, mildly distended      A/P  Resolving ileus  Maintain lytes  NGT clamp trial  Clamp NGT for 4 hours then flush and replace on suction (medium high, continuous) for 30 minutes. If output is <150 mL can discontinue NGT and start on clear liquids.    Funmi Choudhary MD  General Surgery

## 2025-01-26 LAB
ALBUMIN SERPL-MCNC: 1.6 G/DL (ref 3.5–5)
ALBUMIN/GLOB SERPL: 0.3 (ref 1.1–2.2)
ALP SERPL-CCNC: 123 U/L (ref 45–117)
ALT SERPL-CCNC: 27 U/L (ref 12–78)
ANION GAP SERPL CALC-SCNC: 12 MMOL/L (ref 2–12)
ANION GAP SERPL CALC-SCNC: 9 MMOL/L (ref 2–12)
AST SERPL-CCNC: 56 U/L (ref 15–37)
BACTERIA SPEC CULT: ABNORMAL
BACTERIA SPEC CULT: ABNORMAL
BILIRUB SERPL-MCNC: 3 MG/DL (ref 0.2–1)
BUN SERPL-MCNC: 79 MG/DL (ref 6–20)
BUN SERPL-MCNC: 88 MG/DL (ref 6–20)
BUN/CREAT SERPL: 18 (ref 12–20)
BUN/CREAT SERPL: 20 (ref 12–20)
CALCIUM SERPL-MCNC: 7.7 MG/DL (ref 8.5–10.1)
CALCIUM SERPL-MCNC: 8.4 MG/DL (ref 8.5–10.1)
CHLORIDE SERPL-SCNC: 115 MMOL/L (ref 97–108)
CHLORIDE SERPL-SCNC: 116 MMOL/L (ref 97–108)
CO2 SERPL-SCNC: 19 MMOL/L (ref 21–32)
CO2 SERPL-SCNC: 20 MMOL/L (ref 21–32)
CREAT SERPL-MCNC: 4.3 MG/DL (ref 0.7–1.3)
CREAT SERPL-MCNC: 4.51 MG/DL (ref 0.7–1.3)
EKG ATRIAL RATE: 74 BPM
EKG DIAGNOSIS: NORMAL
EKG P AXIS: 24 DEGREES
EKG P-R INTERVAL: 170 MS
EKG Q-T INTERVAL: 392 MS
EKG QRS DURATION: 96 MS
EKG QTC CALCULATION (BAZETT): 435 MS
EKG R AXIS: 6 DEGREES
EKG T AXIS: -17 DEGREES
EKG VENTRICULAR RATE: 74 BPM
ERYTHROCYTE [DISTWIDTH] IN BLOOD BY AUTOMATED COUNT: 14 % (ref 11.5–14.5)
GLOBULIN SER CALC-MCNC: 4.8 G/DL (ref 2–4)
GLUCOSE SERPL-MCNC: 116 MG/DL (ref 65–100)
GLUCOSE SERPL-MCNC: 120 MG/DL (ref 65–100)
HCT VFR BLD AUTO: 25.6 % (ref 36.6–50.3)
HGB BLD-MCNC: 8.5 G/DL (ref 12.1–17)
MCH RBC QN AUTO: 30.6 PG (ref 26–34)
MCHC RBC AUTO-ENTMCNC: 33.2 G/DL (ref 30–36.5)
MCV RBC AUTO: 92.1 FL (ref 80–99)
NRBC # BLD: 0 K/UL (ref 0–0.01)
NRBC BLD-RTO: 0 PER 100 WBC
PLATELET # BLD AUTO: 107 K/UL (ref 150–400)
PMV BLD AUTO: 11.6 FL (ref 8.9–12.9)
POTASSIUM SERPL-SCNC: 2.7 MMOL/L (ref 3.5–5.1)
POTASSIUM SERPL-SCNC: 3 MMOL/L (ref 3.5–5.1)
PROT SERPL-MCNC: 6.4 G/DL (ref 6.4–8.2)
RBC # BLD AUTO: 2.78 M/UL (ref 4.1–5.7)
SERVICE CMNT-IMP: ABNORMAL
SODIUM SERPL-SCNC: 144 MMOL/L (ref 136–145)
SODIUM SERPL-SCNC: 147 MMOL/L (ref 136–145)
WBC # BLD AUTO: 9.9 K/UL (ref 4.1–11.1)

## 2025-01-26 PROCEDURE — 36415 COLL VENOUS BLD VENIPUNCTURE: CPT

## 2025-01-26 PROCEDURE — 93005 ELECTROCARDIOGRAM TRACING: CPT | Performed by: INTERNAL MEDICINE

## 2025-01-26 PROCEDURE — 80053 COMPREHEN METABOLIC PANEL: CPT

## 2025-01-26 PROCEDURE — 93010 ELECTROCARDIOGRAM REPORT: CPT | Performed by: INTERNAL MEDICINE

## 2025-01-26 PROCEDURE — 97530 THERAPEUTIC ACTIVITIES: CPT | Performed by: OCCUPATIONAL THERAPIST

## 2025-01-26 PROCEDURE — 97166 OT EVAL MOD COMPLEX 45 MIN: CPT | Performed by: OCCUPATIONAL THERAPIST

## 2025-01-26 PROCEDURE — 6360000002 HC RX W HCPCS: Performed by: INTERNAL MEDICINE

## 2025-01-26 PROCEDURE — 2580000003 HC RX 258

## 2025-01-26 PROCEDURE — 6360000002 HC RX W HCPCS: Performed by: STUDENT IN AN ORGANIZED HEALTH CARE EDUCATION/TRAINING PROGRAM

## 2025-01-26 PROCEDURE — 2580000003 HC RX 258: Performed by: INTERNAL MEDICINE

## 2025-01-26 PROCEDURE — 2580000003 HC RX 258: Performed by: STUDENT IN AN ORGANIZED HEALTH CARE EDUCATION/TRAINING PROGRAM

## 2025-01-26 PROCEDURE — 6360000002 HC RX W HCPCS

## 2025-01-26 PROCEDURE — 2060000000 HC ICU INTERMEDIATE R&B

## 2025-01-26 PROCEDURE — 85027 COMPLETE CBC AUTOMATED: CPT

## 2025-01-26 RX ORDER — POTASSIUM CHLORIDE 7.45 MG/ML
10 INJECTION INTRAVENOUS
Status: COMPLETED | OUTPATIENT
Start: 2025-01-26 | End: 2025-01-26

## 2025-01-26 RX ADMIN — POTASSIUM CHLORIDE 10 MEQ: 10 INJECTION, SOLUTION INTRAVENOUS at 09:41

## 2025-01-26 RX ADMIN — SODIUM CHLORIDE, PRESERVATIVE FREE 40 MG: 5 INJECTION INTRAVENOUS at 23:57

## 2025-01-26 RX ADMIN — POTASSIUM CHLORIDE: 2 INJECTION, SOLUTION, CONCENTRATE INTRAVENOUS at 20:23

## 2025-01-26 RX ADMIN — POTASSIUM CHLORIDE 10 MEQ: 10 INJECTION, SOLUTION INTRAVENOUS at 12:00

## 2025-01-26 RX ADMIN — POTASSIUM CHLORIDE: 2 INJECTION, SOLUTION, CONCENTRATE INTRAVENOUS at 07:31

## 2025-01-26 RX ADMIN — PHENOL 1 SPRAY: 1.5 LIQUID ORAL at 05:33

## 2025-01-26 RX ADMIN — POTASSIUM CHLORIDE 10 MEQ: 10 INJECTION, SOLUTION INTRAVENOUS at 08:30

## 2025-01-26 RX ADMIN — SODIUM CHLORIDE, PRESERVATIVE FREE 40 MG: 5 INJECTION INTRAVENOUS at 11:45

## 2025-01-26 RX ADMIN — PIPERACILLIN AND TAZOBACTAM 3375 MG: 3; .375 INJECTION, POWDER, LYOPHILIZED, FOR SOLUTION INTRAVENOUS at 11:47

## 2025-01-26 RX ADMIN — POTASSIUM CHLORIDE 10 MEQ: 10 INJECTION, SOLUTION INTRAVENOUS at 11:46

## 2025-01-26 RX ADMIN — PIPERACILLIN AND TAZOBACTAM 3375 MG: 3; .375 INJECTION, POWDER, LYOPHILIZED, FOR SOLUTION INTRAVENOUS at 23:58

## 2025-01-26 ASSESSMENT — PAIN DESCRIPTION - LOCATION: LOCATION: THROAT

## 2025-01-26 ASSESSMENT — PAIN SCALES - GENERAL
PAINLEVEL_OUTOF10: 6
PAINLEVEL_OUTOF10: 7
PAINLEVEL_OUTOF10: 0
PAINLEVEL_OUTOF10: 6

## 2025-01-26 ASSESSMENT — PAIN DESCRIPTION - ORIENTATION: ORIENTATION: ANTERIOR

## 2025-01-26 ASSESSMENT — PAIN - FUNCTIONAL ASSESSMENT: PAIN_FUNCTIONAL_ASSESSMENT: ACTIVITIES ARE NOT PREVENTED

## 2025-01-26 ASSESSMENT — PAIN DESCRIPTION - DESCRIPTORS: DESCRIPTORS: ACHING

## 2025-01-26 NOTE — PROGRESS NOTES
Occupational Therapy  Orders received and medical record reviewed.  Nurse cleared pt for OT Evaluation.  Pt participated to his tolerance.  He is frail appearing and frequently asked for water (NPO-NG tube).  VS stable at conclusion; pt reported fatigue and wanting to rest. Poor tolerance for activity this session.  Full OT note to follow.

## 2025-01-26 NOTE — PLAN OF CARE
Problem: Occupational Therapy - Adult  Goal: By Discharge: Performs self-care activities at highest level of function for planned discharge setting.  See evaluation for individualized goals.  Description: FUNCTIONAL STATUS PRIOR TO ADMISSION:    Receives Help From: Family (daughters assist pt with bathing and dressing.  Pt unbale to report specifics at this time.), Prior Level of Assist for ADLs: Needs assistance,  ,  ,  ,  ,  ,  ,  ,  , Active : No.  Per old notes in chart review, pt has a history of falls and needing assistance with adls/transfers     HOME SUPPORT: Patient lived with his wife.  Pt's family has been assisting pt for adls.  .    Occupational Therapy Goals:  Initiated 1/26/2025  1.  Patient will perform grooming, seated EOB with Set-up and Supervision within 7 day(s).  2.  Patient will perform upper body bathing with Moderate Assist within 7 day(s).  3.  Patient will perform upper body dressing and lower body dressing with Moderate Assist within 7 day(s).  4.  Patient will perform toilet transfers with Moderate Assist  within 7 day(s).  5.  Patient will perform all aspects of toileting with Moderate Assist within 7 day(s).  6.  Patient will participate in upper extremity therapeutic exercise/activities with Supervision for 5 minutes within 7 day(s).    7.  Patient will utilize energy conservation techniques during functional activities with verbal cues within 7 day(s).   Outcome: Not Progressing    OCCUPATIONAL THERAPY EVALUATION    Patient: Steve Maharaj (89 y.o. male)  Date: 1/26/2025  Primary Diagnosis: Urinary tract obstruction by kidney stone [N20.0, N13.8]  Acute renal failure, unspecified acute renal failure type (HCC) [N17.9]  Renal stone [N20.0]  Procedure(s) (LRB):  CYSTOSCOPY (Right) 4 Days Post-Op     Precautions:                    ASSESSMENT :  The patient is limited by decreased functional mobility, independence in ADLs, high-level IADLs, ROM, strength, activity tolerance,

## 2025-01-26 NOTE — PROGRESS NOTES
Surgery    Following for colonic ileus vs SBO.    CT shows sigmoid dilation without evidence of volvulus.   No output after clamp trial  Lots of flatus  Several bowel movements    On exam he is comfortable  Abdomen non tender in all quadrants, non distended      A/P  D/c NGT  Clear liquids  Advance if tolerated tomorrow to full liquids, soft diet  Surgery to sign off, re consult if needed    Funmi Choudhary MD  General Surgery

## 2025-01-26 NOTE — PROGRESS NOTES
Hospitalist Progress Note    NAME:   Steve Maharaj   : 1933   MRN: [id number]     Date/Time: 2025 12:44 PM  Patient PCP: Joseph Ugarte MD    Patient was lying comfortably in the bed.  Reported abdominal pain is improving.  Still has nausea.  Shortness of breath and chest tightness.   Plan of care discussed about reaching out to surgery to ask about removing the NG tube discussed with the patient.  All questions were answered.   Called pt's child ( April) , did not  the call. Called Faby Guallpa ( daughter ) and updated her about plan of care. All questions were answered.           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 A 5 mm calculus is again demonstrated in the proximal right  ureter with surrounding ureteral inflammation.    - Cr 7.28 on admission  from prior .2024   - Urology could not place R ureteral stent (unsuccessful due to anatomy); s/p R PCN by IR on    -Nephrology on board, appreciate recommendation, creatinine improving slowly  - on IV D5W +20 mEq KCl at 100 cc/h by nephrology  -Urology on the board, appreciate recommendation, maintain right PCN, will need close follow-up to plan for definitive treatment  -Daily renal function panel, CBC and magnesium for electrolyte monitoring and for infection monitoring  -Continue Zosyn, discuss with pharmacist   Blood culture positive for Enterobacter and Proteus  Repeat blood culture ordered, FU   Infectious disease consult placed,a appreciate recs   PT, OT consult placed         Ileus, improving  25: CTAP : Moderate gaseous gastric distention and mildly dilated proximal small bowel loops with gradual tapering

## 2025-01-26 NOTE — PROGRESS NOTES
Name: Steve Maharaj   MRN: [id number]  : 1933      Assessment:  ELIAS- 2nd to obstructive uropathy in a solitary kidney + n/v + diuretic     R ureteral stone with mod R hydro- in solitary kidney. 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 due to HTN/Solitary kidney  BPH  Pyuria/presumed UTI  Dark brown emesis/Ileus  Hypernatremia:borderline        Discussion-ELIAS is slowly improving.  Cr down to 4.5mg/dl today. UOP up to 1,100 ml yesterday. Acidosis is mild.  Worsening hypernatremia noted despite starting hypotonic fluids yesterday. +?UGI bleed and Ileus. On IV PPI     Plan/Recommendations:  No indication for RRT with ongoing renal recovery (albeit very slow)  increase IV D5W + 20meq/L Kcl to 100cc/hr  IV Kcl  Repeat BMP later this afternoon  Monitor urine output.    Trend Hgb  IV antibiotics as per primary team.  Follow culture  Continue Proscar/Flomax  Continue to hold atenolol/HCTZ with soft BP  Avoid nephrotoxins  Daily labs  IV PPI    Subjective:  Sitter at bedside. NGT to suction->Not much output.        Exam:  /62   Pulse (!) 137   Temp 98.4 °F (36.9 °C) (Axillary)   Resp 21   SpO2 96%     Elderly thin/frail, ill appearing in NAD  NGT  Clear  RRR  Soft, hypoactive BS  R PCN  No edema    Labs/Data:    Lab Results   Component Value Date    WBC 9.9 2025    HGB 8.5 (L) 2025    HCT 25.6 (L) 2025    MCV 92.1 2025     (L) 2025       Lab Results   Component Value Date/Time     2025 05:32 AM    K 2.7 2025 05:32 AM     2025 05:32 AM    CO2 19 2025 05:32 AM    BUN 88 2025 05:32 AM    CREATININE 4.51 2025 05:32 AM

## 2025-01-26 NOTE — PROGRESS NOTES
End of Shift Note    Bedside shift change report given to ED Ortiz (oncoming nurse) by GAGE OTTO RN (offgoing nurse).  Report included the following information SBAR, Kardex, ED Summary, Procedure Summary, Intake/Output, MAR, Accordion, Recent Results, Med Rec Status, Cardiac Rhythm NS, Alarm Parameters , and Quality Measures    Shift worked:  8343 - 7442     Shift summary and any significant changes:     No significant change at this time       Concerns for physician to address: None     Zone phone for oncoming shift:   [phone]       Activity:  Level of Assistance: Maximum assist, patient does 25-49%  Number times ambulated in hallways past shift: 0  Number of times OOB to chair past shift: 0    Cardiac:   Cardiac Monitoring: Yes      Cardiac Rhythm: Sinus rhythm    Access:  Current line(s): PIV     Genitourinary:   Urinary Status:  (Nephrostomy Tube)    Respiratory:   O2 Device: None (Room air)  Chronic home O2 use?: NO  Incentive spirometer at bedside: NO    GI:  Last BM (including prior to admit): 01/24/25  Current diet:  Diet NPO  Passing flatus: YES    Pain Management:   Patient states pain is manageable on current regimen: YES    Skin:  Mateusz Scale Score: 11  Interventions: Wound Offloading (Prevention Methods): Pillows, Repositioning, Turning, Elevate heels    Patient Safety:  Fall Risk: Nursing Judgement-Fall Risk High(Add Comments): Yes  Fall Risk Interventions  Nursing Judgement-Fall Risk High(Add Comments): Yes  Toilet Every 2 Hours-In Advance of Need: Yes  Hourly Visual Checks: Awake, In bed  Fall Visual Posted: Armband, Socks  Room Door Open: Yes  Alarm On: Bed, Other (Comment)  Patient Moved Closer to Nursing Station: No    Active Consults:   IP CONSULT TO UROLOGY  IP CONSULT TO NEPHROLOGY  IP CONSULT TO GENERAL SURGERY  IP CONSULT TO GI  IP CONSULT TO INFECTIOUS DISEASES    Length of Stay:  Expected LOS: 6  Actual LOS: 3    GAGE OTTO, RN

## 2025-01-27 LAB
ALBUMIN SERPL-MCNC: 1.6 G/DL (ref 3.5–5)
ALBUMIN/GLOB SERPL: 0.3 (ref 1.1–2.2)
ALP SERPL-CCNC: 125 U/L (ref 45–117)
ALT SERPL-CCNC: 17 U/L (ref 12–78)
ANION GAP SERPL CALC-SCNC: 10 MMOL/L (ref 2–12)
AST SERPL-CCNC: 30 U/L (ref 15–37)
BILIRUB SERPL-MCNC: 3.4 MG/DL (ref 0.2–1)
BUN SERPL-MCNC: 70 MG/DL (ref 6–20)
BUN/CREAT SERPL: 17 (ref 12–20)
CALCIUM SERPL-MCNC: 8.1 MG/DL (ref 8.5–10.1)
CHLORIDE SERPL-SCNC: 113 MMOL/L (ref 97–108)
CO2 SERPL-SCNC: 21 MMOL/L (ref 21–32)
CREAT SERPL-MCNC: 4.11 MG/DL (ref 0.7–1.3)
ERYTHROCYTE [DISTWIDTH] IN BLOOD BY AUTOMATED COUNT: 14.6 % (ref 11.5–14.5)
GLOBULIN SER CALC-MCNC: 4.7 G/DL (ref 2–4)
GLUCOSE SERPL-MCNC: 136 MG/DL (ref 65–100)
HCT VFR BLD AUTO: 29.2 % (ref 36.6–50.3)
HGB BLD-MCNC: 9.5 G/DL (ref 12.1–17)
MAGNESIUM SERPL-MCNC: 2.3 MG/DL (ref 1.6–2.4)
MCH RBC QN AUTO: 30.9 PG (ref 26–34)
MCHC RBC AUTO-ENTMCNC: 32.5 G/DL (ref 30–36.5)
MCV RBC AUTO: 95.1 FL (ref 80–99)
NRBC # BLD: 0.02 K/UL (ref 0–0.01)
NRBC BLD-RTO: 0.1 PER 100 WBC
PHOSPHATE SERPL-MCNC: 3 MG/DL (ref 2.6–4.7)
PLATELET # BLD AUTO: 139 K/UL (ref 150–400)
PMV BLD AUTO: 10.9 FL (ref 8.9–12.9)
POTASSIUM SERPL-SCNC: 3.3 MMOL/L (ref 3.5–5.1)
PROT SERPL-MCNC: 6.3 G/DL (ref 6.4–8.2)
RBC # BLD AUTO: 3.07 M/UL (ref 4.1–5.7)
SODIUM SERPL-SCNC: 144 MMOL/L (ref 136–145)
WBC # BLD AUTO: 15.2 K/UL (ref 4.1–11.1)

## 2025-01-27 PROCEDURE — 97116 GAIT TRAINING THERAPY: CPT

## 2025-01-27 PROCEDURE — 2580000003 HC RX 258

## 2025-01-27 PROCEDURE — 6360000002 HC RX W HCPCS: Performed by: STUDENT IN AN ORGANIZED HEALTH CARE EDUCATION/TRAINING PROGRAM

## 2025-01-27 PROCEDURE — 2060000000 HC ICU INTERMEDIATE R&B

## 2025-01-27 PROCEDURE — 2500000003 HC RX 250 WO HCPCS: Performed by: STUDENT IN AN ORGANIZED HEALTH CARE EDUCATION/TRAINING PROGRAM

## 2025-01-27 PROCEDURE — 6370000000 HC RX 637 (ALT 250 FOR IP): Performed by: STUDENT IN AN ORGANIZED HEALTH CARE EDUCATION/TRAINING PROGRAM

## 2025-01-27 PROCEDURE — 83735 ASSAY OF MAGNESIUM: CPT

## 2025-01-27 PROCEDURE — 2580000003 HC RX 258: Performed by: STUDENT IN AN ORGANIZED HEALTH CARE EDUCATION/TRAINING PROGRAM

## 2025-01-27 PROCEDURE — 36415 COLL VENOUS BLD VENIPUNCTURE: CPT

## 2025-01-27 PROCEDURE — 6360000002 HC RX W HCPCS

## 2025-01-27 PROCEDURE — 97162 PT EVAL MOD COMPLEX 30 MIN: CPT

## 2025-01-27 PROCEDURE — 85027 COMPLETE CBC AUTOMATED: CPT

## 2025-01-27 PROCEDURE — 6370000000 HC RX 637 (ALT 250 FOR IP): Performed by: INTERNAL MEDICINE

## 2025-01-27 PROCEDURE — 84100 ASSAY OF PHOSPHORUS: CPT

## 2025-01-27 PROCEDURE — 80053 COMPREHEN METABOLIC PANEL: CPT

## 2025-01-27 RX ORDER — POTASSIUM CHLORIDE 1500 MG/1
40 TABLET, EXTENDED RELEASE ORAL ONCE
Status: COMPLETED | OUTPATIENT
Start: 2025-01-27 | End: 2025-01-27

## 2025-01-27 RX ADMIN — SODIUM CHLORIDE, PRESERVATIVE FREE 10 ML: 5 INJECTION INTRAVENOUS at 00:00

## 2025-01-27 RX ADMIN — SODIUM CHLORIDE, PRESERVATIVE FREE 40 MG: 5 INJECTION INTRAVENOUS at 11:28

## 2025-01-27 RX ADMIN — POTASSIUM CHLORIDE 40 MEQ: 1500 TABLET, EXTENDED RELEASE ORAL at 11:28

## 2025-01-27 RX ADMIN — TAMSULOSIN HYDROCHLORIDE 0.4 MG: 0.4 CAPSULE ORAL at 11:29

## 2025-01-27 RX ADMIN — PIPERACILLIN AND TAZOBACTAM 3375 MG: 3; .375 INJECTION, POWDER, LYOPHILIZED, FOR SOLUTION INTRAVENOUS at 11:30

## 2025-01-27 RX ADMIN — SODIUM CHLORIDE, PRESERVATIVE FREE 10 ML: 5 INJECTION INTRAVENOUS at 11:08

## 2025-01-27 RX ADMIN — FINASTERIDE 5 MG: 5 TABLET, FILM COATED ORAL at 11:29

## 2025-01-27 NOTE — PROGRESS NOTES
End of Shift Note    Bedside shift change report given to ED Mccollum  (oncoming nurse) by Diana Mcdaniel RN (offgoing nurse).  Report included the following information SBAR, Intake/Output, and Cardiac Rhythm Normal Sinus    Shift worked:  9969-4066     Shift summary and any significant changes:     NG tube out per order    Passing gas and having Bms     Eager to eat, advanced diet to clear liquids     PT/OT to see tomorrow     Repeat BMP pending after x4 run potassium    Concerns for physician to address:  See above     Zone phone for oncoming shift:          Activity:  Level of Assistance: Maximum assist, patient does 25-49%  Number times ambulated in hallways past shift: 0  Number of times OOB to chair past shift: 0    Cardiac:   Cardiac Monitoring: Yes      Cardiac Rhythm: Sinus rhythm    Access:  Current line(s): PIV     Genitourinary:   Urinary Status:  (Nephrostomy Tube)    Respiratory:   O2 Device: None (Room air)  Chronic home O2 use?: NO  Incentive spirometer at bedside: NO    GI:  Last BM (including prior to admit): 01/24/25  Current diet:  ADULT DIET; Clear Liquid  DIET ONE TIME MESSAGE;  Passing flatus: YES    Pain Management:   Patient states pain is manageable on current regimen: YES    Skin:  Mateusz Scale Score: 11  Interventions: Wound Offloading (Prevention Methods): Pillows, Repositioning, Turning, Elevate heels    Patient Safety:  Fall Risk: Nursing Judgement-Fall Risk High(Add Comments): Yes  Fall Risk Interventions  Nursing Judgement-Fall Risk High(Add Comments): Yes  Toilet Every 2 Hours-In Advance of Need: Yes  Hourly Visual Checks: Awake, In bed  Fall Visual Posted: Armband, Socks  Room Door Open: Yes  Alarm On: Bed, Other (Comment)  Patient Moved Closer to Nursing Station: No    Active Consults:   IP CONSULT TO UROLOGY  IP CONSULT TO NEPHROLOGY  IP CONSULT TO GENERAL SURGERY  IP CONSULT TO GI  IP CONSULT TO INFECTIOUS DISEASES    Length of Stay:  Expected LOS: 6  Actual LOS: 3    Diana Mcdaniel

## 2025-01-27 NOTE — CARE COORDINATION
Transition of Care Plan:    RUR: 16% Moderate RUR  Prior Level of Functioning: patient requires assistance with most activities of daily living (ADLs).  Disposition: SNF  REYES: 1/27  If SNF or IPR: Date FOC offered: 1/27  Date FOC received:   Accepting facility:   Date authorization started with reference number:   Date authorization received and expires:   Follow up appointments: pcp/specialist   DME needed: none  Transportation at discharge: the patient's family   IM/IMM Medicare/ letter given: 2nd IM upon discharge  Is patient a  and connected with VA? na   If yes, was  transfer form completed and VA notified? na  Caregiver Contact: The patient's main support system are Faby Guallpa (Child) [phone] and April Carol Ann, [phone]   Discharge Caregiver contacted prior to discharge? contacted  Care Conference needed? no  Barriers to discharge: medical clearance     CM noted the new recommendation that states, \"Moderate intensity short-term skilled occupational therapy up to 5x/week\".  A SNF list was provided, emphasizing the patient/caregiver right to choose any agency/facility within network to meet their preference. The  has requested the selection of five preferred options within 24 hours to ensure a smooth discharge process and urges contacting CM once the choices are finalized to avoid any delays. Patients and caregivers are encouraged to ask questions, tour the facilities, and reach out for specific inquiries or accommodations.      The patient resides in a single-story home with their spouse and has 2-3 steps to navigate. The patient's designated responsible party (DTR) reports that the patient requires assistance with most activities of daily living (ADLs). The patient utilizes a cane, rolling walker (RW), but needs a  wheelchair (WC) at home. They report not using oxygen or CPAP at home and confirm that they are supported by family members. The patient is not  currently driving.            The patient's main support system are Faby Guallpa (Child) [phone] and Naida Maharaj, [phone]     Zachery Simms RN  Case Management  [phone]

## 2025-01-27 NOTE — PROGRESS NOTES
Hospitalist Progress Note    NAME:   Steve Maharaj   : 1933   MRN: [id number]     Date: 2025    Patient PCP: Joseph Ugarte MD    Hospital Problem list:     Acute kidney injury POA creatinine 7.28 --> 4.11  CKD IIIb POA baseline creatinine ~ 1.6  Proteus bacteremia POA likely urinary source  Moderate right hydronephrosis due to obstructing right ureteral stone POA  Prior Left nephrectomy s/p to RCC   5 days of progressive lethargy, nausea vomiting, poor p.o. intake  Creatinine 7.28 and admitted with evidence of right hydronephrosis   Stone causing obstruction in right ureter   Urology could not place a right ureteral stent   IR placed a right percutaneous nephrostomy  Admit CT abdomen/pelvis IMPRESSION:  1. 5 mm obstructing stone in the right ureter. There is fluid  tracking posterior to the right ureter into the right retroperitoneum. Findings  may represent rupture of the ureter and urinoma formation. Moderate right-sided  hydronephrosis. If possible, consider contrast-enhanced CT urogram     2. Small hypodense lesions in the right kidney likely cyst. There are also  hyperdense lesions in the right kidney which may represent complex or  hemorrhagic cyst. Most of these were present on the prior study  CT abdomen/pelvis last 2025 IMPRESSION:  Gaseous distention of the sigmoid colon which is predominantly in the left upper  quadrant. No evidence of volvulus or bowel obstruction.  Mildly prominent fluid-filled small bowel loops in the pelvis suggesting ileus.  Status post right nephrostomy tube with decompression of the right kidney and  stable mild fluid in the right retroperitoneum.   Cr 7.28 on admission  from prior .2024   Urology could not place R ureteral stent (unsuccessful due to anatomy)  s/p R PCN by IR on    Nephrology on board, appreciate recommendation, creatinine improving slowly  Stop IV fluids as sodium normalized  Urology appreciate recommendation, maintain

## 2025-01-27 NOTE — PROGRESS NOTES
Name: Steve Maharaj   MRN: [id number]  : 1933      Assessment:  ELIAS- 2nd to obstructive uropathy in a solitary kidney + n/v + diuretic     R ureteral stone with mod R hydro- in solitary kidney. 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 due to HTN/Solitary kidney  BPH    Hypernatremia        Plan/Recommendations:    Sodium stable today.    Creatinine improving.      Monitor urine output.      Continue Proscar/Flomax    Avoid nephrotoxins    Daily labs      Subjective:    No complaint.        Exam:  /74   Pulse 82   Temp 98.2 °F (36.8 °C)   Resp 26   Ht 1.829 m (6' 0.01\")   SpO2 96%   BMI 19.66 kg/m²     Elderly thin/frail, ill appearing in NAD    No edema    Labs/Data:    Lab Results   Component Value Date    WBC 15.2 (H) 2025    HGB 9.5 (L) 2025    HCT 29.2 (L) 2025    MCV 95.1 2025     (L) 2025       Lab Results   Component Value Date/Time     2025 06:52 AM    K 3.3 2025 06:52 AM     2025 06:52 AM    CO2 21 2025 06:52 AM    BUN 70 2025 06:52 AM    CREATININE 4.11 2025 06:52 AM    GLUCOSE 136 2025 06:52 AM    CALCIUM 8.1 2025 06:52 AM    LABGLOM 13 2025 06:52 AM    LABGLOM 41 2024 06:44 AM        Wt Readings from Last 3 Encounters:   24 65.8 kg (145 lb)   24 65.8 kg (145 lb)   23 65.8 kg (145 lb 1 oz)         Intake/Output Summary (Last 24 hours) at 2025 1130  Last data filed at 2025 0535  Gross per 24 hour   Intake 563.31 ml   Output 425 ml   Net 138.31 ml       Patient seen and examined. Chart reviewed. Labs, data and other pertinent notes reviewed

## 2025-01-27 NOTE — CARE COORDINATION
Transition of Care Plan:    RUR: 16% Moderate RUR  Prior Level of Functioning: patient requires assistance with most activities of daily living (ADLs).  Disposition: SNF  REYES: 1/27  If SNF or IPR: Date FOC offered: 1/27  Date FOC received: 1/27 awaiting 4 other choices.   Accepting facility:   Date authorization started with reference number:   Date authorization received and expires:   Follow up appointments: pcp/specialist   DME needed: none  Transportation at discharge: the patient's family   IM/IMM Medicare/ letter given: 2nd IM upon discharge  Is patient a  and connected with VA? na   If yes, was [place] transfer form completed and VA notified? na  Caregiver Contact: The patient's main support system are Faby Guallpa (Child) [phone] and Naida Maharaj [phone]   Discharge Caregiver contacted prior to discharge? contacted  Care Conference needed? no  Barriers to discharge: medical clearance     CM noted the new recommendation that states, \"Moderate intensity short-term skilled occupational therapy up to 5x/week\".  A SNF list was provided, emphasizing the patient/caregiver right to choose any agency/facility within network to meet their preference. The  has requested the selection of five preferred options within 24 hours to ensure a smooth discharge process and urges contacting CM once the choices are finalized to avoid any delays. Patients and caregivers are encouraged to ask questions, tour the facilities, and reach out for specific inquiries or accommodations.     CM contacted every family member listed on the chart but no answer other than Sparkle Alejandra,[phone]; CM requested from her for the designated family member regarding decision making to contact CM and discuss the SNF choices; emphasizing that CM had left a SNF list at the bedside.     NOEMI received a call from Faby Guallpa (Child) [phone] who stated she had negative experiences in most facilities but

## 2025-01-27 NOTE — PROGRESS NOTES
0700 Bedside and Verbal shift change report given to ED Ortiz (oncoming nurse) by ED Mccollum (offgoing nurse). Report included the following information Nurse Handoff Report.     0800 Shift assessment complete. See flow sheets.    1050 GI at bedside    1200 Reassessment completed. No acute changes. See flowsheets.     1600 Reassessment completed. No acute changes - See flowsheets    1830 Patient vomited soup and emesis consistent was thivk mucous. Patient has been congested. MD made aware. HR increased on ambulation. MD made aware. Repleting K.  1900 Bedside and Verbal shift change report given to ED Mccollum (oncoming nurse) by ED Ortiz (offgoing nurse). Report included the following information Nurse Handoff Report.

## 2025-01-27 NOTE — PROGRESS NOTES
Bedside and Verbal shift change report given to Veda RN (oncoming nurse) by Diana RN (offgoing nurse). Report included the following information Nurse Handoff Report.       End of Shift Note    Bedside shift change report given to Diana   RN (oncoming nurse) by Veda Rico RN (offgoing nurse).  Report included the following information SBAR    Shift worked:  7p-7a     Shift summary and any significant changes:    Pt passing gas, tolerating diet, K increasing 3.0 to 3.3     Concerns for physician to address:  See above     Zone phone for oncoming shift:          Activity:  Level of Assistance: Maximum assist, patient does 25-49%  Number times ambulated in hallways past shift: 0  Number of times OOB to chair past shift: 0    Cardiac:   Cardiac Monitoring: Yes      Cardiac Rhythm: Sinus rhythm    Access:  Current line(s): PIV     Genitourinary:   Urinary Status:  (Nephrostomy Tube)    Respiratory:   O2 Device: None (Room air)  Chronic home O2 use?: NO  Incentive spirometer at bedside: NO    GI:  Last BM (including prior to admit): 01/26/25  Current diet:  DIET ONE TIME MESSAGE;  ADULT DIET; Full Liquid  Passing flatus: YES    Pain Management:   Patient states pain is manageable on current regimen: YES    Skin:  Mateusz Scale Score: 11  Interventions: Wound Offloading (Prevention Methods): Pillows, Repositioning    Patient Safety:  Fall Risk: Nursing Judgement-Fall Risk High(Add Comments): Yes  Fall Risk Interventions  Nursing Judgement-Fall Risk High(Add Comments): Yes  Toilet Every 2 Hours-In Advance of Need: Yes  Hourly Visual Checks: Awake, In bed, Quiet  Fall Visual Posted: Armband, Fall sign posted, Socks  Room Door Open: Yes  Alarm On: Bed  Patient Moved Closer to Nursing Station: No    Active Consults:   IP CONSULT TO UROLOGY  IP CONSULT TO NEPHROLOGY  IP CONSULT TO GENERAL SURGERY  IP CONSULT TO GI  IP CONSULT TO INFECTIOUS DISEASES    Length of Stay:  Expected LOS: 6  Actual LOS: 4    Veda Rico

## 2025-01-27 NOTE — PROGRESS NOTES
Comprehensive Nutrition Assessment    Type and Reason for Visit:  Initial    Nutrition Recommendations/Plan:   Start ensure plus BID  Advance diet to GI light as medically able and tolerated  Monitor intakes and BM's  Obtain current wt     Malnutrition Assessment:  Malnutrition Status:  Insufficient data (01/27/25 1100)    Context:  Acute Illness     Findings of the 6 clinical characteristics of malnutrition:  Energy Intake:  Unable to assess  Weight Loss:  Unable to assess     Body Fat Loss:  Unable to assess     Muscle Mass Loss:  Moderate muscle mass loss Clavicles (pectoralis & deltoids), Temples (temporalis)  Fluid Accumulation:  No fluid accumulation     Strength:  Not Performed    Nutrition Assessment:     Pt medically noted for ELIAS on CKD, Obstructive right ureteral stone of solitary kidney, ileus, HTN. Patient sleeping soundly at time of visit. Pt appeared to have wasting of temples and bilateral clavicles. Breakfast tray items at bedside appeared untouched. Pt currently receiving full liquids, advancing as tolerated per surgery. Noted nursing note reporting pt is tolerating diet and eager to eat. No recent intakes recorded. No updated wt, wt loss can not be determined at this time. RD ordered new wt. Malnutrition Screening Tool score 3 noted for unsure wt loss and poor PO PTA. Start ONS ensure plus bid.    Nutrition Related Findings:    Labs: K 3.3, Cl 113, BUN 70, Cr 4.11, Ca 8.1. Meds: protonix, zosyn. No edema. Last BM: 1/26 Wound Type: None       Current Nutrition Intake & Therapies:    Average Meal Intake: Unable to assess  Average Supplements Intake: Unable to assess  DIET ONE TIME MESSAGE;  ADULT DIET; Full Liquid    Anthropometric Measures:  Height: 182.9 cm (6' 0.01\")  Ideal Body Weight (IBW): 178 lbs (81 kg)       Current Body Weight:  (no current wt documented),   IBW.    Current BMI (kg/m2):                               BMI Categories:  (unable to assess)    Wt Readings from Last 3

## 2025-01-27 NOTE — PROGRESS NOTES
Nikki Mahmood, NP-C                       [phone] cell                 Monday-Thursday 7:30-5:00                               GI PROGRESS NOTE        NAME:   Steve Maharaj       :    1933       MRN:    [id number]     Assessment/Plan     GI consult for dark brown emesis, +occult, ileus. 89 y/o male with pmhx significant for HTN enlarged prostate requiring indwelling Murphy, presented to the hospital  with lethargy, nausea, vomiting and decreased oral intake. 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 Found to have obstructing ureteral stone, forniceal rupture, and taken to OR for cysto. R ureteral stent placement was unsuccessful so IR was consulted for R PCN placement.   Labs 2025: WBC 8.5 Hgb 10.1 Hct 30.9 MCV 96 BUN 83 Cr 5.86 eGFR 9 Lactic acid 2.1 NT Pro-BNP 8045 Troponin, HS 26 ALT 13 AST 39 ALP 87 Tbili 1.6 Albumin 1.8 Lipase 9 Tprotein 7.0 Na 142 K 4.4 Mg 2.0 Ca 7.3 P 5.3 EKG NSR Baseline hemoglobin is 9-10 range   CT A/P WO Contrast  showin) Moderate gaseous gastric distention and mildly dilated proximal small bowel loops with gradual tapering suggesting ileus. No intraperitoneal free air. 2) Small R pleural effusion w/RLL airspace opacity that may represent atelectasis, infection, or aspiration. 3) R percutaneous nephrostomy tube and solitary R kidney w/o hydronephrosis. 5 mm calculus again demonstrated in proximal R ureter with surrounding ureteral inflammation. 4) R retroperitoneal fluid and stranding unchanged extending to surround R ureter.   KUB enteric tube terminates in stomach. Increased colonic dilatation in left abdomen. There is dark output from Ngtube. Hypoactive bowel sounds with tenderness disproportionate to touch on exam. His daughter is at bedside who reports that he had an ileus 4 years ago post nephrectomy. He had an EGD/colonoscopy

## 2025-01-27 NOTE — PLAN OF CARE
Problem: Physical Therapy - Adult  Goal: By Discharge: Performs mobility at highest level of function for planned discharge setting.  See evaluation for individualized goals.  Description: FUNCTIONAL STATUS PRIOR TO ADMISSION: Pt unable to provide accurate PLOF as he presents extremely [place] and with confusion however, per chart review, pt was ambulatory with use of rolling walker and received assistance from daughter for ADLs?    HOME SUPPORT PRIOR TO ADMISSION: The patient lived with wife and had assistance from 2 daughters for ADLs.    Physical Therapy Goals  Initiated 1/27/2025  1.  Patient will move from supine to sit and sit to supine, scoot up and down, and roll side to side in bed with supervision/set-up within 7 day(s).    2.  Patient will perform sit to stand with supervision/set-up within 7 day(s).  3.  Patient will transfer from bed to chair and chair to bed with contact guard assist using the least restrictive device within 7 day(s).  4.  Patient will ambulate with contact guard assist for 25 feet with the least restrictive device within 7 day(s).   Outcome: Progressing   PHYSICAL THERAPY EVALUATION    Patient: Steve Maharaj (89 y.o. male)  Date: 1/27/2025  Primary Diagnosis: Urinary tract obstruction by kidney stone [N20.0, N13.8]  Acute renal failure, unspecified acute renal failure type (HCC) [N17.9]  Renal stone [N20.0]  Procedure(s) (LRB):  CYSTOSCOPY (Right) 5 Days Post-Op   Precautions:                        ASSESSMENT :   DEFICITS/IMPAIRMENTS:   The patient is limited by intermittent confusion, impaired activity tolerance, decreased endurance, impaired safety awareness/decreased insight, generalized weakness, impaired balance, and overall impaired functional mobility. Pt required CG/minAx1 and RW support during brief ambulation. Pt with significant trunk flexion, despite max tactile and verbal cueing, in addition to slow, shuffled gait. No overt LOB however fair gait stability overall. HR

## 2025-01-28 LAB
ALBUMIN SERPL-MCNC: 1.7 G/DL (ref 3.5–5)
ALBUMIN/GLOB SERPL: 0.3 (ref 1.1–2.2)
ALP SERPL-CCNC: 136 U/L (ref 45–117)
ALT SERPL-CCNC: 16 U/L (ref 12–78)
ANION GAP SERPL CALC-SCNC: 12 MMOL/L (ref 2–12)
AST SERPL-CCNC: 28 U/L (ref 15–37)
BACTERIA SPEC CULT: NORMAL
BASOPHILS # BLD: 0 K/UL (ref 0–0.1)
BASOPHILS NFR BLD: 0 % (ref 0–1)
BILIRUB SERPL-MCNC: 3.2 MG/DL (ref 0.2–1)
BUN SERPL-MCNC: 63 MG/DL (ref 6–20)
BUN/CREAT SERPL: 17 (ref 12–20)
CALCIUM SERPL-MCNC: 8.4 MG/DL (ref 8.5–10.1)
CHLORIDE SERPL-SCNC: 115 MMOL/L (ref 97–108)
CO2 SERPL-SCNC: 19 MMOL/L (ref 21–32)
CREAT SERPL-MCNC: 3.61 MG/DL (ref 0.7–1.3)
DIFFERENTIAL METHOD BLD: ABNORMAL
EOSINOPHIL # BLD: 0 K/UL (ref 0–0.4)
EOSINOPHIL NFR BLD: 0 % (ref 0–7)
ERYTHROCYTE [DISTWIDTH] IN BLOOD BY AUTOMATED COUNT: 14.6 % (ref 11.5–14.5)
GLOBULIN SER CALC-MCNC: 5.1 G/DL (ref 2–4)
GLUCOSE SERPL-MCNC: 81 MG/DL (ref 65–100)
HCT VFR BLD AUTO: 28.5 % (ref 36.6–50.3)
HGB BLD-MCNC: 9.4 G/DL (ref 12.1–17)
IMM GRANULOCYTES # BLD AUTO: 0 K/UL (ref 0–0.04)
IMM GRANULOCYTES NFR BLD AUTO: 0 % (ref 0–0.5)
LYMPHOCYTES # BLD: 1.38 K/UL (ref 0.8–3.5)
LYMPHOCYTES NFR BLD: 8 % (ref 12–49)
MAGNESIUM SERPL-MCNC: 2.3 MG/DL (ref 1.6–2.4)
MCH RBC QN AUTO: 30.3 PG (ref 26–34)
MCHC RBC AUTO-ENTMCNC: 33 G/DL (ref 30–36.5)
MCV RBC AUTO: 91.9 FL (ref 80–99)
MONOCYTES # BLD: 1.2 K/UL (ref 0–1)
MONOCYTES NFR BLD: 7 % (ref 5–13)
NEUTS SEG # BLD: 14.62 K/UL (ref 1.8–8)
NEUTS SEG NFR BLD: 85 % (ref 32–75)
NRBC # BLD: 0 K/UL (ref 0–0.01)
NRBC BLD-RTO: 0 PER 100 WBC
PHOSPHATE SERPL-MCNC: 3 MG/DL (ref 2.6–4.7)
PLATELET # BLD AUTO: 189 K/UL (ref 150–400)
PMV BLD AUTO: 10.9 FL (ref 8.9–12.9)
POTASSIUM SERPL-SCNC: 3.3 MMOL/L (ref 3.5–5.1)
PROCALCITONIN SERPL-MCNC: 7.87 NG/ML
PROT SERPL-MCNC: 6.8 G/DL (ref 6.4–8.2)
RBC # BLD AUTO: 3.1 M/UL (ref 4.1–5.7)
RBC MORPH BLD: ABNORMAL
SERVICE CMNT-IMP: NORMAL
SODIUM SERPL-SCNC: 146 MMOL/L (ref 136–145)
WBC # BLD AUTO: 17.2 K/UL (ref 4.1–11.1)

## 2025-01-28 PROCEDURE — 2580000003 HC RX 258: Performed by: STUDENT IN AN ORGANIZED HEALTH CARE EDUCATION/TRAINING PROGRAM

## 2025-01-28 PROCEDURE — 2500000003 HC RX 250 WO HCPCS: Performed by: STUDENT IN AN ORGANIZED HEALTH CARE EDUCATION/TRAINING PROGRAM

## 2025-01-28 PROCEDURE — 6370000000 HC RX 637 (ALT 250 FOR IP): Performed by: STUDENT IN AN ORGANIZED HEALTH CARE EDUCATION/TRAINING PROGRAM

## 2025-01-28 PROCEDURE — 85025 COMPLETE CBC W/AUTO DIFF WBC: CPT

## 2025-01-28 PROCEDURE — 84145 PROCALCITONIN (PCT): CPT

## 2025-01-28 PROCEDURE — 84100 ASSAY OF PHOSPHORUS: CPT

## 2025-01-28 PROCEDURE — 6360000002 HC RX W HCPCS: Performed by: STUDENT IN AN ORGANIZED HEALTH CARE EDUCATION/TRAINING PROGRAM

## 2025-01-28 PROCEDURE — 6360000002 HC RX W HCPCS: Performed by: INTERNAL MEDICINE

## 2025-01-28 PROCEDURE — 6360000002 HC RX W HCPCS

## 2025-01-28 PROCEDURE — 2580000003 HC RX 258

## 2025-01-28 PROCEDURE — 36415 COLL VENOUS BLD VENIPUNCTURE: CPT

## 2025-01-28 PROCEDURE — 80053 COMPREHEN METABOLIC PANEL: CPT

## 2025-01-28 PROCEDURE — 2060000000 HC ICU INTERMEDIATE R&B

## 2025-01-28 PROCEDURE — 2580000003 HC RX 258: Performed by: INTERNAL MEDICINE

## 2025-01-28 PROCEDURE — 97535 SELF CARE MNGMENT TRAINING: CPT

## 2025-01-28 PROCEDURE — 83735 ASSAY OF MAGNESIUM: CPT

## 2025-01-28 RX ADMIN — SODIUM CHLORIDE, PRESERVATIVE FREE 40 MG: 5 INJECTION INTRAVENOUS at 04:12

## 2025-01-28 RX ADMIN — PIPERACILLIN AND TAZOBACTAM 3375 MG: 3; .375 INJECTION, POWDER, LYOPHILIZED, FOR SOLUTION INTRAVENOUS at 23:27

## 2025-01-28 RX ADMIN — PIPERACILLIN AND TAZOBACTAM 3375 MG: 3; .375 INJECTION, POWDER, LYOPHILIZED, FOR SOLUTION INTRAVENOUS at 10:53

## 2025-01-28 RX ADMIN — PIPERACILLIN AND TAZOBACTAM 3375 MG: 3; .375 INJECTION, POWDER, LYOPHILIZED, FOR SOLUTION INTRAVENOUS at 03:34

## 2025-01-28 RX ADMIN — SODIUM CHLORIDE, PRESERVATIVE FREE 40 MG: 5 INJECTION INTRAVENOUS at 23:22

## 2025-01-28 RX ADMIN — SODIUM CHLORIDE, PRESERVATIVE FREE 10 ML: 5 INJECTION INTRAVENOUS at 03:37

## 2025-01-28 RX ADMIN — SODIUM CHLORIDE, PRESERVATIVE FREE 10 ML: 5 INJECTION INTRAVENOUS at 21:31

## 2025-01-28 RX ADMIN — TAMSULOSIN HYDROCHLORIDE 0.4 MG: 0.4 CAPSULE ORAL at 10:48

## 2025-01-28 RX ADMIN — FINASTERIDE 5 MG: 5 TABLET, FILM COATED ORAL at 10:48

## 2025-01-28 RX ADMIN — SODIUM CHLORIDE, PRESERVATIVE FREE 40 MG: 5 INJECTION INTRAVENOUS at 11:49

## 2025-01-28 ASSESSMENT — PAIN SCALES - GENERAL: PAINLEVEL_OUTOF10: 0

## 2025-01-28 NOTE — PROGRESS NOTES
Name: Steve Maharaj   MRN: [id number]  : 1933      Assessment:  ELIAS- 2nd to obstructive uropathy in a solitary kidney + n/v + diuretic     R ureteral stone with mod R hydro- in solitary kidney. 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 due to HTN/Solitary kidney  BPH    Hypernatremia        Plan/Recommendations:    Sodium up a little  today.    Creatinine improving.      Monitor urine output.      Continue Proscar/Flomax    Avoid nephrotoxins    Daily labs      Subjective:      \"I feel bad.\"        Exam:  /66   Pulse 83   Temp 97.4 °F (36.3 °C) (Oral)   Resp 30   Ht 1.829 m (6' 0.01\")   Wt 58.1 kg (128 lb)   SpO2 96%   BMI 17.36 kg/m²     Elderly thin/frail, ill appearing in NAD    No edema    Labs/Data:    Lab Results   Component Value Date    WBC 17.2 (H) 2025    HGB 9.4 (L) 2025    HCT 28.5 (L) 2025    MCV 91.9 2025     2025       Lab Results   Component Value Date/Time     2025 03:31 AM    K 3.3 2025 03:31 AM     2025 03:31 AM    CO2 19 2025 03:31 AM    BUN 63 2025 03:31 AM    CREATININE 3.61 2025 03:31 AM    GLUCOSE 81 2025 03:31 AM    CALCIUM 8.4 2025 03:31 AM    LABGLOM 15 2025 03:31 AM    LABGLOM 41 2024 06:44 AM        Wt Readings from Last 3 Encounters:   25 58.1 kg (128 lb)   24 65.8 kg (145 lb)   24 65.8 kg (145 lb)         Intake/Output Summary (Last 24 hours) at 2025 1043  Last data filed at 2025 1832  Gross per 24 hour   Intake 240 ml   Output 750 ml   Net -510 ml       Patient seen and examined. Chart reviewed. Labs, data and other

## 2025-01-28 NOTE — CARE COORDINATION
Transition of Care Plan:    RUR: 16% Moderate RUR  Prior Level of Functioning: patient requires assistance with most activities of daily living (ADLs).  Disposition: SNF  REYES: 1/29   If SNF or IPR: Date FOC offered: 1/27  Date FOC received: 1/27 awaiting 4 other choices.   Accepting facility:   Date authorization started with reference number:   Date authorization received and expires:   Follow up appointments: pcp/specialist   DME needed: none  Transportation at discharge: the patient's family   IM/IMM Medicare/ letter given: 2nd IM upon discharge  Is patient a  and connected with VA? na   If yes, was [place] transfer form completed and VA notified? na  Caregiver Contact: The patient's main support system are Faby Guallpa (Child) [phone] and Naida Maharaj [phone]   Discharge Caregiver contacted prior to discharge? contacted  Care Conference needed? no  Barriers to discharge: medical clearance         CM received the following choices from Faby Ramu (Child) [phone] who also mentioned that she no loner wants [facility].    [facility] - [phone]  [facility] - [phone]  [facility] - [phone]  [facility] - [phone]  [facility] - [phone]  [facility] - [phone]    CM sent referrals via both careport and [employer] link.     The patient resides in a single-story home with their spouse and has 2-3 steps to navigate. The patient's designated responsible party (DTR) reports that the patient requires assistance with most activities of daily living (ADLs). The patient utilizes a cane, rolling walker (RW), but needs a  wheelchair (WC) at home. They report not using oxygen or CPAP at home and confirm that they are supported by family members. The patient is not currently driving.            The patient's main support system are

## 2025-01-28 NOTE — PLAN OF CARE
Problem: Safety - Adult  Goal: Free from fall injury  Outcome: Progressing     Problem: Pain  Goal: Verbalizes/displays adequate comfort level or baseline comfort level  Outcome: Progressing     Problem: Discharge Planning  Goal: Discharge to home or other facility with appropriate resources  Outcome: Progressing     Problem: Skin/Tissue Integrity  Goal: Absence of new skin breakdown  Description: 1.  Monitor for areas of redness and/or skin breakdown  2.  Assess vascular access sites hourly  3.  Every 4-6 hours minimum:  Change oxygen saturation probe site  4.  Every 4-6 hours:  If on nasal continuous positive airway pressure, respiratory therapy assess nares and determine need for appliance change or resting period.  Outcome: Progressing     Problem: Respiratory - Adult  Goal: Achieves optimal ventilation and oxygenation  Outcome: Progressing     Problem: Gastrointestinal - Adult  Goal: Minimal or absence of nausea and vomiting  Outcome: Progressing  Goal: Maintains or returns to baseline bowel function  Outcome: Progressing  Goal: Maintains adequate nutritional intake  Outcome: Progressing  Goal: Establish and maintain optimal ostomy function  Outcome: Progressing     Problem: Genitourinary - Adult  Goal: Absence of urinary retention  Outcome: Progressing  Goal: Urinary catheter remains patent  Outcome: Progressing     Problem: Infection - Adult  Goal: Absence of infection at discharge  Outcome: Progressing  Goal: Absence of infection during hospitalization  Outcome: Progressing  Goal: Absence of fever/infection during anticipated neutropenic period  Outcome: Progressing     Problem: Hematologic - Adult  Goal: Maintains hematologic stability  Outcome: Progressing     Problem: ABCDS Injury Assessment  Goal: Absence of physical injury  Outcome: Progressing     Problem: Physical Therapy - Adult  Goal: By Discharge: Performs mobility at highest level of function for planned discharge setting.  See evaluation for

## 2025-01-28 NOTE — PROGRESS NOTES
Bedside and Verbal shift change report given to Veda RN (oncoming nurse) by Diana RN (offgoing nurse). Report included the following information Nurse Handoff Report.       End of Shift Note    Bedside shift change report given to Diana RN (oncoming nurse) by Veda Rico RN (offgoing nurse).  Report included the following information SBAR    Shift worked:  7p-7a     Shift summary and any significant changes:    2245 Pt lost iv access escalated to charged, ed charge, for ultra sound guided no one trained, ccu unavailable.RRRN in rrt    Pt got iv access   K 3.3  Pt states \"I want [facility] bed\"     Concerns for physician to address:  See above     Zone phone for oncoming shift:          Activity:  Level of Assistance: Maximum assist, patient does 25-49%  Number times ambulated in hallways past shift: 0  Number of times OOB to chair past shift: 0    Cardiac:   Cardiac Monitoring: Yes      Cardiac Rhythm: Sinus rhythm    Access:  Current line(s): PIV     Genitourinary:   Urinary Status:  (R Nephrostomy drain)    Respiratory:   O2 Device: None (Room air)  Chronic home O2 use?: NO  Incentive spirometer at bedside: NO    GI:  Last BM (including prior to admit): 01/27/25  Current diet:  DIET ONE TIME MESSAGE;  ADULT DIET; Full Liquid  ADULT ORAL NUTRITION SUPPLEMENT; Breakfast, Dinner; Standard High Calorie/High Protein Oral Supplement  Passing flatus: YES    Pain Management:   Patient states pain is manageable on current regimen: YES    Skin:  Mateusz Scale Score: 14  Interventions: Wound Offloading (Prevention Methods): Pillows, Repositioning    Patient Safety:  Fall Risk: Nursing Judgement-Fall Risk High(Add Comments): Yes  Fall Risk Interventions  Nursing Judgement-Fall Risk High(Add Comments): Yes  Toilet Every 2 Hours-In Advance of Need: Yes  Hourly Visual Checks: Awake, In bed, Quiet  Fall Visual Posted: Armband, Fall sign posted, Socks  Room Door Open: Yes  Alarm On: Bed  Patient Moved Closer to Nursing Station:

## 2025-01-28 NOTE — PLAN OF CARE
Problem: Occupational Therapy - Adult  Goal: By Discharge: Performs self-care activities at highest level of function for planned discharge setting.  See evaluation for individualized goals.  Description: FUNCTIONAL STATUS PRIOR TO ADMISSION:    Receives Help From: Family (daughters assist pt with bathing and dressing.  Pt unbale to report specifics at this time.), Prior Level of Assist for ADLs: Needs assistance,  ,  ,  ,  ,  ,  ,  ,  , Active : No.  Per old notes in chart review, pt has a history of falls and needing assistance with adls/transfers     HOME SUPPORT: Patient lived with his wife.  Pt's family has been assisting pt for adls.  .    Occupational Therapy Goals:  Initiated 1/26/2025  1.  Patient will perform grooming, seated EOB with Set-up and Supervision within 7 day(s).  2.  Patient will perform upper body bathing with Moderate Assist within 7 day(s).  3.  Patient will perform upper body dressing and lower body dressing with Moderate Assist within 7 day(s).  4.  Patient will perform toilet transfers with Moderate Assist  within 7 day(s).  5.  Patient will perform all aspects of toileting with Moderate Assist within 7 day(s).  6.  Patient will participate in upper extremity therapeutic exercise/activities with Supervision for 5 minutes within 7 day(s).    7.  Patient will utilize energy conservation techniques during functional activities with verbal cues within 7 day(s).   Outcome: Progressing   OCCUPATIONAL THERAPY TREATMENT  Patient: Steve Maharaj (89 y.o. male)  Date: 1/28/2025  Primary Diagnosis: Urinary tract obstruction by kidney stone [N20.0, N13.8]  Acute renal failure, unspecified acute renal failure type (HCC) [N17.9]  Renal stone [N20.0]  Procedure(s) (LRB):  CYSTOSCOPY (Right) 6 Days Post-Op   Precautions:                  Chart, occupational therapy assessment, plan of care, and goals were reviewed.    ASSESSMENT  Patient continues to benefit from skilled OT services and is

## 2025-01-28 NOTE — PLAN OF CARE
Problem: Safety - Adult  Goal: Free from fall injury  1/28/2025 1229 by Diana Mcdaniel RN  Outcome: Progressing  1/27/2025 2357 by Veda Rico RN  Outcome: Progressing     Problem: Pain  Goal: Verbalizes/displays adequate comfort level or baseline comfort level  1/28/2025 1229 by Diana Mcdaniel RN  Outcome: Progressing  1/27/2025 2357 by Veda Rico RN  Outcome: Progressing     Problem: Discharge Planning  Goal: Discharge to home or other facility with appropriate resources  1/28/2025 1229 by Diana Mcdaniel RN  Outcome: Progressing  1/27/2025 2357 by Veda Rico RN  Outcome: Progressing     Problem: Skin/Tissue Integrity  Goal: Absence of new skin breakdown  Description: 1.  Monitor for areas of redness and/or skin breakdown  2.  Assess vascular access sites hourly  3.  Every 4-6 hours minimum:  Change oxygen saturation probe site  4.  Every 4-6 hours:  If on nasal continuous positive airway pressure, respiratory therapy assess nares and determine need for appliance change or resting period.  1/28/2025 1229 by Diana Mcdaniel RN  Outcome: Progressing  1/27/2025 2357 by Veda Rico RN  Outcome: Progressing     Problem: Respiratory - Adult  Goal: Achieves optimal ventilation and oxygenation  1/28/2025 1229 by Diana Mcdaniel RN  Outcome: Progressing  1/27/2025 2357 by Veda Rico RN  Outcome: Progressing     Problem: Gastrointestinal - Adult  Goal: Minimal or absence of nausea and vomiting  1/28/2025 1229 by Diana Mcdaniel RN  Outcome: Progressing  1/27/2025 2357 by Veda Rico RN  Outcome: Progressing  Goal: Maintains or returns to baseline bowel function  1/28/2025 1229 by Diana Mcdaniel RN  Outcome: Progressing  1/27/2025 2357 by Veda Rico RN  Outcome: Progressing  Goal: Maintains adequate nutritional intake  1/28/2025 1229 by Diana Mcdaniel RN  Outcome: Progressing  1/27/2025 2357 by Veda Rico RN  Outcome: Progressing     Problem: Genitourinary - Adult  Goal: Absence of urinary retention  1/28/2025

## 2025-01-29 ENCOUNTER — APPOINTMENT (OUTPATIENT)
Facility: HOSPITAL | Age: 89
DRG: 853 | End: 2025-01-29
Payer: MEDICARE

## 2025-01-29 LAB
ALBUMIN SERPL-MCNC: 1.7 G/DL (ref 3.5–5)
ALBUMIN SERPL-MCNC: 2 G/DL (ref 3.5–5)
ALBUMIN/GLOB SERPL: 0.3 (ref 1.1–2.2)
ALBUMIN/GLOB SERPL: 0.3 (ref 1.1–2.2)
ALP SERPL-CCNC: 130 U/L (ref 45–117)
ALP SERPL-CCNC: 154 U/L (ref 45–117)
ALT SERPL-CCNC: 11 U/L (ref 12–78)
ALT SERPL-CCNC: 13 U/L (ref 12–78)
ANION GAP SERPL CALC-SCNC: 10 MMOL/L (ref 2–12)
ANION GAP SERPL CALC-SCNC: 11 MMOL/L (ref 2–12)
ARTERIAL PATENCY WRIST A: POSITIVE
AST SERPL-CCNC: 19 U/L (ref 15–37)
AST SERPL-CCNC: 19 U/L (ref 15–37)
BASE DEFICIT BLD-SCNC: 3.9 MMOL/L
BASOPHILS # BLD: 0 K/UL (ref 0–0.1)
BASOPHILS # BLD: 0 K/UL (ref 0–0.1)
BASOPHILS NFR BLD: 0 % (ref 0–1)
BASOPHILS NFR BLD: 0 % (ref 0–1)
BDY SITE: ABNORMAL
BILIRUB SERPL-MCNC: 2.3 MG/DL (ref 0.2–1)
BILIRUB SERPL-MCNC: 2.4 MG/DL (ref 0.2–1)
BUN SERPL-MCNC: 56 MG/DL (ref 6–20)
BUN SERPL-MCNC: 57 MG/DL (ref 6–20)
BUN/CREAT SERPL: 17 (ref 12–20)
BUN/CREAT SERPL: 17 (ref 12–20)
CALCIUM SERPL-MCNC: 8.4 MG/DL (ref 8.5–10.1)
CALCIUM SERPL-MCNC: 8.9 MG/DL (ref 8.5–10.1)
CHLORIDE SERPL-SCNC: 113 MMOL/L (ref 97–108)
CHLORIDE SERPL-SCNC: 114 MMOL/L (ref 97–108)
CO2 SERPL-SCNC: 19 MMOL/L (ref 21–32)
CO2 SERPL-SCNC: 22 MMOL/L (ref 21–32)
CREAT SERPL-MCNC: 3.32 MG/DL (ref 0.7–1.3)
CREAT SERPL-MCNC: 3.34 MG/DL (ref 0.7–1.3)
DIFFERENTIAL METHOD BLD: ABNORMAL
DIFFERENTIAL METHOD BLD: ABNORMAL
EOSINOPHIL # BLD: 0 K/UL (ref 0–0.4)
EOSINOPHIL # BLD: 0.23 K/UL (ref 0–0.4)
EOSINOPHIL NFR BLD: 0 % (ref 0–7)
EOSINOPHIL NFR BLD: 1 % (ref 0–7)
ERYTHROCYTE [DISTWIDTH] IN BLOOD BY AUTOMATED COUNT: 14.7 % (ref 11.5–14.5)
ERYTHROCYTE [DISTWIDTH] IN BLOOD BY AUTOMATED COUNT: 14.8 % (ref 11.5–14.5)
GAS FLOW.O2 O2 DELIVERY SYS: ABNORMAL
GLOBULIN SER CALC-MCNC: 4.9 G/DL (ref 2–4)
GLOBULIN SER CALC-MCNC: 6.2 G/DL (ref 2–4)
GLUCOSE BLD STRIP.AUTO-MCNC: 150 MG/DL (ref 65–117)
GLUCOSE SERPL-MCNC: 138 MG/DL (ref 65–100)
GLUCOSE SERPL-MCNC: 147 MG/DL (ref 65–100)
HCO3 BLD-SCNC: 18 MMOL/L (ref 21–28)
HCT VFR BLD AUTO: 27 % (ref 36.6–50.3)
HCT VFR BLD AUTO: 30 % (ref 36.6–50.3)
HGB BLD-MCNC: 8.9 G/DL (ref 12.1–17)
HGB BLD-MCNC: 9.8 G/DL (ref 12.1–17)
IMM GRANULOCYTES # BLD AUTO: 0 K/UL (ref 0–0.04)
IMM GRANULOCYTES # BLD AUTO: 0 K/UL (ref 0–0.04)
IMM GRANULOCYTES NFR BLD AUTO: 0 % (ref 0–0.5)
IMM GRANULOCYTES NFR BLD AUTO: 0 % (ref 0–0.5)
INR PPP: 1 (ref 0.9–1.1)
LACTATE SERPL-SCNC: 3 MMOL/L (ref 0.4–2)
LYMPHOCYTES # BLD: 1.61 K/UL (ref 0.8–3.5)
LYMPHOCYTES # BLD: 2.03 K/UL (ref 0.8–3.5)
LYMPHOCYTES NFR BLD: 7 % (ref 12–49)
LYMPHOCYTES NFR BLD: 8 % (ref 12–49)
MAGNESIUM SERPL-MCNC: 2.2 MG/DL (ref 1.6–2.4)
MCH RBC QN AUTO: 30.2 PG (ref 26–34)
MCH RBC QN AUTO: 30.4 PG (ref 26–34)
MCHC RBC AUTO-ENTMCNC: 32.7 G/DL (ref 30–36.5)
MCHC RBC AUTO-ENTMCNC: 33 G/DL (ref 30–36.5)
MCV RBC AUTO: 91.5 FL (ref 80–99)
MCV RBC AUTO: 93.2 FL (ref 80–99)
METAMYELOCYTES NFR BLD MANUAL: 1 %
MONOCYTES # BLD: 1.15 K/UL (ref 0–1)
MONOCYTES # BLD: 1.27 K/UL (ref 0–1)
MONOCYTES NFR BLD: 5 % (ref 5–13)
MONOCYTES NFR BLD: 5 % (ref 5–13)
NEUTS BAND NFR BLD MANUAL: 1 %
NEUTS SEG # BLD: 19.78 K/UL (ref 1.8–8)
NEUTS SEG # BLD: 22.1 K/UL (ref 1.8–8)
NEUTS SEG NFR BLD: 85 % (ref 32–75)
NEUTS SEG NFR BLD: 87 % (ref 32–75)
NRBC # BLD: 0 K/UL (ref 0–0.01)
NRBC # BLD: 0 K/UL (ref 0–0.01)
NRBC BLD-RTO: 0 PER 100 WBC
NRBC BLD-RTO: 0 PER 100 WBC
O2/TOTAL GAS SETTING VFR VENT: 21 %
PCO2 BLD: 23 MMHG (ref 35–48)
PH BLD: 7.5 (ref 7.35–7.45)
PHOSPHATE SERPL-MCNC: 2.9 MG/DL (ref 2.6–4.7)
PLATELET # BLD AUTO: 220 K/UL (ref 150–400)
PLATELET # BLD AUTO: 260 K/UL (ref 150–400)
PMV BLD AUTO: 10.5 FL (ref 8.9–12.9)
PMV BLD AUTO: 10.6 FL (ref 8.9–12.9)
PO2 BLD: 73 MMHG (ref 83–108)
POTASSIUM SERPL-SCNC: 2.8 MMOL/L (ref 3.5–5.1)
POTASSIUM SERPL-SCNC: 2.9 MMOL/L (ref 3.5–5.1)
PROCALCITONIN SERPL-MCNC: 3.88 NG/ML
PROT SERPL-MCNC: 6.6 G/DL (ref 6.4–8.2)
PROT SERPL-MCNC: 8.2 G/DL (ref 6.4–8.2)
PROTHROMBIN TIME: 11.1 SEC (ref 9.2–11.2)
RBC # BLD AUTO: 2.95 M/UL (ref 4.1–5.7)
RBC # BLD AUTO: 3.22 M/UL (ref 4.1–5.7)
RBC MORPH BLD: ABNORMAL
RBC MORPH BLD: ABNORMAL
SAO2 % BLD: 96.3 % (ref 92–97)
SERVICE CMNT-IMP: ABNORMAL
SODIUM SERPL-SCNC: 144 MMOL/L (ref 136–145)
SODIUM SERPL-SCNC: 145 MMOL/L (ref 136–145)
SPECIMEN TYPE: ABNORMAL
WBC # BLD AUTO: 23 K/UL (ref 4.1–11.1)
WBC # BLD AUTO: 25.4 K/UL (ref 4.1–11.1)
WBC MORPH BLD: ABNORMAL

## 2025-01-29 PROCEDURE — 94640 AIRWAY INHALATION TREATMENT: CPT

## 2025-01-29 PROCEDURE — 84145 PROCALCITONIN (PCT): CPT

## 2025-01-29 PROCEDURE — 2580000003 HC RX 258: Performed by: INTERNAL MEDICINE

## 2025-01-29 PROCEDURE — 36600 WITHDRAWAL OF ARTERIAL BLOOD: CPT

## 2025-01-29 PROCEDURE — 71045 X-RAY EXAM CHEST 1 VIEW: CPT

## 2025-01-29 PROCEDURE — 6360000002 HC RX W HCPCS

## 2025-01-29 PROCEDURE — 83605 ASSAY OF LACTIC ACID: CPT

## 2025-01-29 PROCEDURE — 83735 ASSAY OF MAGNESIUM: CPT

## 2025-01-29 PROCEDURE — 85610 PROTHROMBIN TIME: CPT

## 2025-01-29 PROCEDURE — 6360000002 HC RX W HCPCS: Performed by: INTERNAL MEDICINE

## 2025-01-29 PROCEDURE — 93005 ELECTROCARDIOGRAM TRACING: CPT | Performed by: NURSE PRACTITIONER

## 2025-01-29 PROCEDURE — 2500000003 HC RX 250 WO HCPCS: Performed by: NURSE PRACTITIONER

## 2025-01-29 PROCEDURE — 82803 BLOOD GASES ANY COMBINATION: CPT

## 2025-01-29 PROCEDURE — 85025 COMPLETE CBC W/AUTO DIFF WBC: CPT

## 2025-01-29 PROCEDURE — 6360000002 HC RX W HCPCS: Performed by: STUDENT IN AN ORGANIZED HEALTH CARE EDUCATION/TRAINING PROGRAM

## 2025-01-29 PROCEDURE — 74176 CT ABD & PELVIS W/O CONTRAST: CPT

## 2025-01-29 PROCEDURE — 2060000000 HC ICU INTERMEDIATE R&B

## 2025-01-29 PROCEDURE — 70450 CT HEAD/BRAIN W/O DYE: CPT

## 2025-01-29 PROCEDURE — 74018 RADEX ABDOMEN 1 VIEW: CPT

## 2025-01-29 PROCEDURE — 2580000003 HC RX 258: Performed by: NURSE PRACTITIONER

## 2025-01-29 PROCEDURE — 6370000000 HC RX 637 (ALT 250 FOR IP): Performed by: STUDENT IN AN ORGANIZED HEALTH CARE EDUCATION/TRAINING PROGRAM

## 2025-01-29 PROCEDURE — 82962 GLUCOSE BLOOD TEST: CPT

## 2025-01-29 PROCEDURE — 2580000003 HC RX 258

## 2025-01-29 PROCEDURE — 84100 ASSAY OF PHOSPHORUS: CPT

## 2025-01-29 PROCEDURE — 80053 COMPREHEN METABOLIC PANEL: CPT

## 2025-01-29 PROCEDURE — 6370000000 HC RX 637 (ALT 250 FOR IP)

## 2025-01-29 PROCEDURE — 36415 COLL VENOUS BLD VENIPUNCTURE: CPT

## 2025-01-29 PROCEDURE — 92610 EVALUATE SWALLOWING FUNCTION: CPT

## 2025-01-29 PROCEDURE — 2500000003 HC RX 250 WO HCPCS

## 2025-01-29 RX ORDER — HEPARIN SODIUM 5000 [USP'U]/ML
5000 INJECTION, SOLUTION INTRAVENOUS; SUBCUTANEOUS EVERY 8 HOURS SCHEDULED
Status: DISCONTINUED | OUTPATIENT
Start: 2025-01-29 | End: 2025-01-29

## 2025-01-29 RX ORDER — VANCOMYCIN 1.5 G/300ML
1500 INJECTION, SOLUTION INTRAVENOUS ONCE
Status: COMPLETED | OUTPATIENT
Start: 2025-01-29 | End: 2025-01-30

## 2025-01-29 RX ORDER — 0.9 % SODIUM CHLORIDE 0.9 %
500 INTRAVENOUS SOLUTION INTRAVENOUS ONCE
Status: COMPLETED | OUTPATIENT
Start: 2025-01-29 | End: 2025-01-30

## 2025-01-29 RX ORDER — MORPHINE SULFATE 2 MG/ML
2 INJECTION, SOLUTION INTRAMUSCULAR; INTRAVENOUS ONCE
Status: COMPLETED | OUTPATIENT
Start: 2025-01-29 | End: 2025-02-01

## 2025-01-29 RX ORDER — SODIUM CHLORIDE AND POTASSIUM CHLORIDE 300; 900 MG/100ML; MG/100ML
INJECTION, SOLUTION INTRAVENOUS CONTINUOUS
Status: DISCONTINUED | OUTPATIENT
Start: 2025-01-29 | End: 2025-01-30

## 2025-01-29 RX ORDER — MORPHINE SULFATE 2 MG/ML
INJECTION, SOLUTION INTRAMUSCULAR; INTRAVENOUS
Status: COMPLETED
Start: 2025-01-29 | End: 2025-01-29

## 2025-01-29 RX ORDER — 0.9 % SODIUM CHLORIDE 0.9 %
500 INTRAVENOUS SOLUTION INTRAVENOUS ONCE
Status: COMPLETED | OUTPATIENT
Start: 2025-01-29 | End: 2025-01-29

## 2025-01-29 RX ADMIN — FAMOTIDINE 20 MG: 10 INJECTION, SOLUTION INTRAVENOUS at 20:17

## 2025-01-29 RX ADMIN — HEPARIN SODIUM 5000 UNITS: 5000 INJECTION INTRAVENOUS; SUBCUTANEOUS at 15:02

## 2025-01-29 RX ADMIN — PIPERACILLIN AND TAZOBACTAM 3375 MG: 3; .375 INJECTION, POWDER, LYOPHILIZED, FOR SOLUTION INTRAVENOUS at 12:57

## 2025-01-29 RX ADMIN — IPRATROPIUM BROMIDE AND ALBUTEROL SULFATE 1 DOSE: .5; 3 SOLUTION RESPIRATORY (INHALATION) at 20:03

## 2025-01-29 RX ADMIN — SODIUM CHLORIDE, PRESERVATIVE FREE 40 MG: 5 INJECTION INTRAVENOUS at 12:54

## 2025-01-29 RX ADMIN — ACETAMINOPHEN 650 MG: 325 TABLET ORAL at 07:01

## 2025-01-29 RX ADMIN — SODIUM CHLORIDE, PRESERVATIVE FREE 40 MG: 5 INJECTION INTRAVENOUS at 22:55

## 2025-01-29 RX ADMIN — POTASSIUM CHLORIDE AND SODIUM CHLORIDE: 900; 300 INJECTION, SOLUTION INTRAVENOUS at 21:55

## 2025-01-29 RX ADMIN — MORPHINE SULFATE 2 MG: 2 INJECTION, SOLUTION INTRAMUSCULAR; INTRAVENOUS at 20:16

## 2025-01-29 RX ADMIN — ONDANSETRON 4 MG: 2 INJECTION INTRAMUSCULAR; INTRAVENOUS at 20:17

## 2025-01-29 RX ADMIN — SODIUM CHLORIDE 500 ML: 9 INJECTION, SOLUTION INTRAVENOUS at 20:13

## 2025-01-29 RX ADMIN — SODIUM CHLORIDE 500 ML: 9 INJECTION, SOLUTION INTRAVENOUS at 22:57

## 2025-01-29 ASSESSMENT — PAIN SCALES - GENERAL
PAINLEVEL_OUTOF10: 10
PAINLEVEL_OUTOF10: 7
PAINLEVEL_OUTOF10: 7
PAINLEVEL_OUTOF10: 0

## 2025-01-29 ASSESSMENT — PAIN DESCRIPTION - ORIENTATION: ORIENTATION: UPPER;MID

## 2025-01-29 ASSESSMENT — PAIN DESCRIPTION - LOCATION
LOCATION: HEAD
LOCATION: ABDOMEN

## 2025-01-29 ASSESSMENT — PAIN DESCRIPTION - DESCRIPTORS: DESCRIPTORS: ACHING

## 2025-01-29 NOTE — PROGRESS NOTES
End of Shift Note    Bedside shift change report given to ED Mccollum  (oncoming nurse) by Diana Mcdaniel RN (offgoing nurse).  Report included the following information SBAR, Intake/Output, and Cardiac Rhythm Sinus Tach     Shift worked:  7774-9213     Shift summary and any significant changes:     Owrked PT/OT, tachycardia to the 180s, had to deffer    No other changes      Concerns for physician to address:  See above     Zone phone for oncoming shift:          Activity:  Level of Assistance: Maximum assist, patient does 25-49%  Number times ambulated in hallways past shift: 0  Number of times OOB to chair past shift: 0    Cardiac:   Cardiac Monitoring: Yes      Cardiac Rhythm: Sinus rhythm    Access:  Current line(s): PIV     Genitourinary:   Urinary Status:  (R Nephrostomy drain)    Respiratory:   O2 Device: None (Room air)  Chronic home O2 use?: NO  Incentive spirometer at bedside: NO    GI:  Last BM (including prior to admit): 01/28/25  Current diet:  DIET ONE TIME MESSAGE;  ADULT DIET; Full Liquid  ADULT ORAL NUTRITION SUPPLEMENT; Breakfast, Dinner; Standard High Calorie/High Protein Oral Supplement  Passing flatus: YES    Pain Management:   Patient states pain is manageable on current regimen: YES    Skin:  Mateusz Scale Score: 15  Interventions: Wound Offloading (Prevention Methods): Pillows, Repositioning    Patient Safety:  Fall Risk: Nursing Judgement-Fall Risk High(Add Comments): Yes  Fall Risk Interventions  Nursing Judgement-Fall Risk High(Add Comments): Yes  Toilet Every 2 Hours-In Advance of Need: Yes  Hourly Visual Checks: Awake, In bed  Fall Visual Posted: Socks  Room Door Open: Yes  Alarm On: Bed  Patient Moved Closer to Nursing Station: No    Active Consults:   IP CONSULT TO UROLOGY  IP CONSULT TO NEPHROLOGY  IP CONSULT TO GENERAL SURGERY  IP CONSULT TO GI  IP CONSULT TO INFECTIOUS DISEASES    Length of Stay:  Expected LOS: 7  Actual LOS: 5    Diana Mcdaniel RN

## 2025-01-29 NOTE — PLAN OF CARE
Problem: Safety - Adult  Goal: Free from fall injury  Outcome: Progressing     Problem: Pain  Goal: Verbalizes/displays adequate comfort level or baseline comfort level  Outcome: Progressing     Problem: Discharge Planning  Goal: Discharge to home or other facility with appropriate resources  Outcome: Progressing  Flowsheets (Taken 1/28/2025 1959)  Discharge to home or other facility with appropriate resources:   Identify barriers to discharge with patient and caregiver   Arrange for needed discharge resources and transportation as appropriate   Identify discharge learning needs (meds, wound care, etc)   Arrange for interpreters to assist at discharge as needed   Refer to discharge planning if patient needs post-hospital services based on physician order or complex needs related to functional status, cognitive ability or social support system     Problem: Skin/Tissue Integrity  Goal: Absence of new skin breakdown  Description: 1.  Monitor for areas of redness and/or skin breakdown  2.  Assess vascular access sites hourly  3.  Every 4-6 hours minimum:  Change oxygen saturation probe site  4.  Every 4-6 hours:  If on nasal continuous positive airway pressure, respiratory therapy assess nares and determine need for appliance change or resting period.  Outcome: Progressing     Problem: Respiratory - Adult  Goal: Achieves optimal ventilation and oxygenation  Outcome: Progressing     Problem: Gastrointestinal - Adult  Goal: Minimal or absence of nausea and vomiting  Outcome: Progressing  Goal: Maintains or returns to baseline bowel function  Outcome: Progressing  Goal: Maintains adequate nutritional intake  Outcome: Progressing     Problem: Genitourinary - Adult  Goal: Absence of urinary retention  Outcome: Progressing  Goal: Urinary catheter remains patent  Outcome: Progressing     Problem: Infection - Adult  Goal: Absence of infection at discharge  Outcome: Progressing  Flowsheets (Taken 1/28/2025 1959)  Absence of

## 2025-01-29 NOTE — PROGRESS NOTES
Bedside and Verbal shift change report given to Veda RN (oncoming nurse) by Diana RN (offgoing nurse). Report included the following information Nurse Handoff Report.       End of Shift Note    Bedside shift change report given to Diana RN (oncoming nurse) by Veda Rico RN (offgoing nurse).  Report included the following information SBAR    Shift worked:  7p-7a     Shift summary and any significant changes:    1 bm, No N/V,   pt a very difficult stick am labs delayed until day shift when there are more available resources.  Pt reported a headache     Concerns for physician to address:  See above     Zone phone for oncoming shift:          Activity:  Level of Assistance: Maximum assist, patient does 25-49%  Number times ambulated in hallways past shift: 0  Number of times OOB to chair past shift: 0    Cardiac:   Cardiac Monitoring: Yes      Cardiac Rhythm: Sinus tachy    Access:  Current line(s): PIV     Genitourinary:   Urinary Status:  (R Nephrostomy drain)    Respiratory:   O2 Device: None (Room air)  Chronic home O2 use?: NO  Incentive spirometer at bedside: NO    GI:  Last BM (including prior to admit): 01/28/25  Current diet:  DIET ONE TIME MESSAGE;  ADULT DIET; Full Liquid  ADULT ORAL NUTRITION SUPPLEMENT; Breakfast, Dinner; Standard High Calorie/High Protein Oral Supplement  Passing flatus: YES    Pain Management:   Patient states pain is manageable on current regimen: YES    Skin:  Mateusz Scale Score: 15  Interventions: Wound Offloading (Prevention Methods): Pillows, Repositioning    Patient Safety:  Fall Risk: Nursing Judgement-Fall Risk High(Add Comments): Yes  Fall Risk Interventions  Nursing Judgement-Fall Risk High(Add Comments): Yes  Toilet Every 2 Hours-In Advance of Need: Yes  Hourly Visual Checks: Awake, In bed  Fall Visual Posted: Socks  Room Door Open: Yes  Alarm On: Bed  Patient Moved Closer to Nursing Station: No    Active Consults:   IP CONSULT TO UROLOGY  IP CONSULT TO NEPHROLOGY  IP

## 2025-01-29 NOTE — CARE COORDINATION
Transition of Care Plan:    RUR: 16% Moderate RUR  Prior Level of Functioning: patient requires assistance with most activities of daily living (ADLs).  Disposition: SNF  REYES: 1/31  If SNF or IPR: Date FOC offered: 1/27  Date FOC received: 1/27 awaiting 4 other choices.   Accepting facility: [facility] Phone: [phone] [place] [address]  Date authorization started with reference number: NA  Date authorization received and expires: NA  Follow up appointments: pcp/specialist   DME needed: none  Transportation at discharge: transportation need is anticipated   IM/IMM Medicare/ letter given: 2nd IM upon discharge  Is patient a  and connected with VA? na   If yes, was [place] transfer form completed and VA notified? na  Caregiver Contact: The patient's main support system are Faby Guallpa (Child) [phone] and April Carol Ann [phone]   Discharge Caregiver contacted prior to discharge? contacted  Care Conference needed? no  Barriers to discharge: medical clearance       The patient has been accepted to [facility] Phone: [phone] [place] [address]. CM has informed  Faby Ramu (Child) [phone] who agreed. No authorization is needed. CM emailed  enio to confirm a 3 midnight stay.       The patient resides in a single-story home with their spouse and has 2-3 steps to navigate. The patient's designated responsible party (DTR) reports that the patient requires assistance with most activities of daily living (ADLs). The patient utilizes a cane, rolling walker (RW), but needs a  wheelchair (WC) at home. They report not using oxygen or CPAP at home and confirm that they are supported by family members. The patient is not currently driving.            The patient's main support system are Faby Guallpa (Child) [phone] and Naida Maharaj [phone]     Zachery Simms RN  Case

## 2025-01-29 NOTE — PLAN OF CARE
Problem: SLP Adult - Impaired Swallowing  Goal: By Discharge: Advance to least restrictive diet without signs or symptoms of aspiration for planned discharge setting.  See evaluation for individualized goals.  Description: Speech pathology goals initiated 1/29/2025   1. Patient will tolerate least restrictive diet free of s/s aspiration within 7 days   Outcome: Progressing   Speech LAnguage Pathology EVALUATION    Patient: Steve Maharaj (89 y.o. male)  Date: 1/29/2025  Primary Diagnosis: Urinary tract obstruction by kidney stone [N20.0, N13.8]  Acute renal failure, unspecified acute renal failure type (HCC) [N17.9]  Renal stone [N20.0]  Procedure(s) (LRB):  CYSTOSCOPY (Right) 7 Days Post-Op   Precautions:                     ASSESSMENT :  SLP consulted as patient with coughing with applesauce this morning. RN reporting tolerance with diet yesterday. WBC elevated today, no updated chest imaging.  Patient with prolonged mastication of dunia cracker given minimal dentition; though full clearance. Immediate gagging followed applesauce; however, he report he did not like it. He refused pudding trial. After thin liquids via small cup sip and straw, patient with strong coughing episodes. Patient repeatedly stating that he just wants to lay back and get some rest.    Note patient a full code. Given s/s aspiration with PO, recommend considering further goals of care discussion with advanced age. If aggressive measures desired and if continued s/s aspiration, may benefit from instrumental testing to objectively assess. If aspiration risks accepted, would recommend soft and bite sized diet/ thin liquids.       Patient will benefit from skilled intervention to address the above impairments.     PLAN :  Recommendations and Planned Interventions:  Diet: npo except ice chips, sips of water would be safest  If pertinent meds, give crushed (if able) or 1 at a time whole in pudding  *Consider Palliative Consult for GOC

## 2025-01-29 NOTE — PROGRESS NOTES
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 intake  Creatinine 7.28 and admitted with evidence of right hydronephrosis   Stone causing obstruction in right ureter   Urology could not place a right ureteral stent   IR placed a right percutaneous nephrostomy  Admit CT abdomen/pelvis IMPRESSION:  1. 5 mm obstructing stone in the right ureter. There is fluid  tracking posterior to the right ureter into the right retroperitoneum. Findings  may represent rupture of the ureter and urinoma formation. Moderate right-sided  hydronephrosis. If possible, consider contrast-enhanced CT urogram     2. Small hypodense lesions in the right kidney likely cyst. There are also  hyperdense lesions in the right kidney which may represent complex or  hemorrhagic cyst. Most of these were present on the prior study  CT abdomen/pelvis last 2025 IMPRESSION:  Gaseous distention of the sigmoid colon which is predominantly in the left upper  quadrant. No evidence of volvulus or bowel obstruction.  Mildly prominent fluid-filled small bowel loops in the pelvis suggesting ileus.  Status post right nephrostomy tube with decompression of the right kidney and  stable mild fluid in the right retroperitoneum.   Cr 7.28 on admission  from prior 1.2024   Urology could not place R ureteral stent (unsuccessful due to anatomy)  s/p R PCN by IR on    Nephrology on board, appreciate recommendation, creatinine improving slowly  Stop IV fluids as sodium normalized  Urology

## 2025-01-29 NOTE — PROGRESS NOTES
Name: Steve Maharaj   MRN: [id number]  : 1933      Assessment:  ELIAS- 2nd to obstructive uropathy in a solitary kidney + n/v + diuretic     R ureteral stone with mod R hydro- in solitary kidney. 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 due to HTN/Solitary kidney  BPH    Hypernatremia        Plan/Recommendations:    Sodium OK  today.    Creatinine improving.      Monitor urine output.      Continue Proscar/Flomax    Avoid nephrotoxins    Daily labs      Subjective:      \"I feel OK.\"        Exam:  /63   Pulse 89   Temp 98.3 °F (36.8 °C) (Axillary)   Resp 25   Ht 1.829 m (6' 0.01\")   Wt 58.1 kg (128 lb)   SpO2 96%   BMI 17.36 kg/m²     Elderly thin/frail, ill appearing in NAD    No edema    Labs/Data:    Lab Results   Component Value Date    WBC 23.0 (H) 2025    HGB 8.9 (L) 2025    HCT 27.0 (L) 2025    MCV 91.5 2025     2025       Lab Results   Component Value Date/Time     2025 09:14 AM    K 2.9 2025 09:14 AM     2025 09:14 AM    CO2 22 2025 09:14 AM    BUN 57 2025 09:14 AM    CREATININE 3.32 2025 09:14 AM    GLUCOSE 138 2025 09:14 AM    CALCIUM 8.4 2025 09:14 AM    LABGLOM 17 2025 09:14 AM    LABGLOM 41 2024 06:44 AM        Wt Readings from Last 3 Encounters:   25 58.1 kg (128 lb)   24 65.8 kg (145 lb)   24 65.8 kg (145 lb)         Intake/Output Summary (Last 24 hours) at 2025 1033  Last data filed at 2025 0926  Gross per 24 hour   Intake 1478.64 ml   Output 1450 ml   Net 28.64 ml       Patient seen and examined. Chart reviewed. Labs, data and other

## 2025-01-30 ENCOUNTER — APPOINTMENT (OUTPATIENT)
Facility: HOSPITAL | Age: 89
DRG: 853 | End: 2025-01-30
Payer: MEDICARE

## 2025-01-30 ENCOUNTER — ANESTHESIA EVENT (OUTPATIENT)
Facility: HOSPITAL | Age: 89
End: 2025-01-30
Payer: MEDICARE

## 2025-01-30 ENCOUNTER — ANESTHESIA (OUTPATIENT)
Facility: HOSPITAL | Age: 89
End: 2025-01-30
Payer: MEDICARE

## 2025-01-30 LAB
ANION GAP SERPL CALC-SCNC: 10 MMOL/L (ref 2–12)
BASOPHILS # BLD: 0 K/UL (ref 0–0.1)
BASOPHILS NFR BLD: 0 % (ref 0–1)
BUN SERPL-MCNC: 56 MG/DL (ref 6–20)
BUN/CREAT SERPL: 18 (ref 12–20)
CALCIUM SERPL-MCNC: 8.2 MG/DL (ref 8.5–10.1)
CHLORIDE SERPL-SCNC: 116 MMOL/L (ref 97–108)
CO2 SERPL-SCNC: 17 MMOL/L (ref 21–32)
CREAT SERPL-MCNC: 3.05 MG/DL (ref 0.7–1.3)
DIFFERENTIAL METHOD BLD: ABNORMAL
EOSINOPHIL # BLD: 0.23 K/UL (ref 0–0.4)
EOSINOPHIL NFR BLD: 1 % (ref 0–7)
ERYTHROCYTE [DISTWIDTH] IN BLOOD BY AUTOMATED COUNT: 15.2 % (ref 11.5–14.5)
GLUCOSE SERPL-MCNC: 138 MG/DL (ref 65–100)
HCT VFR BLD AUTO: 31.4 % (ref 36.6–50.3)
HGB BLD-MCNC: 9.7 G/DL (ref 12.1–17)
IMM GRANULOCYTES # BLD AUTO: 0 K/UL (ref 0–0.04)
IMM GRANULOCYTES NFR BLD AUTO: 0 % (ref 0–0.5)
LACTATE SERPL-SCNC: 1.6 MMOL/L (ref 0.4–2)
LACTATE SERPL-SCNC: 2.8 MMOL/L (ref 0.4–2)
LYMPHOCYTES # BLD: 0.69 K/UL (ref 0.8–3.5)
LYMPHOCYTES NFR BLD: 3 % (ref 12–49)
MAGNESIUM SERPL-MCNC: 2 MG/DL (ref 1.6–2.4)
MAGNESIUM SERPL-MCNC: 2 MG/DL (ref 1.6–2.4)
MAGNESIUM SERPL-MCNC: 2.2 MG/DL (ref 1.6–2.4)
MCH RBC QN AUTO: 30.8 PG (ref 26–34)
MCHC RBC AUTO-ENTMCNC: 30.9 G/DL (ref 30–36.5)
MCV RBC AUTO: 99.7 FL (ref 80–99)
MONOCYTES # BLD: 0.92 K/UL (ref 0–1)
MONOCYTES NFR BLD: 4 % (ref 5–13)
MYELOCYTES NFR BLD MANUAL: 1 %
NEUTS SEG # BLD: 21.02 K/UL (ref 1.8–8)
NEUTS SEG NFR BLD: 91 % (ref 32–75)
NRBC # BLD: 0 K/UL (ref 0–0.01)
NRBC BLD-RTO: 0 PER 100 WBC
PHOSPHATE SERPL-MCNC: 3.1 MG/DL (ref 2.6–4.7)
PLATELET # BLD AUTO: 230 K/UL (ref 150–400)
PMV BLD AUTO: 10.5 FL (ref 8.9–12.9)
POTASSIUM SERPL-SCNC: 3.3 MMOL/L (ref 3.5–5.1)
POTASSIUM SERPL-SCNC: 3.5 MMOL/L (ref 3.5–5.1)
PROCALCITONIN SERPL-MCNC: 2.88 NG/ML
RBC # BLD AUTO: 3.15 M/UL (ref 4.1–5.7)
RBC MORPH BLD: ABNORMAL
RBC MORPH BLD: ABNORMAL
SODIUM SERPL-SCNC: 143 MMOL/L (ref 136–145)
WBC # BLD AUTO: 23.1 K/UL (ref 4.1–11.1)

## 2025-01-30 PROCEDURE — 6370000000 HC RX 637 (ALT 250 FOR IP): Performed by: HOSPITALIST

## 2025-01-30 PROCEDURE — 0DBQ8ZX EXCISION OF ANUS, VIA NATURAL OR ARTIFICIAL OPENING ENDOSCOPIC, DIAGNOSTIC: ICD-10-PCS | Performed by: INTERNAL MEDICINE

## 2025-01-30 PROCEDURE — 93005 ELECTROCARDIOGRAM TRACING: CPT | Performed by: INTERNAL MEDICINE

## 2025-01-30 PROCEDURE — 83605 ASSAY OF LACTIC ACID: CPT

## 2025-01-30 PROCEDURE — 84145 PROCALCITONIN (PCT): CPT

## 2025-01-30 PROCEDURE — 2580000003 HC RX 258: Performed by: INTERNAL MEDICINE

## 2025-01-30 PROCEDURE — 6360000002 HC RX W HCPCS: Performed by: INTERNAL MEDICINE

## 2025-01-30 PROCEDURE — 88305 TISSUE EXAM BY PATHOLOGIST: CPT

## 2025-01-30 PROCEDURE — 85025 COMPLETE CBC W/AUTO DIFF WBC: CPT

## 2025-01-30 PROCEDURE — 3700000000 HC ANESTHESIA ATTENDED CARE: Performed by: INTERNAL MEDICINE

## 2025-01-30 PROCEDURE — 93970 EXTREMITY STUDY: CPT

## 2025-01-30 PROCEDURE — 7100000010 HC PHASE II RECOVERY - FIRST 15 MIN: Performed by: INTERNAL MEDICINE

## 2025-01-30 PROCEDURE — 74018 RADEX ABDOMEN 1 VIEW: CPT

## 2025-01-30 PROCEDURE — 6360000002 HC RX W HCPCS: Performed by: REGISTERED NURSE

## 2025-01-30 PROCEDURE — 6360000002 HC RX W HCPCS: Performed by: NURSE PRACTITIONER

## 2025-01-30 PROCEDURE — 2580000003 HC RX 258

## 2025-01-30 PROCEDURE — 80202 ASSAY OF VANCOMYCIN: CPT

## 2025-01-30 PROCEDURE — 80048 BASIC METABOLIC PNL TOTAL CA: CPT

## 2025-01-30 PROCEDURE — 2500000003 HC RX 250 WO HCPCS: Performed by: STUDENT IN AN ORGANIZED HEALTH CARE EDUCATION/TRAINING PROGRAM

## 2025-01-30 PROCEDURE — 2580000003 HC RX 258: Performed by: NURSE PRACTITIONER

## 2025-01-30 PROCEDURE — 0D9N8ZZ DRAINAGE OF SIGMOID COLON, VIA NATURAL OR ARTIFICIAL OPENING ENDOSCOPIC: ICD-10-PCS | Performed by: INTERNAL MEDICINE

## 2025-01-30 PROCEDURE — 2000000000 HC ICU R&B

## 2025-01-30 PROCEDURE — 2580000003 HC RX 258: Performed by: REGISTERED NURSE

## 2025-01-30 PROCEDURE — 84132 ASSAY OF SERUM POTASSIUM: CPT

## 2025-01-30 PROCEDURE — 7100000011 HC PHASE II RECOVERY - ADDTL 15 MIN: Performed by: INTERNAL MEDICINE

## 2025-01-30 PROCEDURE — 6360000002 HC RX W HCPCS

## 2025-01-30 PROCEDURE — 3600007502: Performed by: INTERNAL MEDICINE

## 2025-01-30 PROCEDURE — 3700000001 HC ADD 15 MINUTES (ANESTHESIA): Performed by: INTERNAL MEDICINE

## 2025-01-30 PROCEDURE — 2500000003 HC RX 250 WO HCPCS: Performed by: NURSE PRACTITIONER

## 2025-01-30 PROCEDURE — 3600007512: Performed by: INTERNAL MEDICINE

## 2025-01-30 PROCEDURE — C1729 CATH, DRAINAGE: HCPCS | Performed by: INTERNAL MEDICINE

## 2025-01-30 PROCEDURE — 99223 1ST HOSP IP/OBS HIGH 75: CPT | Performed by: INTERNAL MEDICINE

## 2025-01-30 PROCEDURE — 36415 COLL VENOUS BLD VENIPUNCTURE: CPT

## 2025-01-30 PROCEDURE — 6370000000 HC RX 637 (ALT 250 FOR IP): Performed by: NURSE PRACTITIONER

## 2025-01-30 PROCEDURE — 84100 ASSAY OF PHOSPHORUS: CPT

## 2025-01-30 PROCEDURE — 83735 ASSAY OF MAGNESIUM: CPT

## 2025-01-30 PROCEDURE — 2709999900 HC NON-CHARGEABLE SUPPLY: Performed by: INTERNAL MEDICINE

## 2025-01-30 RX ORDER — POTASSIUM CHLORIDE 7.45 MG/ML
10 INJECTION INTRAVENOUS
Status: COMPLETED | OUTPATIENT
Start: 2025-01-30 | End: 2025-01-30

## 2025-01-30 RX ORDER — SODIUM CHLORIDE 0.9 % (FLUSH) 0.9 %
5-40 SYRINGE (ML) INJECTION PRN
Status: DISCONTINUED | OUTPATIENT
Start: 2025-01-30 | End: 2025-02-08 | Stop reason: HOSPADM

## 2025-01-30 RX ORDER — SIMETHICONE 40MG/0.6ML
40 SUSPENSION, DROPS(FINAL DOSAGE FORM)(ML) ORAL AS NEEDED
Status: DISCONTINUED | OUTPATIENT
Start: 2025-01-30 | End: 2025-02-07

## 2025-01-30 RX ORDER — MORPHINE SULFATE 2 MG/ML
2 INJECTION, SOLUTION INTRAMUSCULAR; INTRAVENOUS
Status: DISCONTINUED | OUTPATIENT
Start: 2025-01-30 | End: 2025-01-30

## 2025-01-30 RX ORDER — SODIUM CHLORIDE 9 MG/ML
INJECTION, SOLUTION INTRAVENOUS CONTINUOUS
Status: DISCONTINUED | OUTPATIENT
Start: 2025-01-30 | End: 2025-01-30

## 2025-01-30 RX ORDER — DEXTROSE, SODIUM CHLORIDE, SODIUM LACTATE, POTASSIUM CHLORIDE, AND CALCIUM CHLORIDE 5; .6; .31; .03; .02 G/100ML; G/100ML; G/100ML; G/100ML; G/100ML
INJECTION, SOLUTION INTRAVENOUS CONTINUOUS
Status: DISCONTINUED | OUTPATIENT
Start: 2025-01-30 | End: 2025-01-31

## 2025-01-30 RX ORDER — SODIUM CHLORIDE, SODIUM LACTATE, POTASSIUM CHLORIDE, AND CALCIUM CHLORIDE .6; .31; .03; .02 G/100ML; G/100ML; G/100ML; G/100ML
1000 INJECTION, SOLUTION INTRAVENOUS ONCE
Status: COMPLETED | OUTPATIENT
Start: 2025-01-30 | End: 2025-01-30

## 2025-01-30 RX ORDER — SODIUM CHLORIDE 9 MG/ML
INJECTION, SOLUTION INTRAVENOUS
Status: DISCONTINUED | OUTPATIENT
Start: 2025-01-30 | End: 2025-01-30 | Stop reason: SDUPTHER

## 2025-01-30 RX ORDER — HEPARIN SODIUM 5000 [USP'U]/ML
5000 INJECTION, SOLUTION INTRAVENOUS; SUBCUTANEOUS EVERY 8 HOURS SCHEDULED
Status: DISCONTINUED | OUTPATIENT
Start: 2025-01-30 | End: 2025-02-08 | Stop reason: HOSPADM

## 2025-01-30 RX ORDER — SODIUM CHLORIDE, SODIUM LACTATE, POTASSIUM CHLORIDE, CALCIUM CHLORIDE 600; 310; 30; 20 MG/100ML; MG/100ML; MG/100ML; MG/100ML
INJECTION, SOLUTION INTRAVENOUS CONTINUOUS
Status: DISCONTINUED | OUTPATIENT
Start: 2025-01-30 | End: 2025-01-30

## 2025-01-30 RX ORDER — SODIUM PHOSPHATE, DIBASIC AND SODIUM PHOSPHATE, MONOBASIC 7; 19 G/230ML; G/230ML
1 ENEMA RECTAL
Status: COMPLETED | OUTPATIENT
Start: 2025-01-30 | End: 2025-01-30

## 2025-01-30 RX ORDER — HYDROMORPHONE HYDROCHLORIDE 1 MG/ML
0.5 INJECTION, SOLUTION INTRAMUSCULAR; INTRAVENOUS; SUBCUTANEOUS EVERY 4 HOURS PRN
Status: DISCONTINUED | OUTPATIENT
Start: 2025-01-30 | End: 2025-02-08 | Stop reason: HOSPADM

## 2025-01-30 RX ORDER — POTASSIUM CHLORIDE 7.45 MG/ML
10 INJECTION INTRAVENOUS ONCE
Status: COMPLETED | OUTPATIENT
Start: 2025-01-30 | End: 2025-01-30

## 2025-01-30 RX ADMIN — POTASSIUM CHLORIDE 10 MEQ: 7.45 INJECTION INTRAVENOUS at 05:00

## 2025-01-30 RX ADMIN — POTASSIUM CHLORIDE 10 MEQ: 7.46 INJECTION, SOLUTION INTRAVENOUS at 15:58

## 2025-01-30 RX ADMIN — POTASSIUM CHLORIDE 10 MEQ: 7.45 INJECTION INTRAVENOUS at 06:00

## 2025-01-30 RX ADMIN — SODIUM CHLORIDE, PRESERVATIVE FREE 10 ML: 5 INJECTION INTRAVENOUS at 09:28

## 2025-01-30 RX ADMIN — PIPERACILLIN AND TAZOBACTAM 3375 MG: 3; .375 INJECTION, POWDER, LYOPHILIZED, FOR SOLUTION INTRAVENOUS at 02:02

## 2025-01-30 RX ADMIN — LIDOCAINE HYDROCHLORIDE 50 MG: 20 INJECTION, SOLUTION EPIDURAL; INFILTRATION; INTRACAUDAL; PERINEURAL at 16:43

## 2025-01-30 RX ADMIN — SODIUM CHLORIDE, PRESERVATIVE FREE 40 MG: 5 INJECTION INTRAVENOUS at 11:03

## 2025-01-30 RX ADMIN — SODIUM CHLORIDE, PRESERVATIVE FREE 40 MG: 5 INJECTION INTRAVENOUS at 22:07

## 2025-01-30 RX ADMIN — POTASSIUM CHLORIDE 10 MEQ: 7.45 INJECTION INTRAVENOUS at 03:58

## 2025-01-30 RX ADMIN — AMIODARONE HYDROCHLORIDE 150 MG: 1.5 INJECTION, SOLUTION INTRAVENOUS at 03:37

## 2025-01-30 RX ADMIN — PROPOFOL 30 MG: 10 INJECTION, EMULSION INTRAVENOUS at 16:43

## 2025-01-30 RX ADMIN — PROPOFOL 20 MG: 10 INJECTION, EMULSION INTRAVENOUS at 16:46

## 2025-01-30 RX ADMIN — POTASSIUM CHLORIDE 10 MEQ: 7.46 INJECTION, SOLUTION INTRAVENOUS at 17:33

## 2025-01-30 RX ADMIN — Medication 1 AMPULE: at 09:27

## 2025-01-30 RX ADMIN — POTASSIUM CHLORIDE 10 MEQ: 7.45 INJECTION INTRAVENOUS at 03:10

## 2025-01-30 RX ADMIN — POTASSIUM CHLORIDE 10 MEQ: 7.46 INJECTION, SOLUTION INTRAVENOUS at 14:51

## 2025-01-30 RX ADMIN — HEPARIN SODIUM 5000 UNITS: 5000 INJECTION INTRAVENOUS; SUBCUTANEOUS at 06:38

## 2025-01-30 RX ADMIN — PIPERACILLIN AND TAZOBACTAM 3375 MG: 3; .375 INJECTION, POWDER, LYOPHILIZED, FOR SOLUTION INTRAVENOUS at 22:59

## 2025-01-30 RX ADMIN — SODIUM PHOSPHATE, DIBASIC AND SODIUM PHOSPHATE, MONOBASIC 1 ENEMA: 7; 19 ENEMA RECTAL at 14:11

## 2025-01-30 RX ADMIN — PROPOFOL 10 MG: 10 INJECTION, EMULSION INTRAVENOUS at 16:51

## 2025-01-30 RX ADMIN — HEPARIN SODIUM 5000 UNITS: 5000 INJECTION INTRAVENOUS; SUBCUTANEOUS at 22:06

## 2025-01-30 RX ADMIN — SODIUM CHLORIDE, POTASSIUM CHLORIDE, SODIUM LACTATE AND CALCIUM CHLORIDE 1000 ML: 600; 310; 30; 20 INJECTION, SOLUTION INTRAVENOUS at 03:58

## 2025-01-30 RX ADMIN — POTASSIUM CHLORIDE 10 MEQ: 7.46 INJECTION, SOLUTION INTRAVENOUS at 13:48

## 2025-01-30 RX ADMIN — SODIUM CHLORIDE: 9 INJECTION, SOLUTION INTRAVENOUS at 16:43

## 2025-01-30 RX ADMIN — SODIUM CHLORIDE, PRESERVATIVE FREE 10 ML: 5 INJECTION INTRAVENOUS at 22:07

## 2025-01-30 RX ADMIN — SODIUM CHLORIDE, POTASSIUM CHLORIDE, SODIUM LACTATE AND CALCIUM CHLORIDE: 600; 310; 30; 20 INJECTION, SOLUTION INTRAVENOUS at 13:43

## 2025-01-30 RX ADMIN — SODIUM CHLORIDE, SODIUM LACTATE, POTASSIUM CHLORIDE, CALCIUM CHLORIDE AND DEXTROSE MONOHYDRATE: 5; 600; 310; 30; 20 INJECTION, SOLUTION INTRAVENOUS at 22:06

## 2025-01-30 RX ADMIN — Medication 1 AMPULE: at 22:07

## 2025-01-30 RX ADMIN — PROPOFOL 10 MG: 10 INJECTION, EMULSION INTRAVENOUS at 16:54

## 2025-01-30 RX ADMIN — SODIUM PHOSPHATE, DIBASIC AND SODIUM PHOSPHATE, MONOBASIC 1 ENEMA: 7; 19 ENEMA RECTAL at 12:10

## 2025-01-30 RX ADMIN — VANCOMYCIN 1500 MG: 1.5 INJECTION, SOLUTION INTRAVENOUS at 00:26

## 2025-01-30 RX ADMIN — PIPERACILLIN AND TAZOBACTAM 3375 MG: 3; .375 INJECTION, POWDER, LYOPHILIZED, FOR SOLUTION INTRAVENOUS at 11:44

## 2025-01-30 ASSESSMENT — PAIN SCALES - GENERAL
PAINLEVEL_OUTOF10: 0

## 2025-01-30 NOTE — PROGRESS NOTES
Responded to rapid response at 2230 due to pt having tachycardia with HR in the 140s with c/o chest pain. No respiratory interventions ordered at this time. Pt remains on room air with O2 sat at 100%.

## 2025-01-30 NOTE — ANESTHESIA PRE PROCEDURE
Department of Anesthesiology  Preprocedure Note       Name:  Steve Maharaj   Age:  89 y.o.  :  1933                                          MRN:  [id number]         Date:  2025      Surgeon: Surgeon(s):  Doris Amaral MD    Procedure: Procedure(s):  COLONOSCOPY DIAGNOSTIC    Medications prior to admission:   Prior to Admission medications    Medication Sig Start Date End Date Taking? Authorizing Provider   oxyBUTYnin (DITROPAN) 5 MG tablet Take 0.5 tablets by mouth 3 times daily as needed (bladder spasms) 24  Iker Zambrano MD   aspirin 81 MG chewable tablet Take 1 tablet by mouth daily 24   Antonietta Garcia MD   acetaminophen (TYLENOL) 325 MG tablet Take by mouth every 6 hours as needed    Automatic Reconciliation, Ar   atenolol-chlorthalidone (TENORETIC) 50-25 MG per tablet Take 1 tablet by mouth daily    Automatic Reconciliation, Ar   finasteride (PROSCAR) 5 MG tablet Take 1 tablet by mouth daily    Automatic Reconciliation, Ar   melatonin 3 MG TABS tablet Take by mouth  Patient not taking: Reported on 2024    Automatic Reconciliation, Ar   tamsulosin (FLOMAX) 0.4 MG capsule Take by mouth daily    Automatic Reconciliation, Ar       Current medications:    Current Facility-Administered Medications   Medication Dose Route Frequency Provider Last Rate Last Admin   • HYDROmorphone HCl PF (DILAUDID) injection 0.5 mg  0.5 mg IntraVENous Q4H PRN Mikel Abdul APRN - CNP       • heparin (porcine) injection 5,000 Units  5,000 Units SubCUTAneous 3 times per day Mikel Abdul APRN - CNP   5,000 Units at 25 0638   • alcohol 62% (NOZIN) nasal  1 ampule  1 ampule Nasal Q12H Mathew Baron MD   1 ampule at 25 0927   • potassium chloride 10 mEq/100 mL IVPB (Peripheral Line)  10 mEq IntraVENous Q1H Jagdeep Sousa  mL/hr at 25 1348 10 mEq at 25 1348   • lactated ringers infusion   IntraVENous Continuous Merry

## 2025-01-30 NOTE — PROGRESS NOTES
Nikki Mahmood, NP-C                       [phone] cell                 Monday-Thursday 7:30-5:00                               GI PROGRESS NOTE        NAME:   Steve Maharaj       :    1933       MRN:    [id number]     Assessment/Plan     GI consult for dark brown emesis, +occult, ileus. 89 y/o male with pmhx significant for HTN enlarged prostate requiring indwelling Murphy, presented to the hospital  with lethargy, nausea, vomiting and decreased oral intake. 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 Found to have obstructing ureteral stone, forniceal rupture, and taken to OR for cysto. R ureteral stent placement was unsuccessful so IR was consulted for R PCN placement.   Labs 2025: WBC 8.5 Hgb 10.1 Hct 30.9 MCV 96 BUN 83 Cr 5.86 eGFR 9 Lactic acid 2.1 NT Pro-BNP 8045 Troponin, HS 26 ALT 13 AST 39 ALP 87 Tbili 1.6 Albumin 1.8 Lipase 9 Tprotein 7.0 Na 142 K 4.4 Mg 2.0 Ca 7.3 P 5.3 EKG NSR Baseline hemoglobin is 9-10 range   CT A/P WO Contrast  showin) Moderate gaseous gastric distention and mildly dilated proximal small bowel loops with gradual tapering suggesting ileus. No intraperitoneal free air. 2) Small R pleural effusion w/RLL airspace opacity that may represent atelectasis, infection, or aspiration. 3) R percutaneous nephrostomy tube and solitary R kidney w/o hydronephrosis. 5 mm calculus again demonstrated in proximal R ureter with surrounding ureteral inflammation. 4) R retroperitoneal fluid and stranding unchanged extending to surround R ureter.   KUB enteric tube terminates in stomach. Increased colonic dilatation in left abdomen. There is dark output from Ngtube. Hypoactive bowel sounds with tenderness disproportionate to touch on exam. His daughter is at bedside who reports that he had an ileus 4 years ago post nephrectomy. He had an EGD/colonoscopy  around that time. He is not on any blood thinners and daughter denies NSAID use although home meds do list aspirin 81 mg. Daughter denies prior history of GI issues.     Bcx 1/22/2025+ for enterobacter and proteus.     Impression  Ureteral tone obstruction  ELIAS with prior history of RCC   Ileus   Nausea and vomiting     1/27/2025: Hgb up, 9.5. Bilirubin 3.4, . Surgery has signed off, said could advance diet as tolerated. No KUB since 1/24/2025. Tolerating liquid diet. Bowels are moving.     CTAP with contrast 1/24/2025:  IMPRESSION:  Gaseous distention of the sigmoid colon which is predominantly in the left upper  quadrant. No evidence of volvulus or bowel obstruction.  Mildly prominent fluid-filled small bowel loops in the pelvis suggesting ileus.  Status post right nephrostomy tube with decompression of the right kidney and  stable mild fluid in the right retroperitoneum.    1/30/2025: Reconsult for recurrent N/V, persistent sigmoid dilation on imaging. Repeated KUB this morning and still shows marked gaseous distention of sigmoid colon.     Moved to ICU last night. Became tachypneic and tachycardic. Abdomen more distended and with generalized TTP. Not having bowel movements. NGT to suction with brown/green aspirate. He is restless at the time of my visit.     WBC up to 25. Lactic acid 3.     Recent Labs     01/30/25  0638 01/29/25 2010 01/29/25  0914   WBC 23.1* 25.4* 23.0*   HGB 9.7* 9.8* 8.9*   HCT 31.4* 30.0* 27.0*   MCV 99.7* 93.2 91.5    260 220     Plan:   - Plan flex sig with decompression this afternoon. I tried to reach multiple family members by phone, no one answered and they all went to voicemail. I was able to leave a voicemail on April Toliver's (child) phone with my callback number.   - 2 fleets enemas today before decompression  - Follow H&H, transfuse PRN           Patient Active Problem List   Diagnosis    Chronic indwelling Murphy catheter    Sepsis (HCC)    Urinary tract infection

## 2025-01-30 NOTE — PROGRESS NOTES
GI note    I called Mr. Maharaj's daughter, Faby Guallpa ([phone]), to see if they had decided they wanted to proceed with flexible sigmoidoscopy this afternoon and she states they have decided to agree to the endoscopy. They are not currently at the hospital. Her phone number is included in this note for endoscopy to call to obtain phone consent.

## 2025-01-30 NOTE — PROGRESS NOTES
Critical care interdisciplinary rounds today.  Following members present: Case Management, , Clinical Lead, Diabetes Team, Nursing, Nutrition, Pharmacy, and Physician. Family invited to participate.  Plan of care discussed.  See clinical pathway for plan of care and interventions and desired outcomes.

## 2025-01-30 NOTE — PROGRESS NOTES
Received bedside report from Gracia WARD.   0887 Dr. Doty in to see patient.   0915 xray in to do KUB. Dr. Baron in to see xray and examined patient. NG changed from intermittent suction to continuous low wall suction.  0920 Patients daughter at bedside.   0945 Dr. Quintanilla in to see patient.  1146 Mahmood NP in to see patient.   1210 Fleets enema given at this time. Minimal results. Patient tolerated well.   1340 Patient incontinent of large amount of loose brown stool. Patient cleaned and bed pads changed.   1411 Discussed with Mahmood NP that patient had large BM. Ok to proceed with 2nd fleets enema at this time. Patient had enema at this time. Minimal results at this time.   1500 Patient has order for sub-q heparin. Discussed with Dr. Sousa. Hold off on sub-q heparin at this time.   1637 endoscopy and anesthesia in at bedside to perform colonoscopy with decompression.  Rectal tube in place post procedure.  1910 End of Shift Note    Bedside shift change report given to Gracia WRAD (oncoming nurse) by IJEOMA FAJARDO RN (offgoing nurse).  Report included the following information SBAR, Kardex, Intake/Output, MAR, Accordion, and Recent Results    Shift worked:  7am to 1930     Shift summary and any significant changes:    See above note     Concerns for physician to address: None at this time     Zone phone for oncoming shift:  N/a       Activity:  Level of Assistance: Maximum assist, patient does 25-49%  Number times ambulated in hallways past shift: 0  Number of times OOB to chair past shift: 0    Cardiac:   Cardiac Monitoring: Yes      Cardiac Rhythm: Sinus rhythm    Access:  Current line(s): PIV     Genitourinary:   Urinary Status:  (nephrostomy)    Respiratory:   O2 Device: Nasal cannula  Chronic home O2 use?: NO  Incentive spirometer at bedside: NO    GI:  Last BM (including prior to admit): 01/30/25  Current diet:  Diet NPO Exceptions are: Sips of Water with Meds  Passing flatus: YES    Pain Management:

## 2025-01-30 NOTE — PROGRESS NOTES
Chart reviewed, overnight events noted. Pt tx into CCU due to sustain tachycardia and tachypnea and not medically appropriate for participation in PT intervention at this time. Will defer however continue to follow.    Hetal Gonzalez, PT, DPT

## 2025-01-30 NOTE — PROGRESS NOTES
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 intake  Creatinine 7.28 and admitted with evidence of right hydronephrosis   Stone causing obstruction in right ureter   Urology could not place a right ureteral stent   IR placed a right percutaneous nephrostomy  Admit CT abdomen/pelvis IMPRESSION:  1. 5 mm obstructing stone in the right ureter. There is fluid  tracking posterior to the right ureter into the right retroperitoneum. Findings  may represent rupture of the ureter and urinoma formation. Moderate right-sided  hydronephrosis. If possible, consider contrast-enhanced CT urogram     2. Small hypodense lesions in the right kidney likely cyst. There are also  hyperdense lesions in the right kidney which may represent complex or  hemorrhagic cyst. Most of these were present on the prior study  CT abdomen/pelvis last 2025 IMPRESSION:  Gaseous distention of the sigmoid colon which is predominantly in the left upper  quadrant. No evidence of volvulus or bowel obstruction.  Mildly prominent fluid-filled small bowel loops in the pelvis suggesting ileus.  Status post right nephrostomy tube with decompression of the right kidney and  stable mild fluid in the right retroperitoneum.  Cr 7.28 on admission, last prior 1.60 2024   Urology could not place R ureteral stent (unsuccessful due to anatomy)  s/p R PCN by IR on    Nephrology on board, appreciate recommendation, creatinine improving slowly  Urology appreciate recommendation, maintain

## 2025-01-30 NOTE — PROGRESS NOTES
Name: Steve Maharaj   MRN: [id number]  : 1933      Assessment:  ELIAS- 2nd to obstructive uropathy in a solitary kidney + n/v + diuretic     R ureteral stone with mod R hydro- in solitary kidney. 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 due to HTN/Solitary kidney  BPH    Hypernatremia        Plan/Recommendations:    Sodium OK  today.    Creatinine improving.      Monitor urine output.      Continue Proscar/Flomax    Avoid nephrotoxins    Daily labs      Subjective:      Events noted.  Moved to ICU overnight.        Exam:  /83   Pulse (!) 104   Temp 98.1 °F (36.7 °C) (Axillary)   Resp 29   Ht 1.829 m (6' 0.01\")   Wt 58.1 kg (128 lb)   SpO2 93%   BMI 17.36 kg/m²     Elderly thin/frail, ill appearing in NAD    No edema    Labs/Data:    Lab Results   Component Value Date    WBC 23.1 (H) 2025    HGB 9.7 (L) 2025    HCT 31.4 (L) 2025    MCV 99.7 (H) 2025     2025       Lab Results   Component Value Date/Time     2025 06:38 AM    K 3.5 2025 06:38 AM     2025 06:38 AM    CO2 17 2025 06:38 AM    BUN 56 2025 06:38 AM    CREATININE 3.05 2025 06:38 AM    GLUCOSE 138 2025 06:38 AM    CALCIUM 8.2 2025 06:38 AM    LABGLOM 19 2025 06:38 AM    LABGLOM 41 2024 06:44 AM        Wt Readings from Last 3 Encounters:   25 58.1 kg (128 lb)   24 65.8 kg (145 lb)   24 65.8 kg (145 lb)         Intake/Output Summary (Last 24 hours) at 2025 1125  Last data filed at 2025 0926  Gross per 24 hour   Intake 3527.16 ml   Output 175 ml   Net 3352.16 ml       Patient seen and examined.

## 2025-01-30 NOTE — PROGRESS NOTES
Comprehensive Nutrition Assessment    Type and Reason for Visit:  Reassess    Nutrition Recommendations/Plan:   Advance diet as medically able per GI  If unable to advance to PO diet in next 24h and family desires aggressive care, would consider TPN initiation (RD to provide recommendations tomorrow if this is decided upon)      Malnutrition Assessment:  Malnutrition Status:  Severe malnutrition (01/30/25 1448)    Context:  Acute Illness     Findings of the 6 clinical characteristics of malnutrition:  Energy Intake:  50% or less of estimated energy requirements for 5 or more days  Weight Loss:   (11.7% wt loss but unsure of time frame and reliability of current wt in chart)     Body Fat Loss:  Unable to assess     Muscle Mass Loss:  Moderate muscle mass loss Temples (temporalis), Clavicles (pectoralis & deltoids)  Fluid Accumulation:  No fluid accumulation     Strength:  Not Performed    Nutrition Assessment: Chart reviewed, case discussed during CCU rounds.  Pt receiving care at time of visit, unable to examine/interview.  He tx to CCU and has NGT in place to LCS, concern for ileus earlier this admission.  GI consulted, plans for flexible sigmoidoscopy to decompress today.  K 3.3, phos and Mag WNL.  Significant wt loss noted if current wt in chart (128lb) is correct as his UBW is 145lb.  He has been NPO or with poor PO intake most of this admission (LOS 7 days) and had reported n/v/anorexia for several day PTA so he meets criteria for acute malnutrition (including his muscle wasting already observed).  Discussed with provider consideration of TPN IF in line with GOC, plan is for palliative consult to discuss this.   Patient Vitals for the past 120 hrs:   PO Meals Eaten (%)   01/29/25 1740 1 - 25%   01/29/25 1224 0%   01/29/25 0926 1 - 25%   01/28/25 1740 1 - 25%   01/28/25 1324 0%   01/28/25 1130 1 - 25%   01/27/25 1832 0%   01/27/25 0800 1 - 25%     Wt Readings from Last 10 Encounters:   01/27/25 58.1 kg (128

## 2025-01-30 NOTE — SIGNIFICANT EVENT
RAPID RESPONSE TEAM NOTE:    1953: Responded to overhead rapid response for tachycardia, tachypnea, and chest pain. Patient is admitted for ELIAS on CKD III and Ileus. Per dayshift RN, patient has been intermittently tachycardic throughout the day, but never sustained > 120. Per primary RN, patient had roughly 5 episodes of emesis, became tachycardic in the 130s, tachypneic in the 40s, and complaining of epigastric/chest pain, prompting rapid response.    Assessment:  Upon arrival to bedside, patient is awake and alert, tachypneic, not able to speak in complete sentences. Tachycardic in the 130s. Patient able to state he is having pain in his epigastric region as well as nausea. Lung sounds with expiratory wheezes bilaterally.    Vital signs as follows: Temp: 98.7, HR: 133, BP: 116/71 (84), RR: 42, SpO2: 99% on RA    Interventions:  DOMONIQUE Turner at bedside, received orders for:    - EKG  - CXR  - CBC, CMP, Lactic, Procalcitonin, and PT/INR levels  - ABG  - Pepcid 20 mg IVP  - Morphine 2 mg IVP  - 500 mL NS bolus    MD Chantell at bedside, received orders for:    - CT Head  - CT Abd/Pelv    PRN DuoNeb and Zofran administered    Outcome:  EKG showing sinus tachycardia in the 130s, no change from previous EKG. Labs obtained and in progress. CXR completed. Patient transported to CT and back.    Patient to remain in room at this time    ADDENDUM:  2224: Responded to overhead rapid response for tachycardia. Per primary RN, patient's HR went up to the 190s prompting rapid response.    Assessment:  Patient remains awake and alert, still tachypneic with RR in the 50s. HR back down to 130s at this time.    Vital signs as follows: HR: 140, BP: 93/68 (76), RR: 54, SpO2: 96% on RA.    Interventions:  DOMONIQUE Turner and MD Chnatell at bedside, received orders for:    - EKG  - NGT placement  - 500 mL NS bolus    Outcome:  EKG showing sinus tachycardia in the 140s. No significant changes from previous EKG. NGT placed in right nare,

## 2025-01-30 NOTE — PROGRESS NOTES
Pharmacy Antimicrobial Kinetic Dosing    Indication for Antimicrobials: IAI     Current Regimen of Each Antimicrobial:  Vancomycin Pharmacy to Dose; Start Date ; Day # 2  Zosyn 3.375g IV q12h; Start Date ; Day # 9    Previous Antimicrobial Therapy:  Ceftriaxone      Goal Level: Vancomycin random level < 20     Date Dose & Interval Measured (mcg/mL) Predicted AUC 24-48 Predicted AUC 24,ss                          Significant Cultures:    Blood, paired: Proteus 1/4 bottles   Blood, paired: Proteus   Blood, paired: ngtd, prelim    Labs:  Recent Labs     Units 25  0638 25  0914 25  0331   CREATININE MG/DL 3.05* 3.34* 3.32* 3.61*   BUN MG/DL 56* 56* 57* 63*   PROCAL ng/mL  --  3.88  --  7.87   WBC K/uL 23.1* 25.4* 23.0* 17.2*   BANDS %  --   --  1  --      Temp (24hrs), Av.2 °F (36.8 °C), Min:97.9 °F (36.6 °C), Max:98.7 °F (37.1 °C)    Conditions for Dosing Consideration:  ELIAS on CKD    Creatinine Clearance (mL/min): Estimated Creatinine Clearance: 13 mL/min (A) (based on SCr of 3.05 mg/dL (H)).     Impression/Plan:   ELIAS on CKD (baseline creatinine ~ 1.6) - will pulse dose vancomycin for now  Vancomycin 1500mg IV loading dose ordered  Level scheduled  2300  BMP and CBC ordered per protocol  Antimicrobial stop date TBD     Pharmacy will follow daily and adjust medications as appropriate for renal function and/or serum levels.    Thank you,  Diana Peters, [facility]

## 2025-01-30 NOTE — ANESTHESIA POSTPROCEDURE EVALUATION
Department of Anesthesiology  Postprocedure Note    Patient: Steve Maharaj  MRN: [id number]  YOB: 1933  Date of evaluation: 1/30/2025    Procedure Summary       Date: 01/30/25 Room / Location: [facility] ENDO TRAVEL / [facility] ENDOSCOPY    Anesthesia Start: 1631 Anesthesia Stop: 1707    Procedure: COLONOSCOPY DIAGNOSTIC Diagnosis:       Acute dilatation of the colon syndrome      (Acute dilatation of the colon syndrome [K59.39])    Surgeons: Doris Amaral MD Responsible Provider: Nael Watson MD    Anesthesia Type: MAC ASA Status: 4            Anesthesia Type: MAC    Jose Alejandro Phase I: Jose Alejandro Score: 9    Jose Alejandro Phase II: Jose Alejandro Score: 7    Anesthesia Post Evaluation    Patient location during evaluation: PACU  Patient participation: complete - patient participated  Level of consciousness: sleepy but conscious and responsive to verbal stimuli  Pain score: 0  Airway patency: patent  Nausea & Vomiting: no vomiting and no nausea  Cardiovascular status: blood pressure returned to baseline and hemodynamically stable  Respiratory status: acceptable  Hydration status: stable    No notable events documented.

## 2025-01-30 NOTE — SIGNIFICANT EVENT
RRT called for third time tonight due to having bout svt 180s, self limiting and resolved and remains tachy 130s upon my arrival to bedside, unable to capture on EKG, only able to visualize on 2 lead telemetry monitoring.     Pt states c/o abd pain, remains tachy 130s at bedside exam and remains tachypnea 40s, total output ng tube almost 500ml at present tanish colored fluids. After d/w family at the bedside, they are in agreement with change code status to DNR/DNI, but they are wanting everything else medically recommended at this time, as pt having persistent tachycardia and tachypnea, despite further w/u as ordered earlier- consulted icu intensivist team for further RECs and possible transfer for mgmt higher level of care as d/w Dr Interiano due to this also being 3rd RRT tonight and having persistent tachy/tachypnea and high risk for decompensation/decline further. please see other RRT note for further details. VS stable otherwise. Patient reported c/o abd pain woke up with the abd pain, upper epigastric, and had appears to have had the bout of svt at the same time as woke up with the pain. See prior hospitalist group notes for complete details of course of treatment. Recent lab work and documented vs reviewed.     Ordered kcl 10meq iv x1, as having bursts of svt on monitor not sustaining, on cmivf w/40meq kcl in it, and also already had 1L boluses and on cmivf since around 2015 at 100ml/hr. Morphine 2mg iv x1 for pain. Ordered code status change to DNR. Will defer further evaluation/management and timing of discontinuation of regimens to the icu care team who will now be assuming care of pt as was accepted to Intensivist service for further mgmt/eval and higher level of care due to high risk for decompensation.    Non-billable note.

## 2025-01-30 NOTE — PROGRESS NOTES
GI note    Received call from Faby Guallpa, Mr. Maharaj's daughter, regarding my previous phone calls. I discussed plans for flex sig with decompression this afternoon. She states that she is bringing his wife up to see him and will be talking with other family members and they will decide if they want to proceed with endoscopy. She has my number and will call me back in about an hour.

## 2025-01-30 NOTE — PROGRESS NOTES
Responded to rapid response call at 1955. Due to chest pain with tachypnea, RR in the 40s, 100% on room air.  PT had 5 episodes of emesis prior to Rapid being called. ABG and PRN neb ordered at this time.

## 2025-01-30 NOTE — OP NOTE
[facility]  [facility]  Eloisa Amaral MD, [facility]  [phone]         2025    Colonoscopy Procedure Note  Steve Maharaj  :  1933  [id number]    Indications:   Sigmoid distension, need decompression  PCP:  Joseph Ugarte MD  Anesthesia/Sedation: TIVA  Endoscopist:  Dr. Eloisa Amaral  Complications:  None  Estimated Blood Loss:  None    Permit:  The indications, risks, benefits and alternatives were reviewed with the patient or their decision maker who was provided an opportunity to ask questions and all questions were answered.  The specific risks of colonoscopy with conscious sedation were reviewed, including but not limited to anesthetic complication, bleeding, adverse drug reaction, missed lesion, infection, IV site reactions, and intestinal perforation which would lead to the need for surgical repair.  Alternatives to colonoscopy including radiographic imaging, observation without testing, or laboratory testing were reviewed including the limitations of those alternatives.  After considering the options and having all their questions answered, the patient or their decision maker provided both verbal and written consent to proceed.        Procedure in Detail:  After obtaining informed consent, positioning of the patient in the left lateral decubitus position, and conduction of a pre-procedure pause or \"time out\" the endoscope was introduced into the anus and advanced to the sigmoid colon.  The quality of the colonic preparation was inadequate.  A careful inspection was made as the colonoscope was withdrawn, findings and interventions are described below.    Findings:   - KIM Normal  -There is large amount of stool in the rectum and sigmoid colon.  Significantly distended colon lumen is noted in the sigmoid colon.  Decompression was performed by suctioning air through the

## 2025-01-30 NOTE — PROGRESS NOTES
Speech pathology note  Reviewed chart and note RRT called x3 overnight due to tachycardia and tachypnea and patient now in CCU. Patient also with NG to LIS due to concern for recurrent ileus/SBO. Therefore, SLP will hold at this time but continue to follow as medically appropriate. Thank you.    ALVAREZ Rodríguez Ed., CCC-SLP

## 2025-01-30 NOTE — PROGRESS NOTES
End of Shift Note    Bedside shift change report given to ED Powell  (oncoming nurse) by Diana Mcdaniel RN (offgoing nurse).  Report included the following information SBAR, Intake/Output, and Cardiac Rhythm Sinus Tach    Shift worked:  [id number]     Shift summary and any significant changes:     No significant change during the shift.    Belly still mildly distended but passing gas and having BM    @ shift change patient was tachycardic in 130-140s and tachypnic in the 40s pt c/o of belly pain with x5 episodes of emesis    Continued c/o of headache     RRT called overhead as pt is in acute distress.    See orders from PM provider     Daughter called to update about acute change in patient condition      Concerns for physician to address:  See above     Zone phone for oncoming shift:          Activity:  Level of Assistance: Maximum assist, patient does 25-49%  Number times ambulated in hallways past shift: 0  Number of times OOB to chair past shift: 0    Cardiac:   Cardiac Monitoring: Yes      Cardiac Rhythm: Sinus tachy    Access:  Current line(s): PIV     Genitourinary:   Urinary Status:  (R Nephrostomy drain)    Respiratory:   O2 Device: None (Room air)  Chronic home O2 use?: NO  Incentive spirometer at bedside: N/A    GI:  Last BM (including prior to admit): 01/29/25  Current diet:  Diet NPO  Passing flatus: YES    Pain Management:   Patient states pain is manageable on current regimen: YES    Skin:  Mateusz Scale Score: 15  Interventions: Wound Offloading (Prevention Methods): Pillows, Repositioning    Patient Safety:  Fall Risk: Nursing Judgement-Fall Risk High(Add Comments): Yes  Fall Risk Interventions  Nursing Judgement-Fall Risk High(Add Comments): Yes  Toilet Every 2 Hours-In Advance of Need: Yes  Hourly Visual Checks: Other (Comment)  Fall Visual Posted: Armband, Fall sign posted, Socks  Room Door Open: Yes  Alarm On: Bed  Patient Moved Closer to Nursing Station: No    Active Consults:   IP CONSULT TO

## 2025-01-30 NOTE — PROGRESS NOTES
0300 TRANSFER - IN REPORT:    Verbal report received from ED Powell on Steve Maharaj  being received from PCU for urgent transfer      Report consisted of patient's Situation, Background, Assessment and   Recommendations(SBAR).     Information from the following report(s) Nurse Handoff Report, Adult Overview, Intake/Output, MAR, Recent Results, and Cardiac Rhythm ST  was reviewed with the receiving nurse.    Opportunity for questions and clarification was provided.      Assessment completed upon patient's arrival to unit and care assumed.     Upon arrival to unit, patient appears to be going in and out of SVT. EKG obtained, orders received for amio bolus. Potassium repletion ongoing.    Additional PIV access obtained to accommodate multiple IV infusions. Ultrasound guidance required for access.    0400 Patient's daughter and granddaughter updated at bedside on plan of care for remainder of night. Provided with security code and nurse's station phone number.

## 2025-01-30 NOTE — H&P
Family History   Problem Relation Age of Onset    Hypertension Mother     Cancer Daughter         Breast ca - stage 1         OBJECTIVE:     Labs and Data: Reviewed 25  Medications: Reviewed 25  Imaging: Reviewed 25    Physical Exam  Vitals reviewed.   Cardiovascular:      Rate and Rhythm: Regular rhythm. Tachycardia present.      Pulses: Normal pulses.      Heart sounds: Normal heart sounds.   Pulmonary:      Comments: Tachypneic, lung sounds diminished at bases bilaterally  Abdominal:      Comments: Firm, generalized tenderness, bowel sounds hypoactive, NG in place   Musculoskeletal:      Right lower leg: No edema.      Left lower leg: No edema.   Skin:     General: Skin is warm and dry.      Capillary Refill: Capillary refill takes 2 to 3 seconds.   Neurological:      General: No focal deficit present.      Mental Status: He is alert.   Psychiatric:         Mood and Affect: Mood normal.          /70   Pulse (!) 136   Temp 98.2 °F (36.8 °C) (Axillary)   Resp (!) 33   Ht 1.829 m (6' 0.01\")   Wt 58.1 kg (128 lb)   SpO2 99%   BMI 17.36 kg/m²      Temp (24hrs), Av.2 °F (36.8 °C), Min:97.9 °F (36.6 °C), Max:98.7 °F (37.1 °C)           Intake/Output:     Intake/Output Summary (Last 24 hours) at 2025 0232  Last data filed at 2025 1740  Gross per 24 hour   Intake 1028.64 ml   Output 350 ml   Net 678.64 ml       Imaging    No valid procedures specified.         CRITICAL CARE DOCUMENTATION  I had a face to face encounter with the patient, reviewed and interpreted patient data including clinical events, labs, images, vital signs, I/O's, and examined patient.  I have discussed the case and the plan and management of the patient's care with the consulting services, the bedside nurses and the respiratory therapist.      NOTE OF PERSONAL INVOLVEMENT IN CARE   This patient has a high probability of imminent, clinically significant deterioration, which requires the highest level of  preparedness to intervene urgently. I participated in the decision-making and personally managed or directed the management of the following life and organ supporting interventions that required my frequent assessment to treat or prevent imminent deterioration.    I personally spent 55 minutes of critical care time.  This is time spent at this critically ill patient's bedside actively involved in patient care as well as the coordination of care.  This does not include any procedural time which has been billed separately.    Mikel Abdul, APRN - [facility]  1/30/2025

## 2025-01-30 NOTE — PLAN OF CARE
Problem: Safety - Adult  Goal: Free from fall injury  Outcome: Progressing     Problem: Pain  Goal: Verbalizes/displays adequate comfort level or baseline comfort level  Outcome: Progressing     Problem: Discharge Planning  Goal: Discharge to home or other facility with appropriate resources  Outcome: Progressing     Problem: Skin/Tissue Integrity  Goal: Absence of new skin breakdown  Description: 1.  Monitor for areas of redness and/or skin breakdown  2.  Assess vascular access sites hourly  3.  Every 4-6 hours minimum:  Change oxygen saturation probe site  4.  Every 4-6 hours:  If on nasal continuous positive airway pressure, respiratory therapy assess nares and determine need for appliance change or resting period.  Outcome: Progressing     Problem: Respiratory - Adult  Goal: Achieves optimal ventilation and oxygenation  1/30/2025 0430 by Gracia Espitia RN  Outcome: Progressing  Flowsheets (Taken 1/30/2025 0310)  Achieves optimal ventilation and oxygenation:   Assess for changes in respiratory status   Assess for changes in mentation and behavior   Position to facilitate oxygenation and minimize respiratory effort  1/30/2025 0152 by Ormond, Brittany Michelle, RCP  Outcome: Progressing     Problem: Gastrointestinal - Adult  Goal: Minimal or absence of nausea and vomiting  Outcome: Progressing  Flowsheets (Taken 1/30/2025 0310)  Minimal or absence of nausea and vomiting:   Administer IV fluids as ordered to ensure adequate hydration   Maintain NPO status until nausea and vomiting are resolved   Nasogastric tube to low intermittent suction as ordered  Goal: Maintains or returns to baseline bowel function  Outcome: Progressing  Flowsheets (Taken 1/30/2025 0310)  Maintains or returns to baseline bowel function:   Assess bowel function   Administer IV fluids as ordered to ensure adequate hydration   Administer ordered medications as needed  Goal: Maintains adequate nutritional intake  Outcome:

## 2025-01-30 NOTE — PROGRESS NOTES
Pharmacy Antimicrobial Kinetic Dosing    Indication for Antimicrobials: IAI     Current Regimen of Each Antimicrobial:  Vancomycin Pharmacy to Dose; Start Date ; Day # 1  Zosyn 3.375g IV q12h; Start Date ; Day # 8    Previous Antimicrobial Therapy:  Ceftriaxone      Goal Level: Vancomycin random level < 20     Date Dose & Interval Measured (mcg/mL) Predicted AUC 24-48 Predicted AUC 24,ss                          Significant Cultures:    Blood, paired: Proteus 1/ bottles   Blood, paired: Proteus   Blood, paired: ngtd, prelim    Labs:  Recent Labs     Units 25  0914 25  0331 25  0652   CREATININE MG/DL 3.34* 3.32* 3.61* 4.11*   BUN MG/DL 56* 57* 63* 70*   PROCAL ng/mL 3.88  --  7.87  --    WBC K/uL 25.4* 23.0* 17.2* 15.2*   BANDS %  --  1  --   --      Temp (24hrs), Av.4 °F (36.9 °C), Min:97.9 °F (36.6 °C), Max:99.5 °F (37.5 °C)    Conditions for Dosing Consideration:  ELIAS on CKD    Creatinine Clearance (mL/min): Estimated Creatinine Clearance: 12 mL/min (A) (based on SCr of 3.34 mg/dL (H)).     Impression/Plan:   ELIAS on CKD (baseline creatinine ~ 1.6) - will pulse dose vancomycin for now  Vancomycin 1500mg IV loading dose ordered  Level scheduled  2300  BMP and CBC ordered per protocol  Antimicrobial stop date TBD     Pharmacy will follow daily and adjust medications as appropriate for renal function and/or serum levels.    Thank you,  LILLIAN MORALES [facility]

## 2025-01-30 NOTE — CONSULTS
Infectious Disease Consult    Date of Consultation:  January 30, 2025  Reason for Consultation: Bacteremia, worsening sepsis  Referring Physician: Dr Baron  Date of Admission:1/22/2025      Impression    Proteus bacteremia  Blood culture 1/22+ for P mirabilis 1/4-LH/LFA  Negative repeat culture 1/25  Treated.  ?  Urinary source.No available UA, urine culture    Right hydronephrosis  Obstructing right ureteral stone  S/p  R/percutaneous nephrostomy tube placement by IR 1/23.    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  Cr 2.91    Persistent ileus, sigmoid distention with air and stool  S/p decompression by GI 1/30    S/p rapid response  Tachycardia, lactic acidosis  Patient remains afebrile  Improving procalcitonin 2.88, previously 52.48    Leukocytosis  WBC 23.1  Likely multifactorial    Acute metabolic encephalopathy  Likely multifactorial-infection, renal failure, electrolyte  abnormalities  hospitalization    Plan  Continue vancomycin and Zosyn IV  Blood cultures if patient spikes fever  Aspiration precautions  Change lines when able    ID service to follow  Abx  Zosyn-1/22  Vancomycin 1/29    Extensive review of chart notes, labs, imaging, cultures done  Additionally review of done: Recent reports-Labs, cultures, imaging  D/w -hospitalist, RN  Steve JHA Carol Ann is seen for bacteremia and worsening sepsis  Steve Shrestha is a 89-year-old male with a PMH of HTN, BPH & RCC s/p left nephrectomy who presents to [facility] ED on 1/22 with lethargy, nausea, and vomiting found to have right obstructing ureteral stone.  Taken to the OR for cystoscopy and right ureteral stent placement was unsuccessful so patient underwent right PCN placement with IR.  Patient was admitted to internal medicine service for further management of sepsis.   On hospital day 8 patient had multiple rapid responses initiated for tachycardia with heart rate into the 150s and tachypnea.  Laboratory workup during rapid  applied. Tube was connected to gravity drainage bag. There were no immediate complications and patient tolerated the procedure well.     Successful placement of 10.2 Cymro right percutaneous nephrostomy tube.  Electronically signed by Lefty Hartman    CT ABDOMEN PELVIS WO CONTRAST Additional Contrast? None    Result Date: 1/22/2025  EXAM: CT ABDOMEN PELVIS WO CONTRAST INDICATION: RLQ pain COMPARISON: 4/17/2019 IV CONTRAST: None. ORAL CONTRAST: None TECHNIQUE: Thin axial images were obtained through the abdomen and pelvis. Coronal and sagittal reformats were generated. CT dose reduction was achieved through use of a standardized protocol tailored for this examination and automatic exposure control for dose modulation. The absence of intravenous contrast material reduces the sensitivity for evaluation of the vasculature and solid organs. FINDINGS: LOWER THORAX: Coronary calcifications LIVER: No mass. BILIARY TREE: Gallbladder is within normal limits. CBD is not dilated. SPLEEN: within normal limits. PANCREAS: No focal abnormality. ADRENALS: Unremarkable. KIDNEYS/URETERS: Left nephrectomy. There is right-sided hydronephrosis and proximal hydroureter with a 5 mm stone in the mid right ureter. Periureteral inflammation is noted extending into the pelvis. There are hypodense and hyperdense lesions in the right kidney as well as nonobstructing stones. STOMACH: Unremarkable. SMALL BOWEL: No dilatation or wall thickening. COLON: No dilatation or wall thickening. APPENDIX: Not visualized PERITONEUM: No ascites or pneumoperitoneum. RETROPERITONEUM: Fluid in the right retroperitoneum REPRODUCTIVE ORGANS: Unremarkable URINARY BLADDER: Murphy cath in the urinary bladder BONES: No destructive bone lesion. ABDOMINAL WALL: No mass or hernia. ADDITIONAL COMMENTS: N/A     1. There is a 5 mm obstructing stone in the right ureter. There is fluid tracking posterior to the right ureter into the right retroperitoneum. Findings may

## 2025-01-30 NOTE — PROGRESS NOTES
Occupational Therapy note:    Chart reviewed and discussed with nursing. Noted overnight events requiring transfer in CCU due to sustained tachycardia and tachypnea. He is not medically appropriate for OT tx session. Will defer and continue to follow.    Diana Paz, OTR/L, OTD

## 2025-01-30 NOTE — SIGNIFICANT EVENT
gets up to 180s when ambulating or moving around in the bed for tachycardia.    Patient denies any other concerns or complaints on exam. Discussed with patient RECs, plan of care as below. Risk vs benefits of admit/treatment vs non-treatment discussed with patient, they were actively involved in decision-making, and at this time they are in agreement with plan as below. All questions answered to patient's satisfaction on exam.  -EKG upon my initial review revealed sinus tachy 134, w/o acute ischemic changes- awaiting final cardiology reading.     Total 12 point ROS reviewed, and was negative aside from as mentioned above.    Physical Examination:   General appearance: alert, poor dentition, ill appearing  Head: Atraumatic, normocephalic  Eyes: PERRL. EOMI bilaterally. No scleral icterus.    ENT: Oral mucosa is moist and free of lesions.  No tracheal deviation.  No JVD.    Respiratory: wheezing throughout, tachypnea 40s, on RA, o2 sat wnl, shallow rapid breathing, faint crackles bl bases  Cardiovascular: sinus tachy 130s.   Abdomen: hyperactive bowel sounds, distended firm abd, abd pain on palpation,   Musculoskeletal: MCKAY  Skin: No acute injury noted  Neurologic: Alert and oriented x4. No acute focal motor or sensory deficits noted.      ASSESSMENT/ INTERVENTION/ RESPONSE  Recent resolved ileus, concern for possible recurrence vs sbo with hyperactive bowel sounds, abd pain, vomiting x5 episodes after change of shift and was s/p ng tube removed over weekend and surgery no longer following- stat ct a/p ordered already by dr turner- pending. stat- cbc, cmp, pt, lactic, procalc, abg, accucheck 150s glucose, morphine 2mg iv x1 for abd pain, protonix 20mg iv x1, on ppi bid already. Consider re-consult surgery pending on results. On zosyn empircal antbx- will defer to broaden out antbx further at present and Will await imaging for possible placement of ng tube as d/w dr turner at bedside.  Acute Sinus tachycardia 130-150s  likely 2/2 s/p vomiting and abd pain with above w/u pending, consider also ? PE- stat EKG- as above. Ns 500ml bolus x1.-- hr s/p bolus staying closer to 130s.  Acute nausea and vomiting likely 2/2 above, remaining w/u pending- zofran prn nurse to give now, pepcid 20mg iv x1, on ppi bid iv  Headache w/o acute focal deficits -stat head ct ordered by Dr Abdul- pending.remaining plan as above.  Acute wheezing with dyspnea- stat cxr r/o acute aspiration s/p emesis, give prn duoneb x1 now-- pt wheezing improved s/p neb, and dyspnea c/o improved, but remains tachypnea- w/u as above pending.  Will defer further evaluation/management to the day shift primary care team.     Update: 2224 Dr Abdul placed additional orders/again at bedside as another RRT was called due to tachycardia 180s- incl broaden out antbx to vanc, ng tube, mittens as pt pulled out ng tube prior several times, consider to add heparin gtt- as unable to determine if PE as unable to do CTA chest due to kidney function and only one kidney, family aware of plan and in agreement with plan at bedside, was again at the bedside and d/w pt family plan of care, as was d/w pt family at the bedside possibility of pt condition further to decline for goals of care, remains full code at present, granddaughter is a nurse at bedside and her mother/pt's daughter at bedside d/w them, at this time family going to d/w possible DNR and if needs would consider de-escalation of care towards comfort if not turning around- however at present no final decisions have been made, family aware I will remain available overnight further concerns and if they decide to change goals of care or code status.     -Discussed with Dr Abdul plan of care and findings as outlined in this note  -Recent lab work and notes reviewed.    I have spent 75 minutes of critical care time involved in lab review, consultations with specialist, family decision- making, bedside attention and documentation.

## 2025-01-31 ENCOUNTER — APPOINTMENT (OUTPATIENT)
Facility: HOSPITAL | Age: 89
DRG: 853 | End: 2025-01-31
Payer: MEDICARE

## 2025-01-31 PROBLEM — N18.32 CKD STAGE 3B, GFR 30-44 ML/MIN (HCC): Status: ACTIVE | Noted: 2025-01-31

## 2025-01-31 PROBLEM — N13.8 URINARY TRACT OBSTRUCTION BY KIDNEY STONE: Status: ACTIVE | Noted: 2025-01-23

## 2025-01-31 PROBLEM — N13.30 HYDRONEPHROSIS OF RIGHT KIDNEY: Status: ACTIVE | Noted: 2025-01-31

## 2025-01-31 PROBLEM — N17.9 AKI (ACUTE KIDNEY INJURY) (HCC): Status: ACTIVE | Noted: 2025-01-31

## 2025-01-31 PROBLEM — Z90.5 H/O LEFT NEPHRECTOMY: Status: ACTIVE | Noted: 2025-01-31

## 2025-01-31 PROBLEM — G93.41 METABOLIC ENCEPHALOPATHY: Status: ACTIVE | Noted: 2025-01-31

## 2025-01-31 PROBLEM — Z71.89 GOALS OF CARE, COUNSELING/DISCUSSION: Status: ACTIVE | Noted: 2025-01-31

## 2025-01-31 PROBLEM — D72.825 BANDEMIA: Status: ACTIVE | Noted: 2025-01-31

## 2025-01-31 PROBLEM — R78.81 BACTEREMIA DUE TO PROTEUS SPECIES: Status: ACTIVE | Noted: 2025-01-31

## 2025-01-31 PROBLEM — C64.2 RENAL CELL CARCINOMA OF LEFT KIDNEY (HCC): Status: ACTIVE | Noted: 2025-01-31

## 2025-01-31 PROBLEM — B96.4 BACTEREMIA DUE TO PROTEUS SPECIES: Status: ACTIVE | Noted: 2025-01-31

## 2025-01-31 PROBLEM — Z51.5 PALLIATIVE CARE BY SPECIALIST: Status: ACTIVE | Noted: 2025-01-31

## 2025-01-31 PROBLEM — K56.7 ILEUS (HCC): Status: ACTIVE | Noted: 2025-01-31

## 2025-01-31 LAB
ANION GAP SERPL CALC-SCNC: 9 MMOL/L (ref 2–12)
BACTERIA SPEC CULT: NORMAL
BUN SERPL-MCNC: 51 MG/DL (ref 6–20)
BUN/CREAT SERPL: 18 (ref 12–20)
CALCIUM SERPL-MCNC: 8.1 MG/DL (ref 8.5–10.1)
CHLORIDE SERPL-SCNC: 120 MMOL/L (ref 97–108)
CO2 SERPL-SCNC: 18 MMOL/L (ref 21–32)
CREAT SERPL-MCNC: 2.91 MG/DL (ref 0.7–1.3)
EKG ATRIAL RATE: 134 BPM
EKG ATRIAL RATE: 141 BPM
EKG ATRIAL RATE: 78 BPM
EKG DIAGNOSIS: NORMAL
EKG P AXIS: 27 DEGREES
EKG P AXIS: 33 DEGREES
EKG P AXIS: 61 DEGREES
EKG P-R INTERVAL: 144 MS
EKG P-R INTERVAL: 146 MS
EKG P-R INTERVAL: 160 MS
EKG Q-T INTERVAL: 288 MS
EKG Q-T INTERVAL: 292 MS
EKG Q-T INTERVAL: 336 MS
EKG Q-T INTERVAL: 398 MS
EKG QRS DURATION: 88 MS
EKG QRS DURATION: 90 MS
EKG QTC CALCULATION (BAZETT): 430 MS
EKG QTC CALCULATION (BAZETT): 447 MS
EKG QTC CALCULATION (BAZETT): 453 MS
EKG QTC CALCULATION (BAZETT): 523 MS
EKG R AXIS: -7 DEGREES
EKG R AXIS: -8 DEGREES
EKG R AXIS: 11 DEGREES
EKG R AXIS: 12 DEGREES
EKG T AXIS: 22 DEGREES
EKG T AXIS: 33 DEGREES
EKG T AXIS: 7 DEGREES
EKG T AXIS: 75 DEGREES
EKG VENTRICULAR RATE: 134 BPM
EKG VENTRICULAR RATE: 141 BPM
EKG VENTRICULAR RATE: 146 BPM
EKG VENTRICULAR RATE: 78 BPM
ERYTHROCYTE [DISTWIDTH] IN BLOOD BY AUTOMATED COUNT: 14.9 % (ref 11.5–14.5)
GLUCOSE SERPL-MCNC: 114 MG/DL (ref 65–100)
HCT VFR BLD AUTO: 26.3 % (ref 36.6–50.3)
HGB BLD-MCNC: 8.5 G/DL (ref 12.1–17)
MAGNESIUM SERPL-MCNC: 1.8 MG/DL (ref 1.6–2.4)
MCH RBC QN AUTO: 30.1 PG (ref 26–34)
MCHC RBC AUTO-ENTMCNC: 32.3 G/DL (ref 30–36.5)
MCV RBC AUTO: 93.3 FL (ref 80–99)
NRBC # BLD: 0 K/UL (ref 0–0.01)
NRBC BLD-RTO: 0 PER 100 WBC
PHOSPHATE SERPL-MCNC: 3 MG/DL (ref 2.6–4.7)
PLATELET # BLD AUTO: 274 K/UL (ref 150–400)
PMV BLD AUTO: 10.3 FL (ref 8.9–12.9)
POTASSIUM SERPL-SCNC: 3.3 MMOL/L (ref 3.5–5.1)
RBC # BLD AUTO: 2.82 M/UL (ref 4.1–5.7)
SERVICE CMNT-IMP: NORMAL
SODIUM SERPL-SCNC: 147 MMOL/L (ref 136–145)
VANCOMYCIN SERPL-MCNC: 16.7 UG/ML
WBC # BLD AUTO: 26.6 K/UL (ref 4.1–11.1)

## 2025-01-31 PROCEDURE — 2580000003 HC RX 258: Performed by: INTERNAL MEDICINE

## 2025-01-31 PROCEDURE — 6360000002 HC RX W HCPCS: Performed by: INTERNAL MEDICINE

## 2025-01-31 PROCEDURE — 6360000002 HC RX W HCPCS

## 2025-01-31 PROCEDURE — 2580000003 HC RX 258

## 2025-01-31 PROCEDURE — 2060000000 HC ICU INTERMEDIATE R&B

## 2025-01-31 PROCEDURE — 99233 SBSQ HOSP IP/OBS HIGH 50: CPT | Performed by: INTERNAL MEDICINE

## 2025-01-31 PROCEDURE — 2700000000 HC OXYGEN THERAPY PER DAY

## 2025-01-31 PROCEDURE — 99223 1ST HOSP IP/OBS HIGH 75: CPT | Performed by: INTERNAL MEDICINE

## 2025-01-31 PROCEDURE — 2500000003 HC RX 250 WO HCPCS: Performed by: STUDENT IN AN ORGANIZED HEALTH CARE EDUCATION/TRAINING PROGRAM

## 2025-01-31 PROCEDURE — 6370000000 HC RX 637 (ALT 250 FOR IP): Performed by: HOSPITALIST

## 2025-01-31 PROCEDURE — 85027 COMPLETE CBC AUTOMATED: CPT

## 2025-01-31 PROCEDURE — 84100 ASSAY OF PHOSPHORUS: CPT

## 2025-01-31 PROCEDURE — 97530 THERAPEUTIC ACTIVITIES: CPT

## 2025-01-31 PROCEDURE — 2500000003 HC RX 250 WO HCPCS: Performed by: INTERNAL MEDICINE

## 2025-01-31 PROCEDURE — 80048 BASIC METABOLIC PNL TOTAL CA: CPT

## 2025-01-31 PROCEDURE — 36415 COLL VENOUS BLD VENIPUNCTURE: CPT

## 2025-01-31 PROCEDURE — 74018 RADEX ABDOMEN 1 VIEW: CPT

## 2025-01-31 PROCEDURE — 83735 ASSAY OF MAGNESIUM: CPT

## 2025-01-31 PROCEDURE — 92526 ORAL FUNCTION THERAPY: CPT

## 2025-01-31 RX ORDER — DEXTROSE MONOHYDRATE 50 MG/ML
INJECTION, SOLUTION INTRAVENOUS CONTINUOUS
Status: DISCONTINUED | OUTPATIENT
Start: 2025-01-31 | End: 2025-02-04

## 2025-01-31 RX ORDER — METRONIDAZOLE 500 MG/100ML
500 INJECTION, SOLUTION INTRAVENOUS EVERY 8 HOURS
Status: COMPLETED | OUTPATIENT
Start: 2025-01-31 | End: 2025-02-04

## 2025-01-31 RX ORDER — POTASSIUM CHLORIDE 7.45 MG/ML
10 INJECTION INTRAVENOUS
Status: COMPLETED | OUTPATIENT
Start: 2025-01-31 | End: 2025-01-31

## 2025-01-31 RX ORDER — METOPROLOL TARTRATE 1 MG/ML
2.5 INJECTION, SOLUTION INTRAVENOUS EVERY 8 HOURS PRN
Status: DISCONTINUED | OUTPATIENT
Start: 2025-01-31 | End: 2025-02-07

## 2025-01-31 RX ADMIN — POTASSIUM CHLORIDE 10 MEQ: 7.46 INJECTION, SOLUTION INTRAVENOUS at 11:46

## 2025-01-31 RX ADMIN — Medication 1 AMPULE: at 08:57

## 2025-01-31 RX ADMIN — HEPARIN SODIUM 5000 UNITS: 5000 INJECTION INTRAVENOUS; SUBCUTANEOUS at 23:30

## 2025-01-31 RX ADMIN — SODIUM CHLORIDE, PRESERVATIVE FREE 40 MG: 5 INJECTION INTRAVENOUS at 10:02

## 2025-01-31 RX ADMIN — HEPARIN SODIUM 5000 UNITS: 5000 INJECTION INTRAVENOUS; SUBCUTANEOUS at 14:06

## 2025-01-31 RX ADMIN — METRONIDAZOLE 500 MG: 500 INJECTION, SOLUTION INTRAVENOUS at 14:12

## 2025-01-31 RX ADMIN — HEPARIN SODIUM 5000 UNITS: 5000 INJECTION INTRAVENOUS; SUBCUTANEOUS at 06:34

## 2025-01-31 RX ADMIN — SODIUM CHLORIDE, SODIUM LACTATE, POTASSIUM CHLORIDE, CALCIUM CHLORIDE AND DEXTROSE MONOHYDRATE: 5; 600; 310; 30; 20 INJECTION, SOLUTION INTRAVENOUS at 07:49

## 2025-01-31 RX ADMIN — DEXTROSE MONOHYDRATE: 50 INJECTION, SOLUTION INTRAVENOUS at 09:00

## 2025-01-31 RX ADMIN — POTASSIUM CHLORIDE 10 MEQ: 7.46 INJECTION, SOLUTION INTRAVENOUS at 10:33

## 2025-01-31 RX ADMIN — WATER 1000 MG: 1 INJECTION INTRAMUSCULAR; INTRAVENOUS; SUBCUTANEOUS at 14:04

## 2025-01-31 RX ADMIN — SODIUM CHLORIDE, PRESERVATIVE FREE 40 MG: 5 INJECTION INTRAVENOUS at 23:30

## 2025-01-31 RX ADMIN — PIPERACILLIN AND TAZOBACTAM 3375 MG: 3; .375 INJECTION, POWDER, LYOPHILIZED, FOR SOLUTION INTRAVENOUS at 11:18

## 2025-01-31 RX ADMIN — SODIUM CHLORIDE, PRESERVATIVE FREE 30 ML: 5 INJECTION INTRAVENOUS at 21:25

## 2025-01-31 RX ADMIN — SODIUM CHLORIDE, PRESERVATIVE FREE 10 ML: 5 INJECTION INTRAVENOUS at 08:59

## 2025-01-31 RX ADMIN — POTASSIUM CHLORIDE 10 MEQ: 7.46 INJECTION, SOLUTION INTRAVENOUS at 07:47

## 2025-01-31 RX ADMIN — VANCOMYCIN HYDROCHLORIDE 500 MG: 500 INJECTION, POWDER, LYOPHILIZED, FOR SOLUTION INTRAVENOUS at 01:57

## 2025-01-31 RX ADMIN — Medication 1 AMPULE: at 21:25

## 2025-01-31 RX ADMIN — POTASSIUM CHLORIDE 10 MEQ: 7.46 INJECTION, SOLUTION INTRAVENOUS at 08:58

## 2025-01-31 ASSESSMENT — PAIN SCALES - GENERAL
PAINLEVEL_OUTOF10: 0

## 2025-01-31 ASSESSMENT — PAIN SCALES - WONG BAKER: WONGBAKER_NUMERICALRESPONSE: NO HURT

## 2025-01-31 NOTE — PLAN OF CARE
Speech LAnguage Pathology TREATMENT    Patient: Steve Maharaj (89 y.o. male)  Date: 1/31/2025  Primary Diagnosis: Urinary tract obstruction by kidney stone [N20.0, N13.8]  Acute renal failure, unspecified acute renal failure type (HCC) [N17.9]  Renal stone [N20.0]  Procedure(s) (LRB):  COLONOSCOPY DIAGNOSTIC (N/A) 1 Day Post-Op   Precautions:                     ASSESSMENT :  Patient presents with improved overall status, including breathing per RN. He also tolerates PO trials this date (cleared for clear liquid per GI). SLP presented trials of ice chips and thin via spoon/straw. No immediate overt clinical s/s aspiration noted across PO trials. Significantly delayed throat clear following completion of all PO trials. Patient appears appropriate for clear liquid diet as per GI. SLP will continue to follow.     Patient will benefit from skilled intervention to address the above impairments.     PLAN :  Recommendations and Planned Interventions:  Diet: Clear liquid and thin liquids  --Clear liquids per GI   --Medications as tolerated   --Upright all PO intake   --Oral hygiene 2-3x/day  --Reflux precautions        Recommend next SLP session: Ensure diet tolerance, determine advancement once cleared by GI (limited dentition noted)     Acute SLP Services: Yes, SLP will continue to follow per plan of care.  Discharge Recommendations: Continue to assess pending progress     SUBJECTIVE:   Patient stated, “Okay.”    OBJECTIVE:     Past Medical History:   Diagnosis Date    Arthritis     Cancer (HCC) 04/2019    renal    Hypertension     Prostate enlargement      Past Surgical History:   Procedure Laterality Date    COLONOSCOPY N/A 1/30/2025    COLONOSCOPY DIAGNOSTIC performed by Doris Amaral MD at [facility] ENDOSCOPY    CYSTOSCOPY Right 1/22/2025    CYSTOSCOPY performed by Jerson Padilla III, MD at [facility] MAIN OR    IR GUIDED NEPHROSTOMY CATH PLACEMENT RIGHT  1/23/2025    IR GUIDED NEPHROSTOMY CATH PLACEMENT RIGHT 1/23/2025

## 2025-01-31 NOTE — PROGRESS NOTES
tablet   Yes No   Sig: Take 1 tablet by mouth daily   finasteride (PROSCAR) 5 MG tablet   Yes No   Sig: Take 1 tablet by mouth daily   melatonin 3 MG TABS tablet   Yes No   Sig: Take by mouth   Patient not taking: Reported on 4/8/2024   oxyBUTYnin (DITROPAN) 5 MG tablet   No No   Sig: Take 0.5 tablets by mouth 3 times daily as needed (bladder spasms)   tamsulosin (FLOMAX) 0.4 MG capsule   Yes No   Sig: Take by mouth daily      Facility-Administered Medications: None             Review of Systems: Could not obtain due to patient factors    Vitals:    01/31/25 1200   BP: (!) 111/55   Pulse: 84   Resp: 25   Temp: 98.3 °F (36.8 °C)   SpO2: 97%           PHYSICAL EXAM:  General:          Resting. no acute distress    EENT:              EOMI. Anicteric sclerae. MMM  Resp:               CTA bilaterally, no wheezing or rales.  No accessory muscle use  CV:                  Regular  rhythm,  No edema  GI:                   Soft, Non distended, Non tender.  +Bowel sounds  Neurologic:      Cannot assess  Psych:             Cannot assess  Skin:                No rashes.  No jaundice.  Extremities: No edema        Results       Procedure Component Value Units Date/Time    Culture, Blood 1 [[id number]] Collected: 01/25/25 1745    Order Status: Completed Specimen: Blood Updated: 01/31/25 0946     Special Requests --        NO SPECIAL REQUESTS  RIGHT  HAND       Culture NO GROWTH 5 DAYS       Culture, Blood 2 [[id number]] Collected: 01/25/25 1400    Order Status: Canceled Specimen: Blood     Blood Culture 2 [[id number]]  (Abnormal)  (Susceptibility) Collected: 01/22/25 2246    Order Status: Completed Specimen: Blood Updated: 01/26/25 0757     Special Requests --        LEFT  HAND       Culture       Proteus mirabilis GROWING IN 1 OF 2 BOTTLES DRAWN SITE = L HAND            (NOTE) GNR CALLED TO READ BACK BY MS EVELYN WARD ON 1/24/25 AT 0651.HH    Susceptibility        Proteus mirabilis      BACTERIAL SUSCEPTIBILITY PANEL KENYATTA       dilatation in the left abdomen. Electronically signed by Dennis Casey    XR ABDOMEN (KUB) (SINGLE AP VIEW)    Result Date: 1/24/2025  EXAM:  XR ABDOMEN (KUB) (SINGLE AP VIEW) INDICATION: Enteric tube placement COMPARISON: 1/24/2025. TECHNIQUE: Supine frontal abdomen (KUB). FINDINGS: The enteric tube terminates in the stomach. Dilated bowel loops are again noted in the upper abdomen. The bones are stable. A right percutaneous nephrostomy tube is noted.     The enteric tube terminates in the stomach. Electronically signed by Dennis Casey    XR ABDOMEN (KUB) (SINGLE AP VIEW)    Result Date: 1/24/2025  EXAM:  XR ABDOMEN (KUB) (SINGLE AP VIEW) INDICATION: Enteric tube placement COMPARISON: CT 1/23/2025. TECHNIQUE: Supine frontal abdomen (KUB). FINDINGS: The enteric tube terminates in the stomach. Mildly dilated small bowel loops are again noted. There is right basilar airspace opacity and small right pleural effusion. The bones are stable.     The enteric tube terminates in the stomach. Electronically signed by Dennis Casey    CT ABDOMEN PELVIS WO CONTRAST Additional Contrast? None    Result Date: 1/24/2025  EXAM:  CT ABDOMEN PELVIS WO CONTRAST INDICATION: Abdominal pain COMPARISON: CT 1/22/2025. TECHNIQUE: Helical CT of the abdomen  and pelvis  without contrast. Coronal and sagittal reformats are performed. CT dose reduction was achieved through use of a standardized protocol tailored for this examination and automatic exposure control for dose modulation. FINDINGS: Solid organ evaluation is limited without contrast. The visualized lung bases demonstrate and small right pleural effusion with right lower lobe airspace opacity. The left pleural space and left lower lung are clear. There is stable cardiomegaly. There is no pericardial effusion. There is a percutaneous nephrostomy tube in a solitary right kidney without hydronephrosis. There is a stable 8 mm calculus in the proximal right ureter. There is stable

## 2025-01-31 NOTE — PROGRESS NOTES
CRITICAL CARE PROGRESS NOTE    Name: Steve Maharaj   : 1933   MRN: [id number]   Date: 2025      Diagnoses/problem list:   Proteus bacteremia, suspect urinary source  Moderate right hydronephrosis s/p nephrostomy tube  Sigmoid colon distention   ELIAS on CKD 3    24-hour events:    GI decompression of sigmoid colon, NGT removed   advancing per GI and did well with SPSW eval however coughed after ice chip so back to NPO tonight     ROS negative except as otherwise documented.     Assessment and plan:     NEUROLOGICAL:    Acute metabolic encephalopathy, improved  -Answering questions appropriately although cannot get comfortable  -CTh () no acute intracranial process  -Neurochecks per protocol      PULMONOLOGY:   - Wean low flow NC as able     CARDIOVASCULAR:   - Tachycardia resolved  - Maintain MAP >65    GASTROINTESTINAL:   Sigmoid colon distention  - S/p endoscopic decompression with GI on   - Continue bowel reg  - At risk for recurrent episodes, Palliative consulted, family considering transitioning to comfort measures early next week once more family can make input     RENAL/ELECTROLYTE:   Obstructive right ureteral stone s/p R PCN  ELIAS, prerenal-improving  Prior left nephrectomy 2/2 RCC  NAGMA  Hypokalemia  Hypernatremia  Hx BPH  - Switch mIVF to D5W today  - goal K>=4, Mg>=2, Phos >3  - daily BMP  - strict I/O's; daily weights  - avoid nephrotoxic medications    ENDOCRINE:   Glycemic control  -Goal glucose 140-200 mg/dl    HEMATOLOGY/ONCOLOGY:     VTE Px  - UFH    ID/MICRO:   Proteus bacteremia, likely urine source  Leukocytosis  -Trend fevers, WBC  - Ceftriaxone, metronidazole per ID      OBJECTIVE:     Blood pressure (!) 148/76, pulse 85, temperature 98.2 °F (36.8 °C), temperature source Oral, resp. rate 24, height 1.829 m (6'), weight 58.1 kg (128 lb), SpO2 98%.  Physical Exam  Gen: Not in distress but appears uncomfortable, frail  HEENT: NC/AT, supple  Cardiac: Regular rate and

## 2025-01-31 NOTE — PROGRESS NOTES
Name: Steve Maharaj   MRN: [id number]  : 1933      Assessment:  ELIAS- 2nd to obstructive uropathy in a solitary kidney + n/v + diuretic     R ureteral stone with mod R hydro- in solitary kidney. 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 due to HTN/Solitary kidney  BPH    Hypernatremia        Plan/Recommendations:    Sodium up today.  Continue hypotonic fluids.    Creatinine improving.      Monitor urine output.      Continue Proscar/Flomax    Avoid nephrotoxins    Daily labs      Subjective:      No change/        Exam:  /65   Pulse 88   Temp 98.2 °F (36.8 °C) (Oral)   Resp (!) 31   Ht 1.829 m (6')   Wt 58.1 kg (128 lb)   SpO2 94%   BMI 17.36 kg/m²     Elderly thin/frail, ill appearing in NAD    No edema    Labs/Data:    Lab Results   Component Value Date    WBC 26.6 (H) 2025    HGB 8.5 (L) 2025    HCT 26.3 (L) 2025    MCV 93.3 2025     2025       Lab Results   Component Value Date/Time     2025 03:59 AM    K 3.3 2025 03:59 AM     2025 03:59 AM    CO2 18 2025 03:59 AM    BUN 51 2025 03:59 AM    CREATININE 2.91 2025 03:59 AM    GLUCOSE 114 2025 03:59 AM    CALCIUM 8.1 2025 03:59 AM    LABGLOM 20 2025 03:59 AM    LABGLOM 41 2024 06:44 AM        Wt Readings from Last 3 Encounters:   25 58.1 kg (128 lb)   24 65.8 kg (145 lb)   24 65.8 kg (145 lb)         Intake/Output Summary (Last 24 hours) at 2025 1051  Last data filed at 2025 1001  Gross per 24 hour   Intake 2786.51 ml   Output 1805 ml   Net 981.51 ml       Patient seen and examined. Chart reviewed.

## 2025-01-31 NOTE — PROGRESS NOTES
Speech Pathology Contact Note:    SLP following for clinical swallow re-evaluation. SLP attempted to complete X2 this date. Per discussion with RN, patient remains NPO pending clearance from GI. First attempt, RN reaching out to GI for clarification given results of Abdomen CXR this date. Second attempt, RN reports GI hasn't been by and no word from GI. Per previous SLP evaluation 1/29, patient with s/s aspiration across all PO trials and recommendations for NPO except ice chips given. SLP will follow up as patient medically appropriate. Thanks!     Cherelle Meehan, CCC-SLP

## 2025-01-31 NOTE — PROGRESS NOTES
1900 Bedside shift change report given to ED Fagan (oncoming nurse) by ED Madrigal (offgoing nurse). Report included the following information Nurse Handoff Report, Adult Overview, Intake/Output, MAR, Recent Results, and Cardiac Rhythm SR .     2000 Shift assessment completed, see flowsheets for details.     0000 Reassessment completed, no change to previous assessment.    0400 Reassessment completed, no change to previous assessment.    0700 Bedside shift change report given to ED Madrigal (oncoming nurse) by ED Fagan (offgoing nurse). Report included the following information Nurse Handoff Report, Adult Overview, Intake/Output, MAR, Recent Results, and Cardiac Rhythm SR-ST .

## 2025-01-31 NOTE — PROGRESS NOTES
Theodora March PA-C                       [phone] cell                      Friday 7:30 am- 4:30 pm         GI PROGRESS NOTE        NAME:   Steve Maharaj       :    1933       MRN:    [id number]     Assessment/Plan     GI consult for dark brown emesis, +occult, ileus. 89 y/o male with pmhx significant for HTN enlarged prostate requiring indwelling Murphy, presented to the hospital  with lethargy, nausea, vomiting and decreased oral intake. 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 Found to have obstructing ureteral stone, forniceal rupture, and taken to OR for cysto. R ureteral stent placement was unsuccessful so IR was consulted for R PCN placement.   Labs 2025: WBC 8.5 Hgb 10.1 Hct 30.9 MCV 96 BUN 83 Cr 5.86 eGFR 9 Lactic acid 2.1 NT Pro-BNP 8045 Troponin, HS 26 ALT 13 AST 39 ALP 87 Tbili 1.6 Albumin 1.8 Lipase 9 Tprotein 7.0 Na 142 K 4.4 Mg 2.0 Ca 7.3 P 5.3 EKG NSR Baseline hemoglobin is 9-10 range   CT A/P WO Contrast  showin) Moderate gaseous gastric distention and mildly dilated proximal small bowel loops with gradual tapering suggesting ileus. No intraperitoneal free air. 2) Small R pleural effusion w/RLL airspace opacity that may represent atelectasis, infection, or aspiration. 3) R percutaneous nephrostomy tube and solitary R kidney w/o hydronephrosis. 5 mm calculus again demonstrated in proximal R ureter with surrounding ureteral inflammation. 4) R retroperitoneal fluid and stranding unchanged extending to surround R ureter.   KUB enteric tube terminates in stomach. Increased colonic dilatation in left abdomen. There is dark output from Ngtube. Hypoactive bowel sounds with tenderness disproportionate to touch on exam. His daughter is at bedside who reports that he had an ileus 4 years ago post nephrectomy. He had an EGD/colonoscopy around that time.

## 2025-01-31 NOTE — CONSULTS
Palliative Medicine  Patient Name: tSeve Maharaj  YOB: 1933  MRN: [id number]  Age: 89 y.o.  Gender: male    Date of Initial Consult: 1/31/2025  Date of Service: 1/31/2025  Time: 11:05 AM  Provider: Tere Lundberg MD  Hospital Day: 10  Admit Date: 1/22/2025  Referring Provider: Dr. Sousa        Reasons for Consultation:  Goals of Care    HISTORY OF PRESENT ILLNESS (HPI):   Steve Maharaj is a 89 y.o. male with a past medical history of RCC s/p left nephrectomy in 2020, BPH, CKD3b, HTN, who was admitted on 1/22/2025 from home with a diagnosis of lethargy, nausea, vomiting.   Patient found to have R obstructing ureteral stone, went to OR for cystoscopy and R ureteral stent (unsuccessful) then to IR for R PCN.  He has been on treatment for proteus bacteremia (BC+ 1/22), now blood cultures negative.  He had rapid response 1/29 and again 1/30 with transfer to ICU.   He has ileus, with ongoing sigmoid distention, s/p decompression by GI/ sigmoidoscopy on 1/30/25.  KUB for today pending.   Metabolic encephalopathy noted as well.   Family has made decision for DNR/DNI and are realistic that he may not be able to bounce back from this.     Psychosocial: Patient worked in [employer] for [employer] for many years.  He is  to wife Kareen, they have 4 adult children (Faby \"D\", Rylee Jernigan, April)   Family describes him as kind, a great father/ spouse/ provider, often helping others, very [employer].  Recent baseline fairly sedentary, but up to chair to watch TV/ do puzzles.   No hx of cognitive decline.   Patient's wife is in poor health (on oxygen, nearing need for dialysis) but cognitively intact .  Family rotates time at their home to provide care for them.       PALLIATIVE DIAGNOSES:    Urosepsis, bacteremia, controlled with abx.  S/p R PCN  CKD3b, s/p left nephrectomy  Ileus, feeding difficulties  Debility, deconditioning  Unintentional weight loss  (145 pounds down to 128)   Metabolic  weekends)   Please call with any palliative questions or concerns.  Palliative Care Team is available via perfect serve or via phone.    Referrals to:   [] Outpatient Palliative Care  [] Home Based Palliative Care  [] Home Based Primary Care  [] Hospice       ADVANCE CARE PLANNING:   [] [facility] Interdisciplinary Team has updated the ACP Navigator with Health Care Decision Maker and Patient Capacity      Primary Decision Maker (Active): Kareen Maharaj - Spouse - [phone]  Confirm Advance Directive: None  Patient Would Like to Complete Advance Directive: Unable    Current Code Status: DNR     Goals of Care: Goals of Care and Interventions  Patient/Health Care Proxy Stated Goals: Other (comment) (giving more time to assess recovery, considering hospice if declines)  Medical Interventions: Limited additional interventions  Artificially Administered Nutrition: No feeding tube       Please refer to Palliative Medicine ACP notes for further details.    PALLIATIVE ASSESSMENT:      Palliative Performance Scale (PPS):  PPS: 20    ECOG:        Modified ESAS:  Modified-[place] Symptom Assessment Scale (ESAS)  Pain Score: No pain  Dyspnea Score: No shortness of breath    Clinical Pain Assessment (nonverbal scale for severity on nonverbal patients):   Clinical Pain Assessment  Severity: 0       NVPS:       RDOS:         Vital Signs: Blood pressure 134/65, pulse 88, temperature 98.2 °F (36.8 °C), temperature source Oral, resp. rate (!) 31, height 1.829 m (6'), weight 58.1 kg (128 lb), SpO2 94%.    PHYSICAL ASSESSMENT:   General: [] Oriented x3  [] Well appearing  [] Intubated  [x]Ill appearing  []Other:  Mental Status: [] Normal mental status exam  [x] Drowsy  [] Confused  []Other:  Cardiovascular: [] Regular rate/rhythm  [] Arrhythmia  [] Other:  Chest: [] Effort normal  []Lungs clear  [] Respiratory distress  []Tachypnea  [] Other:  Abdomen: [] Soft/non-tender  [] Normal appearance  [] Distended  [] Ascites  []

## 2025-01-31 NOTE — PLAN OF CARE
Problem: Occupational Therapy - Adult  Goal: By Discharge: Performs self-care activities at highest level of function for planned discharge setting.  See evaluation for individualized goals.  Description: FUNCTIONAL STATUS PRIOR TO ADMISSION:    Receives Help From: Family (daughters assist pt with bathing and dressing.  Pt unbale to report specifics at this time.), Prior Level of Assist for ADLs: Needs assistance,  ,  ,  ,  ,  ,  ,  ,  , Active : No.  Per old notes in chart review, pt has a history of falls and needing assistance with adls/transfers     HOME SUPPORT: Patient lived with his wife.  Pt's family has been assisting pt for adls.  .    Occupational Therapy Goals:  Initiated 1/26/2025  1.  Patient will perform grooming, seated EOB with Set-up and Supervision within 7 day(s).  2.  Patient will perform upper body bathing with Moderate Assist within 7 day(s).  3.  Patient will perform upper body dressing and lower body dressing with Moderate Assist within 7 day(s).  4.  Patient will perform toilet transfers with Moderate Assist  within 7 day(s).  5.  Patient will perform all aspects of toileting with Moderate Assist within 7 day(s).  6.  Patient will participate in upper extremity therapeutic exercise/activities with Supervision for 5 minutes within 7 day(s).    7.  Patient will utilize energy conservation techniques during functional activities with verbal cues within 7 day(s).   Outcome: Not Progressing   OCCUPATIONAL THERAPY TREATMENT  Patient: Steve Maharaj (89 y.o. male)  Date: 1/31/2025  Primary Diagnosis: Urinary tract obstruction by kidney stone [N20.0, N13.8]  Acute renal failure, unspecified acute renal failure type (HCC) [N17.9]  Renal stone [N20.0]  Procedure(s) (LRB):  COLONOSCOPY DIAGNOSTIC (N/A) 1 Day Post-Op   Precautions:                  Chart, occupational therapy assessment, plan of care, and goals were reviewed.    ASSESSMENT  Patient continues to benefit from skilled OT services  Training  Bed Mobility Training: Yes  Rolling: Maximum assistance  Scooting: Total assistance     Pain Rating:  Patient c/o R sided discomfort  Pain Intervention(s):   nursing notified and addressing and repositioning      Activity Tolerance:   Poor, requires frequent rest breaks, observed shortness of breath on exertion, and desaturates with activity and requires oxygen  Please refer to the flowsheet for vital signs taken during this treatment.    After treatment:   Patient left in no apparent distress in bed, Call bell within reach, Side rails x3, Heel protection boot applied for pressure relief, and Patient offloaded in partial right side lying for pressure relief    COMMUNICATION/EDUCATION:   The patient's plan of care was discussed with: physical therapist and registered nurse    Patient Education  Education Given To: Patient  Education Provided: Role of Therapy;Plan of Care  Education Method: Verbal  Barriers to Learning: Hearing  Education Outcome: Continued education needed    Thank you for this referral.  Diana Paz OT  Minutes: 12

## 2025-01-31 NOTE — PLAN OF CARE
minimize respiratory effort   Oxygen supplementation based on oxygen saturation or arterial blood gases     Problem: Gastrointestinal - Adult  Goal: Minimal or absence of nausea and vomiting  Outcome: Progressing  Flowsheets (Taken 1/30/2025 2000)  Minimal or absence of nausea and vomiting:   Administer IV fluids as ordered to ensure adequate hydration   Maintain NPO status until nausea and vomiting are resolved  Goal: Maintains or returns to baseline bowel function  Outcome: Progressing  Flowsheets (Taken 1/30/2025 2000)  Maintains or returns to baseline bowel function:   Assess bowel function   Administer IV fluids as ordered to ensure adequate hydration   Administer ordered medications as needed  Goal: Maintains adequate nutritional intake  Outcome: Progressing  Flowsheets (Taken 1/30/2025 2000)  Maintains adequate nutritional intake:   Identify factors contributing to decreased intake, treat as appropriate   Monitor intake and output, weight and lab values   Obtain nutritional consult as needed     Problem: Genitourinary - Adult  Goal: Absence of urinary retention  Outcome: Progressing  Flowsheets (Taken 1/30/2025 2000)  Absence of urinary retention: Assess patient’s ability to void and empty bladder  Goal: Urinary catheter remains patent  Outcome: Progressing     Problem: Infection - Adult  Goal: Absence of infection at discharge  Outcome: Progressing  Flowsheets (Taken 1/30/2025 2000)  Absence of infection at discharge:   Assess and monitor for signs and symptoms of infection   Monitor lab/diagnostic results   Monitor all insertion sites i.e., indwelling lines, tubes and drains  Goal: Absence of infection during hospitalization  Outcome: Progressing  Flowsheets (Taken 1/30/2025 2000)  Absence of infection during hospitalization:   Assess and monitor for signs and symptoms of infection   Monitor lab/diagnostic results   Monitor all insertion sites i.e., indwelling lines, tubes and drains  Goal: Absence of  fever/infection during anticipated neutropenic period  Outcome: Progressing  Flowsheets (Taken 1/30/2025 2000)  Absence of fever/infection during anticipated neutropenic period: Monitor white blood cell count     Problem: Hematologic - Adult  Goal: Maintains hematologic stability  Outcome: Progressing  Flowsheets (Taken 1/30/2025 2000)  Maintains hematologic stability:   Assess for signs and symptoms of bleeding or hemorrhage   Monitor labs for bleeding or clotting disorders     Problem: ABCDS Injury Assessment  Goal: Absence of physical injury  Outcome: Progressing  Flowsheets (Taken 1/30/2025 2000)  Absence of Physical Injury: Implement safety measures based on patient assessment     Problem: Nutrition Deficit:  Goal: Optimize nutritional status  Outcome: Progressing  Flowsheets (Taken 1/30/2025 1446 by Lucy Salcedo RD)  Nutrient intake appropriate for improving, restoring, or maintaining nutritional needs: Assess nutritional status and recommend course of action     Problem: Safety - Medical Restraint  Goal: Remains free of injury from restraints (Restraint for Interference with Medical Device)  Description: INTERVENTIONS:  1. Determine that other, less restrictive measures have been tried or would not be effective before applying the restraint  2. Evaluate the patient's condition at the time of restraint application  3. Inform patient/family regarding the reason for restraint  4. Q2H: Monitor safety, psychosocial status, comfort, nutrition and hydration  Outcome: Progressing

## 2025-01-31 NOTE — PROGRESS NOTES
Bedside report received from Gracia Espitia RN.   1045 Palliative care in to see patient and talk with patients daughter.   1130 PT in to see patient.   1420 Joaquim WEAVER in to see patient.  1435 Speech therapy in to see patient.   1800 Patient cleared by speech to have diet. Patient received clear liquid diet but only requesting to drink water. Patient given water via straw and encouraged to take small amounts at a time. Patient coughing after drinking water. Attempted again and patient again coughed after drinking water. Patients daughter at bedside. Dr. Sousa notified. No change in orders at this time.   1922 End of Shift Note    Bedside shift change report given to nelia BETANCOURT RN (oncoming nurse) by IJEOMA FAJARDO RN (offgoing nurse).  Report included the following information SBAR, Kardex, Procedure Summary, Intake/Output, MAR, Accordion, and Recent Results    Shift worked:  7am to 1930     Shift summary and any significant changes:    See above note     Concerns for physician to address: None at this time     Zone phone for oncoming shift:  N/a       Activity:  Level of Assistance: Maximum assist, patient does 25-49%  Number times ambulated in hallways past shift: 0  Number of times OOB to chair past shift: 0    Cardiac:   Cardiac Monitoring: Yes      Cardiac Rhythm: Sinus rhythm    Access:  Current line(s): PIV     Genitourinary:   Urinary Status:  (nephrostomy tube)    Respiratory:   O2 Device: Nasal cannula  Chronic home O2 use?: NO  Incentive spirometer at bedside: NO    GI:  Last BM (including prior to admit): 01/31/25  Current diet:  ADULT DIET; Full Liquid  Passing flatus: YES    Pain Management:   Patient states pain is manageable on current regimen: YES    Skin:  Mateusz Scale Score: 12  Interventions: Wound Offloading (Prevention Methods): Bed, pressure redistribution/air    Patient Safety:  Fall Risk: Nursing Judgement-Fall Risk High(Add Comments): Yes  Fall Risk Interventions  Nursing Judgement-Fall Risk

## 2025-01-31 NOTE — PROGRESS NOTES
[facility]  [address] [place] [address]  Phone- [phone]                GI PROGRESS NOTE        NAME:   Steve Maharaj       :    1933       MRN:    [id number]     Assessment/Plan     89-year-old male with enlarged prostate requiring indwelling Murphy, presented to the hospital  with lethargy, nausea, vomiting and decreased oral intake. In ED was found to have ELIAS in setting of previous nephrectomy 2/2 RCC, s/p nephrostomy tube, was on broad-spectrum antibiotic for sepsis coverage, then course complicated by ileus, was managed conservatively and then 2 days ago, he had worsening abdominal distention, tachycardia, tachypnea so was transferred to ICU and noted to have dilated/distended sigmoid colon but no obvious volvulus.  Underwent NG tube decompression and then flex sig with colonic decompression and rectal tube placement by me yesterday.  Of note, he did have a polyp in the rectum, which was biopsied.  Did not appear malignant.  Did not appear obstructive.    His abdomen is softer and did have large BM today.  Rectal tube still in place.  It remains difficult to understand him.  -Overall, sigmoid distention has been resolved.  However, he remains significantly frail and at high risk for another episode of sigmoid distention/volvulus.  He has been DNR/DNI.    - Recommend to advance diet as tolerated, bowel regimen.    We will reevaluate him as needed     Patient Active Problem List   Diagnosis    Chronic indwelling Murphy catheter    Sepsis (HCC)    Urinary tract infection associated with indwelling urethral catheter (HCC)    CKD (chronic kidney disease), stage I    Draining cutaneous sinus tract    Diarrhea    Stroke-like symptoms    Benign paroxysmal positional vertigo    Falls frequently    Dizziness    Generalized weakness    Bilateral carotid artery stenosis    Urinary tract obstruction by kidney

## 2025-01-31 NOTE — PROGRESS NOTES
Palliative Medicine  Per H&P: Steve Shrestha is a 89-year-old male with a PMH of HTN, BPH & RCC s/p left nephrectomy who presents to [facility] ED on  with lethargy, nausea, and vomiting found to have right obstructing ureteral stone.  Taken to the OR for cystoscopy and right ureteral stent placement was unsuccessful so patient underwent right PCN placement with IR.  Patient was admitted to internal medicine service for further management of sepsis. On hospital day 8 patient had multiple rapid responses initiated for tachycardia with heart rate into the 150s and tachypnea.  Laboratory workup during rapid response demonstrates lactic acidosis (3) but normal anion gap with alkalotic ABG without hypoxia and increasing leukocytosis concerning for worsening sepsis.  IV antibiotics broadened and ICU consulted for transfer.  Goals of care addressed per primary team today and patient is confirmed to be DNR/DNI.    Code Status: DNR    [facility] Plannin/31/2025     6:00 AM   Demographics   Marital Status    No AMD on file. Spouse is legal nok so primary HCDM. Family makes decisions together when patient is unable. Oldest daughter \"Sinai\" is main contact.    Patient / Family Encounter Documentation    Participants (names): Steve Maharaj (resting comfortably, not interactive), daughter Faby (\"Sinai\"), Dr. Lundberg, Kellie Wong, [facility]    Narrative:   --Palliative team reviewed chart, checked in with assigned nurse, Kaitlin.  --Introduced ourselves to daughter and invited her to meet with us in conference room and allow patient to rest.  --Provided empathetic, reflective listening as daughter shared information about patient's medical and family history.  --Normalized, validated feelings of sadness, grief, anxiety about patient's multiple medical issues and recent functional decline.  --Affirmed strong family support for patient and each other and encouraged a balance of self-care as well.  --Educated Sinai on

## 2025-01-31 NOTE — CARE COORDINATION
Transition of Care Plan:    RUR: 17%  Prior Level of Functioning: needs some assistance   Disposition: [facility] pending goals of care discussion  REYES: 2/5/24  If SNF or IPR: Date FOC offered:   Date FOC received:   Accepting facility:   Date authorization started with reference number:   Date authorization received and expires:   Follow up appointments:   DME needed:   Transportation at discharge: likely BLS  IM/IMM Medicare/ letter given: will need prior to discharge  Is patient a [place] and connected with [facility]?    If yes, was [place] transfer form completed and [facility] notified?   Caregiver Contact: Spouse - Kareen Maharaj [phone]  Discharge Caregiver contacted prior to discharge? yes  Care Conference needed?   Barriers to discharge: medical stability    CM reviewed chart - patient is in CCU - palliative on board for goals of care discussion and will meet with family again on Monday.  CM staff available for assistance with disposition plans once identified by palliative and MD.

## 2025-01-31 NOTE — PLAN OF CARE
Problem: Physical Therapy - Adult  Goal: By Discharge: Performs mobility at highest level of function for planned discharge setting.  See evaluation for individualized goals.  Description: FUNCTIONAL STATUS PRIOR TO ADMISSION: Pt unable to provide accurate PLOF as he presents extremely [place] and with confusion however, per chart review, pt was ambulatory with use of rolling walker and received assistance from daughter for ADLs?    HOME SUPPORT PRIOR TO ADMISSION: The patient lived with wife and had assistance from 2 daughters for ADLs.    Physical Therapy Goals  Initiated 1/27/2025  1.  Patient will move from supine to sit and sit to supine, scoot up and down, and roll side to side in bed with supervision/set-up within 7 day(s).    2.  Patient will perform sit to stand with supervision/set-up within 7 day(s).  3.  Patient will transfer from bed to chair and chair to bed with contact guard assist using the least restrictive device within 7 day(s).  4.  Patient will ambulate with contact guard assist for 25 feet with the least restrictive device within 7 day(s).   Outcome: Not Progressing     PHYSICAL THERAPY TREATMENT    Patient: Steve Maharaj (89 y.o. male)  Date: 1/31/2025  Diagnosis: Urinary tract obstruction by kidney stone [N20.0, N13.8]  Acute renal failure, unspecified acute renal failure type (HCC) [N17.9]  Renal stone [N20.0] Urinary tract obstruction by kidney stone  Procedure(s) (LRB):  COLONOSCOPY DIAGNOSTIC (N/A) 1 Day Post-Op  Precautions:                        ASSESSMENT:  Patient continues to benefit from skilled PT services and is not progressing towards goals. Pt encountered semi-reclined in bed in NAD, cleared by RN to participate in therapy. Pt c/o sacral discomfort, note pt slid to L side of bed and no longer positioned on offloading pillows on R side. Pt agreeable to repositioning, boosted up in bed with Max A x2 and rolled to R with Max A. Pt positioned in R sidelying in NAD, reported feeling

## 2025-02-01 LAB
ANION GAP SERPL CALC-SCNC: 7 MMOL/L (ref 2–12)
BUN SERPL-MCNC: 43 MG/DL (ref 6–20)
BUN/CREAT SERPL: 16 (ref 12–20)
CALCIUM SERPL-MCNC: 8 MG/DL (ref 8.5–10.1)
CHLORIDE SERPL-SCNC: 122 MMOL/L (ref 97–108)
CO2 SERPL-SCNC: 17 MMOL/L (ref 21–32)
CREAT SERPL-MCNC: 2.63 MG/DL (ref 0.7–1.3)
ERYTHROCYTE [DISTWIDTH] IN BLOOD BY AUTOMATED COUNT: 15.2 % (ref 11.5–14.5)
GLUCOSE SERPL-MCNC: 75 MG/DL (ref 65–100)
HCT VFR BLD AUTO: 22.7 % (ref 36.6–50.3)
HGB BLD-MCNC: 7.2 G/DL (ref 12.1–17)
MAGNESIUM SERPL-MCNC: 1.8 MG/DL (ref 1.6–2.4)
MCH RBC QN AUTO: 30.1 PG (ref 26–34)
MCHC RBC AUTO-ENTMCNC: 31.7 G/DL (ref 30–36.5)
MCV RBC AUTO: 95 FL (ref 80–99)
NRBC # BLD: 0 K/UL (ref 0–0.01)
NRBC BLD-RTO: 0 PER 100 WBC
PHOSPHATE SERPL-MCNC: 3.1 MG/DL (ref 2.6–4.7)
PLATELET # BLD AUTO: 326 K/UL (ref 150–400)
PMV BLD AUTO: 10.6 FL (ref 8.9–12.9)
POTASSIUM SERPL-SCNC: 4.1 MMOL/L (ref 3.5–5.1)
RBC # BLD AUTO: 2.39 M/UL (ref 4.1–5.7)
SODIUM SERPL-SCNC: 146 MMOL/L (ref 136–145)
VANCOMYCIN SERPL-MCNC: 14.2 UG/ML
WBC # BLD AUTO: 29.9 K/UL (ref 4.1–11.1)

## 2025-02-01 PROCEDURE — 6360000002 HC RX W HCPCS: Performed by: INTERNAL MEDICINE

## 2025-02-01 PROCEDURE — 6370000000 HC RX 637 (ALT 250 FOR IP): Performed by: HOSPITALIST

## 2025-02-01 PROCEDURE — 80202 ASSAY OF VANCOMYCIN: CPT

## 2025-02-01 PROCEDURE — 2060000000 HC ICU INTERMEDIATE R&B

## 2025-02-01 PROCEDURE — 2500000003 HC RX 250 WO HCPCS: Performed by: INTERNAL MEDICINE

## 2025-02-01 PROCEDURE — 85027 COMPLETE CBC AUTOMATED: CPT

## 2025-02-01 PROCEDURE — 2580000003 HC RX 258

## 2025-02-01 PROCEDURE — 84100 ASSAY OF PHOSPHORUS: CPT

## 2025-02-01 PROCEDURE — 83735 ASSAY OF MAGNESIUM: CPT

## 2025-02-01 PROCEDURE — 6360000002 HC RX W HCPCS

## 2025-02-01 PROCEDURE — 2700000000 HC OXYGEN THERAPY PER DAY

## 2025-02-01 PROCEDURE — 80048 BASIC METABOLIC PNL TOTAL CA: CPT

## 2025-02-01 PROCEDURE — 6360000002 HC RX W HCPCS: Performed by: NURSE PRACTITIONER

## 2025-02-01 PROCEDURE — 6370000000 HC RX 637 (ALT 250 FOR IP): Performed by: STUDENT IN AN ORGANIZED HEALTH CARE EDUCATION/TRAINING PROGRAM

## 2025-02-01 PROCEDURE — 2500000003 HC RX 250 WO HCPCS: Performed by: STUDENT IN AN ORGANIZED HEALTH CARE EDUCATION/TRAINING PROGRAM

## 2025-02-01 PROCEDURE — 36415 COLL VENOUS BLD VENIPUNCTURE: CPT

## 2025-02-01 PROCEDURE — 2580000003 HC RX 258: Performed by: NURSE PRACTITIONER

## 2025-02-01 RX ORDER — DEXTROSE MONOHYDRATE AND SODIUM CHLORIDE 5; .45 G/100ML; G/100ML
INJECTION, SOLUTION INTRAVENOUS CONTINUOUS
Status: DISCONTINUED | OUTPATIENT
Start: 2025-02-01 | End: 2025-02-02

## 2025-02-01 RX ORDER — SODIUM BICARBONATE 650 MG/1
650 TABLET ORAL 2 TIMES DAILY
Status: DISCONTINUED | OUTPATIENT
Start: 2025-02-01 | End: 2025-02-08 | Stop reason: HOSPADM

## 2025-02-01 RX ADMIN — HYDROMORPHONE HYDROCHLORIDE 0.5 MG: 1 INJECTION, SOLUTION INTRAMUSCULAR; INTRAVENOUS; SUBCUTANEOUS at 09:00

## 2025-02-01 RX ADMIN — SODIUM CHLORIDE, PRESERVATIVE FREE 40 MG: 5 INJECTION INTRAVENOUS at 11:34

## 2025-02-01 RX ADMIN — SODIUM CHLORIDE, PRESERVATIVE FREE 10 ML: 5 INJECTION INTRAVENOUS at 20:50

## 2025-02-01 RX ADMIN — Medication 1 AMPULE: at 09:01

## 2025-02-01 RX ADMIN — METRONIDAZOLE 500 MG: 500 INJECTION, SOLUTION INTRAVENOUS at 14:42

## 2025-02-01 RX ADMIN — WATER 1000 MG: 1 INJECTION INTRAMUSCULAR; INTRAVENOUS; SUBCUTANEOUS at 14:36

## 2025-02-01 RX ADMIN — SODIUM CHLORIDE, PRESERVATIVE FREE 40 MG: 5 INJECTION INTRAVENOUS at 23:43

## 2025-02-01 RX ADMIN — METRONIDAZOLE 500 MG: 500 INJECTION, SOLUTION INTRAVENOUS at 05:24

## 2025-02-01 RX ADMIN — HEPARIN SODIUM 5000 UNITS: 5000 INJECTION INTRAVENOUS; SUBCUTANEOUS at 05:36

## 2025-02-01 RX ADMIN — MORPHINE SULFATE 2 MG: 2 INJECTION, SOLUTION INTRAMUSCULAR; INTRAVENOUS at 02:41

## 2025-02-01 RX ADMIN — FINASTERIDE 5 MG: 5 TABLET, FILM COATED ORAL at 09:01

## 2025-02-01 RX ADMIN — METRONIDAZOLE 500 MG: 500 INJECTION, SOLUTION INTRAVENOUS at 21:34

## 2025-02-01 RX ADMIN — DEXTROSE AND SODIUM CHLORIDE: 5; 450 INJECTION, SOLUTION INTRAVENOUS at 15:14

## 2025-02-01 RX ADMIN — METRONIDAZOLE 500 MG: 500 INJECTION, SOLUTION INTRAVENOUS at 00:00

## 2025-02-01 RX ADMIN — Medication 1 AMPULE: at 21:35

## 2025-02-01 RX ADMIN — TAMSULOSIN HYDROCHLORIDE 0.4 MG: 0.4 CAPSULE ORAL at 09:01

## 2025-02-01 RX ADMIN — HEPARIN SODIUM 5000 UNITS: 5000 INJECTION INTRAVENOUS; SUBCUTANEOUS at 21:32

## 2025-02-01 RX ADMIN — SODIUM CHLORIDE, PRESERVATIVE FREE 10 ML: 5 INJECTION INTRAVENOUS at 09:01

## 2025-02-01 RX ADMIN — HEPARIN SODIUM 5000 UNITS: 5000 INJECTION INTRAVENOUS; SUBCUTANEOUS at 14:36

## 2025-02-01 ASSESSMENT — PAIN DESCRIPTION - DESCRIPTORS: DESCRIPTORS: DULL

## 2025-02-01 ASSESSMENT — PAIN SCALES - GENERAL
PAINLEVEL_OUTOF10: 7
PAINLEVEL_OUTOF10: 0
PAINLEVEL_OUTOF10: 10

## 2025-02-01 ASSESSMENT — PAIN - FUNCTIONAL ASSESSMENT: PAIN_FUNCTIONAL_ASSESSMENT: ACTIVITIES ARE NOT PREVENTED

## 2025-02-01 ASSESSMENT — PAIN DESCRIPTION - LOCATION
LOCATION: BACK
LOCATION: OTHER (COMMENT)

## 2025-02-01 ASSESSMENT — PAIN DESCRIPTION - ORIENTATION: ORIENTATION: RIGHT

## 2025-02-01 NOTE — PROGRESS NOTES
1943:Bedside and Verbal shift change report given to ED Paris (oncoming nurse) by ED Madrigal (offgoing nurse). Report included the following information Nurse Handoff Report, ED Encounter Summary, Surgery Report, Intake/Output, MAR, Recent Results, Med Rec Status, Cardiac Rhythm NSR, Neuro Assessment, and Event Log.     2000:Shift assessment completed; see flow sheets    2104:CMSU- Selena  TRANSFER - OUT REPORT:    2104:SBAR report given via telephone (CMSU EXT. 0000) to ED Walters on Steve Maharaj  being transferred to CMSU for routine progression of patient care       Report consisted of patient's Situation, Background, Assessment and   Recommendations(SBAR).     Information from the following report(s) Nurse Handoff Report, ED Encounter Summary, Surgery Report, Recent Results, Cardiac Rhythm NSR, and Neuro Assessment was reviewed with the receiving nurse.    2133: Spoke to daughter Siani Guallpa via telephone, notified of patient's transfer to room 2101 on CMSU, gave unit conatct info, and receiving nurse name, daughter verbalized understanding, but expressed she didn't understand why they wouldn't wait to transfer him until the morning.    2152: Patient transferred to Room 2101, via bed by RN x1 PCT x1 on cardiac monitor O2 @ 2LPM, with all belongings. Patient transferred to Step-down unit bed with max assist x 2, receiving nurse informed of patient arrival, and entered patients room.         Lines:   Peripheral IV 01/28/25 Proximal;Right Forearm (Active)   Site Assessment Clean, dry & intact 01/31/25 1600   Line Status Capped;Flushed 01/31/25 1600   Line Care Cap changed 01/31/25 1600   Phlebitis Assessment No symptoms 01/31/25 1600   Infiltration Assessment 0 01/31/25 1600   Alcohol Cap Used Yes 01/31/25 1600   Dressing Status Clean, dry & intact 01/31/25 1600   Dressing Type Transparent 01/31/25 1600       Peripheral IV 01/30/25 Distal;Left;Anterior Antecubital (Active)   Site Assessment Clean, dry & intact

## 2025-02-01 NOTE — PROGRESS NOTES
End of Shift Note    Bedside shift change report given to Carmel RODRIGUEZ (oncoming nurse) by Lenore Magaña RN (offgoing nurse).  Report included the following information SBAR, ED Summary, Intake/Output, MAR, Recent Results, Cardiac Rhythm Sinus tach/rhythm, and Quality Measures    Shift worked:  Day shift 5713-9861     Shift summary and any significant changes:     No significant events this shift.      Concerns for physician to address:  None      Zone phone for oncoming shift:   [phone]       Activity:  Level of Assistance: Maximum assist, patient does 25-49%  Number times ambulated in hallways past shift: 0  Number of times OOB to chair past shift: 0    Cardiac:   Cardiac Monitoring: Yes      Cardiac Rhythm: Sinus rhythm    Access:  Current line(s): PIV  20 G right forearm, PIV 20 G right hand and PIV 20 G left AC    Genitourinary:   Urinary Status: Suprapubic cath    Respiratory:   O2 Device: Nasal cannula  Chronic home O2 use?: NO  Incentive spirometer at bedside: NO    GI:  Last BM (including prior to admit): 01/31/25  Current diet:  Diet NPO  Passing flatus: YES    Pain Management:   Patient states pain is manageable on current regimen: YES    Skin:  Mateusz Scale Score: 13  Interventions: Wound Offloading (Prevention Methods): Bed, pressure reduction mattress, Turning, Pillows, Repositioning    Patient Safety:  Fall Risk: Nursing Judgement-Fall Risk High(Add Comments): Yes  Fall Risk Interventions  Nursing Judgement-Fall Risk High(Add Comments): Yes  Toilet Every 2 Hours-In Advance of Need: Yes  Hourly Visual Checks: In bed, Awake  Fall Visual Posted: Armband, Socks  Room Door Open: Yes  Alarm On: Bed  Patient Moved Closer to Nursing Station: No    Active Consults:   IP CONSULT TO UROLOGY  IP CONSULT TO NEPHROLOGY  IP CONSULT TO GENERAL SURGERY  IP CONSULT TO GI  IP CONSULT TO PHARMACY  IP CONSULT TO GI  IP CONSULT TO INFECTIOUS DISEASES  IP CONSULT TO PALLIATIVE CARE    Length of Stay:  Expected LOS: 9  Actual

## 2025-02-01 NOTE — PROGRESS NOTES
0700: bedside shift report received from night shift nurse.     0800: shift assessment completed.     1040: reassessment completed.    1430: reassessment completed.    1900: bedside shift report given to night shift nurse.

## 2025-02-01 NOTE — PROGRESS NOTES
Spiritual Health History and Assessment/Progress Note  [facility]    Initial Encounter, Loneliness/Social Isolation,  ,  ,      Name: Steve Maharaj MRN: [id number]    Age: 89 y.o.     Sex: male   Language: English   Mandaen: Hinduism   Urinary tract obstruction by kidney stone     Date: 2/1/2025            Total Time Calculated: 15 min              Spiritual Assessment began in [facility] 2 CARDIAC MEDICAL STEP DOWN        Referral/Consult From: Rounding   Encounter Overview/Reason: Initial Encounter, Loneliness/Social Isolation  Service Provided For: Family    Yaritza, Belief, Meaning:   Patient unable to assess at this time  Family/Friends identify as spiritual and are connected with a yaritza tradition or spiritual practice      Importance and Influence:  Patient unable to assess at this time  Family/Friends have spiritual/personal beliefs that influence decisions regarding the patient's health    Community:  Patient Other: unable to assess  Family/Friends are connected with a spiritual community: and feel well-supported. Support system includes: Parent/s, Children, and Friends    Assessment and Plan of Care:     Patient Interventions include: Other: unable to assess  Family/Friends Interventions include: Facilitated expression of thoughts and feelings    Patient Plan of Care: Spiritual Care available upon further referral  Family/Friends Plan of Care: Spiritual Care available upon further referral     visited patient's room, but patient was sleeping.  spoke with patient's daughter and granddaughter, offering active listening and affirmation of feelings.    Electronically signed by Chaplain Miguel on 2/1/2025 at 10:32 AM

## 2025-02-01 NOTE — PROGRESS NOTES
Name: Steve Maharaj   MRN: [id number]  : 1933      Assessment:  ELIAS- 2nd to obstructive uropathy in a solitary kidney + n/v + diuretic     R ureteral stone with mod R hydro- in solitary kidney. 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  Not taking much p.o. as per RN.  Sodium is mild high.  Acidosis persist.        Plan/Recommendations:    Continue hypotonic fluids-D5 half NS at 75 mL/h  Start sodium bicarb 650 mg twice daily  Monitor urine output.      Continue Proscar/Flomax    Avoid nephrotoxins    Subjective:  Resting.  RN at the bedside.  Not taking much p.o.      Exam:  /66   Pulse (!) 106   Temp 98.5 °F (36.9 °C) (Axillary)   Resp 20   Ht 1.829 m (6')   Wt 58.1 kg (128 lb)   SpO2 97%   BMI 17.36 kg/m²     Elderly thin/frail, ill appearing in NAD    No edema    Labs/Data:    Lab Results   Component Value Date    WBC 29.9 (H) 2025    HGB 7.2 (L) 2025    HCT 22.7 (L) 2025    MCV 95.0 2025     2025       Lab Results   Component Value Date/Time     2025 04:07 AM    K 4.1 2025 04:07 AM     2025 04:07 AM    CO2 17 2025 04:07 AM    BUN 43 2025 04:07 AM    CREATININE 2.63 2025 04:07 AM    GLUCOSE 75 2025 04:07 AM    CALCIUM 8.0 2025 04:07 AM    LABGLOM 22 2025 04:07 AM    LABGLOM 41 2024 06:44 AM        Wt Readings from Last 3 Encounters:   25 58.1 kg (128 lb)   24 65.8 kg (145 lb)   24 65.8 kg (145 lb)         Intake/Output Summary (Last 24 hours) at 2025 8155  Last data filed at 2025  1447  Gross per 24 hour   Intake 317.32 ml   Output 1600 ml   Net -1282.68 ml       Patient seen and examined. Chart reviewed. Labs, data and other pertinent notes reviewed in last 24 hrs.    PMH/SH/FH reviewed and unchanged compared to H&P    Discussed with ED Michaud MD  [facility]

## 2025-02-01 NOTE — PLAN OF CARE
Problem: Safety - Adult  Goal: Free from fall injury  Outcome: Progressing  Flowsheets (Taken 2/1/2025 0940)  Free From Fall Injury:   Instruct family/caregiver on patient safety   Based on caregiver fall risk screen, instruct family/caregiver to ask for assistance with transferring infant if caregiver noted to have fall risk factors     Problem: Pain  Goal: Verbalizes/displays adequate comfort level or baseline comfort level  Outcome: Progressing  Flowsheets (Taken 2/1/2025 0715)  Verbalizes/displays adequate comfort level or baseline comfort level:   Encourage patient to monitor pain and request assistance   Assess pain using appropriate pain scale   Consider cultural and social influences on pain and pain management   Notify Licensed Independent Practitioner if interventions unsuccessful or patient reports new pain   Implement non-pharmacological measures as appropriate and evaluate response   Administer analgesics based on type and severity of pain and evaluate response     Problem: Discharge Planning  Goal: Discharge to home or other facility with appropriate resources  Outcome: Progressing  Flowsheets (Taken 2/1/2025 1736)  Discharge to home or other facility with appropriate resources:   Identify barriers to discharge with patient and caregiver   Arrange for needed discharge resources and transportation as appropriate   Identify discharge learning needs (meds, wound care, etc)     Problem: Skin/Tissue Integrity  Goal: Absence of new skin breakdown  Description: 1.  Monitor for areas of redness and/or skin breakdown  2.  Assess vascular access sites hourly  3.  Every 4-6 hours minimum:  Change oxygen saturation probe site  4.  Every 4-6 hours:  If on nasal continuous positive airway pressure, respiratory therapy assess nares and determine need for appliance change or resting period.  Outcome: Progressing     Problem: Respiratory - Adult  Goal: Achieves optimal ventilation and oxygenation  Outcome:    Problem: Safety - Medical Restraint  Goal: Remains free of injury from restraints (Restraint for Interference with Medical Device)  Description: INTERVENTIONS:  1. Determine that other, less restrictive measures have been tried or would not be effective before applying the restraint  2. Evaluate the patient's condition at the time of restraint application  3. Inform patient/family regarding the reason for restraint  4. Q2H: Monitor safety, psychosocial status, comfort, nutrition and hydration  Outcome: Progressing

## 2025-02-01 NOTE — PROGRESS NOTES
Hospitalist Progress Note    NAME:   Steve Maharaj   : 1933   MRN: [id number]     Date/Time: 2025 8:23 PM  Patient PCP: Joseph Ugarte MD    Estimated discharge date:  Barriers:       Assessment / Plan:    Transferred out of ICU 2025  Pt was a rapid response for elevated -190's he required amio bolus, gtt was not needed, rate controlled.     Leukocytosis   WBC peaked to 26.6 from 9.9   Blood cx = Proteus Mirabilis  ID following   - anbx changed to Flagyl and Rocephin   - source unknown ? Urinary   - ID following     R ureteral stone w/ mod hydro   Right PCN by IR on   Hx of left nephrectomy  RCC   - enlarged prostate   - resume Flomax   - resume Proscar     HTN   Electrolyte imbalance   BPH   -vitals per routine  -trend cbc daily     Heme + emesis   Epigastric pain  Dysphagia   -eval by GI KUB  decreased colonic distention  -diet cl liquids advance as tolerated  - noted coughing on ice chips - NPO for now high aspiration risk   - SLP following   - continue IV fluids       SR with PAC's via EKG   - metoprolol IV for HR >130 sustained       Medical Decision Making:   I personally reviewed labs:y  I personally reviewed imaging:y  I personally reviewed EKG:NSR on tele   Toxic drug monitoring: heparin / bleeding   Discussed case with: clinical nurse         Code Status: DNR - palliative has been consulted, plan for home with hospice has been discussed, not confirmed   DVT Prophylaxis:   GI Prophylaxis: protonix     Subjective:     Chief Complaint / Reason for Physician Visit  NO acute events to note, transfer out of ICU  nurse notes coughing after liquids, provider witnessed coughing after ice chips, advised to keep NPO for now, pt was seen by SLP who advanced diet to full liquid \".  Discussed with RN events overnight.       Excerpt: H& P   Steve Maharaj is a 89 y.o.  male with PMHx significant for  has a past medical history of Arthritis, Cancer (HCC),

## 2025-02-02 ENCOUNTER — APPOINTMENT (OUTPATIENT)
Facility: HOSPITAL | Age: 89
DRG: 853 | End: 2025-02-02
Payer: MEDICARE

## 2025-02-02 LAB
ANION GAP SERPL CALC-SCNC: 9 MMOL/L (ref 2–12)
BUN SERPL-MCNC: 38 MG/DL (ref 6–20)
BUN/CREAT SERPL: 15 (ref 12–20)
CALCIUM SERPL-MCNC: 7.8 MG/DL (ref 8.5–10.1)
CHLORIDE SERPL-SCNC: 120 MMOL/L (ref 97–108)
CO2 SERPL-SCNC: 17 MMOL/L (ref 21–32)
CREAT SERPL-MCNC: 2.6 MG/DL (ref 0.7–1.3)
EKG DIAGNOSIS: NORMAL
EKG Q-T INTERVAL: 284 MS
EKG QRS DURATION: 86 MS
EKG QTC CALCULATION (BAZETT): 476 MS
EKG R AXIS: 3 DEGREES
EKG T AXIS: 168 DEGREES
EKG VENTRICULAR RATE: 169 BPM
ERYTHROCYTE [DISTWIDTH] IN BLOOD BY AUTOMATED COUNT: 15 % (ref 11.5–14.5)
GLUCOSE SERPL-MCNC: 101 MG/DL (ref 65–100)
HCT VFR BLD AUTO: 23.4 % (ref 36.6–50.3)
HGB BLD-MCNC: 7.4 G/DL (ref 12.1–17)
MAGNESIUM SERPL-MCNC: 1.7 MG/DL (ref 1.6–2.4)
MCH RBC QN AUTO: 29.7 PG (ref 26–34)
MCHC RBC AUTO-ENTMCNC: 31.6 G/DL (ref 30–36.5)
MCV RBC AUTO: 94 FL (ref 80–99)
NRBC # BLD: 0 K/UL (ref 0–0.01)
NRBC BLD-RTO: 0 PER 100 WBC
PHOSPHATE SERPL-MCNC: 3.1 MG/DL (ref 2.6–4.7)
PLATELET # BLD AUTO: 349 K/UL (ref 150–400)
PMV BLD AUTO: 10.3 FL (ref 8.9–12.9)
POTASSIUM SERPL-SCNC: 3 MMOL/L (ref 3.5–5.1)
RBC # BLD AUTO: 2.49 M/UL (ref 4.1–5.7)
SODIUM SERPL-SCNC: 146 MMOL/L (ref 136–145)
WBC # BLD AUTO: 18.6 K/UL (ref 4.1–11.1)

## 2025-02-02 PROCEDURE — 6360000002 HC RX W HCPCS

## 2025-02-02 PROCEDURE — 6370000000 HC RX 637 (ALT 250 FOR IP): Performed by: INTERNAL MEDICINE

## 2025-02-02 PROCEDURE — 36415 COLL VENOUS BLD VENIPUNCTURE: CPT

## 2025-02-02 PROCEDURE — 6370000000 HC RX 637 (ALT 250 FOR IP): Performed by: STUDENT IN AN ORGANIZED HEALTH CARE EDUCATION/TRAINING PROGRAM

## 2025-02-02 PROCEDURE — 2500000003 HC RX 250 WO HCPCS: Performed by: INTERNAL MEDICINE

## 2025-02-02 PROCEDURE — 6370000000 HC RX 637 (ALT 250 FOR IP): Performed by: HOSPITALIST

## 2025-02-02 PROCEDURE — 2060000000 HC ICU INTERMEDIATE R&B

## 2025-02-02 PROCEDURE — 2500000003 HC RX 250 WO HCPCS: Performed by: STUDENT IN AN ORGANIZED HEALTH CARE EDUCATION/TRAINING PROGRAM

## 2025-02-02 PROCEDURE — 85027 COMPLETE CBC AUTOMATED: CPT

## 2025-02-02 PROCEDURE — 80048 BASIC METABOLIC PNL TOTAL CA: CPT

## 2025-02-02 PROCEDURE — 70551 MRI BRAIN STEM W/O DYE: CPT

## 2025-02-02 PROCEDURE — 99222 1ST HOSP IP/OBS MODERATE 55: CPT | Performed by: PSYCHIATRY & NEUROLOGY

## 2025-02-02 PROCEDURE — 2580000003 HC RX 258: Performed by: NURSE PRACTITIONER

## 2025-02-02 PROCEDURE — 84100 ASSAY OF PHOSPHORUS: CPT

## 2025-02-02 PROCEDURE — 2700000000 HC OXYGEN THERAPY PER DAY

## 2025-02-02 PROCEDURE — 2580000003 HC RX 258

## 2025-02-02 PROCEDURE — 6360000002 HC RX W HCPCS: Performed by: INTERNAL MEDICINE

## 2025-02-02 PROCEDURE — 6360000002 HC RX W HCPCS: Performed by: NURSE PRACTITIONER

## 2025-02-02 PROCEDURE — 83735 ASSAY OF MAGNESIUM: CPT

## 2025-02-02 RX ORDER — OXYCODONE AND ACETAMINOPHEN 5; 325 MG/1; MG/1
0.5 TABLET ORAL EVERY 4 HOURS PRN
Status: DISCONTINUED | OUTPATIENT
Start: 2025-02-02 | End: 2025-02-08 | Stop reason: HOSPADM

## 2025-02-02 RX ORDER — POTASSIUM CHLORIDE 7.45 MG/ML
10 INJECTION INTRAVENOUS
Status: COMPLETED | OUTPATIENT
Start: 2025-02-02 | End: 2025-02-02

## 2025-02-02 RX ORDER — METOPROLOL TARTRATE 25 MG/1
12.5 TABLET, FILM COATED ORAL 2 TIMES DAILY
Status: DISCONTINUED | OUTPATIENT
Start: 2025-02-02 | End: 2025-02-08 | Stop reason: HOSPADM

## 2025-02-02 RX ADMIN — SODIUM CHLORIDE, PRESERVATIVE FREE 40 MG: 5 INJECTION INTRAVENOUS at 09:44

## 2025-02-02 RX ADMIN — DEXTROSE AND SODIUM CHLORIDE: 5; 450 INJECTION, SOLUTION INTRAVENOUS at 05:07

## 2025-02-02 RX ADMIN — HEPARIN SODIUM 5000 UNITS: 5000 INJECTION INTRAVENOUS; SUBCUTANEOUS at 14:22

## 2025-02-02 RX ADMIN — SODIUM BICARBONATE 650 MG: 650 TABLET ORAL at 08:17

## 2025-02-02 RX ADMIN — METRONIDAZOLE 500 MG: 500 INJECTION, SOLUTION INTRAVENOUS at 22:01

## 2025-02-02 RX ADMIN — FINASTERIDE 5 MG: 5 TABLET, FILM COATED ORAL at 08:18

## 2025-02-02 RX ADMIN — HYDROMORPHONE HYDROCHLORIDE 0.5 MG: 1 INJECTION, SOLUTION INTRAMUSCULAR; INTRAVENOUS; SUBCUTANEOUS at 10:11

## 2025-02-02 RX ADMIN — SODIUM CHLORIDE, PRESERVATIVE FREE 10 ML: 5 INJECTION INTRAVENOUS at 22:00

## 2025-02-02 RX ADMIN — HEPARIN SODIUM 5000 UNITS: 5000 INJECTION INTRAVENOUS; SUBCUTANEOUS at 05:51

## 2025-02-02 RX ADMIN — SODIUM CHLORIDE, PRESERVATIVE FREE 10 ML: 5 INJECTION INTRAVENOUS at 08:18

## 2025-02-02 RX ADMIN — METRONIDAZOLE 500 MG: 500 INJECTION, SOLUTION INTRAVENOUS at 05:51

## 2025-02-02 RX ADMIN — WATER 1000 MG: 1 INJECTION INTRAMUSCULAR; INTRAVENOUS; SUBCUTANEOUS at 14:22

## 2025-02-02 RX ADMIN — HYDROMORPHONE HYDROCHLORIDE 0.5 MG: 1 INJECTION, SOLUTION INTRAMUSCULAR; INTRAVENOUS; SUBCUTANEOUS at 02:42

## 2025-02-02 RX ADMIN — METRONIDAZOLE 500 MG: 500 INJECTION, SOLUTION INTRAVENOUS at 14:31

## 2025-02-02 RX ADMIN — SODIUM BICARBONATE 650 MG: 650 TABLET ORAL at 21:58

## 2025-02-02 RX ADMIN — SODIUM CHLORIDE, PRESERVATIVE FREE 40 MG: 5 INJECTION INTRAVENOUS at 21:58

## 2025-02-02 RX ADMIN — HYDROMORPHONE HYDROCHLORIDE 0.5 MG: 1 INJECTION, SOLUTION INTRAMUSCULAR; INTRAVENOUS; SUBCUTANEOUS at 06:58

## 2025-02-02 RX ADMIN — METOPROLOL TARTRATE 12.5 MG: 25 TABLET, FILM COATED ORAL at 21:58

## 2025-02-02 RX ADMIN — Medication 1 AMPULE: at 08:18

## 2025-02-02 RX ADMIN — Medication 1 AMPULE: at 22:07

## 2025-02-02 RX ADMIN — POTASSIUM CHLORIDE 10 MEQ: 7.46 INJECTION, SOLUTION INTRAVENOUS at 09:48

## 2025-02-02 RX ADMIN — HEPARIN SODIUM 5000 UNITS: 5000 INJECTION INTRAVENOUS; SUBCUTANEOUS at 21:58

## 2025-02-02 RX ADMIN — POTASSIUM CHLORIDE 10 MEQ: 7.46 INJECTION, SOLUTION INTRAVENOUS at 10:57

## 2025-02-02 ASSESSMENT — PAIN DESCRIPTION - PAIN TYPE: TYPE: ACUTE PAIN

## 2025-02-02 ASSESSMENT — PAIN SCALES - GENERAL
PAINLEVEL_OUTOF10: 10
PAINLEVEL_OUTOF10: 8
PAINLEVEL_OUTOF10: 8

## 2025-02-02 ASSESSMENT — PAIN DESCRIPTION - DESCRIPTORS: DESCRIPTORS: ACHING;DULL

## 2025-02-02 ASSESSMENT — PAIN - FUNCTIONAL ASSESSMENT
PAIN_FUNCTIONAL_ASSESSMENT: PREVENTS OR INTERFERES SOME ACTIVE ACTIVITIES AND ADLS
PAIN_FUNCTIONAL_ASSESSMENT: ACTIVITIES ARE NOT PREVENTED
PAIN_FUNCTIONAL_ASSESSMENT: PREVENTS OR INTERFERES SOME ACTIVE ACTIVITIES AND ADLS

## 2025-02-02 ASSESSMENT — PAIN DESCRIPTION - LOCATION
LOCATION: HIP
LOCATION: FLANK
LOCATION: FLANK

## 2025-02-02 ASSESSMENT — PAIN DESCRIPTION - ORIENTATION
ORIENTATION: RIGHT

## 2025-02-02 NOTE — PROGRESS NOTES
End of Shift Note    Bedside shift change report given to Carmel (oncoming nurse) by Lenore Magaña RN (offgoing nurse).  Report included the following information SBAR, Intake/Output, MAR, Recent Results, Cardiac Rhythm Sinus rhythm/tach, and Quality Measures    Shift worked:  Day shift [id number]     Shift summary and any significant changes:     PIV not working, two new PIV placed. Heart rate was in the 200's, IV metoprolol given and two runs of K+. Pt pushed out rectal tube.     Concerns for physician to address:  none     Zone phone for oncoming shift:   [phone]       Activity:  Level of Assistance: Maximum assist, patient does 25-49%  Number times ambulated in hallways past shift: 0  Number of times OOB to chair past shift: 0    Cardiac:   Cardiac Monitoring: Yes      Cardiac Rhythm: Sinus rhythm    Access:  Current line(s): PIV  20 G Left AC and PIV 22 G Left hand    Genitourinary:   Urinary Status: Draining    Respiratory:   O2 Device: Nasal cannula  Chronic home O2 use?: NO  Incentive spirometer at bedside: NO    GI:  Last BM (including prior to admit): 02/02/25  Current diet:  ADULT DIET; Clear Liquid  ADULT ORAL NUTRITION SUPPLEMENT; AM Snack, PM Snack, HS Snack; Standard High Calorie/High Protein Oral Supplement  Passing flatus: YES    Pain Management:   Patient states pain is manageable on current regimen: YES    Skin:  Mateusz Scale Score: 13  Interventions: Wound Offloading (Prevention Methods): Bed, pressure reduction mattress, Turning, Pillows, Repositioning    Patient Safety:  Fall Risk: Nursing Judgement-Fall Risk High(Add Comments): Yes  Fall Risk Interventions  Nursing Judgement-Fall Risk High(Add Comments): Yes  Toilet Every 2 Hours-In Advance of Need: Yes  Hourly Visual Checks: In bed, Awake  Fall Visual Posted: Armband, Socks  Room Door Open: Yes  Alarm On: Bed  Patient Moved Closer to Nursing Station: No    Active Consults:   IP CONSULT TO UROLOGY  IP CONSULT TO NEPHROLOGY  IP CONSULT TO  GENERAL SURGERY  IP CONSULT TO GI  IP CONSULT TO PHARMACY  IP CONSULT TO GI  IP CONSULT TO INFECTIOUS DISEASES  IP CONSULT TO PALLIATIVE CARE  IP CONSULT TO CASE MANAGEMENT  IP CONSULT TO NEUROLOGY    Length of Stay:  Expected LOS: 12  Actual LOS: 10    Lenore Magaña RN

## 2025-02-02 NOTE — PROGRESS NOTES
-Please complete MRI History and Safety Screening Form.    - Patient cannot be scanned until this form is completed and reviewed in MRI to ensure patient is SAFE and eligible for MRI.  - CALL MRI when this has been successfully completed at [phone].

## 2025-02-02 NOTE — PLAN OF CARE
Problem: Safety - Adult  Goal: Free from fall injury  2/2/2025 0917 by Lenore Magaña RN  Outcome: Progressing  Flowsheets (Taken 2/2/2025 0733)  Free From Fall Injury:   Instruct family/caregiver on patient safety   Based on caregiver fall risk screen, instruct family/caregiver to ask for assistance with transferring infant if caregiver noted to have fall risk factors  2/2/2025 0412 by Carmel Arroyo RN  Outcome: Progressing     Problem: Pain  Goal: Verbalizes/displays adequate comfort level or baseline comfort level  2/2/2025 0917 by Lenore Magaña RN  Outcome: Progressing  2/2/2025 0412 by Carmel Arroyo RN  Outcome: Progressing     Problem: Discharge Planning  Goal: Discharge to home or other facility with appropriate resources  2/2/2025 0917 by Lenore Magaña RN  Outcome: Progressing  Flowsheets (Taken 2/2/2025 0730)  Discharge to home or other facility with appropriate resources:   Identify barriers to discharge with patient and caregiver   Arrange for needed discharge resources and transportation as appropriate   Identify discharge learning needs (meds, wound care, etc)  2/2/2025 0412 by Carmel Arroyo RN  Outcome: Progressing     Problem: Skin/Tissue Integrity  Goal: Absence of new skin breakdown  Description: 1.  Monitor for areas of redness and/or skin breakdown  2.  Assess vascular access sites hourly  3.  Every 4-6 hours minimum:  Change oxygen saturation probe site  4.  Every 4-6 hours:  If on nasal continuous positive airway pressure, respiratory therapy assess nares and determine need for appliance change or resting period.  2/2/2025 0917 by Lenore Magaña RN  Outcome: Progressing  2/2/2025 0412 by Carmel Arroyo RN  Outcome: Progressing     Problem: Respiratory - Adult  Goal: Achieves optimal ventilation and oxygenation  2/2/2025 0917 by Lenore Magaña RN  Outcome: Progressing  Flowsheets (Taken 2/2/2025 0730)  Achieves optimal ventilation and oxygenation:   Assess for changes in  retention:   Assess patient’s ability to void and empty bladder   Monitor intake/output and perform bladder scan as needed   Place urinary catheter per Licensed Independent Practitioner order if needed  2/2/2025 0412 by Carmel Arroyo RN  Outcome: Progressing  Goal: Urinary catheter remains patent  2/2/2025 0917 by Lenore Magaña RN  Outcome: Progressing  Flowsheets (Taken 2/2/2025 0730)  Urinary catheter remains patent:   Assess patency of urinary catheter   Irrigate catheter per Licensed Independent Practitioner order if indicated and notify Licensed Independent Practitioner if unable to irrigate   Assess need for a larger catheter size or a 3-way catheter for continuous bladder irrigation  2/2/2025 0412 by Carmel Arroyo RN  Outcome: Progressing     Problem: Infection - Adult  Goal: Absence of infection at discharge  2/2/2025 0917 by Lenore Magaña RN  Outcome: Progressing  Flowsheets (Taken 2/2/2025 0730)  Absence of infection at discharge:   Assess and monitor for signs and symptoms of infection   Monitor lab/diagnostic results   Monitor all insertion sites i.e., indwelling lines, tubes and drains  2/2/2025 0412 by Carmel Arroyo RN  Outcome: Progressing  Goal: Absence of infection during hospitalization  Outcome: Progressing  Flowsheets (Taken 2/2/2025 0730)  Absence of infection during hospitalization:   Assess and monitor for signs and symptoms of infection   Monitor lab/diagnostic results   Monitor all insertion sites i.e., indwelling lines, tubes and drains  Goal: Absence of fever/infection during anticipated neutropenic period  Outcome: Progressing  Flowsheets (Taken 2/2/2025 0730)  Absence of fever/infection during anticipated neutropenic period:   Monitor white blood cell count   Administer growth factors as ordered   Implement neutropenic guidelines     Problem: Hematologic - Adult  Goal: Maintains hematologic stability  Outcome: Progressing  Flowsheets (Taken 2/2/2025 0730)  Maintains

## 2025-02-02 NOTE — PLAN OF CARE
Problem: Safety - Adult  Goal: Free from fall injury  2/2/2025 0412 by Carmel Arroyo RN  Outcome: Progressing  2/1/2025 1635 by Lenore Magaña RN  Outcome: Progressing  Flowsheets (Taken 2/1/2025 0940)  Free From Fall Injury:   Instruct family/caregiver on patient safety   Based on caregiver fall risk screen, instruct family/caregiver to ask for assistance with transferring infant if caregiver noted to have fall risk factors     Problem: Pain  Goal: Verbalizes/displays adequate comfort level or baseline comfort level  2/2/2025 0412 by Carmel Arroyo RN  Outcome: Progressing  2/1/2025 1635 by Lenore Magaña RN  Outcome: Progressing  Flowsheets (Taken 2/1/2025 0715)  Verbalizes/displays adequate comfort level or baseline comfort level:   Encourage patient to monitor pain and request assistance   Assess pain using appropriate pain scale   Consider cultural and social influences on pain and pain management   Notify Licensed Independent Practitioner if interventions unsuccessful or patient reports new pain   Implement non-pharmacological measures as appropriate and evaluate response   Administer analgesics based on type and severity of pain and evaluate response     Problem: Discharge Planning  Goal: Discharge to home or other facility with appropriate resources  2/2/2025 0412 by Carmel Arroyo RN  Outcome: Progressing  2/1/2025 1635 by Lenore Magaña RN  Outcome: Progressing  Flowsheets (Taken 2/1/2025 0857)  Discharge to home or other facility with appropriate resources:   Identify barriers to discharge with patient and caregiver   Arrange for needed discharge resources and transportation as appropriate   Identify discharge learning needs (meds, wound care, etc)     Problem: Skin/Tissue Integrity  Goal: Absence of new skin breakdown  Description: 1.  Monitor for areas of redness and/or skin breakdown  2.  Assess vascular access sites hourly  3.  Every 4-6 hours minimum:  Change oxygen saturation probe  hospitalization:   Assess and monitor for signs and symptoms of infection   Monitor all insertion sites i.e., indwelling lines, tubes and drains   Monitor lab/diagnostic results  Goal: Absence of fever/infection during anticipated neutropenic period  2/1/2025 1635 by Lenore Magaña RN  Outcome: Progressing  Flowsheets (Taken 2/1/2025 0857)  Absence of fever/infection during anticipated neutropenic period:   Monitor white blood cell count   Implement neutropenic guidelines   Administer growth factors as ordered     Problem: Hematologic - Adult  Goal: Maintains hematologic stability  2/1/2025 1635 by Lenore Magaña RN  Outcome: Progressing  Flowsheets (Taken 2/1/2025 0857)  Maintains hematologic stability:   Assess for signs and symptoms of bleeding or hemorrhage   Monitor labs for bleeding or clotting disorders   Administer blood products/factors as ordered     Problem: ABCDS Injury Assessment  Goal: Absence of physical injury  2/1/2025 1635 by Lenore Magaña RN  Outcome: Progressing  Flowsheets (Taken 2/1/2025 0940)  Absence of Physical Injury: Implement safety measures based on patient assessment     Problem: Nutrition Deficit:  Goal: Optimize nutritional status  2/1/2025 1635 by Lenore Magaña RN  Outcome: Progressing     Problem: Safety - Medical Restraint  Goal: Remains free of injury from restraints (Restraint for Interference with Medical Device)  Description: INTERVENTIONS:  1. Determine that other, less restrictive measures have been tried or would not be effective before applying the restraint  2. Evaluate the patient's condition at the time of restraint application  3. Inform patient/family regarding the reason for restraint  4. Q2H: Monitor safety, psychosocial status, comfort, nutrition and hydration  2/1/2025 1635 by Lenore Magaña RN  Outcome: Progressing

## 2025-02-02 NOTE — CARE COORDINATION
Transition of Care Plan:    RUR: 16% (moderate RUR)  Prior Level of Functioning: Requiring assistance with most activities of daily living (ADLs).  Disposition: [facility] (bed pending)  REYES: 2/2/2025  If SNF or IPR: Date FOC offered: 1/27  Date FOC received: 1/27 awaiting 4 other choices.   Accepting facility: [facility] Phone: [phone] [id number]  Date authorization started with reference number: N/A - 3NS needed  Date authorization received and expires: N/A  Follow up appointments: defer to SNF.  DME needed: defer to SNF.  Transportation at discharge: [facility] needed   IM/IMM Medicare/ letter given: 2nd IM needed prior to discharge.  Is patient a [place] and connected with [facility]? No.   If yes, was [facility] transfer form completed and [facility] notified? N/A  Caregiver Contact: Faby Guallpa - [place] - [phone]  Discharge Caregiver contacted prior to discharge? CM to contact, if needed.  Care Conference needed? no  Barriers to discharge: MRI, nephro/neuro/hepatology clearance      MELI AdamsN, RN    Care Management  [phone]

## 2025-02-02 NOTE — CONSULTS
CONSULT - Neurology      Name:  Steve Maharaj       MRN: [id number]  Location: 2101/01    Date: 2/2/2025  Time:  4:00 PM        Chief Complaint:   Chief Complaint   Patient presents with    Malaise     Pt arrives via EMS from home for family's concern that for the last 5 days pt has been lethargic, N/V, not eating or drinking. Family is concerned about dehydration. Pt has a carpenter in place upon arrival. For EMS pt is A&Ox4.        HPI:  It is a great pleasure to see Steve Maharaj, a 89 y.o. male today in the hospital . Briefly these are the events happened as per the chart H&P and taken from the chart. The patient was doing fine and the symptoms started with AMS. He  has had pain primarily in his right lower quadrant and has generally not been feeling well.  Neurology was consulted for the evaluation of AMS. Poor historian  PAST MEDICAL HISTORY:  Past Medical History:   Diagnosis Date    Arthritis     Cancer (HCC) 04/2019    renal    Hypertension     Prostate enlargement      PAST SURGICAL HISTORY:    Past Surgical History:   Procedure Laterality Date    COLONOSCOPY N/A 1/30/2025    COLONOSCOPY DIAGNOSTIC performed by Doris Amaral MD at [facility] ENDOSCOPY    CYSTOSCOPY Right 1/22/2025    CYSTOSCOPY performed by Jerson Padilla III, MD at [facility] MAIN OR    IR GUIDED NEPHROSTOMY CATH PLACEMENT RIGHT  1/23/2025    IR GUIDED NEPHROSTOMY CATH PLACEMENT RIGHT 1/23/2025 [facility] RAD ANGIO IR    ORTHOPEDIC SURGERY Right 2008    hip replacement/[facility]/Dr Bowman    PRE-MALIGNANT / BENIGN SKIN LESION EXCISION  07/13/2018    excision of left flank      FAMILY HISTORY:    Family History   Problem Relation Age of Onset    Hypertension Mother     Cancer Daughter         Breast ca - stage 1     SOCIAL HISTORY:   Social History     Tobacco Use    Smoking status: Never    Smokeless tobacco: Never   Substance Use Topics    Alcohol use: No      ALLERGIES:   Allergies   Allergen Reactions    Bee Venom      Other Reaction(s): SWELLING

## 2025-02-02 NOTE — PROGRESS NOTES
Name: Steve Maharaj   MRN: [id number]  : 1933      Assessment:  ELIAS- 2nd to obstructive uropathy in a solitary kidney + n/v + diuretic     R ureteral stone with mod R hydro- in solitary kidney. 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 due to HTN/Solitary kidney  BPH    Hypernatremia    ELIAS is improving- Cr stalled at 2.6.  Not taking much p.o. as per RN.  Sodium is mild high.  Acidosis persist.        Plan/Recommendations:  Continue hypotonic fluids-D5 half NS at 75 mL/h, until able to take po  Ct sodium bicarb 650 mg twice daily  Monitor urine output.    Continue Proscar/Flomax  Avoid nephrotoxins    Subjective:  Resting. Not taking much po    Exam:  /70   Pulse 99   Temp 98.6 °F (37 °C) (Axillary)   Resp 22   Ht 1.829 m (6')   Wt 58.1 kg (128 lb)   SpO2 94%   BMI 17.36 kg/m²     Elderly thin/frail, ill appearing in NAD  No edema    Labs/Data:    Lab Results   Component Value Date    WBC 18.6 (H) 2025    HGB 7.4 (L) 2025    HCT 23.4 (L) 2025    MCV 94.0 2025     2025       Lab Results   Component Value Date/Time     2025 03:15 AM    K 3.0 2025 03:15 AM     2025 03:15 AM    CO2 17 2025 03:15 AM    BUN 38 2025 03:15 AM    CREATININE 2.60 2025 03:15 AM    GLUCOSE 101 2025 03:15 AM    CALCIUM 7.8 2025 03:15 AM    LABGLOM 23 2025 03:15 AM    LABGLOM 41 2024 06:44 AM        Wt Readings from Last 3 Encounters:   25 58.1 kg (128 lb)   24 65.8 kg (145 lb)   24 65.8 kg (145 lb)         Intake/Output Summary (Last 24 hours) at 2025 1620  Last data filed at 2025

## 2025-02-02 NOTE — PROGRESS NOTES
0700: bedside shift report received from night shift nurse. Shift assessment completed.     0800: pt's heart rate went up to the 200's, provider notified. PIV occluded. New PIV placed. Metoprolol given IV. Heart rate improved to 90's. Provider notified that pt cannot stay wake long enough to give oral medications.     1030: reassessment completed.     1330: pt was able to drink water without coughing. Refused any of his lunch.     1430: reassessment completed.     1600: neuro consult contacted via [facility]wolf.    1615: MRI screening form completed with daughter (Faby Guallpa). Called down to MRI to inform them that the screening form has been completed.     1730: pt pushed out rectal tube, provider notified.     1803: went down with transport via stretcher to MRI.    1900: pt returned to room. Bedside shift report given to night shift nurse.

## 2025-02-02 NOTE — PROGRESS NOTES
Hospitalist Progress Note        Demographics    Patient Name  Steve Maharaj   Date of Birth 6/13/1933   Medical Record Number  [id number]      Age  89 y.o.   PCP Joseph Ugarte MD   Admit date:  1/22/2025  6:32 PM     Room Number  2101/01  @ [facility]           Admission Diagnoses:  Urinary tract obstruction by kidney stone   Admission Summary:  Steve Maharaj is a 89 y.o.  male with PMHx significant for  has a past medical history of Arthritis, Cancer (HCC), Hypertension, and Prostate enlargement. who presents with the above chief complaint.      We were asked to admit for work up and further evaluation.      Patient is difficult historian, suspect may have some degree of underlying memory deficit however he is also extremely hard of hearing.  He tells me that he has noticed over the past several days that he has had pain primarily in his right lower quadrant and has generally not been feeling well.  He denies any fevers or chills.  Denies any nausea or vomiting.     Further history was obtained from discussion with the ED provider who had discussed with patient's family.  They note that over the past several days patient had been complaining of nausea and vomiting and they had noticed decreased p.o. intake.  Brought him to the emergency department as they were concerned that he was dehydrated.     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  He had a previous nephrectomy.  He was found to have an obstructing ureteral stone and went to the OR emergently with urology.     Hospital Medicine: Attestation of advanced practitioner note    Additional Recommendations:   This patient's clinical data and care plan were discussed with Case discussed with Ms. Romero Jones CNP  Agree with current plan of care       Patient is lying in bed in no apparent distress       The patient was seen and  Completed Specimen: Blood Updated: 01/24/25 1114     Accession Number [id number]     Enterococcus faecalis by PCR Not detected        Enterococcus faecium by PCR Not detected        Listeria monocytogenes by PCR Not detected        STAPHYLOCOCCUS Not detected        Staphylococcus Aureus Not detected        Staphylococcus epidermidis by PCR Not detected        Staphylococcus lugdunensis by PCR Not detected        STREPTOCOCCUS Not detected        Streptococcus agalactiae (Group B) Not detected        Strep pneumoniae Not detected        Strep pyogenes,(Grp. A) Not detected        Acinetobacter calcoac baumannii complex by PCR Not detected        Bacteroides fragilis by PCR Not detected        Enterobacteriaceae by PCR Detected        Enterobacter cloacae complex by PCR Not detected        Escherichia Coli Not detected        Klebsiella aerogenes by PCR Not detected        Klebsiella oxytoca by PCR Not detected        Klebsiella pneumoniae group by PCR Not detected        Proteus by PCR Detected        Salmonella species by PCR Not detected        Serratia marcescens by PCR Not detected        Haemophilus Influenzae by PCR Not detected        Neisseria meningitidis by PCR Not detected        Pseudomonas aeruginosa Not detected        Stenotrophomonas maltophilia by PCR Not detected        Candida albicans by PCR Not detected        Candida auris by PCR Not detected        Candida glabrata Not detected        Candida krusei by PCR Not detected        Candida parapsilosis by PCR Not detected        Candida tropicalis by PCR Not detected        Cryptococcus neoformans/gattii by PCR Not detected        Resistant gene targets          Resistant gene ctx-m by PCR Not detected        Resistant gene imp by PCR Not detected        KPC (Carbapenem resistance gene) Not detected        Resistant gene ndm by PCR Not detected        Resistant gene oxa-48-like by pcr Not detected        Resistant gene vim by PCR Not detected

## 2025-02-02 NOTE — PROGRESS NOTES
End of Shift Note    Bedside shift change report given to Lenore (oncoming nurse) by Carmel Arroyo RN (offgoing nurse).  Report included the following information SBAR, Kardex, Intake/Output, MAR, and Recent Results    Shift worked:  1531-9180     Shift summary and any significant changes:     No significant events overnight. Pt c/o pain in flank around nephrostomy site. Family meeting planned Monday per palliative note. Pt continuously asking for water, educated on NPO status; mouth swabbed with sponges/water/mouth moisturizer.     Concerns for physician to address:  none     Zone phone for oncoming shift:          Activity:  Level of Assistance: Maximum assist, patient does 25-49%  Number times ambulated in hallways past shift: 0  Number of times OOB to chair past shift: 0    Cardiac:   Cardiac Monitoring: Yes      Cardiac Rhythm: Sinus tachy    Access:  Current line(s): PIV     Genitourinary:   Urinary Status: Draining (R nephrostomy tube)    Respiratory:   O2 Device: Nasal cannula  Chronic home O2 use?: NO  Incentive spirometer at bedside: YES    GI:  Last BM (including prior to admit): 02/01/25  Current diet:  Diet NPO  Passing flatus: YES    Pain Management:   Patient states pain is manageable on current regimen: YES    Skin:  Mateusz Scale Score: 13  Interventions: Wound Offloading (Prevention Methods): Bed, pressure reduction mattress, Elevate heels, Pillows, Repositioning, Turning    Patient Safety:  Fall Risk: Nursing Judgement-Fall Risk High(Add Comments): Yes  Fall Risk Interventions  Nursing Judgement-Fall Risk High(Add Comments): Yes  Toilet Every 2 Hours-In Advance of Need: Yes  Hourly Visual Checks: Eyes closed, In bed, Quiet  Fall Visual Posted: Fall sign posted, Socks, Armband  Room Door Open: Yes  Alarm On: Bed  Patient Moved Closer to Nursing Station: No    Active Consults:   IP CONSULT TO UROLOGY  IP CONSULT TO NEPHROLOGY  IP CONSULT TO GENERAL SURGERY  IP CONSULT TO GI  IP CONSULT TO

## 2025-02-03 ENCOUNTER — TELEPHONE (OUTPATIENT)
Age: 89
End: 2025-02-03

## 2025-02-03 PROBLEM — I48.0 PAF (PAROXYSMAL ATRIAL FIBRILLATION) (HCC): Status: ACTIVE | Noted: 2025-02-03

## 2025-02-03 LAB
ANION GAP SERPL CALC-SCNC: 8 MMOL/L (ref 2–12)
BASOPHILS # BLD: 0.04 K/UL (ref 0–0.1)
BASOPHILS NFR BLD: 0.3 % (ref 0–1)
BUN SERPL-MCNC: 36 MG/DL (ref 6–20)
BUN/CREAT SERPL: 15 (ref 12–20)
CALCIUM SERPL-MCNC: 7.7 MG/DL (ref 8.5–10.1)
CHLORIDE SERPL-SCNC: 122 MMOL/L (ref 97–108)
CO2 SERPL-SCNC: 18 MMOL/L (ref 21–32)
CREAT SERPL-MCNC: 2.39 MG/DL (ref 0.7–1.3)
DIFFERENTIAL METHOD BLD: ABNORMAL
EKG ATRIAL RATE: 65 BPM
EKG DIAGNOSIS: NORMAL
EKG P AXIS: 42 DEGREES
EKG P-R INTERVAL: 166 MS
EKG Q-T INTERVAL: 424 MS
EKG QRS DURATION: 98 MS
EKG QTC CALCULATION (BAZETT): 440 MS
EKG R AXIS: 30 DEGREES
EKG T AXIS: 23 DEGREES
EKG VENTRICULAR RATE: 65 BPM
EOSINOPHIL # BLD: 0.09 K/UL (ref 0–0.4)
EOSINOPHIL NFR BLD: 0.6 % (ref 0–7)
ERYTHROCYTE [DISTWIDTH] IN BLOOD BY AUTOMATED COUNT: 15 % (ref 11.5–14.5)
GLUCOSE SERPL-MCNC: 82 MG/DL (ref 65–100)
HCT VFR BLD AUTO: 23.4 % (ref 36.6–50.3)
HGB BLD-MCNC: 7.1 G/DL (ref 12.1–17)
IMM GRANULOCYTES # BLD AUTO: 0.24 K/UL (ref 0–0.04)
IMM GRANULOCYTES NFR BLD AUTO: 1.6 % (ref 0–0.5)
IRON SATN MFR SERPL: 12 % (ref 20–50)
IRON SERPL-MCNC: 15 UG/DL (ref 35–150)
LYMPHOCYTES # BLD: 1.31 K/UL (ref 0.8–3.5)
LYMPHOCYTES NFR BLD: 8.7 % (ref 12–49)
MCH RBC QN AUTO: 29.6 PG (ref 26–34)
MCHC RBC AUTO-ENTMCNC: 30.3 G/DL (ref 30–36.5)
MCV RBC AUTO: 97.5 FL (ref 80–99)
MONOCYTES # BLD: 0.79 K/UL (ref 0–1)
MONOCYTES NFR BLD: 5.3 % (ref 5–13)
NEUTS SEG # BLD: 12.53 K/UL (ref 1.8–8)
NEUTS SEG NFR BLD: 83.5 % (ref 32–75)
NRBC # BLD: 0 K/UL (ref 0–0.01)
NRBC BLD-RTO: 0 PER 100 WBC
PLATELET # BLD AUTO: 345 K/UL (ref 150–400)
PMV BLD AUTO: 10.3 FL (ref 8.9–12.9)
POTASSIUM SERPL-SCNC: 3.1 MMOL/L (ref 3.5–5.1)
RBC # BLD AUTO: 2.4 M/UL (ref 4.1–5.7)
SODIUM SERPL-SCNC: 148 MMOL/L (ref 136–145)
TIBC SERPL-MCNC: 129 UG/DL (ref 250–450)
WBC # BLD AUTO: 15 K/UL (ref 4.1–11.1)

## 2025-02-03 PROCEDURE — 6360000002 HC RX W HCPCS

## 2025-02-03 PROCEDURE — 2580000003 HC RX 258

## 2025-02-03 PROCEDURE — 99233 SBSQ HOSP IP/OBS HIGH 50: CPT

## 2025-02-03 PROCEDURE — 2500000003 HC RX 250 WO HCPCS: Performed by: STUDENT IN AN ORGANIZED HEALTH CARE EDUCATION/TRAINING PROGRAM

## 2025-02-03 PROCEDURE — 6370000000 HC RX 637 (ALT 250 FOR IP): Performed by: STUDENT IN AN ORGANIZED HEALTH CARE EDUCATION/TRAINING PROGRAM

## 2025-02-03 PROCEDURE — 6370000000 HC RX 637 (ALT 250 FOR IP): Performed by: INTERNAL MEDICINE

## 2025-02-03 PROCEDURE — 2060000000 HC ICU INTERMEDIATE R&B

## 2025-02-03 PROCEDURE — 80048 BASIC METABOLIC PNL TOTAL CA: CPT

## 2025-02-03 PROCEDURE — 36415 COLL VENOUS BLD VENIPUNCTURE: CPT

## 2025-02-03 PROCEDURE — 85025 COMPLETE CBC W/AUTO DIFF WBC: CPT

## 2025-02-03 PROCEDURE — 6360000002 HC RX W HCPCS: Performed by: INTERNAL MEDICINE

## 2025-02-03 PROCEDURE — 99233 SBSQ HOSP IP/OBS HIGH 50: CPT | Performed by: INTERNAL MEDICINE

## 2025-02-03 PROCEDURE — 83540 ASSAY OF IRON: CPT

## 2025-02-03 PROCEDURE — 93005 ELECTROCARDIOGRAM TRACING: CPT | Performed by: INTERNAL MEDICINE

## 2025-02-03 PROCEDURE — 92526 ORAL FUNCTION THERAPY: CPT

## 2025-02-03 PROCEDURE — 83550 IRON BINDING TEST: CPT

## 2025-02-03 PROCEDURE — 2700000000 HC OXYGEN THERAPY PER DAY

## 2025-02-03 PROCEDURE — 2500000003 HC RX 250 WO HCPCS: Performed by: INTERNAL MEDICINE

## 2025-02-03 PROCEDURE — 6360000002 HC RX W HCPCS: Performed by: NURSE PRACTITIONER

## 2025-02-03 RX ORDER — FLUTICASONE PROPIONATE 50 MCG
1 SPRAY, SUSPENSION (ML) NASAL DAILY PRN
Status: DISCONTINUED | OUTPATIENT
Start: 2025-02-03 | End: 2025-02-07

## 2025-02-03 RX ORDER — ACETAMINOPHEN 325 MG/1
650 TABLET ORAL EVERY 8 HOURS
Status: DISCONTINUED | OUTPATIENT
Start: 2025-02-03 | End: 2025-02-08 | Stop reason: HOSPADM

## 2025-02-03 RX ADMIN — HEPARIN SODIUM 5000 UNITS: 5000 INJECTION INTRAVENOUS; SUBCUTANEOUS at 21:30

## 2025-02-03 RX ADMIN — FINASTERIDE 5 MG: 5 TABLET, FILM COATED ORAL at 08:40

## 2025-02-03 RX ADMIN — SODIUM CHLORIDE, PRESERVATIVE FREE 40 MG: 5 INJECTION INTRAVENOUS at 10:37

## 2025-02-03 RX ADMIN — METOPROLOL TARTRATE 12.5 MG: 25 TABLET, FILM COATED ORAL at 08:41

## 2025-02-03 RX ADMIN — METRONIDAZOLE 500 MG: 500 INJECTION, SOLUTION INTRAVENOUS at 05:35

## 2025-02-03 RX ADMIN — METRONIDAZOLE 500 MG: 500 INJECTION, SOLUTION INTRAVENOUS at 14:52

## 2025-02-03 RX ADMIN — TAMSULOSIN HYDROCHLORIDE 0.4 MG: 0.4 CAPSULE ORAL at 08:40

## 2025-02-03 RX ADMIN — SODIUM CHLORIDE, PRESERVATIVE FREE 10 ML: 5 INJECTION INTRAVENOUS at 21:30

## 2025-02-03 RX ADMIN — ACETAMINOPHEN 650 MG: 325 TABLET ORAL at 08:40

## 2025-02-03 RX ADMIN — HEPARIN SODIUM 5000 UNITS: 5000 INJECTION INTRAVENOUS; SUBCUTANEOUS at 14:51

## 2025-02-03 RX ADMIN — METRONIDAZOLE 500 MG: 500 INJECTION, SOLUTION INTRAVENOUS at 21:40

## 2025-02-03 RX ADMIN — SODIUM CHLORIDE, PRESERVATIVE FREE 40 MG: 5 INJECTION INTRAVENOUS at 22:52

## 2025-02-03 RX ADMIN — ACETAMINOPHEN 650 MG: 325 TABLET ORAL at 10:58

## 2025-02-03 RX ADMIN — WATER 1000 MG: 1 INJECTION INTRAMUSCULAR; INTRAVENOUS; SUBCUTANEOUS at 14:51

## 2025-02-03 RX ADMIN — METOPROLOL TARTRATE 12.5 MG: 25 TABLET, FILM COATED ORAL at 21:29

## 2025-02-03 RX ADMIN — ACETAMINOPHEN 650 MG: 325 TABLET ORAL at 18:41

## 2025-02-03 RX ADMIN — HYDROMORPHONE HYDROCHLORIDE 0.5 MG: 1 INJECTION, SOLUTION INTRAMUSCULAR; INTRAVENOUS; SUBCUTANEOUS at 01:17

## 2025-02-03 RX ADMIN — SODIUM BICARBONATE 650 MG: 650 TABLET ORAL at 08:40

## 2025-02-03 RX ADMIN — SODIUM BICARBONATE 650 MG: 650 TABLET ORAL at 21:29

## 2025-02-03 ASSESSMENT — PAIN SCALES - GENERAL
PAINLEVEL_OUTOF10: 0
PAINLEVEL_OUTOF10: 3
PAINLEVEL_OUTOF10: 0
PAINLEVEL_OUTOF10: 0
PAINLEVEL_OUTOF10: 10

## 2025-02-03 ASSESSMENT — PAIN SCALES - PAIN ASSESSMENT IN ADVANCED DEMENTIA (PAINAD): BREATHING: NORMAL

## 2025-02-03 ASSESSMENT — PAIN DESCRIPTION - LOCATION: LOCATION: FLANK

## 2025-02-03 ASSESSMENT — PAIN DESCRIPTION - DESCRIPTORS: DESCRIPTORS: SHARP

## 2025-02-03 ASSESSMENT — PAIN DESCRIPTION - ORIENTATION: ORIENTATION: RIGHT

## 2025-02-03 ASSESSMENT — PAIN SCALES - WONG BAKER: WONGBAKER_NUMERICALRESPONSE: NO HURT

## 2025-02-03 NOTE — PROGRESS NOTES
HOSPITAL NEUROLOGY NOTE     Chief Complaint   Patient presents with    Malaise     Pt arrives via EMS from home for family's concern that for the last 5 days pt has been lethargic, N/V, not eating or drinking. Family is concerned about dehydration. Pt has a carpenter in place upon arrival. For EMS pt is A&Ox4.         HPI  Steve Maharaj is a 89 y.o. male  with a history of hypertension, BPH & RCC s/p left nephrectomy, who presented to the ED on 1/22/2025 with lethargy, nausea, vomiting and found to have acute renal failure with significant worsening in the setting of solitary kidney from renal cell carcinoma and right obstructing ureteral stone.  He was taken to the OR for cystoscopy with right ureteral stent placement which was unsuccessful and patient underwent right PCN placement with IR.  Patient was admitted for possible sepsis.    His hospitalization that complicated with multiple rapid responses due to tachycardia with a heart rate in the 150s and tachypnea.  Labs showed lactic acidosis with increasing leukocytosis, concerning for worsening sepsis.  He had a blood culture on 1/22/2025 which showed positive Proteus mirabilis, on antibiotics.  ID following.    There was also concern for recurrent ileus/SBO.  NG tube was placed.  CT of the abdomen and pelvis on 1/30/2025 showed no evidence of obstruction.  GI following.    On 2/2/2025, neurology was consulted for altered mental status.  Patient was seen by Dr. Gilbert.    Head CT without contrast from 1/29/2025 showed no acute intracranial abnormality.  Moderate calcification of the vertebrobasilar system.  Empty sella.    Brain MRI without contrast from 2/2/2025 showed mild chronic microvascular ischemic change and mild to moderate cerebral atrophy. There is no intracranial mass, hemorrhage or evidence of acute infarction. No acute intracranial process is demonstrated.         INTERIM DATA: At the time of my evaluation this morning, patient was awake, alert, and

## 2025-02-03 NOTE — PROGRESS NOTES
Name: Steve Maharaj   MRN: [id number]  : 1933      Assessment:  ELIAS- 2nd to obstructive uropathy in a solitary kidney + n/v + diuretic     R ureteral stone with mod R hydro- in solitary kidney. 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 due to HTN/Solitary kidney  BPH    Hypernatremia    Cr 2.6 to 2.4.  Not taking much p.o. as per RN. Loose stools.  Sodium is mild high, now 148.  Acidosis persist.        Plan/Recommendations:    Continue hypotonic fluids-increase rate  Ct sodium bicarb 650 mg twice daily  Monitor urine output.    Continue Proscar/Flomax  Avoid nephrotoxins      Subjective:  Resting. Not taking much po. [place]. Palliative and family discussing goc.    Exam:  BP (!) 150/74   Pulse 83   Temp 98.2 °F (36.8 °C) (Axillary)   Resp 23   Ht 1.829 m (6')   Wt 58.1 kg (128 lb)   SpO2 92%   BMI 17.36 kg/m²     Elderly thin/frail, ill appearing in NAD  No edema    Labs/Data:    Lab Results   Component Value Date    WBC 15.0 (H) 2025    HGB 7.1 (L) 2025    HCT 23.4 (L) 2025    MCV 97.5 2025     2025       Lab Results   Component Value Date/Time     2025 01:30 AM    K 3.1 2025 01:30 AM     2025 01:30 AM    CO2 18 2025 01:30 AM    BUN 36 2025 01:30 AM    CREATININE 2.39 2025 01:30 AM    GLUCOSE 82 2025 01:30 AM    CALCIUM 7.7 2025 01:30 AM    LABGLOM 25 2025 01:30 AM    LABGLOM 41 2024 06:44 AM        Wt Readings from Last 3 Encounters:   25 58.1 kg (128 lb)   24 65.8 kg (145 lb)   24 65.8 kg (145 lb)         Intake/Output Summary (Last 24 hours) at 2/3/2025 1642  Last

## 2025-02-03 NOTE — PROGRESS NOTES
End of Shift Note    Bedside shift change report given to ED Walsh (oncoming nurse) by Carmel Trevizo RN (offgoing nurse).  Report included the following information SBAR, Kardex, ED Summary, Intake/Output, MAR, and Recent Results    Shift worked:  Night      Shift summary and any significant changes:     Multiple loose BM's, Q2 turns. Pt needs PRN Dilaudid x1 overnight. Pt remains on 2L NC     Concerns for physician to address:       Zone phone for oncoming shift:            Carmel Trevizo RN

## 2025-02-03 NOTE — CONSULTS
[facility]  [address], [place] [address] [phone]  [email or web address]      Cardiology Progress Note      2/3/2025 10:42 AM    Admit Date: 1/22/2025    Admit Diagnosis:   Urinary tract obstruction by kidney stone [N20.0, N13.8]  Acute renal failure, unspecified acute renal failure type (HCC) [N17.9]  Renal stone [N20.0]    Subjective:     Steve Maharaj     Pt poorly communicative.  Denies SOB, CP    /76   Pulse 93   Temp 98.5 °F (36.9 °C) (Oral)   Resp 28   Ht 1.829 m (6')   Wt 58.1 kg (128 lb)   SpO2 97%   BMI 17.36 kg/m²     Current Facility-Administered Medications   Medication Dose Route Frequency    metoprolol tartrate (LOPRESSOR) tablet 12.5 mg  12.5 mg Oral BID    oxyCODONE-acetaminophen (PERCOCET) 5-325 MG per tablet 0.5 tablet  0.5 tablet Oral Q4H PRN    sodium bicarbonate tablet 650 mg  650 mg Oral BID    dextrose 5 % solution   IntraVENous Continuous    cefTRIAXone (ROCEPHIN) 1,000 mg in sterile water 10 mL IV syringe  1,000 mg IntraVENous Q24H    metroNIDAZOLE (FLAGYL) 500 mg in 0.9% NaCl 100 mL IVPB premix  500 mg IntraVENous q8h    metoprolol (LOPRESSOR) injection 2.5 mg  2.5 mg IntraVENous Q8H PRN    HYDROmorphone HCl PF (DILAUDID) injection 0.5 mg  0.5 mg IntraVENous Q4H PRN    heparin (porcine) injection 5,000 Units  5,000 Units SubCUTAneous 3 times per day    alcohol 62% (NOZIN) nasal  1 ampule  1 ampule Nasal Q12H    sodium chloride flush 0.9 % injection 5-40 mL  5-40 mL IntraVENous PRN    simethicone (MYLICON) 40 MG/0.6ML suspension drops 40 mg  40 mg Oral PRN    sodium chloride flush 0.9 % injection 5-40 mL  5-40 mL IntraVENous 2 times per day    sodium chloride flush 0.9 % injection 5-40 mL  5-40 mL IntraVENous PRN    0.9 % sodium chloride infusion   IntraVENous PRN    ondansetron (ZOFRAN) injection 4 mg  4 mg IntraVENous Q6H PRN    acetaminophen (TYLENOL) tablet 650 mg  650 mg Oral Q6H PRN    Or

## 2025-02-03 NOTE — PROGRESS NOTES
unable to tell me  Frequency: constant       NVPS:       RDOS:         Vital Signs: Blood pressure 125/76, pulse 93, temperature 98.5 °F (36.9 °C), temperature source Oral, resp. rate 28, height 1.829 m (6'), weight 58.1 kg (128 lb), SpO2 97%.    PHYSICAL ASSESSMENT:   General: [] Oriented x3  [] Well appearing  [] Intubated  [x]Ill appearing  []Other:frail , alert , hard of hearing .  Mental Status: [] Normal mental status exam  [] Drowsy  [x] Confused  []Other:  Cardiovascular: [x] Regular rate/rhythm  [] Arrhythmia  [] Other:  Chest: [x] Effort normal  []Lungs clear  [] Respiratory distress  []Tachypnea  [] Other:  Abdomen: [] Soft/non-tender  [] Normal appearance  [] Distended  [] Ascites  [] Other:  Neurological: [] Normal speech  [] Normal sensation  []Deficits present:  Extremity: [] Normal skin color/temp  [] Clubbing/cyanosis  [x] No edema  [] Other:    Wt Readings from Last 15 Encounters:   01/27/25 58.1 kg (128 lb)   07/28/24 65.8 kg (145 lb)   04/07/24 65.8 kg (145 lb)   09/09/23 65.8 kg (145 lb 1 oz)   09/08/23 65.8 kg (145 lb)        Current Diet: ADULT DIET; Clear Liquid  ADULT ORAL NUTRITION SUPPLEMENT; AM Snack, PM Snack, HS Snack; Standard High Calorie/High Protein Oral Supplement       PSYCHOSOCIAL/SPIRITUAL SCREENING:   Palliative IDT has assessed this patient for cultural preferences / practices and a referral made as appropriate to needs (Cultural Services, Patient Advocacy, Ethics, etc.)    Spiritual Affiliation: Orthodox    Any spiritual / Protestant concerns:  [] Yes /  [x] No   If \"Yes\" to discuss with pastoral care during IDT     Does caregiver feel burdened by caring for their loved one:   [] Yes /  [x] No /  [] No Caregiver Present/Available [] No Caregiver [] Pt Lives at Facility  If \"Yes\" to discuss with social work during IDT    Anticipatory grief assessment:   [x] Normal  / [] Maladaptive     If \"Maladaptive\" to discuss with social work during IDT    ESAS Anxiety: Anxiety Score:  2    ESAS Depression:          LAB AND IMAGING FINDINGS:   Objective data reviewed:  labs, images, records, medication use, vitals, and chart     FINAL COMMENTS   Thank you for allowing Palliative Medicine to participate in the care of Steve Maharaj.    Only check if applicable and billing time based rather than MDM  [x] The total encounter time on this service date was __80__ minutes which was spent performing a face-to-face encounter and personally completing the provider-level activities documented in the note. This includes time spent prior to the visit and after the visit in direct care of the patient. This time does not include time spent in any separately reportable services.    Electronically signed by   Kelley Noe MD  Palliative Care Team  on 2/3/2025 at 11:44 AM

## 2025-02-03 NOTE — PROGRESS NOTES
0842- perfect serve dr. Lopez. Hr fluctuating between 80's-160's.   0905- perfect serve dr. Lopez. Hr 150's.EKG performed.   0910- no answer from any perfectserves. Rapid response called for afib rvr.   0911- dr. Lopez at bedside. Hr fluctuating between 80's-170's. Md aware. He sustaining 90's majority of time.

## 2025-02-03 NOTE — TELEPHONE ENCOUNTER
Hello,    Can this patient be scheduled for hospital follow-up in 4 to 8 weeks with any other neurology providers?    Thanks,  Misbah

## 2025-02-03 NOTE — PROGRESS NOTES
Palliative Medicine  Per H&P: Steve Shrestha is a 89-year-old male with a PMH of HTN, BPH & RCC s/p left nephrectomy who presents to [facility] ED on  with lethargy, nausea, and vomiting found to have right obstructing ureteral stone.  Taken to the OR for cystoscopy and right ureteral stent placement was unsuccessful so patient underwent right PCN placement with IR.  Patient was admitted to internal medicine service for further management of sepsis. On hospital day 8 patient had multiple rapid responses initiated for tachycardia with heart rate into the 150s and tachypnea.  Laboratory workup during rapid response demonstrates lactic acidosis (3) but normal anion gap with alkalotic ABG without hypoxia and increasing leukocytosis concerning for worsening sepsis.  IV antibiotics broadened and ICU consulted for transfer.  Goals of care addressed per primary team today and patient is confirmed to be DNR/DNI.    Code Status: DNR    Advance Care Plannin/2/2025     4:50 PM   Demographics   Marital Status    No AMD on file. Spouse is legal nok so primary HCDM. Family makes decisions together when patient is unable. Oldest daughter \"Sinai\" is main contact.    Patient / Family Encounter Documentation    Participants (names): Steve Maharaj, confused, anxious, [place], daughter Faby (\"Sinai\"), granddaughter Sparkle, (by phone spouse Kareen and daughter April), Dr. Noe, Kellie Wong, [facility]    Narrative:   --Palliative team reviewed chart, checked in with assigned nurse, Carie, donned PPE, and entered to meet with patient and family as scheduled.  --Dr. Ellis and Dr. Hernandez each stopped by to give update at bedside.   --Patient was alert to self and family, could say he was in the hospital and reported he has pain in his hip and he hasn't been able to sleep. He also asked for ice water and fruit (grapes, tangerines, cantelope). He is on liquid diet so has some grape juice. He was unable to engage meaningfully

## 2025-02-03 NOTE — CARE COORDINATION
Transition of Care Plan:    RUR: 19% (moderate RUR)  Prior Level of Functioning: Requiring assistance with most activities of daily living (ADLs).  Disposition: [facility] (bed pending), then return home afterwards  REYES: 2/5/2025  If SNF or IPR: Date FOC offered: 1/27  Date FOC received: 1/27 awaiting 4 other choices.   Accepting facility: [facility] Phone: [phone] [place] [address]  Date authorization started with reference number: N/A - 3NS needed  Date authorization received and expires: N/A  Follow up appointments: defer to SNF.  DME needed: defer to SNF.  Transportation at discharge: [facility] needed   IM/IMM Medicare/ letter given: 2nd IM needed prior to discharge.  Is patient a  and connected with [facility]? No.   If yes, was [place] transfer form completed and [facility] notified? N/A  Caregiver Contact: Kareen Maharaj - spouse - [phone]  Discharge Caregiver contacted prior to discharge? CM to contact, if needed.  Care Conference needed? no  Barriers to discharge: nephro/neuro/hepatology clearance, nephro/rectal tubes    0907 - RR called.    1316 - Consult received for \"help with reviewing long term care insurance policy to see if he qualifies for long term care placement/care giver help.\"  CM Manager notified of consult; CM is not aware of how to navigate LTC placement through LTC insurance policies but family can be provided with LTC/CG list.  Patient does not appear to have coverage for LTC/CGs.    1626 - CM attempted to contact patient's spouse via cell phone; unable to reach anyone at this time and unable to leave .          Carmel Finch, MELIN, RN    Care Management  [phone]

## 2025-02-03 NOTE — PROGRESS NOTES
Comprehensive Nutrition Assessment    Type and Reason for Visit:  Reassess    Nutrition Recommendations/Plan:   Continue diet per SLP  If goals are to remain aggressive, may consider initiation of nutrition support  Please document % meals and supplements consumed in flowsheet I/O's under intake      Malnutrition Assessment:  Malnutrition Status:  Severe malnutrition (01/30/25 1448)    Context:  Acute Illness     Findings of the 6 clinical characteristics of malnutrition:  Energy Intake:  50% or less of estimated energy requirements for 5 or more days  Weight Loss:   (11.7% wt loss but unsure of time frame and reliability of current wt in chart)     Body Fat Loss:  Unable to assess     Muscle Mass Loss:  Moderate muscle mass loss Temples (temporalis), Clavicles (pectoralis & deltoids)  Fluid Accumulation:  No fluid accumulation     Strength:  Not Performed    Nutrition Assessment:     Chart reviewed. Unable to visit pt at bedside today despite multiple attempts. SLP is following and recommending a cautious clear liquid diet with aspiration risk. Palliative note indicates family is considering hospice if he does not improve. No escalation of care to ICU, but no mention of nutrition support. Will continue monitoring.     Patient Vitals for the past 120 hrs:   PO Meals Eaten (%)   01/29/25 1740 1 - 25%     Wt Readings from Last 5 Encounters:   01/27/25 58.1 kg (128 lb)   07/28/24 65.8 kg (145 lb)   04/07/24 65.8 kg (145 lb)   09/09/23 65.8 kg (145 lb 1 oz)   09/08/23 65.8 kg (145 lb)   ]    Nutrition Related Findings:    Labs: Na 148, K 3.1 ,Cr 2.39, BUN 36, Hgb 7.1.   Meds: Rocephin, Flagyl, Protonix, Na bicarb, D5 Dilaudid.   BM 2/2.   Wound Type: None       Current Nutrition Intake & Therapies:    Average Meal Intake: Unable to assess  Average Supplements Intake: None Ordered  ADULT DIET; Clear Liquid  ADULT ORAL NUTRITION SUPPLEMENT; AM Snack, PM Snack, HS Snack; Standard High Calorie/High Protein Oral  details…

## 2025-02-03 NOTE — PLAN OF CARE
Speech LAnguage Pathology TREATMENT    Patient: Steve Maharaj (89 y.o. male)  Date: 2/3/2025  Primary Diagnosis: Urinary tract obstruction by kidney stone [N20.0, N13.8]  Acute renal failure, unspecified acute renal failure type (HCC) [N17.9]  Renal stone [N20.0]  Procedure(s) (LRB):  COLONOSCOPY DIAGNOSTIC (N/A) 4 Days Post-Op   Precautions:                     ASSESSMENT :  Patient has been on CLD since 1/31. RRT called this morning for atrial fibrillation.   Patient sleeping on arrival to room with daughter and grand-daughter present. Patient with low, hoarse vocal quality. He was agreeable to sips of juice and water; however, with successive sips, immediate, weak coughing noted. Slight improvement with single straw sips when pulled away to reduce bolus size.   Discussed questionable tolerance with liquids and typically how next step would be to discuss instrumental testing. Grand-daughter (who is a nurse at [employer]) agreed that patient not stable to go down for MBS or for fees at bedside. Family in agreement to cautiously continue PO while knowing increased prandial and post prandial aspiration risk. Patient went into Afib during session with HR reaching 180 briefly before returning to 1-teens.     Family met with Palliative Care this morning and note patient DNR/DNI and will consider Hospice if no improvement made.   Patient will benefit from skilled intervention to address the above impairments.     PLAN :  Recommendations and Planned Interventions:  Diet: Cautiously continue on clear liquids, while accepting of aspiration risk  SINGLE straw sips- pull away or pinch straw to limit to bolus size  Will follow along pending medical condition and goals of care     Recommend next SLP session: swallow up pending medical condition, ?trial full liquids - more so for pleasure if GI ok with advancement    Acute SLP Services: Yes, SLP will continue to follow per plan of care.  Discharge Recommendations: Continue to assess  away)                 After treatment:   Patient left in no apparent distress in bed, Call bell left within reach, Nursing notified, and Caregiver present    COMMUNICATION/EDUCATION:   Patient was educated regarding high aspiration risk.  He demonstrated Guarded understanding as evidenced by Verbalizing understanding.  family present at bedside and verbalized understanding to education provided.     The patient's plan of care including recommendations, planned interventions, and recommended diet changes were discussed with: Registered nurse    Patient/family agree to work toward stated goals and plan of care    Thank you,  Cherelle Kwok, SLP    SLP Individual Minutes  Time In: 1150  Time Out: 1205  Minutes: 15    Problem: SLP Adult - Impaired Swallowing  Goal: By Discharge: Advance to least restrictive diet without signs or symptoms of aspiration for planned discharge setting.  See evaluation for individualized goals.  Description: Speech pathology goals initiated 1/29/2025   1. Patient will tolerate least restrictive diet free of s/s aspiration within 7 days   Outcome: Not Progressing

## 2025-02-03 NOTE — PROGRESS NOTES
Brief follow-up note, full note to follow.    Patient is alert, oriented to place and person, he is confused complaining of right hip pain and loss of sleep, not able to comprehend his medical issues unable to make medical decisions.    Family meeting with patient daughter Faby\"Sinai\" and granddaughter Sparkle who is a nurse,  in person, and patient wife Kareen/legal next of kin over the phone.    We reviewed his big picture, during our visit cardiologist and nephrologist visited the patient to give their input.    At this point goals are:  Continue with current level of care.   if he decompensate do not escalate care to ICU no pressors, no central line, do not attempt resuscitation, DO NOT INTUBATE.    Family may consider hospice if he does not improve.  I would schedule Tylenol 650 mg every 8 hours for right chronic right hip pain.  Delirium: Consider low-dose Seroquel 12.5 mg every 6 hours as needed for agitation/restlessness.    Will continue the dialogue with the family and support them through this difficult course of decision.  His wife Kareen who is the legal next of kin would like patient daughter \" Faby\"and granddaughter Sparkle to be the spokesperson..    Thank you for for allowing us to be part of patient care.

## 2025-02-03 NOTE — PROGRESS NOTES
Bedside and Verbal shift change report given to kelesy zeng  (oncoming nurse) by kelsey eckert  (offgoing nurse). Report included the following information Nurse Handoff Report, Index, Adult Overview, Intake/Output, MAR, Recent Results, Med Rec Status, Cardiac Rhythm afib rvr, Alarm Parameters, Quality Measures, Neuro Assessment, and Event Log.

## 2025-02-03 NOTE — PROGRESS NOTES
Infectious Disease Progress        Impression    Proteus bacteremia  Blood culture 1/22+ for P mirabilis 1/4-LH/LFA  Negative repeat culture 1/25  Treated.  ?  Urinary source.No available UA, urine culture    Right hydronephrosis  Obstructing right ureteral stone  S/p  R/percutaneous nephrostomy tube placement by IR 1/23.    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      Will sign off  Please call with questions, concerns.    Abx  Zosyn-1/22-1/31  Vancomycin 1/29, 1/30  Metronidazole 1/31-  Flagyl-1/31    Extensive review of chart notes, labs, imaging, cultures done  Additionally review of done: Recent reports-Labs, cultures, imaging  D/w -hospitalist, RN  Steve JHA Carol Ann is seen for bacteremia and worsening sepsis  Steve Shrestha is a 89-year-old male with a PMH of HTN, BPH & RCC s/p left nephrectomy who presents to [facility] ED on 1/22 with lethargy, nausea, and vomiting found to have right obstructing ureteral stone.  Taken to the OR for cystoscopy and right ureteral stent placement was unsuccessful so patient underwent right PCN placement with IR.  Patient was admitted to internal medicine service for further management of sepsis.   On hospital day 8 patient had multiple rapid responses initiated for tachycardia with heart rate into the 150s and tachypnea.  Laboratory workup during rapid response demonstrates lactic acidosis (3) but normal anion gap  There were no immediate complications and patient tolerated the procedure well.     Successful placement of 10.2 Somali right percutaneous nephrostomy tube.  Electronically signed by Lefty Hartman    CT ABDOMEN PELVIS WO CONTRAST Additional Contrast? None    Result Date: 1/22/2025  EXAM: CT ABDOMEN PELVIS WO CONTRAST INDICATION: RLQ pain COMPARISON: 4/17/2019 IV CONTRAST: None. ORAL CONTRAST: None TECHNIQUE: Thin axial images were obtained through the abdomen and pelvis. Coronal and sagittal reformats were generated. CT dose reduction was achieved through use of a standardized protocol tailored for this examination and automatic exposure control for dose modulation. The absence of intravenous contrast material reduces the sensitivity for evaluation of the vasculature and solid organs. FINDINGS: LOWER THORAX: Coronary calcifications LIVER: No mass. BILIARY TREE: Gallbladder is within normal limits. CBD is not dilated. SPLEEN: within normal limits. PANCREAS: No focal abnormality. ADRENALS: Unremarkable. KIDNEYS/URETERS: Left nephrectomy. There is right-sided hydronephrosis and proximal hydroureter with a 5 mm stone in the mid right ureter. Periureteral inflammation is noted extending into the pelvis. There are hypodense and hyperdense lesions in the right kidney as well as nonobstructing stones. STOMACH: Unremarkable. SMALL BOWEL: No dilatation or wall thickening. COLON: No dilatation or wall thickening. APPENDIX: Not visualized PERITONEUM: No ascites or pneumoperitoneum. RETROPERITONEUM: Fluid in the right retroperitoneum REPRODUCTIVE ORGANS: Unremarkable URINARY BLADDER: Murphy cath in the urinary bladder BONES: No destructive bone lesion. ABDOMINAL WALL: No mass or hernia. ADDITIONAL COMMENTS: N/A     1. There is a 5 mm obstructing stone in the right ureter. There is fluid tracking posterior to the right ureter into the right retroperitoneum. Findings may represent rupture of the ureter and urinoma formation.

## 2025-02-03 NOTE — PROGRESS NOTES
End of Shift Note    Bedside shift change report given to ED Allen (oncoming nurse) by Carie Guardado RN (offgoing nurse).  Report included the following information SBAR, Kardex, Intake/Output, MAR, Recent Results, and Cardiac Rhythm Afib    Shift worked:  [id number]     Shift summary and any significant changes:    1159 - Made Dr. Garvin aware of HR jumped up to 180 but did not sustain, quickly returned to 109.   1600 - Gospel music playing on computer for patient comfort.      Concerns for physician to address:       Zone phone for oncoming shift:          Activity:  Level of Assistance: Maximum assist, patient does 25-49%  Number times ambulated in hallways past shift: 0  Number of times OOB to chair past shift: 0    Cardiac:   Cardiac Monitoring: Yes      Cardiac Rhythm: Sinus rhythm    Access:  Current line(s): PIV     Genitourinary:   Urinary Status:  (nephrostomy)    Respiratory:   O2 Device: Nasal cannula  Chronic home O2 use?: NO  Incentive spirometer at bedside: N/A    GI:  Last BM (including prior to admit): 02/03/25  Current diet:  ADULT DIET; Clear Liquid  ADULT ORAL NUTRITION SUPPLEMENT; AM Snack, PM Snack, HS Snack; Standard High Calorie/High Protein Oral Supplement  ADULT ORAL NUTRITION SUPPLEMENT; Breakfast, Dinner; Clear Liquid Oral Supplement  Passing flatus: YES    Pain Management:   Patient states pain is manageable on current regimen: YES    Skin:  Mateusz Scale Score: 13  Interventions: Wound Offloading (Prevention Methods): Bed, pressure reduction mattress, Elevate heels, Repositioning, Turning    Patient Safety:  Fall Risk: Nursing Judgement-Fall Risk High(Add Comments): Yes  Fall Risk Interventions  Nursing Judgement-Fall Risk High(Add Comments): Yes  Toilet Every 2 Hours-In Advance of Need: Yes  Hourly Visual Checks: In bed, Awake  Fall Visual Posted: Armband, Socks  Room Door Open: Yes  Alarm On: Bed  Patient Moved Closer to Nursing Station: No    Active Consults:   IP CONSULT TO

## 2025-02-03 NOTE — SIGNIFICANT EVENT
[facility]  RAPID RESPONSE TEAM   Rapid Response NOTE       Overhead rapid response called to room # 2101/01  @ 0907 for patient Steve Maharaj , MRN# [id number]      S- Reason for rapid response: Afib with RVR   Per primary RN:   Background: patient admitted for kidney stone, IR placed PCN 1/23. Pt has solitary kidney.      O/A- Focused Assessment:   Cardiac- sinus on arrival, HR 90s, sbp 120s, stable pulses.afebrile.   Respiratory- 2LNC, denies sob, no apparent respiratory distress.  Nuero- awake, unable to see well due to cataract per pt and daughter at bedside.   GI/- PCN present small amount of urine/dark present.   MSK/Skin- able to move all extremities  Lines/drain- PIV  Labs/chart reviewed-  Yes.  K 3.1, nephro following- defer to nephro for electrolyte replacement due to ELIAS.  White count improving.     Interventions:      Dr. Garvin at bedside, orders received for the following Interventions:   - EKG - afib with rvr- no ischemic changes- confirmed by MD  - Consult cardiology- ? might need increase to low pressor  - nephro on board.   - ECHO- pending       P- Outcome:   Patient stable, remained in the same unit.     Patient Disposition:   Patient transferred to higher level of care following RRT?: No.         Thank you for this RRT call. Patient/family education provided as applicable.   Care Collaborated with primary RN, CRN, RT, and attending MD.   Please refer to the flowsheet for detailed vital signs during this event.   See MD notes for details and plan of care.     Code Status: DNR   Attending MD: Wellington Garvin MD Ali, BSN, RN, CCRN, TCRN  Rapid Response Team  Ext [phone]

## 2025-02-04 LAB
ANION GAP SERPL CALC-SCNC: 9 MMOL/L (ref 2–12)
BASOPHILS # BLD: 0.05 K/UL (ref 0–0.1)
BASOPHILS NFR BLD: 0.5 % (ref 0–1)
BUN SERPL-MCNC: 34 MG/DL (ref 6–20)
BUN/CREAT SERPL: 14 (ref 12–20)
CALCIUM SERPL-MCNC: 7.2 MG/DL (ref 8.5–10.1)
CHLORIDE SERPL-SCNC: 121 MMOL/L (ref 97–108)
CO2 SERPL-SCNC: 18 MMOL/L (ref 21–32)
CREAT SERPL-MCNC: 2.37 MG/DL (ref 0.7–1.3)
DIFFERENTIAL METHOD BLD: ABNORMAL
EOSINOPHIL # BLD: 0.11 K/UL (ref 0–0.4)
EOSINOPHIL NFR BLD: 1 % (ref 0–7)
ERYTHROCYTE [DISTWIDTH] IN BLOOD BY AUTOMATED COUNT: 15.2 % (ref 11.5–14.5)
GLUCOSE BLD STRIP.AUTO-MCNC: 112 MG/DL (ref 65–117)
GLUCOSE SERPL-MCNC: 73 MG/DL (ref 65–100)
HCT VFR BLD AUTO: 20.3 % (ref 36.6–50.3)
HGB BLD-MCNC: 6.4 G/DL (ref 12.1–17)
IMM GRANULOCYTES # BLD AUTO: 0.1 K/UL (ref 0–0.04)
IMM GRANULOCYTES NFR BLD AUTO: 0.9 % (ref 0–0.5)
LYMPHOCYTES # BLD: 1.1 K/UL (ref 0.8–3.5)
LYMPHOCYTES NFR BLD: 10.3 % (ref 12–49)
MCH RBC QN AUTO: 30 PG (ref 26–34)
MCHC RBC AUTO-ENTMCNC: 31.5 G/DL (ref 30–36.5)
MCV RBC AUTO: 95.3 FL (ref 80–99)
MONOCYTES # BLD: 0.67 K/UL (ref 0–1)
MONOCYTES NFR BLD: 6.3 % (ref 5–13)
NEUTS SEG # BLD: 8.67 K/UL (ref 1.8–8)
NEUTS SEG NFR BLD: 81 % (ref 32–75)
NRBC # BLD: 0 K/UL (ref 0–0.01)
NRBC BLD-RTO: 0 PER 100 WBC
PLATELET # BLD AUTO: 350 K/UL (ref 150–400)
PMV BLD AUTO: 10.1 FL (ref 8.9–12.9)
POTASSIUM SERPL-SCNC: 2.8 MMOL/L (ref 3.5–5.1)
PROCALCITONIN SERPL-MCNC: 1.25 NG/ML
RBC # BLD AUTO: 2.13 M/UL (ref 4.1–5.7)
RBC MORPH BLD: ABNORMAL
RBC MORPH BLD: ABNORMAL
SERVICE CMNT-IMP: NORMAL
SODIUM SERPL-SCNC: 148 MMOL/L (ref 136–145)
WBC # BLD AUTO: 10.7 K/UL (ref 4.1–11.1)

## 2025-02-04 PROCEDURE — 6370000000 HC RX 637 (ALT 250 FOR IP): Performed by: INTERNAL MEDICINE

## 2025-02-04 PROCEDURE — 97535 SELF CARE MNGMENT TRAINING: CPT

## 2025-02-04 PROCEDURE — 6370000000 HC RX 637 (ALT 250 FOR IP): Performed by: STUDENT IN AN ORGANIZED HEALTH CARE EDUCATION/TRAINING PROGRAM

## 2025-02-04 PROCEDURE — 97116 GAIT TRAINING THERAPY: CPT

## 2025-02-04 PROCEDURE — 80048 BASIC METABOLIC PNL TOTAL CA: CPT

## 2025-02-04 PROCEDURE — 2700000000 HC OXYGEN THERAPY PER DAY

## 2025-02-04 PROCEDURE — 2580000003 HC RX 258

## 2025-02-04 PROCEDURE — 6360000002 HC RX W HCPCS: Performed by: INTERNAL MEDICINE

## 2025-02-04 PROCEDURE — 92526 ORAL FUNCTION THERAPY: CPT

## 2025-02-04 PROCEDURE — 85025 COMPLETE CBC W/AUTO DIFF WBC: CPT

## 2025-02-04 PROCEDURE — 2500000003 HC RX 250 WO HCPCS: Performed by: STUDENT IN AN ORGANIZED HEALTH CARE EDUCATION/TRAINING PROGRAM

## 2025-02-04 PROCEDURE — 2500000003 HC RX 250 WO HCPCS: Performed by: INTERNAL MEDICINE

## 2025-02-04 PROCEDURE — 2060000000 HC ICU INTERMEDIATE R&B

## 2025-02-04 PROCEDURE — 97530 THERAPEUTIC ACTIVITIES: CPT

## 2025-02-04 PROCEDURE — 6360000002 HC RX W HCPCS

## 2025-02-04 PROCEDURE — 84145 PROCALCITONIN (PCT): CPT

## 2025-02-04 PROCEDURE — 36415 COLL VENOUS BLD VENIPUNCTURE: CPT

## 2025-02-04 PROCEDURE — 82962 GLUCOSE BLOOD TEST: CPT

## 2025-02-04 PROCEDURE — 2580000003 HC RX 258: Performed by: INTERNAL MEDICINE

## 2025-02-04 RX ORDER — POTASSIUM CHLORIDE, DEXTROSE MONOHYDRATE 150; 5 MG/100ML; G/100ML
INJECTION, SOLUTION INTRAVENOUS CONTINUOUS
Status: DISCONTINUED | OUTPATIENT
Start: 2025-02-04 | End: 2025-02-04

## 2025-02-04 RX ADMIN — METRONIDAZOLE 500 MG: 500 INJECTION, SOLUTION INTRAVENOUS at 22:07

## 2025-02-04 RX ADMIN — SODIUM CHLORIDE, PRESERVATIVE FREE 10 ML: 5 INJECTION INTRAVENOUS at 09:08

## 2025-02-04 RX ADMIN — ACETAMINOPHEN 650 MG: 325 TABLET ORAL at 03:47

## 2025-02-04 RX ADMIN — HEPARIN SODIUM 5000 UNITS: 5000 INJECTION INTRAVENOUS; SUBCUTANEOUS at 21:53

## 2025-02-04 RX ADMIN — SODIUM CHLORIDE, PRESERVATIVE FREE 40 MG: 5 INJECTION INTRAVENOUS at 21:55

## 2025-02-04 RX ADMIN — METRONIDAZOLE 500 MG: 500 INJECTION, SOLUTION INTRAVENOUS at 14:44

## 2025-02-04 RX ADMIN — HEPARIN SODIUM 5000 UNITS: 5000 INJECTION INTRAVENOUS; SUBCUTANEOUS at 06:29

## 2025-02-04 RX ADMIN — OXYCODONE HYDROCHLORIDE AND ACETAMINOPHEN 0.5 TABLET: 5; 325 TABLET ORAL at 21:53

## 2025-02-04 RX ADMIN — ACETAMINOPHEN 650 MG: 325 TABLET ORAL at 18:22

## 2025-02-04 RX ADMIN — POTASSIUM CHLORIDE: 2 INJECTION, SOLUTION, CONCENTRATE INTRAVENOUS at 16:52

## 2025-02-04 RX ADMIN — METOPROLOL TARTRATE 12.5 MG: 25 TABLET, FILM COATED ORAL at 09:05

## 2025-02-04 RX ADMIN — SODIUM BICARBONATE 650 MG: 650 TABLET ORAL at 21:54

## 2025-02-04 RX ADMIN — TAMSULOSIN HYDROCHLORIDE 0.4 MG: 0.4 CAPSULE ORAL at 09:04

## 2025-02-04 RX ADMIN — METRONIDAZOLE 500 MG: 500 INJECTION, SOLUTION INTRAVENOUS at 06:32

## 2025-02-04 RX ADMIN — METOPROLOL TARTRATE 12.5 MG: 25 TABLET, FILM COATED ORAL at 21:54

## 2025-02-04 RX ADMIN — WATER 2000 MG: 1 INJECTION INTRAMUSCULAR; INTRAVENOUS; SUBCUTANEOUS at 14:36

## 2025-02-04 RX ADMIN — FINASTERIDE 5 MG: 5 TABLET, FILM COATED ORAL at 09:04

## 2025-02-04 RX ADMIN — ACETAMINOPHEN 650 MG: 325 TABLET ORAL at 12:42

## 2025-02-04 RX ADMIN — SODIUM BICARBONATE 650 MG: 650 TABLET ORAL at 09:05

## 2025-02-04 RX ADMIN — SODIUM CHLORIDE, PRESERVATIVE FREE 40 MG: 5 INJECTION INTRAVENOUS at 12:42

## 2025-02-04 RX ADMIN — POTASSIUM CHLORIDE AND DEXTROSE MONOHYDRATE: 150; 5 INJECTION, SOLUTION INTRAVENOUS at 06:53

## 2025-02-04 RX ADMIN — HEPARIN SODIUM 5000 UNITS: 5000 INJECTION INTRAVENOUS; SUBCUTANEOUS at 14:44

## 2025-02-04 RX ADMIN — SODIUM CHLORIDE, PRESERVATIVE FREE 10 ML: 5 INJECTION INTRAVENOUS at 21:55

## 2025-02-04 ASSESSMENT — PAIN SCALES - GENERAL
PAINLEVEL_OUTOF10: 0
PAINLEVEL_OUTOF10: 0
PAINLEVEL_OUTOF10: 5
PAINLEVEL_OUTOF10: 2

## 2025-02-04 ASSESSMENT — PAIN - FUNCTIONAL ASSESSMENT: PAIN_FUNCTIONAL_ASSESSMENT: ACTIVITIES ARE NOT PREVENTED

## 2025-02-04 ASSESSMENT — PAIN DESCRIPTION - DESCRIPTORS
DESCRIPTORS: BURNING
DESCRIPTORS: ACHING

## 2025-02-04 ASSESSMENT — PAIN DESCRIPTION - LOCATION
LOCATION: HIP
LOCATION: BACK

## 2025-02-04 ASSESSMENT — PAIN DESCRIPTION - ORIENTATION
ORIENTATION: POSTERIOR
ORIENTATION: RIGHT

## 2025-02-04 NOTE — PLAN OF CARE
Problem: Occupational Therapy - Adult  Goal: By Discharge: Performs self-care activities at highest level of function for planned discharge setting.  See evaluation for individualized goals.  Description: FUNCTIONAL STATUS PRIOR TO ADMISSION:    Receives Help From: Family (daughters assist pt with bathing and dressing.  Pt unbale to report specifics at this time.), Prior Level of Assist for ADLs: Needs assistance,  ,  ,  ,  ,  ,  ,  ,  , Active : No.  Per old notes in chart review, pt has a history of falls and needing assistance with adls/transfers     HOME SUPPORT: Patient lived with his wife.  Pt's family has been assisting pt for adls.  .    Occupational Therapy Goals:  Initiated 1/26/2025  Weekly update 2/4/2025: - all goals remain appropriate/will continue  1.  Patient will perform grooming, seated EOB with Set-up and Supervision within 7 day(s).  2.  Patient will perform upper body bathing with Moderate Assist within 7 day(s).  3.  Patient will perform upper body dressing and lower body dressing with Moderate Assist within 7 day(s).  4.  Patient will perform toilet transfers with Moderate Assist  within 7 day(s).  5.  Patient will perform all aspects of toileting with Moderate Assist within 7 day(s).  6.  Patient will participate in upper extremity therapeutic exercise/activities with Supervision for 5 minutes within 7 day(s).    7.  Patient will utilize energy conservation techniques during functional activities with verbal cues within 7 day(s).   Outcome: Progressing  OCCUPATIONAL THERAPY TREATMENT: WEEKLY REASSESSMENT    Patient: Steve Maharaj (89 y.o. male)  Date: 2/4/2025  Primary Diagnosis: Urinary tract obstruction by kidney stone [N20.0, N13.8]  Acute renal failure, unspecified acute renal failure type (HCC) [N17.9]  Renal stone [N20.0]  Procedure(s) (LRB):  COLONOSCOPY DIAGNOSTIC (N/A) 5 Days Post-Op   Precautions:                  Chart, occupational therapy assessment, plan of care, and

## 2025-02-04 NOTE — PROGRESS NOTES
End of Shift Note    Bedside shift change report given to Sarahi (oncoming nurse) by Joel Ramos RN (offgoing nurse).  Report included the following information SBAR, MAR, and Cardiac Rhythm NSR    Shift worked:  1525-1705     Shift summary and any significant changes:     Uneventful     Concerns for physician to address:  N/A     Zone phone for oncoming shift:          Activity:  Level of Assistance: Moderate assist, patient does 50-74%  Number times ambulated in hallways past shift: 0  Number of times OOB to chair past shift: 0    Cardiac:   Cardiac Monitoring: Yes      Cardiac Rhythm: Sinus rhythm    Access:  Current line(s): PIV     Genitourinary:   Urinary Status: Draining (nephrostomy tube)    Respiratory:   O2 Device: None (Room air)  Chronic home O2 use?: NO  Incentive spirometer at bedside: NO    GI:  Last BM (including prior to admit): 02/04/25  Current diet:  ADULT DIET; Clear Liquid  ADULT ORAL NUTRITION SUPPLEMENT; AM Snack, PM Snack, HS Snack; Standard High Calorie/High Protein Oral Supplement  ADULT ORAL NUTRITION SUPPLEMENT; Breakfast, Dinner; Clear Liquid Oral Supplement  Passing flatus: YES    Pain Management:   Patient states pain is manageable on current regimen: YES    Skin:  Mateusz Scale Score: 13  Interventions: Wound Offloading (Prevention Methods): Bed, pressure reduction mattress, Turning, Pillows, Repositioning    Patient Safety:  Fall Risk: Nursing Judgement-Fall Risk High(Add Comments): Yes  Fall Risk Interventions  Nursing Judgement-Fall Risk High(Add Comments): Yes  Toilet Every 2 Hours-In Advance of Need: Yes  Hourly Visual Checks: In bed, Quiet  Fall Visual Posted: Socks  Room Door Open: Yes  Alarm On: Bed, Chair  Patient Moved Closer to Nursing Station: No    Active Consults:   IP CONSULT TO UROLOGY  IP CONSULT TO NEPHROLOGY  IP CONSULT TO GENERAL SURGERY  IP CONSULT TO GI  IP CONSULT TO PHARMACY  IP CONSULT TO GI  IP CONSULT TO INFECTIOUS DISEASES  IP CONSULT TO PALLIATIVE CARE  IP

## 2025-02-04 NOTE — PROGRESS NOTES
End of Shift Note    Bedside shift change report given to Joel WARD (oncoming nurse) by Juan Baires RN (offgoing nurse).  Report included the following information SBAR, Kardex, Intake/Output, MAR, Med Rec Status, Alarm Parameters , Procedure Verification, and Quality Measures    Shift worked:  7332-4621     Shift summary and any significant changes:    Looks calm, well settled woth less issues, vitally stable so far     Concerns for physician to address:       Zone phone for oncoming shift:          Activity:  Level of Assistance: Moderate assist, patient does 50-74%  Number times ambulated in hallways past shift: 0  Number of times OOB to chair past shift: 0    Cardiac:   Cardiac Monitoring: Yes      Cardiac Rhythm: Sinus rhythm    Access:  Current line(s): PIV     Genitourinary:   Urinary Status: Voiding, Bathroom privileges, Continuous bladder irrigation    Respiratory:   O2 Device: Nasal cannula  Chronic home O2 use?:   Incentive spirometer at bedside: YES    GI:  Last BM (including prior to admit): 02/15/25  Current diet:  ADULT DIET; Clear Liquid  ADULT ORAL NUTRITION SUPPLEMENT; AM Snack, PM Snack, HS Snack; Standard High Calorie/High Protein Oral Supplement  ADULT ORAL NUTRITION SUPPLEMENT; Breakfast, Dinner; Clear Liquid Oral Supplement  Passing flatus: YES    Pain Management:   Patient states pain is manageable on current regimen: YES    Skin:  Mateusz Scale Score: 14  Interventions: Wound Offloading (Prevention Methods): Bed, pressure redistribution/air, Bed, pressure reduction mattress    Patient Safety:  Fall Risk: Nursing Judgement-Fall Risk High(Add Comments): Yes  Fall Risk Interventions  Nursing Judgement-Fall Risk High(Add Comments): Yes  Toilet Every 2 Hours-In Advance of Need: Yes  Hourly Visual Checks: In bed, Quiet  Fall Visual Posted: Socks  Room Door Open: Yes  Alarm On: Bed, Chair  Patient Moved Closer to Nursing Station: Yes    Active Consults:   IP CONSULT TO UROLOGY  IP

## 2025-02-04 NOTE — PLAN OF CARE
Problem: Physical Therapy - Adult  Goal: By Discharge: Performs mobility at highest level of function for planned discharge setting.  See evaluation for individualized goals.  Description: FUNCTIONAL STATUS PRIOR TO ADMISSION: Pt unable to provide accurate PLOF as he presents extremely [place] and with confusion however, per chart review, pt was ambulatory with use of rolling walker and received assistance from daughter for ADLs?    HOME SUPPORT PRIOR TO ADMISSION: The patient lived with wife and had assistance from 2 daughters for ADLs.    Physical Therapy Goals  Weekly re-assessment 2/4/25, goals remain appropriate  1.  Patient will move from supine to sit and sit to supine, scoot up and down, and roll side to side in bed with supervision/set-up within 7 day(s).    2.  Patient will perform sit to stand with supervision/set-up within 7 day(s).  3.  Patient will transfer from bed to chair and chair to bed with contact guard assist using the least restrictive device within 7 day(s).  4.  Patient will ambulate with contact guard assist for 25 feet with the least restrictive device within 7 day(s).       Initiated 1/27/2025  1.  Patient will move from supine to sit and sit to supine, scoot up and down, and roll side to side in bed with supervision/set-up within 7 day(s).    2.  Patient will perform sit to stand with supervision/set-up within 7 day(s).  3.  Patient will transfer from bed to chair and chair to bed with contact guard assist using the least restrictive device within 7 day(s).  4.  Patient will ambulate with contact guard assist for 25 feet with the least restrictive device within 7 day(s).   Outcome: Progressing    PHYSICAL THERAPY TREATMENT: WEEKLY REASSESSMENT    Patient: Steve Maharaj (89 y.o. male)  Date: 2/4/2025  Primary Diagnosis: Urinary tract obstruction by kidney stone [N20.0, N13.8]  Acute renal failure, unspecified acute renal failure type (HCC) [N17.9]  Renal stone [N20.0]  Procedure(s)  Rehabil Res Clin Transl. 2022;4(3):019840. doi: 10.1016/j.arrct..919228. PMID: 85869000; PMCID: [id number].  4. Cathy ANGELA, Angie S, Tasha W, Hamida LOFTON. AM-PAC Short Forms Manual 4.0. Revised 2020.                                                                                                                                                                                                                              Pain Ratin/10     Activity Tolerance:   Fair     After treatment:   Patient left in no apparent distress in bed, Call bell within reach, Bed/ chair alarm activated, and Side rails x3    COMMUNICATION/EDUCATION:   The patient's plan of care was discussed with: registered nurse    Patient Education  Education Given To: Patient  Education Provided: Transfer Training;Mobility Training  Education Method: Verbal  Barriers to Learning: Cognition;Hearing  Education Outcome: Verbalized understanding;Continued education needed    Thank you for this referral.  Zoey Viera, PT  Minutes: 26

## 2025-02-04 NOTE — PLAN OF CARE
Problem: SLP Adult - Impaired Swallowing  Goal: By Discharge: Advance to least restrictive diet without signs or symptoms of aspiration for planned discharge setting.  See evaluation for individualized goals.  2/3/2025 1211 by Cherelle Kwok, SLP  Outcome: Not Progressing     Problem: SLP Adult - Impaired Swallowing  Goal: By Discharge: Advance to least restrictive diet without signs or symptoms of aspiration for planned discharge setting.  See evaluation for individualized goals.  2/3/2025 1211 by Cherelle Kwok, SLP  Outcome: Not Progressing

## 2025-02-04 NOTE — PROGRESS NOTES
Bedside shift change report given to ED Martinez (oncoming nurse) by ED Allen (offgoing nurse). Report included the following information Nurse Handoff Report, Intake/Output, MAR, Recent Results, and Cardiac Rhythm NSR . No significant changes from [id number] - PRN flonase added for stuffy nose per patient request.

## 2025-02-04 NOTE — PROGRESS NOTES
Name: Steve Maharaj   MRN: [id number]  : 1933      Assessment:  ELIAS- 2nd to obstructive uropathy in a solitary kidney + n/v + diuretic     R ureteral stone with mod R hydro- in solitary kidney. 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 due to HTN/Solitary kidney  BPH    Hypernatremia    Cr stable at 2.4.  Not taking much p.o. Loose stools.  Sodium is mildly high, stable at 148. K 2.8 Acidosis persists.        Plan/Recommendations:    Continue hypotonic fluids-add Kcl and increase rate  Ct sodium bicarb 650 mg twice daily  Monitor urine output.    Continue Proscar/Flomax  Avoid nephrotoxins      Subjective:  Resting. Not taking much po. [place].     Exam:  /61   Pulse 81   Temp 98.1 °F (36.7 °C) (Oral)   Resp (!) 34   Ht 1.829 m (6')   Wt 58.1 kg (128 lb)   SpO2 96%   BMI 17.36 kg/m²     Elderly thin/frail, ill appearing in NAD  No edema    Labs/Data:    Lab Results   Component Value Date    WBC 10.7 2025    HGB 6.4 (L) 2025    HCT 20.3 (L) 2025    MCV 95.3 2025     2025       Lab Results   Component Value Date/Time     2025 02:52 AM    K 2.8 2025 02:52 AM     2025 02:52 AM    CO2 18 2025 02:52 AM    BUN 34 2025 02:52 AM    CREATININE 2.37 2025 02:52 AM    GLUCOSE 73 2025 02:52 AM    CALCIUM 7.2 2025 02:52 AM    LABGLOM 25 2025 02:52 AM    LABGLOM 41 2024 06:44 AM        Wt Readings from Last 3 Encounters:   25 58.1 kg (128 lb)   24 65.8 kg (145 lb)   24 65.8 kg (145 lb)         Intake/Output Summary (Last 24 hours) at 2025 0605  Last data filed at 2025

## 2025-02-04 NOTE — CARE COORDINATION
Chart reviewed. Patient discussed in rounds.     CM called [facility] at [phone]. Message left for admissions, regarding their acceptance and future discharge.     CM to follow up.     Josseline Tang, RN  Care Manager  [phone]

## 2025-02-04 NOTE — PLAN OF CARE
Speech LAnguage Pathology TREATMENT    Patient: Steve Maharaj (89 y.o. male)  Date: 2/4/2025  Primary Diagnosis: Urinary tract obstruction by kidney stone [N20.0, N13.8]  Acute renal failure, unspecified acute renal failure type (HCC) [N17.9]  Renal stone [N20.0]  Procedure(s) (LRB):  COLONOSCOPY DIAGNOSTIC (N/A) 5 Days Post-Op   Precautions:                     ASSESSMENT :  Patient alert, agreeable to working with SLP. Patient now tolerating RA with stable vital signs throughout session. RN present to give PO medications. Patient positioned upright and presented whole medications 1-2 at a time. Patient swallowing with dry swallow (baseline per daughter). Patient occasionally needs liquid wash to clear whole pill noted under tongue. No overt clinical s/s aspiration noted, even when taking consecutive sips thin via straw. Patient appears appropriate to continue clear liquid diet with diet advancement per GI. SLP will continue to follow.     Patient will benefit from skilled intervention to address the above impairments.     PLAN :  Recommendations and Planned Interventions:  Diet: Clear liquid and thin liquids  --Medications as tolerated   --Upright all PO intake   --Slow rate  --Oral hygiene 2-3x/day  --1:1 supervision/assistance        Recommend next SLP session: Continued patient/family/staff education; ensure diet tolerance/advancement once cleared by GI     Acute SLP Services: Yes, SLP will continue to follow per plan of care.  Discharge Recommendations: Continue to assess pending progress     SUBJECTIVE:   Patient stated, “I can't sleep.”    OBJECTIVE:     Past Medical History:   Diagnosis Date    Arthritis     Cancer (HCC) 04/2019    renal    Hypertension     Prostate enlargement      Past Surgical History:   Procedure Laterality Date    COLONOSCOPY N/A 1/30/2025    COLONOSCOPY DIAGNOSTIC performed by Doris Amaral MD at [facility] ENDOSCOPY    CYSTOSCOPY Right 1/22/2025    CYSTOSCOPY performed by Valerie  Jerson BROKC III, MD at [facility] MAIN OR    IR GUIDED NEPHROSTOMY CATH PLACEMENT RIGHT  1/23/2025    IR GUIDED NEPHROSTOMY CATH PLACEMENT RIGHT 1/23/2025 [facility] RAD ANGIO IR    ORTHOPEDIC SURGERY Right 2008    hip replacement/[facility]/Dr Bowman    PRE-MALIGNANT / BENIGN SKIN LESION EXCISION  07/13/2018    excision of left flank      Prior Level of Function/Home Situation:   Social/Functional History  Lives With: Spouse  Type of Home: House  Home Layout: One level  Home Access: Stairs to enter without rails  Entrance Stairs - Number of Steps: 3  Home Equipment: Cane, Walker - Rolling  Has the patient had two or more falls in the past year or any fall with injury in the past year?: Yes  Receives Help From: Family (daughters assist pt with bathing and dressing.  Pt unale to report specifics at this time.)  Prior Level of Assist for ADLs: Needs assistance  Active : No  Patient's  Info: Faby Guallpa (Child) [phone] and April Carol Ann, [phone]    Cognitive and Communication Status:  Neurologic State: Alert and Confused  Orientation Level: Oriented to person  Cognition: Follows commands and Decreased attention/concentration    Dysphagia:  Oral Assessment:     P.O. Trials:  PO Trials  Assessment Method(s): Observation  Patient Position: upright in bed  Vocal Quality: Low volume  Consistency Presented: Pills;Thin  How Presented: SLP-fed/Presented;Straw  Bolus Acceptance: No impairment  Oral Residue:  (noted half pill under tongue following pill presentation with dry swallow)  Aspiration Signs/Symptoms: None  Pharyngeal Phase Characteristics: No impairment, issues, or problems               Motor Speech:         Language Comprehension and Expression:                   Neuro-Linguistics:                      Voice:       Respiratory Status/Airway:  Room air                         After treatment:   Patient left in no apparent distress in bed, Nursing notified, and Caregiver present    COMMUNICATION/EDUCATION:

## 2025-02-05 LAB
ANION GAP SERPL CALC-SCNC: 10 MMOL/L (ref 2–12)
BASOPHILS # BLD: 0.05 K/UL (ref 0–0.1)
BASOPHILS NFR BLD: 0.5 % (ref 0–1)
BUN SERPL-MCNC: 26 MG/DL (ref 6–20)
BUN/CREAT SERPL: 11 (ref 12–20)
CALCIUM SERPL-MCNC: 7.1 MG/DL (ref 8.5–10.1)
CHLORIDE SERPL-SCNC: 116 MMOL/L (ref 97–108)
CO2 SERPL-SCNC: 18 MMOL/L (ref 21–32)
CREAT SERPL-MCNC: 2.34 MG/DL (ref 0.7–1.3)
DIFFERENTIAL METHOD BLD: ABNORMAL
EKG DIAGNOSIS: NORMAL
EKG Q-T INTERVAL: 264 MS
EKG QRS DURATION: 90 MS
EKG QTC CALCULATION (BAZETT): 388 MS
EKG R AXIS: 2 DEGREES
EKG T AXIS: -55 DEGREES
EKG VENTRICULAR RATE: 130 BPM
EOSINOPHIL # BLD: 0.06 K/UL (ref 0–0.4)
EOSINOPHIL NFR BLD: 0.6 % (ref 0–7)
ERYTHROCYTE [DISTWIDTH] IN BLOOD BY AUTOMATED COUNT: 15.2 % (ref 11.5–14.5)
GLUCOSE SERPL-MCNC: 125 MG/DL (ref 65–100)
HCT VFR BLD AUTO: 21.4 % (ref 36.6–50.3)
HGB BLD-MCNC: 6.8 G/DL (ref 12.1–17)
IMM GRANULOCYTES # BLD AUTO: 0.12 K/UL (ref 0–0.04)
IMM GRANULOCYTES NFR BLD AUTO: 1.2 % (ref 0–0.5)
LYMPHOCYTES # BLD: 1.08 K/UL (ref 0.8–3.5)
LYMPHOCYTES NFR BLD: 10.8 % (ref 12–49)
MAGNESIUM SERPL-MCNC: 1.5 MG/DL (ref 1.6–2.4)
MCH RBC QN AUTO: 30.1 PG (ref 26–34)
MCHC RBC AUTO-ENTMCNC: 31.8 G/DL (ref 30–36.5)
MCV RBC AUTO: 94.7 FL (ref 80–99)
MONOCYTES # BLD: 0.66 K/UL (ref 0–1)
MONOCYTES NFR BLD: 6.6 % (ref 5–13)
NEUTS SEG # BLD: 8.04 K/UL (ref 1.8–8)
NEUTS SEG NFR BLD: 80.3 % (ref 32–75)
NRBC # BLD: 0 K/UL (ref 0–0.01)
NRBC BLD-RTO: 0 PER 100 WBC
PLATELET # BLD AUTO: 363 K/UL (ref 150–400)
PMV BLD AUTO: 9.9 FL (ref 8.9–12.9)
POTASSIUM SERPL-SCNC: 2.5 MMOL/L (ref 3.5–5.1)
RBC # BLD AUTO: 2.26 M/UL (ref 4.1–5.7)
SODIUM SERPL-SCNC: 144 MMOL/L (ref 136–145)
WBC # BLD AUTO: 10 K/UL (ref 4.1–11.1)

## 2025-02-05 PROCEDURE — 6370000000 HC RX 637 (ALT 250 FOR IP): Performed by: INTERNAL MEDICINE

## 2025-02-05 PROCEDURE — 6370000000 HC RX 637 (ALT 250 FOR IP): Performed by: STUDENT IN AN ORGANIZED HEALTH CARE EDUCATION/TRAINING PROGRAM

## 2025-02-05 PROCEDURE — 83735 ASSAY OF MAGNESIUM: CPT

## 2025-02-05 PROCEDURE — 6360000002 HC RX W HCPCS

## 2025-02-05 PROCEDURE — 6360000002 HC RX W HCPCS: Performed by: INTERNAL MEDICINE

## 2025-02-05 PROCEDURE — 2580000003 HC RX 258: Performed by: INTERNAL MEDICINE

## 2025-02-05 PROCEDURE — 2500000003 HC RX 250 WO HCPCS

## 2025-02-05 PROCEDURE — 2500000003 HC RX 250 WO HCPCS: Performed by: STUDENT IN AN ORGANIZED HEALTH CARE EDUCATION/TRAINING PROGRAM

## 2025-02-05 PROCEDURE — 2060000000 HC ICU INTERMEDIATE R&B

## 2025-02-05 PROCEDURE — 2580000003 HC RX 258

## 2025-02-05 PROCEDURE — 80048 BASIC METABOLIC PNL TOTAL CA: CPT

## 2025-02-05 PROCEDURE — 36415 COLL VENOUS BLD VENIPUNCTURE: CPT

## 2025-02-05 PROCEDURE — 85025 COMPLETE CBC W/AUTO DIFF WBC: CPT

## 2025-02-05 RX ORDER — MAGNESIUM SULFATE 1 G/100ML
1000 INJECTION INTRAVENOUS ONCE
Status: COMPLETED | OUTPATIENT
Start: 2025-02-05 | End: 2025-02-05

## 2025-02-05 RX ORDER — POTASSIUM CHLORIDE 7.45 MG/ML
10 INJECTION INTRAVENOUS
Status: COMPLETED | OUTPATIENT
Start: 2025-02-05 | End: 2025-02-05

## 2025-02-05 RX ORDER — LANOLIN ALCOHOL/MO/W.PET/CERES
400 CREAM (GRAM) TOPICAL DAILY
Status: DISCONTINUED | OUTPATIENT
Start: 2025-02-05 | End: 2025-02-08 | Stop reason: HOSPADM

## 2025-02-05 RX ADMIN — POTASSIUM CHLORIDE 10 MEQ: 7.46 INJECTION, SOLUTION INTRAVENOUS at 12:36

## 2025-02-05 RX ADMIN — OXYCODONE HYDROCHLORIDE AND ACETAMINOPHEN 0.5 TABLET: 5; 325 TABLET ORAL at 12:39

## 2025-02-05 RX ADMIN — TAMSULOSIN HYDROCHLORIDE 0.4 MG: 0.4 CAPSULE ORAL at 08:55

## 2025-02-05 RX ADMIN — POTASSIUM CHLORIDE: 2 INJECTION, SOLUTION, CONCENTRATE INTRAVENOUS at 09:19

## 2025-02-05 RX ADMIN — FINASTERIDE 5 MG: 5 TABLET, FILM COATED ORAL at 08:55

## 2025-02-05 RX ADMIN — SODIUM CHLORIDE, PRESERVATIVE FREE 10 ML: 5 INJECTION INTRAVENOUS at 09:30

## 2025-02-05 RX ADMIN — POTASSIUM CHLORIDE 10 MEQ: 7.46 INJECTION, SOLUTION INTRAVENOUS at 14:52

## 2025-02-05 RX ADMIN — HEPARIN SODIUM 5000 UNITS: 5000 INJECTION INTRAVENOUS; SUBCUTANEOUS at 14:53

## 2025-02-05 RX ADMIN — SODIUM BICARBONATE 650 MG: 650 TABLET ORAL at 08:55

## 2025-02-05 RX ADMIN — ACETAMINOPHEN 650 MG: 325 TABLET ORAL at 09:07

## 2025-02-05 RX ADMIN — SODIUM CHLORIDE, PRESERVATIVE FREE 10 ML: 5 INJECTION INTRAVENOUS at 22:27

## 2025-02-05 RX ADMIN — MAGNESIUM SULFATE HEPTAHYDRATE 1000 MG: 1 INJECTION, SOLUTION INTRAVENOUS at 09:23

## 2025-02-05 RX ADMIN — POTASSIUM CHLORIDE: 2 INJECTION, SOLUTION, CONCENTRATE INTRAVENOUS at 18:37

## 2025-02-05 RX ADMIN — METOPROLOL TARTRATE 12.5 MG: 25 TABLET, FILM COATED ORAL at 22:28

## 2025-02-05 RX ADMIN — ACETAMINOPHEN 650 MG: 325 TABLET ORAL at 18:39

## 2025-02-05 RX ADMIN — METOPROLOL TARTRATE 12.5 MG: 25 TABLET, FILM COATED ORAL at 08:55

## 2025-02-05 RX ADMIN — Medication 400 MG: at 08:55

## 2025-02-05 RX ADMIN — HEPARIN SODIUM 5000 UNITS: 5000 INJECTION INTRAVENOUS; SUBCUTANEOUS at 22:27

## 2025-02-05 RX ADMIN — POTASSIUM CHLORIDE 10 MEQ: 7.46 INJECTION, SOLUTION INTRAVENOUS at 09:26

## 2025-02-05 RX ADMIN — SODIUM CHLORIDE, PRESERVATIVE FREE 40 MG: 5 INJECTION INTRAVENOUS at 22:27

## 2025-02-05 RX ADMIN — ACETAMINOPHEN 650 MG: 325 TABLET ORAL at 04:00

## 2025-02-05 RX ADMIN — SODIUM BICARBONATE 650 MG: 650 TABLET ORAL at 22:28

## 2025-02-05 RX ADMIN — SODIUM CHLORIDE, PRESERVATIVE FREE 40 MG: 5 INJECTION INTRAVENOUS at 12:37

## 2025-02-05 ASSESSMENT — PAIN DESCRIPTION - DESCRIPTORS
DESCRIPTORS: ACHING
DESCRIPTORS: ACHING

## 2025-02-05 ASSESSMENT — PAIN DESCRIPTION - ORIENTATION
ORIENTATION: RIGHT

## 2025-02-05 ASSESSMENT — PAIN SCALES - GENERAL
PAINLEVEL_OUTOF10: 0
PAINLEVEL_OUTOF10: 5
PAINLEVEL_OUTOF10: 6

## 2025-02-05 ASSESSMENT — PAIN DESCRIPTION - ONSET
ONSET: ON-GOING
ONSET: ON-GOING

## 2025-02-05 ASSESSMENT — PAIN - FUNCTIONAL ASSESSMENT
PAIN_FUNCTIONAL_ASSESSMENT: ACTIVITIES ARE NOT PREVENTED
PAIN_FUNCTIONAL_ASSESSMENT: ACTIVITIES ARE NOT PREVENTED

## 2025-02-05 ASSESSMENT — PAIN DESCRIPTION - LOCATION
LOCATION: HIP

## 2025-02-05 ASSESSMENT — PAIN DESCRIPTION - PAIN TYPE
TYPE: ACUTE PAIN
TYPE: ACUTE PAIN

## 2025-02-05 ASSESSMENT — PAIN DESCRIPTION - FREQUENCY
FREQUENCY: INTERMITTENT
FREQUENCY: INTERMITTENT

## 2025-02-05 NOTE — PROGRESS NOTES
Name: Steve Maharaj   MRN: [id number]  : 1933      Assessment:  ELIAS- 2nd to obstructive uropathy in a solitary kidney + n/v + diuretic     R ureteral stone with mod R hydro- in solitary kidney. 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 due to HTN/Solitary kidney  BPH    Hypernatremia    Cr stable at ~ 2.4.  Not taking much p.o. Loose stools.  Sodium better, 148 to 144. K 2.5 with low mg. Acidosis persists.        Plan/Recommendations:    Continue hypotonic fluids-D5 with Kcl   Agree with PO/IV Mg and IV K  Ct sodium bicarb 650 mg twice daily  Monitor urine output.    Continue Proscar/Flomax  Avoid nephrotoxins      Subjective:  Resting. Not taking much po. [place].     Exam:  /66   Pulse 82   Temp 98.8 °F (37.1 °C) (Axillary)   Resp 20   Ht 1.829 m (6')   Wt 58.1 kg (128 lb)   SpO2 96%   BMI 17.36 kg/m²     Elderly thin/frail, ill appearing in NAD  No edema    Labs/Data:    Lab Results   Component Value Date    WBC 10.0 2025    HGB 6.8 (L) 2025    HCT 21.4 (L) 2025    MCV 94.7 2025     2025       Lab Results   Component Value Date/Time     2025 04:15 AM    K 2.5 2025 04:15 AM     2025 04:15 AM    CO2 18 2025 04:15 AM    BUN 26 2025 04:15 AM    CREATININE 2.34 2025 04:15 AM    GLUCOSE 125 2025 04:15 AM    CALCIUM 7.1 2025 04:15 AM    LABGLOM 26 2025 04:15 AM    LABGLOM 41 2024 06:44 AM        Wt Readings from Last 3 Encounters:   25 58.1 kg (128 lb)   24 65.8 kg (145 lb)   24 65.8 kg (145 lb)         Intake/Output Summary (Last 24 hours) at 2025 1314  Last data  filed at 2/5/2025 0000  Gross per 24 hour   Intake 1133 ml   Output 500 ml   Net 633 ml       Patient seen and examined. Chart reviewed. Labs, data and other pertinent notes reviewed in last 24 hrs.    PMH/SH/FH reviewed and unchanged compared to H&P    Discussed with Pt    Lawanda Ellis MD  NSP[facility]

## 2025-02-05 NOTE — CARE COORDINATION
0832 to 0838 -  acknowledges hospice order.  NOEMI contacted patient's spouse, Kareen, who confirmed that the patient would return to: [address] [facility], [place], [place] if they choose home hospice.  She requested that CM contacts \"[facility]" his [facility].  CM contacted [facility] who was notified that patient's insurance will not cover LTC placement and they would need to pay privately for placement or CGs.  [facility] agreeable to hospice referral being sent to [facility] for an information session; this was done via [facility] with hospice order.  LTC/CG list emailed to [facility] at: [email or web address].    1335 to 1346 - [facility] asked for CM number and confirmed that they spoke with family.  They said that they will be admitting this patient into hospice.  CM sent message with  phone number and asked what day they are planning for admission.  CM attempted to contact [facility]; unable to reach anyone and VM left with  callback number.    6601 to 1445 - NOEMI received message from [facility] to contact Poornima with [facility] ([phone]).  NOEMI spoke with Poornima who said [facility].  VM received from [facility]; NOEMI returned call and she confirmed that they are having a family meeting [facility].      Carmel Finch, MELIN, RN    Care Management  [phone]

## 2025-02-05 NOTE — PROGRESS NOTES
Hospitalist Progress Note    NAME:   Steve Maharaj   : 1933   MRN: [id number]     Date/Time: 2025 8:48 AM  Patient PCP: Joseph Ugarte MD    Estimated discharge date: 25  Barriers: home hospice      Assessment / Plan:    Anemia (likely iron deficiency)     Hemoglobin POA was 11.6  Reduced to 8.5 on 2025, increased to 9.8 on 2025, reduced to 7.2 on 2025.  Now  6.8  this a.m.  Continue to hold PTA aspirin  Will check iron profile     Leukocytosis, resolved  Elevated lactic acid (Resolved)     WBC POA 9.5, increased to 15.2 on 25 and continually up daily . Was 29.9 yesterday, with much improvement to 18.6 this am    Lactic acid was 3.0 on  , down to 2.8  and currently normal at 1.6  Blood cultures revealed Proteus Mirabella's  ID guidance appreciated.  Antibiotic to complete today     Right ureteral stone  Moderate hydronephrosis  History of left nephrectomy due to renal cell carcinoma  Right PCN by IR on      Chronic [place]  Patient should follow-up with urology outpatient  Nephrology  following appreciate input  Continue PTA Flomax  Continue PTA Proscar     Epigastric pain  Dysphagia     Patient complaining of pain on the right lower quadrant  KUB  revealed decreased colonic distention  Continue metronidazole  GI following, appreciate recommendation  Speech following  Currently on clear liquid diet  Aspiration precaution     Sinus rhythm with PACs noted on EKG   Continue telemetry monitoring  Monitor vitals  Metoprolol IV for heart rate greater than 130 sustained     Hypokalemia  Hypomagnesimia   Seems ongoing since admission.  Yesterday  potassium 2.8, decrease to 2.6 today   Magnesium normal at 1.5  Replete K & Mg  Daily CMP.      Body mass index is 17.36 kg/m². -          Medical Decision Making:   I personally reviewed labs:  I personally reviewed imaging:  I personally reviewed EKG:  Toxic drug monitoring:   Discussed case with:  350 363     Recent Labs     02/03/25  0130 02/04/25  0252 02/05/25  0415   * 148* 144   K 3.1* 2.8* 2.5*   * 121* 116*   CO2 18* 18* 18*   GLUCOSE 82 73 125*   BUN 36* 34* 26*   CREATININE 2.39* 2.37* 2.34*   CALCIUM 7.7* 7.2* 7.1*   MG  --   --  1.5*       Signed: Kye Flowers MD

## 2025-02-05 NOTE — PLAN OF CARE
Problem: Safety - Adult  Goal: Free from fall injury  Outcome: Progressing  Flowsheets (Taken 2/5/2025 1447)  Free From Fall Injury: Instruct family/caregiver on patient safety     Problem: Pain  Goal: Verbalizes/displays adequate comfort level or baseline comfort level  Outcome: Progressing  Flowsheets (Taken 2/5/2025 1447)  Verbalizes/displays adequate comfort level or baseline comfort level:   Administer analgesics based on type and severity of pain and evaluate response   Encourage patient to monitor pain and request assistance   Consider cultural and social influences on pain and pain management   Assess pain using appropriate pain scale   Implement non-pharmacological measures as appropriate and evaluate response   Notify Licensed Independent Practitioner if interventions unsuccessful or patient reports new pain     Problem: Discharge Planning  Goal: Discharge to home or other facility with appropriate resources  Outcome: Progressing  Flowsheets (Taken 2/5/2025 1447)  Discharge to home or other facility with appropriate resources:   Identify barriers to discharge with patient and caregiver   Identify discharge learning needs (meds, wound care, etc)   Arrange for needed discharge resources and transportation as appropriate     Problem: Skin/Tissue Integrity  Goal: Absence of new skin breakdown  Description: 1.  Monitor for areas of redness and/or skin breakdown  2.  Assess vascular access sites hourly  3.  Every 4-6 hours minimum:  Change oxygen saturation probe site  4.  Every 4-6 hours:  If on nasal continuous positive airway pressure, respiratory therapy assess nares and determine need for appliance change or resting period.  Outcome: Progressing     Problem: Infection - Adult  Goal: Absence of infection at discharge  Outcome: Progressing  Flowsheets (Taken 2/5/2025 1447)  Absence of infection at discharge:   Assess and monitor for signs and symptoms of infection   Monitor all insertion sites i.e.,

## 2025-02-05 NOTE — PROGRESS NOTES
0700: Bedside and Verbal shift change report given to Ernesto Schumacher RN   (oncoming nurse) by ED Sharp (offgoing nurse). Report included the following information Nurse Handoff Report, Index, Adult Overview, Intake/Output, MAR, Recent Results, and Cardiac Rhythm NSR .     End of Shift Note    Bedside shift change report given to ED Rojas (oncoming nurse) by Ernesto Schumacher RN (offgoing nurse).  Report included the following information SBAR, Kardex, Intake/Output, MAR, Recent Results, and Cardiac Rhythm NSR    Shift worked:  4842-5502     Shift summary and any significant changes:     uneventful     Concerns for physician to address:  none     Zone phone for oncoming shift:   na       Activity:  Level of Assistance: Moderate assist, patient does 50-74%  Number times ambulated in hallways past shift: 0  Number of times OOB to chair past shift: 0    Cardiac:   Cardiac Monitoring: Yes      Cardiac Rhythm: Sinus rhythm    Access:  Current line(s): PIV     Genitourinary:   Urinary Status:  (nephrostomy tube-right)    Respiratory:   O2 Device: None (Room air)  Chronic home O2 use?: NO  Incentive spirometer at bedside: NO    GI:  Last BM (including prior to admit): 02/04/25  Current diet:  ADULT DIET; Clear Liquid  ADULT ORAL NUTRITION SUPPLEMENT; AM Snack, PM Snack, HS Snack; Standard High Calorie/High Protein Oral Supplement  ADULT ORAL NUTRITION SUPPLEMENT; Breakfast, Dinner; Clear Liquid Oral Supplement  Passing flatus: YES    Pain Management:   Patient states pain is manageable on current regimen: YES    Skin:  Mateusz Scale Score: 14  Interventions: Wound Offloading (Prevention Methods): Bed, pressure reduction mattress, Pillows, Repositioning, Turning    Patient Safety:  Fall Risk: Nursing Judgement-Fall Risk High(Add Comments): Yes  Fall Risk Interventions  Nursing Judgement-Fall Risk High(Add Comments): Yes  Toilet Every 2 Hours-In Advance of Need: Yes  Hourly Visual Checks: Awake, In bed  Fall

## 2025-02-05 NOTE — PROGRESS NOTES
Hospitalist Progress Note        Demographics    Patient Name  Steve Maharaj   Date of Birth 6/13/1933   Medical Record Number  [id number]      Age  89 y.o.   PCP Joseph Ugarte MD   Admit date:  1/22/2025  6:32 PM     Room Number  2101/01  @ [facility]           Admission Diagnoses:  Urinary tract obstruction by kidney stone   Admission Summary:  Setve Maharaj is a 89 y.o.  male with PMHx significant for  has a past medical history of Arthritis, Cancer (HCC), Hypertension, and Prostate enlargement. who presents with the above chief complaint.      We were asked to admit for work up and further evaluation.      Patient is difficult historian, suspect may have some degree of underlying memory deficit however he is also extremely hard of hearing.  He tells me that he has noticed over the past several days that he has had pain primarily in his right lower quadrant and has generally not been feeling well.  He denies any fevers or chills.  Denies any nausea or vomiting.     Further history was obtained from discussion with the ED provider who had discussed with patient's family.  They note that over the past several days patient had been complaining of nausea and vomiting and they had noticed decreased p.o. intake.  Brought him to the emergency department as they were concerned that he was dehydrated.     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  He had a previous nephrectomy.  He was found to have an obstructing ureteral stone and went to the OR emergently with urology.     Assessment and plan:   Anemia (likely iron deficiency)    Hemoglobin POA was 11.6  Reduced to 8.5 on 01/26/2025, increased to 9.8 on 01/29/2025, reduced to 7.2 on 02/01/2025.  Now 7.4 this a.m.  Continue to hold PTA aspirin  Will check iron profile    Leukocytosis  Elevated lactic acid (Resolved)    WBC POA 9.5,  increased to 15.2 on 01/27/25 and continually up daily . Was 29.9 yesterday, with much improvement to 18.6 this am    Lactic acid was 3.0 on 1 /29/25 , down to 2.8  and currently normal at 1.6  Blood cultures revealed Proteus Mirabella's  ID guidance appreciated.  Antibiotic to complete today    Right ureteral stone  Moderate hydronephrosis  History of left nephrectomy due to renal cell carcinoma  Right PCN by IR on 1/23    Chronic Murphy  Patient should follow-up with urology outpatient  Nephrology  following appreciate input  Continue PTA Flomax  Continue PTA Proscar    Epigastric pain  Dysphagia    Patient complaining of pain on the right lower quadrant  KUB 1/21 revealed decreased colonic distention  Continue metronidazole  GI following, appreciate recommendation  Speech following  Currently on clear liquid diet  Aspiration precaution    Sinus rhythm with PACs noted on EKG 1/21  Continue telemetry monitoring  Monitor vitals  Metoprolol IV for heart rate greater than 130 sustained    Hypokalemia    Seems ongoing since admission.  Yesterday  potassium 4.1, decrease to 3.0 today   Magnesium normal at 1.7  Replete K  Daily CMP.     Body mass index is 17.36 kg/m². -         CODE STATUS   DNR   Functional Status  Independent prior to admission     Surrogate decision maker:  daughter   Prophylaxis   Hep SQ   Discharge Plan:  Home w/Family,     [facility]  Payor: MEDICARE / Plan: MEDICARE PART A / Product Type: *No Product type* /   Contact MDRO (multi-drug resistant organism)   Query   None noted today    Prognosis   Poor   Social issues  Daughter at bedside.  Plan of care discussed  2/3/2025:I met with patient's daughter Faby in the room -we had extensive discussion about patient's recent medical history, prolonged hospitalization, advanced age, current plan of care.  Patient daughter reported that patient has a conference today with palliative care team  2/4/2025: Spoke with Faby over telephone.  I learned

## 2025-02-05 NOTE — PROGRESS NOTES
Patient rested at intervals this shift, required coverage with PRN and scheduled pain meds throughout shift per c/o (R) hip pain, incontinent of brown gelatinous stool x2, critical K+ 2.5 relayed to oncoming RN with BSSR.

## 2025-02-05 NOTE — PROGRESS NOTES
Hospitalist Progress Note        Demographics    Patient Name  Steve Maharaj   Date of Birth 6/13/1933   Medical Record Number  [id number]      Age  89 y.o.   PCP Joseph Ugarte MD   Admit date:  1/22/2025  6:32 PM     Room Number  2101/01  @ [facility]           Admission Diagnoses:  Urinary tract obstruction by kidney stone   Admission Summary:  Steve Maharaj is a 89 y.o.  male with PMHx significant for  has a past medical history of Arthritis, Cancer (HCC), Hypertension, and Prostate enlargement. who presents with the above chief complaint.      We were asked to admit for work up and further evaluation.      Patient is difficult historian, suspect may have some degree of underlying memory deficit however he is also extremely hard of hearing.  He tells me that he has noticed over the past several days that he has had pain primarily in his right lower quadrant and has generally not been feeling well.  He denies any fevers or chills.  Denies any nausea or vomiting.     Further history was obtained from discussion with the ED provider who had discussed with patient's family.  They note that over the past several days patient had been complaining of nausea and vomiting and they had noticed decreased p.o. intake.  Brought him to the emergency department as they were concerned that he was dehydrated.     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  He had a previous nephrectomy.  He was found to have an obstructing ureteral stone and went to the OR emergently with urology.     Assessment and plan:   Anemia (likely iron deficiency)    Hemoglobin POA was 11.6  Reduced to 8.5 on 01/26/2025, increased to 9.8 on 01/29/2025, reduced to 7.2 on 02/01/2025.  Now 7.4 this a.m.  Continue to hold PTA aspirin  Will check iron profile    Leukocytosis  Elevated lactic acid (Resolved)    WBC POA 9.5,

## 2025-02-06 ENCOUNTER — APPOINTMENT (OUTPATIENT)
Facility: HOSPITAL | Age: 89
DRG: 853 | End: 2025-02-06
Attending: INTERNAL MEDICINE
Payer: MEDICARE

## 2025-02-06 LAB
ANION GAP SERPL CALC-SCNC: 7 MMOL/L (ref 2–12)
BUN SERPL-MCNC: 20 MG/DL (ref 6–20)
BUN/CREAT SERPL: 9 (ref 12–20)
CALCIUM SERPL-MCNC: 7.1 MG/DL (ref 8.5–10.1)
CHLORIDE SERPL-SCNC: 113 MMOL/L (ref 97–108)
CO2 SERPL-SCNC: 18 MMOL/L (ref 21–32)
CREAT SERPL-MCNC: 2.11 MG/DL (ref 0.7–1.3)
GLUCOSE SERPL-MCNC: 108 MG/DL (ref 65–100)
MAGNESIUM SERPL-MCNC: 1.6 MG/DL (ref 1.6–2.4)
PHOSPHATE SERPL-MCNC: 2.3 MG/DL (ref 2.6–4.7)
POTASSIUM SERPL-SCNC: 3.2 MMOL/L (ref 3.5–5.1)
SODIUM SERPL-SCNC: 138 MMOL/L (ref 136–145)

## 2025-02-06 PROCEDURE — 97530 THERAPEUTIC ACTIVITIES: CPT | Performed by: PHYSICAL THERAPIST

## 2025-02-06 PROCEDURE — 2060000000 HC ICU INTERMEDIATE R&B

## 2025-02-06 PROCEDURE — 36415 COLL VENOUS BLD VENIPUNCTURE: CPT

## 2025-02-06 PROCEDURE — 83735 ASSAY OF MAGNESIUM: CPT

## 2025-02-06 PROCEDURE — 6370000000 HC RX 637 (ALT 250 FOR IP): Performed by: INTERNAL MEDICINE

## 2025-02-06 PROCEDURE — 80048 BASIC METABOLIC PNL TOTAL CA: CPT

## 2025-02-06 PROCEDURE — 2500000003 HC RX 250 WO HCPCS: Performed by: STUDENT IN AN ORGANIZED HEALTH CARE EDUCATION/TRAINING PROGRAM

## 2025-02-06 PROCEDURE — 6370000000 HC RX 637 (ALT 250 FOR IP): Performed by: STUDENT IN AN ORGANIZED HEALTH CARE EDUCATION/TRAINING PROGRAM

## 2025-02-06 PROCEDURE — 2580000003 HC RX 258: Performed by: INTERNAL MEDICINE

## 2025-02-06 PROCEDURE — 97535 SELF CARE MNGMENT TRAINING: CPT

## 2025-02-06 PROCEDURE — 6360000002 HC RX W HCPCS

## 2025-02-06 PROCEDURE — 6360000002 HC RX W HCPCS: Performed by: INTERNAL MEDICINE

## 2025-02-06 PROCEDURE — 2700000000 HC OXYGEN THERAPY PER DAY

## 2025-02-06 PROCEDURE — 6360000002 HC RX W HCPCS: Performed by: NURSE PRACTITIONER

## 2025-02-06 PROCEDURE — 84100 ASSAY OF PHOSPHORUS: CPT

## 2025-02-06 PROCEDURE — 76937 US GUIDE VASCULAR ACCESS: CPT

## 2025-02-06 PROCEDURE — 2580000003 HC RX 258

## 2025-02-06 RX ORDER — PANTOPRAZOLE SODIUM 40 MG/1
40 TABLET, DELAYED RELEASE ORAL
Status: DISCONTINUED | OUTPATIENT
Start: 2025-02-07 | End: 2025-02-08 | Stop reason: HOSPADM

## 2025-02-06 RX ORDER — POTASSIUM CHLORIDE 1.5 G/1.58G
20 POWDER, FOR SOLUTION ORAL ONCE
Status: COMPLETED | OUTPATIENT
Start: 2025-02-06 | End: 2025-02-06

## 2025-02-06 RX ORDER — FERROUS SULFATE 325(65) MG
325 TABLET ORAL
Status: DISCONTINUED | OUTPATIENT
Start: 2025-02-06 | End: 2025-02-08 | Stop reason: HOSPADM

## 2025-02-06 RX ADMIN — SODIUM CHLORIDE, PRESERVATIVE FREE 5 ML: 5 INJECTION INTRAVENOUS at 22:08

## 2025-02-06 RX ADMIN — HEPARIN SODIUM 5000 UNITS: 5000 INJECTION INTRAVENOUS; SUBCUTANEOUS at 06:33

## 2025-02-06 RX ADMIN — HEPARIN SODIUM 5000 UNITS: 5000 INJECTION INTRAVENOUS; SUBCUTANEOUS at 22:08

## 2025-02-06 RX ADMIN — HEPARIN SODIUM 5000 UNITS: 5000 INJECTION INTRAVENOUS; SUBCUTANEOUS at 14:21

## 2025-02-06 RX ADMIN — POTASSIUM CHLORIDE 20 MEQ: 1.5 FOR SOLUTION ORAL at 09:28

## 2025-02-06 RX ADMIN — SODIUM CHLORIDE, PRESERVATIVE FREE 40 MG: 5 INJECTION INTRAVENOUS at 09:17

## 2025-02-06 RX ADMIN — FINASTERIDE 5 MG: 5 TABLET, FILM COATED ORAL at 09:16

## 2025-02-06 RX ADMIN — HYDROMORPHONE HYDROCHLORIDE 0.5 MG: 1 INJECTION, SOLUTION INTRAMUSCULAR; INTRAVENOUS; SUBCUTANEOUS at 01:59

## 2025-02-06 RX ADMIN — TAMSULOSIN HYDROCHLORIDE 0.4 MG: 0.4 CAPSULE ORAL at 09:17

## 2025-02-06 RX ADMIN — SODIUM CHLORIDE, PRESERVATIVE FREE 10 ML: 5 INJECTION INTRAVENOUS at 09:17

## 2025-02-06 RX ADMIN — SODIUM BICARBONATE 650 MG: 650 TABLET ORAL at 09:17

## 2025-02-06 RX ADMIN — HYDROMORPHONE HYDROCHLORIDE 0.5 MG: 1 INJECTION, SOLUTION INTRAMUSCULAR; INTRAVENOUS; SUBCUTANEOUS at 17:01

## 2025-02-06 RX ADMIN — METOPROLOL TARTRATE 12.5 MG: 25 TABLET, FILM COATED ORAL at 09:17

## 2025-02-06 RX ADMIN — POTASSIUM & SODIUM PHOSPHATES POWDER PACK 280-160-250 MG 500 MG: 280-160-250 PACK at 22:08

## 2025-02-06 RX ADMIN — POTASSIUM & SODIUM PHOSPHATES POWDER PACK 280-160-250 MG 500 MG: 280-160-250 PACK at 16:01

## 2025-02-06 RX ADMIN — OXYCODONE HYDROCHLORIDE AND ACETAMINOPHEN 0.5 TABLET: 5; 325 TABLET ORAL at 14:21

## 2025-02-06 RX ADMIN — METOPROLOL TARTRATE 12.5 MG: 25 TABLET, FILM COATED ORAL at 22:08

## 2025-02-06 RX ADMIN — ACETAMINOPHEN 650 MG: 325 TABLET ORAL at 18:21

## 2025-02-06 RX ADMIN — POTASSIUM CHLORIDE: 2 INJECTION, SOLUTION, CONCENTRATE INTRAVENOUS at 22:38

## 2025-02-06 RX ADMIN — ACETAMINOPHEN 650 MG: 325 TABLET ORAL at 09:28

## 2025-02-06 RX ADMIN — FERROUS SULFATE TAB 325 MG (65 MG ELEMENTAL FE) 325 MG: 325 (65 FE) TAB at 09:28

## 2025-02-06 RX ADMIN — SODIUM BICARBONATE 650 MG: 650 TABLET ORAL at 22:08

## 2025-02-06 RX ADMIN — Medication 400 MG: at 09:17

## 2025-02-06 RX ADMIN — POTASSIUM CHLORIDE: 2 INJECTION, SOLUTION, CONCENTRATE INTRAVENOUS at 05:07

## 2025-02-06 ASSESSMENT — PAIN SCALES - GENERAL
PAINLEVEL_OUTOF10: 7
PAINLEVEL_OUTOF10: 8
PAINLEVEL_OUTOF10: 4
PAINLEVEL_OUTOF10: 4

## 2025-02-06 ASSESSMENT — PAIN DESCRIPTION - ORIENTATION: ORIENTATION: RIGHT

## 2025-02-06 ASSESSMENT — PAIN DESCRIPTION - LOCATION: LOCATION: ABDOMEN

## 2025-02-06 ASSESSMENT — PAIN DESCRIPTION - DESCRIPTORS: DESCRIPTORS: ACHING

## 2025-02-06 NOTE — PROGRESS NOTES
End of Shift Note    Bedside shift change report given to Crystal WARD (oncoming nurse) by Nikia Jackson RN (offgoing nurse).  Report included the following information SBAR, ED Summary, MAR, Recent Results, and Cardiac Rhythm NSR/tach    Shift worked:  9532-2413     Shift summary and any significant changes:     Patient is being discharged tomorrow at 3 PM to a SNF for comfort care. No other significant changes     Concerns for physician to address:  none     Zone phone for oncoming shift:   [phone]

## 2025-02-06 NOTE — CARE COORDINATION
Transition of Care Plan: [facility] with comfort care measures    RUR: 20% (high RUR)  Prior Level of Functioning: Requiring assistance with most activities of daily living (ADLs).  Disposition: [facility] with comfort care measures, then LTC with hospice once Medicaid comes through vs. Return home with hospice services     UAI completed on this admission.    REYES: 2/7/2025  If SNF or IPR: Date FOC offered: 1/27  Date FOC received: 1/27    Accepting facility: [facility]  Date authorization started with reference number: N/A - 3NS requested  Date authorization received and expires: N/A  Follow up appointments: defer to SNF.  DME needed: defer to SNF.   Transportation at discharge: [facility] at 3 PM (), to [facility], bed pending, nursing call report to: [phone] and please place a copy of the MAR on the chart for transport.  Inpt DNR copy left on bedside chart.    IM/IMM Medicare/ letter given: 2nd IM done 2/5/2025.  Is patient a  and connected with [facility]? No.   If yes, was [place] transfer form completed and [facility] notified? N/A  Caregiver Contact: Kareen Maharaj (spouse - [phone]) has deferred to: Faby \"Sinai\" Ramu - DTR - [phone] - [email or web address]   Discharge Caregiver contacted prior to discharge? Aware of pending discharge.  Care Conference needed? No.  Barriers to discharge: None.    5077 - CM sent message to [facility] to see if they have received any updates.    1042 - [facility] said they are ready to accept the patient but family is having concerns about caregiving.  [facility] said family is currently weighing their options at this time.    0547 - NOEMI received a call from Po[place] at [facility] regarding family still trying to coordinate a discharge plan for patient and that they applied for Medicaid this morning.  CM spoke with CM  who brought up the option of comfort care in a SNF until Medicaid come through.  CM left  for Sinai (DTR).    4725 to 1650 -

## 2025-02-06 NOTE — PROGRESS NOTES
Palliative Medicine SW Note    LCSW reviewed chart and stopped by patient's room to offer support, follow up on previous GOC conversation. No visitors present. Patient was alert, confused, stated to nurse and this writer, \"I just want to feel better, I feel bad.\"    Palliative SW called daughter Sinai to offer support and follow up.   Empathetic listening provided, normalizing, validating feelings of disappointment and anticipatory grief as they prepare for patient to transition to SNF for comfort care while they prepare to bring him home with hospice once 24/7 care is arranged. (Sinai will be out of town from 2/20-2/26 and is hoping to have care arranged by 2/26 at home.)    She shared that they have applied for Medicaid and her mother has signed the DDNR which will be brought to the room to be signed by provider tomorrow.    Thank you for including Palliative team in the care of Mr. Maharaj.    Kellie Wong, [facility]  Palliative Medicine [phone] ([phone])

## 2025-02-06 NOTE — PROGRESS NOTES
Hospitalist Progress Note    NAME:   Steve Maharaj   : 1933   MRN: [id number]     Date/Time: 2025 9:22 AM  Patient PCP: Joseph Ugarte MD    Estimated discharge date: 25  Barriers: home hospice, nehro clearance      Assessment / Plan:    Anemia (likely iron deficiency)     Right ureteral stone  Moderate hydronephrosis  History of left nephrectomy due to renal cell carcinoma  Right PCN by IR on   CKDlll  Metabolic acidosis  BPH     Chronic Murphy  Patient should follow-up with urology outpatient  Nephrology  following appreciate input  Continue PTA Flomax  Continue PTA Proscar    Hemoglobin POA was 11.6  Reduced to 8.5 on 2025, increased to 9.8 on 2025, reduced to 7.2 on 2025.  Now  6.8  this a.m.  Continue to hold PTA aspirin  Low  iron profile, will start iron tabs     Leukocytosis, resolved  Elevated lactic acid (Resolved)     WBC POA 9.5, increased to 15.2 on 25 and continually up daily . Was 29.9 yesterday, with much improvement to 18.6 this am    Lactic acid was 3.0 on  , down to 2.8  and currently normal at 1.6  Blood cultures revealed Proteus Mirabella's  ID guidance appreciated.  Antibiotic to complete today          Epigastric pain  Dysphagia     Patient complaining of pain on the right lower quadrant  KUB  revealed decreased colonic distention  Continue metronidazole  GI following, appreciate recommendation  Speech following  Currently on clear liquid diet  Aspiration precaution     Sinus rhythm with PACs noted on EKG   Continue telemetry monitoring  Monitor vitals  Metoprolol IV for heart rate greater than 130 sustained     Hypokalemia  Hypomagnesimia   Seems ongoing since admission.  Yesterday  potassium 2.8, decrease to 2.6 today   Magnesium normal at 1.5  Replete K & Mg  Daily CMP.      Body mass index is 17.36 kg/m². -          Medical Decision Making:   I personally reviewed labs:bmp     Discussed case with: staff        Code

## 2025-02-06 NOTE — PLAN OF CARE
Problem: Occupational Therapy - Adult  Goal: By Discharge: Performs self-care activities at highest level of function for planned discharge setting.  See evaluation for individualized goals.  Description: FUNCTIONAL STATUS PRIOR TO ADMISSION:    Receives Help From: Family (daughters assist pt with bathing and dressing.  Pt unbale to report specifics at this time.), Prior Level of Assist for ADLs: Needs assistance,  ,  ,  ,  ,  ,  ,  ,  , Active : No.  Per old notes in chart review, pt has a history of falls and needing assistance with adls/transfers     HOME SUPPORT: Patient lived with his wife.  Pt's family has been assisting pt for adls.  .    Occupational Therapy Goals:  Initiated 1/26/2025  Weekly update 2/4/2025: - all goals remain appropriate/will continue  1.  Patient will perform grooming, seated EOB with Set-up and Supervision within 7 day(s).  2.  Patient will perform upper body bathing with Moderate Assist within 7 day(s).  3.  Patient will perform upper body dressing and lower body dressing with Moderate Assist within 7 day(s).  4.  Patient will perform toilet transfers with Moderate Assist  within 7 day(s).  5.  Patient will perform all aspects of toileting with Moderate Assist within 7 day(s).  6.  Patient will participate in upper extremity therapeutic exercise/activities with Supervision for 5 minutes within 7 day(s).    7.  Patient will utilize energy conservation techniques during functional activities with verbal cues within 7 day(s).   Outcome: Progressing   OCCUPATIONAL THERAPY TREATMENT  Patient: Steve Maharaj (89 y.o. male)  Date: 2/6/2025  Primary Diagnosis: Urinary tract obstruction by kidney stone [N20.0, N13.8]  Acute renal failure, unspecified acute renal failure type (HCC) [N17.9]  Renal stone [N20.0]  Procedure(s) (LRB):  COLONOSCOPY DIAGNOSTIC (N/A) 7 Days Post-Op   Precautions: Fall Risk                Chart, occupational therapy assessment, plan of care, and goals were

## 2025-02-06 NOTE — PLAN OF CARE
Problem: Physical Therapy - Adult  Goal: By Discharge: Performs mobility at highest level of function for planned discharge setting.  See evaluation for individualized goals.  Description: FUNCTIONAL STATUS PRIOR TO ADMISSION: Pt unable to provide accurate PLOF as he presents extremely [place] and with confusion however, per chart review, pt was ambulatory with use of rolling walker and received assistance from daughter for ADLs?    HOME SUPPORT PRIOR TO ADMISSION: The patient lived with wife and had assistance from 2 daughters for ADLs.    Physical Therapy Goals  Weekly re-assessment 2/4/25, goals remain appropriate  1.  Patient will move from supine to sit and sit to supine, scoot up and down, and roll side to side in bed with supervision/set-up within 7 day(s).    2.  Patient will perform sit to stand with supervision/set-up within 7 day(s).  3.  Patient will transfer from bed to chair and chair to bed with contact guard assist using the least restrictive device within 7 day(s).  4.  Patient will ambulate with contact guard assist for 25 feet with the least restrictive device within 7 day(s).       Initiated 1/27/2025  1.  Patient will move from supine to sit and sit to supine, scoot up and down, and roll side to side in bed with supervision/set-up within 7 day(s).    2.  Patient will perform sit to stand with supervision/set-up within 7 day(s).  3.  Patient will transfer from bed to chair and chair to bed with contact guard assist using the least restrictive device within 7 day(s).  4.  Patient will ambulate with contact guard assist for 25 feet with the least restrictive device within 7 day(s).   Outcome: Progressing   PHYSICAL THERAPY TREATMENT    Patient: Steve Maharaj (89 y.o. male)  Date: 2/6/2025  Diagnosis: Urinary tract obstruction by kidney stone [N20.0, N13.8]  Acute renal failure, unspecified acute renal failure type (HCC) [N17.9]  Renal stone [N20.0] Urinary tract obstruction by kidney

## 2025-02-06 NOTE — PLAN OF CARE
Problem: Safety - Adult  Goal: Free from fall injury  2/6/2025 1002 by Nikia Jackson RN  Outcome: Progressing  2/5/2025 2321 by Crystal Terrell RN  Outcome: Progressing     Problem: Pain  Goal: Verbalizes/displays adequate comfort level or baseline comfort level  2/6/2025 1002 by Nikia Jackson RN  Outcome: Progressing  2/5/2025 2321 by Crystal Terrell RN  Outcome: Progressing     Problem: Discharge Planning  Goal: Discharge to home or other facility with appropriate resources  2/6/2025 1002 by Nikia Jackson RN  Outcome: Progressing  2/5/2025 2321 by Crystal Terrell RN  Outcome: Progressing     Problem: Skin/Tissue Integrity  Goal: Absence of new skin breakdown  Description: 1.  Monitor for areas of redness and/or skin breakdown  2.  Assess vascular access sites hourly  3.  Every 4-6 hours minimum:  Change oxygen saturation probe site  4.  Every 4-6 hours:  If on nasal continuous positive airway pressure, respiratory therapy assess nares and determine need for appliance change or resting period.  2/6/2025 1002 by Nikia Jackson RN  Outcome: Progressing  2/5/2025 2321 by Crystal Terrell RN  Outcome: Progressing     Problem: Respiratory - Adult  Goal: Achieves optimal ventilation and oxygenation  2/6/2025 1002 by Nikia Jackson RN  Outcome: Progressing  2/5/2025 2321 by Crystal Terrell RN  Outcome: Progressing     Problem: Gastrointestinal - Adult  Goal: Minimal or absence of nausea and vomiting  2/6/2025 1002 by Nikia Jackson RN  Outcome: Progressing  2/5/2025 2321 by Crystal Terrell RN  Outcome: Progressing  Goal: Maintains or returns to baseline bowel function  Outcome: Progressing  Goal: Maintains adequate nutritional intake  Outcome: Progressing     Problem: Genitourinary - Adult  Goal: Absence of urinary retention  2/6/2025 1002 by Nikia Jackson RN  Outcome: Progressing  2/5/2025 2321 by Crystal Terrell RN  Outcome: Progressing  Goal: Urinary catheter

## 2025-02-06 NOTE — PROGRESS NOTES
End of Shift Note    Bedside shift change report given to Kendall WARD (oncoming nurse) by OLIVIER GARRETT RN (offgoing nurse).  Report included the following information SBAR, Intake/Output, MAR, Recent Results, and Cardiac Rhythm SR    Shift worked:  7p-730a     Shift summary and any significant changes:     Uneventful night, pt rested and pain controlled well     Concerns for physician to address:       Zone phone for oncoming shift:          Activity:  Level of Assistance: Moderate assist, patient does 50-74%  Number times ambulated in hallways past shift: 0  Number of times OOB to chair past shift: 0    Cardiac:   Cardiac Monitoring: Yes      Cardiac Rhythm: Sinus rhythm    Access:  Current line(s): PIV     Genitourinary:   Urinary Status: Incontinent briefs    Respiratory:   O2 Device: None (Room air)  Chronic home O2 use?: NO  Incentive spirometer at bedside: NO    GI:  Last BM (including prior to admit): 02/04/25  Current diet:  ADULT DIET; Clear Liquid  ADULT ORAL NUTRITION SUPPLEMENT; AM Snack, PM Snack, HS Snack; Standard High Calorie/High Protein Oral Supplement  ADULT ORAL NUTRITION SUPPLEMENT; Breakfast, Dinner; Clear Liquid Oral Supplement  Passing flatus: YES    Pain Management:   Patient states pain is manageable on current regimen: YES    Skin:  Mateusz Scale Score: 13  Interventions: Wound Offloading (Prevention Methods): Repositioning    Patient Safety:  Fall Risk: Nursing Judgement-Fall Risk High(Add Comments): Yes  Fall Risk Interventions  Nursing Judgement-Fall Risk High(Add Comments): Yes  Toilet Every 2 Hours-In Advance of Need: Yes  Hourly Visual Checks: Eyes closed, In bed  Fall Visual Posted: Fall sign posted, Socks  Room Door Open: Deferred to promote rest  Alarm On: Bed  Patient Moved Closer to Nursing Station: No    Active Consults:   IP CONSULT TO UROLOGY  IP CONSULT TO NEPHROLOGY  IP CONSULT TO GENERAL SURGERY  IP CONSULT TO GI  IP CONSULT TO PHARMACY  IP CONSULT TO GI  IP CONSULT TO

## 2025-02-06 NOTE — PROGRESS NOTES
Name: Steve Maharaj   MRN: [id number]  : 1933      Assessment:  ELIAS- 2nd to obstructive uropathy in a solitary kidney + n/v + diuretic     R ureteral stone with mod R hydro- in solitary kidney. 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 due to HTN/Solitary kidney  BPH    Hypernatremia    Cr better, now down to 2.1 with IVF's.  Sodium better, 148 to 144 to 138. K 2.5 to 3.2, Mg now 1.6. Acidosis persists.        Plan/Recommendations:  To transition to hospice.     I will sign off.       Subjective:  Resting. Not taking much po. [place].     Exam:  BP (!) 151/71   Pulse 88   Temp 98.2 °F (36.8 °C) (Oral)   Resp 28   Ht 1.829 m (6')   Wt 58.1 kg (128 lb)   SpO2 95%   BMI 17.36 kg/m²     Elderly thin/frail, ill appearing in NAD  No edema    Labs/Data:    Lab Results   Component Value Date    WBC 10.0 2025    HGB 6.8 (L) 2025    HCT 21.4 (L) 2025    MCV 94.7 2025     2025       Lab Results   Component Value Date/Time     2025 06:52 AM    K 3.2 2025 06:52 AM     2025 06:52 AM    CO2 18 2025 06:52 AM    BUN 20 2025 06:52 AM    CREATININE 2.11 2025 06:52 AM    GLUCOSE 108 2025 06:52 AM    CALCIUM 7.1 2025 06:52 AM    LABGLOM 29 2025 06:52 AM    LABGLOM 41 2024 06:44 AM        Wt Readings from Last 3 Encounters:   25 58.1 kg (128 lb)   24 65.8 kg (145 lb)   24 65.8 kg (145 lb)         Intake/Output Summary (Last 24 hours) at 2025 0929  Last data filed at 2025 0514  Gross per 24 hour   Intake 3273.38 ml   Output 750 ml   Net 2523.38 ml       Patient seen and examined. Chart

## 2025-02-06 NOTE — PROGRESS NOTES
Comprehensive Nutrition Assessment    Type and Reason for Visit:  Reassess    Nutrition Recommendations/Plan:   Consider upgrading diet to full liquids. SLP recommended clear liquids with advance per GI. GI has not seen pt since 1/31 with \"Recommend to advance diet as tolerated, bowel regimen.\"  Advance supplements with diet.  Please document % meals and supplements consumed in flowsheet I/O's under intake     Addendum: cleared with attending to advance diet to full liquids. Further advancement per SLP.      Malnutrition Assessment:  Malnutrition Status:  Severe malnutrition (01/30/25 1448)    Context:  Acute Illness     Findings of the 6 clinical characteristics of malnutrition:  Energy Intake:  50% or less of estimated energy requirements for 5 or more days  Weight Loss:   (11.7% wt loss but unsure of time frame and reliability of current wt in chart)     Body Fat Loss:  Unable to assess     Muscle Mass Loss:  Moderate muscle mass loss Temples (temporalis), Clavicles (pectoralis & deltoids)  Fluid Accumulation:  No fluid accumulation     Strength:  Not Performed    Nutrition Assessment:     Chart reviewed and case discussed during IDR. Pt sleeping when RD attempted assessment today. Lunch untouched at bedside and no family present. Dispo TBD between hospice and SNF. Limited PO documentation to review.     Patient Vitals for the past 120 hrs:   PO Meals Eaten (%)   02/03/25 1000 1 - 25%     Wt Readings from Last 5 Encounters:   01/27/25 58.1 kg (128 lb)   07/28/24 65.8 kg (145 lb)   04/07/24 65.8 kg (145 lb)   09/09/23 65.8 kg (145 lb 1 oz)   09/08/23 65.8 kg (145 lb)   ]    Nutrition Related Findings:    Labs: K 3.2, Cr 2.11, Phos 2.3, Hb 6.8.   Meds: Iron, Mag-ox, Protonix, Na bicarb, KCl, in D5, Dilaudid.   Edema: +1 BUE, trace BLE.   BM 2/4.   Wound Type: None       Current Nutrition Intake & Therapies:    Average Meal Intake: 1-25%  Average Supplements Intake: Unable to assess  ADULT DIET; Clear

## 2025-02-06 NOTE — PLAN OF CARE
Problem: Safety - Adult  Goal: Free from fall injury  2/5/2025 2321 by Crystal Terrell RN  Outcome: Progressing  2/5/2025 1447 by Ai Schumacher RN  Outcome: Progressing  Flowsheets (Taken 2/5/2025 1447)  Free From Fall Injury: Instruct family/caregiver on patient safety     Problem: Pain  Goal: Verbalizes/displays adequate comfort level or baseline comfort level  2/5/2025 2321 by Crystal Terrell RN  Outcome: Progressing  2/5/2025 1447 by Ai Schumacher RN  Outcome: Progressing  Flowsheets (Taken 2/5/2025 1447)  Verbalizes/displays adequate comfort level or baseline comfort level:   Administer analgesics based on type and severity of pain and evaluate response   Encourage patient to monitor pain and request assistance   Consider cultural and social influences on pain and pain management   Assess pain using appropriate pain scale   Implement non-pharmacological measures as appropriate and evaluate response   Notify Licensed Independent Practitioner if interventions unsuccessful or patient reports new pain     Problem: Discharge Planning  Goal: Discharge to home or other facility with appropriate resources  2/5/2025 2321 by Crystal Terrell RN  Outcome: Progressing  2/5/2025 1447 by Ai Schumacher RN  Outcome: Progressing  Flowsheets (Taken 2/5/2025 1447)  Discharge to home or other facility with appropriate resources:   Identify barriers to discharge with patient and caregiver   Identify discharge learning needs (meds, wound care, etc)   Arrange for needed discharge resources and transportation as appropriate     Problem: Skin/Tissue Integrity  Goal: Absence of new skin breakdown  Description: 1.  Monitor for areas of redness and/or skin breakdown  2.  Assess vascular access sites hourly  3.  Every 4-6 hours minimum:  Change oxygen saturation probe site  4.  Every 4-6 hours:  If on nasal continuous positive airway pressure, respiratory therapy assess nares and determine need for appliance change

## 2025-02-07 PROBLEM — N13.30 HYDRONEPHROSIS OF RIGHT KIDNEY: Status: RESOLVED | Noted: 2025-01-31 | Resolved: 2025-02-07

## 2025-02-07 LAB
ANION GAP SERPL CALC-SCNC: 8 MMOL/L (ref 2–12)
BUN SERPL-MCNC: 18 MG/DL (ref 6–20)
BUN/CREAT SERPL: 9 (ref 12–20)
CALCIUM SERPL-MCNC: 7.1 MG/DL (ref 8.5–10.1)
CHLORIDE SERPL-SCNC: 109 MMOL/L (ref 97–108)
CO2 SERPL-SCNC: 18 MMOL/L (ref 21–32)
CREAT SERPL-MCNC: 1.98 MG/DL (ref 0.7–1.3)
ERYTHROCYTE [DISTWIDTH] IN BLOOD BY AUTOMATED COUNT: 15.1 % (ref 11.5–14.5)
GLUCOSE SERPL-MCNC: 101 MG/DL (ref 65–100)
HCT VFR BLD AUTO: 20.5 % (ref 36.6–50.3)
HGB BLD-MCNC: 6.8 G/DL (ref 12.1–17)
HISTORY CHECK: NORMAL
MAGNESIUM SERPL-MCNC: 1.4 MG/DL (ref 1.6–2.4)
MCH RBC QN AUTO: 30 PG (ref 26–34)
MCHC RBC AUTO-ENTMCNC: 33.2 G/DL (ref 30–36.5)
MCV RBC AUTO: 90.3 FL (ref 80–99)
NRBC # BLD: 0 K/UL (ref 0–0.01)
NRBC BLD-RTO: 0 PER 100 WBC
PHOSPHATE SERPL-MCNC: 2.5 MG/DL (ref 2.6–4.7)
PLATELET # BLD AUTO: 289 K/UL (ref 150–400)
PMV BLD AUTO: 9.2 FL (ref 8.9–12.9)
POTASSIUM SERPL-SCNC: 3.3 MMOL/L (ref 3.5–5.1)
RBC # BLD AUTO: 2.27 M/UL (ref 4.1–5.7)
SODIUM SERPL-SCNC: 135 MMOL/L (ref 136–145)
WBC # BLD AUTO: 8.9 K/UL (ref 4.1–11.1)

## 2025-02-07 PROCEDURE — 6360000002 HC RX W HCPCS: Performed by: INTERNAL MEDICINE

## 2025-02-07 PROCEDURE — 2500000003 HC RX 250 WO HCPCS: Performed by: STUDENT IN AN ORGANIZED HEALTH CARE EDUCATION/TRAINING PROGRAM

## 2025-02-07 PROCEDURE — 36430 TRANSFUSION BLD/BLD COMPNT: CPT

## 2025-02-07 PROCEDURE — 6370000000 HC RX 637 (ALT 250 FOR IP): Performed by: INTERNAL MEDICINE

## 2025-02-07 PROCEDURE — 2060000000 HC ICU INTERMEDIATE R&B

## 2025-02-07 PROCEDURE — 2580000003 HC RX 258: Performed by: INTERNAL MEDICINE

## 2025-02-07 PROCEDURE — 86850 RBC ANTIBODY SCREEN: CPT

## 2025-02-07 PROCEDURE — 6370000000 HC RX 637 (ALT 250 FOR IP): Performed by: STUDENT IN AN ORGANIZED HEALTH CARE EDUCATION/TRAINING PROGRAM

## 2025-02-07 PROCEDURE — 6360000002 HC RX W HCPCS: Performed by: NURSE PRACTITIONER

## 2025-02-07 PROCEDURE — 84100 ASSAY OF PHOSPHORUS: CPT

## 2025-02-07 PROCEDURE — 85027 COMPLETE CBC AUTOMATED: CPT

## 2025-02-07 PROCEDURE — 30233N1 TRANSFUSION OF NONAUTOLOGOUS RED BLOOD CELLS INTO PERIPHERAL VEIN, PERCUTANEOUS APPROACH: ICD-10-PCS | Performed by: STUDENT IN AN ORGANIZED HEALTH CARE EDUCATION/TRAINING PROGRAM

## 2025-02-07 PROCEDURE — 86900 BLOOD TYPING SEROLOGIC ABO: CPT

## 2025-02-07 PROCEDURE — 36415 COLL VENOUS BLD VENIPUNCTURE: CPT

## 2025-02-07 PROCEDURE — 86901 BLOOD TYPING SEROLOGIC RH(D): CPT

## 2025-02-07 PROCEDURE — 83735 ASSAY OF MAGNESIUM: CPT

## 2025-02-07 PROCEDURE — P9016 RBC LEUKOCYTES REDUCED: HCPCS

## 2025-02-07 PROCEDURE — 80048 BASIC METABOLIC PNL TOTAL CA: CPT

## 2025-02-07 PROCEDURE — 86923 COMPATIBILITY TEST ELECTRIC: CPT

## 2025-02-07 PROCEDURE — 1100000000 HC RM PRIVATE

## 2025-02-07 RX ORDER — SODIUM CHLORIDE 9 MG/ML
INJECTION, SOLUTION INTRAVENOUS PRN
Status: DISCONTINUED | OUTPATIENT
Start: 2025-02-07 | End: 2025-02-08 | Stop reason: HOSPADM

## 2025-02-07 RX ORDER — PANTOPRAZOLE SODIUM 40 MG/1
40 TABLET, DELAYED RELEASE ORAL
Qty: 30 TABLET | Refills: 3 | Status: SHIPPED | OUTPATIENT
Start: 2025-02-07

## 2025-02-07 RX ORDER — SODIUM BICARBONATE 650 MG/1
650 TABLET ORAL 2 TIMES DAILY
Qty: 30 TABLET | Refills: 0 | Status: SHIPPED | OUTPATIENT
Start: 2025-02-07

## 2025-02-07 RX ORDER — IPRATROPIUM BROMIDE AND ALBUTEROL SULFATE 2.5; .5 MG/3ML; MG/3ML
3 SOLUTION RESPIRATORY (INHALATION) EVERY 4 HOURS PRN
Qty: 360 ML | Refills: 0 | Status: SHIPPED | OUTPATIENT
Start: 2025-02-07

## 2025-02-07 RX ORDER — METOPROLOL TARTRATE 25 MG/1
12.5 TABLET, FILM COATED ORAL 2 TIMES DAILY
Qty: 60 TABLET | Refills: 3 | Status: SHIPPED | OUTPATIENT
Start: 2025-02-07

## 2025-02-07 RX ORDER — LANOLIN ALCOHOL/MO/W.PET/CERES
400 CREAM (GRAM) TOPICAL DAILY
Qty: 30 TABLET | Refills: 0 | Status: SHIPPED | OUTPATIENT
Start: 2025-02-07

## 2025-02-07 RX ORDER — FERROUS SULFATE 325(65) MG
325 TABLET ORAL
Qty: 30 TABLET | Refills: 3 | Status: SHIPPED | OUTPATIENT
Start: 2025-02-07

## 2025-02-07 RX ADMIN — FINASTERIDE 5 MG: 5 TABLET, FILM COATED ORAL at 09:40

## 2025-02-07 RX ADMIN — Medication 400 MG: at 09:40

## 2025-02-07 RX ADMIN — SODIUM CHLORIDE, PRESERVATIVE FREE 10 ML: 5 INJECTION INTRAVENOUS at 09:41

## 2025-02-07 RX ADMIN — HEPARIN SODIUM 5000 UNITS: 5000 INJECTION INTRAVENOUS; SUBCUTANEOUS at 14:26

## 2025-02-07 RX ADMIN — PANTOPRAZOLE SODIUM 40 MG: 40 TABLET, DELAYED RELEASE ORAL at 09:40

## 2025-02-07 RX ADMIN — ACETAMINOPHEN 650 MG: 325 TABLET ORAL at 04:28

## 2025-02-07 RX ADMIN — FERROUS SULFATE TAB 325 MG (65 MG ELEMENTAL FE) 325 MG: 325 (65 FE) TAB at 09:40

## 2025-02-07 RX ADMIN — SODIUM CHLORIDE, PRESERVATIVE FREE 10 ML: 5 INJECTION INTRAVENOUS at 20:22

## 2025-02-07 RX ADMIN — POTASSIUM CHLORIDE: 2 INJECTION, SOLUTION, CONCENTRATE INTRAVENOUS at 07:31

## 2025-02-07 RX ADMIN — HEPARIN SODIUM 5000 UNITS: 5000 INJECTION INTRAVENOUS; SUBCUTANEOUS at 06:04

## 2025-02-07 RX ADMIN — POTASSIUM & SODIUM PHOSPHATES POWDER PACK 280-160-250 MG 500 MG: 280-160-250 PACK at 09:40

## 2025-02-07 RX ADMIN — HYDROMORPHONE HYDROCHLORIDE 0.5 MG: 1 INJECTION, SOLUTION INTRAMUSCULAR; INTRAVENOUS; SUBCUTANEOUS at 01:01

## 2025-02-07 RX ADMIN — ACETAMINOPHEN 650 MG: 325 TABLET ORAL at 18:47

## 2025-02-07 RX ADMIN — METOPROLOL TARTRATE 12.5 MG: 25 TABLET, FILM COATED ORAL at 09:40

## 2025-02-07 RX ADMIN — POTASSIUM & SODIUM PHOSPHATES POWDER PACK 280-160-250 MG 500 MG: 280-160-250 PACK at 20:11

## 2025-02-07 RX ADMIN — HYDROMORPHONE HYDROCHLORIDE 0.5 MG: 1 INJECTION, SOLUTION INTRAMUSCULAR; INTRAVENOUS; SUBCUTANEOUS at 20:11

## 2025-02-07 RX ADMIN — POTASSIUM & SODIUM PHOSPHATES POWDER PACK 280-160-250 MG 500 MG: 280-160-250 PACK at 14:26

## 2025-02-07 RX ADMIN — METOPROLOL TARTRATE 12.5 MG: 25 TABLET, FILM COATED ORAL at 20:10

## 2025-02-07 RX ADMIN — HYDROMORPHONE HYDROCHLORIDE 0.5 MG: 1 INJECTION, SOLUTION INTRAMUSCULAR; INTRAVENOUS; SUBCUTANEOUS at 06:03

## 2025-02-07 RX ADMIN — TAMSULOSIN HYDROCHLORIDE 0.4 MG: 0.4 CAPSULE ORAL at 09:40

## 2025-02-07 RX ADMIN — SODIUM BICARBONATE 650 MG: 650 TABLET ORAL at 20:11

## 2025-02-07 RX ADMIN — ACETAMINOPHEN 650 MG: 325 TABLET ORAL at 12:05

## 2025-02-07 RX ADMIN — SODIUM BICARBONATE 650 MG: 650 TABLET ORAL at 09:40

## 2025-02-07 ASSESSMENT — PAIN DESCRIPTION - ORIENTATION
ORIENTATION: RIGHT

## 2025-02-07 ASSESSMENT — PAIN DESCRIPTION - LOCATION
LOCATION: ABDOMEN;FLANK
LOCATION: HIP;SHOULDER
LOCATION: ABDOMEN

## 2025-02-07 ASSESSMENT — PAIN DESCRIPTION - DESCRIPTORS
DESCRIPTORS: ACHING

## 2025-02-07 ASSESSMENT — PAIN SCALES - GENERAL
PAINLEVEL_OUTOF10: 8
PAINLEVEL_OUTOF10: 7
PAINLEVEL_OUTOF10: 7

## 2025-02-07 NOTE — PROGRESS NOTES
Hospitalist Progress Note    NAME:   Steve Maharaj   : 1933   MRN: [id number]     Date/Time: 2025 8:28 AM  Patient PCP: Joseph Ugarte MD    Estimated discharge date: 25  Barriers: home hospice vs SNF with comfort care, nehro clearance      Assessment / Plan:        Anemia (likely iron deficiency)  Hemoglobin POA was 11.6  Reduced to 8.5 on 2025, increased to 9.8 on 2025, reduced to 7.2 on 2025.  Now  6.8  this a.m.  Continue to hold PTA aspirin  C/w iron tabs, repeat cbc        Right ureteral stone  Moderate hydronephrosis  History of left nephrectomy due to renal cell carcinoma  Right PCN by IR on   CKDlll  Metabolic acidosis  BPH     Chronic Murphy  Patient should follow-up with urology outpatient  Nephrology  following appreciate input  Continue PTA Flomax  Continue PTA Proscar    Hemoglobin POA was 11.6  Reduced to 8.5 on 2025, increased to 9.8 on 2025, reduced to 7.2 on 2025.  Now  6.8  this a.m.  Continue to hold PTA aspirin  Low  iron profile, will start iron tabs     Leukocytosis, resolved  Elevated lactic acid (Resolved)    Antibiotic to completed      Epigastric pain  Dysphagia     Patient complaining of pain on the right lower quadrant  KUB  revealed decreased colonic distention  Continue metronidazole  GI following, appreciate recommendation  Speech following  Currently on clear liquid diet  Aspiration precaution     Sinus rhythm with PACs noted on EKG   Continue telemetry monitoring  Monitor vitals  Metoprolol IV for heart rate greater than 130 sustained     Hypokalemia  Hypomagnesimia   Seems ongoing since admission.  Yesterday  potassium 2.8, decrease to 2.6 today   Magnesium normal at 1.5  Replete K & Mg  Daily CMP.      Body mass index is 17.36 kg/m². -    Discharge planning, patient family has been trying to decide between hospice versus SNF with comfort care.    Medical Decision Making:   I personally reviewed

## 2025-02-07 NOTE — PROGRESS NOTES
End of Shift Note    Bedside shift change report given to Crystal (oncoming nurse) by Joel Ramos RN (offgoing nurse).  Report included the following information SBAR, MAR, and Cardiac Rhythm NSR    Shift worked:  9379-4255     Shift summary and any significant changes:     HGB 6.8, patient receiving 1 unit PRBC per MD orders. Discharge delayed.      Concerns for physician to address:       Zone phone for oncoming shift:          Activity:  Level of Assistance: Moderate assist, patient does 50-74%  Number times ambulated in hallways past shift: 0  Number of times OOB to chair past shift: 0    Cardiac:   Cardiac Monitoring: Yes      Cardiac Rhythm: Sinus rhythm    Access:  Current line(s): PIV     Genitourinary:   Urinary Status:  (nephrostomy)    Respiratory:   O2 Device: None (Room air)  Chronic home O2 use?: NO  Incentive spirometer at bedside: NO    GI:  Last BM (including prior to admit): 02/06/25  Current diet:  ADULT DIET; Full Liquid  ADULT ORAL NUTRITION SUPPLEMENT; Lunch, Dinner; Standard High Calorie/High Protein Oral Supplement  ADULT ORAL NUTRITION SUPPLEMENT; Breakfast; Clear Liquid Oral Supplement  ADULT ORAL NUTRITION SUPPLEMENT; Dinner; Frozen Oral Supplement  Passing flatus: YES    Pain Management:   Patient states pain is manageable on current regimen: YES    Skin:  Mateusz Scale Score: 14  Interventions: Wound Offloading (Prevention Methods): Bed, pressure reduction mattress, Pillows, Repositioning, Turning    Patient Safety:  Fall Risk: Nursing Judgement-Fall Risk High(Add Comments): Yes  Fall Risk Interventions  Nursing Judgement-Fall Risk High(Add Comments): Yes  Toilet Every 2 Hours-In Advance of Need: Yes  Hourly Visual Checks: Awake, In bed  Fall Visual Posted: Fall sign posted, Socks  Room Door Open: Yes  Alarm On: Bed  Patient Moved Closer to Nursing Station: No    Active Consults:   IP CONSULT TO UROLOGY  IP CONSULT TO NEPHROLOGY  IP CONSULT TO GENERAL SURGERY  IP CONSULT TO GI  IP

## 2025-02-07 NOTE — DISCHARGE SUMMARY
Discharge Summary    Name: Steve Maharaj  [id number]  YOB: 1933 (Age: 89 y.o.)   Date of Admission: 1/22/2025  Date of Discharge: 2/7/2025  Attending Physician: Kye Flowers MD    Discharge Diagnosis:   Patient Active Problem List   Diagnosis    Chronic indwelling Murphy catheter    Sepsis (HCC)    Urinary tract infection associated with indwelling urethral catheter (HCC)    CKD (chronic kidney disease), stage I    Draining cutaneous sinus tract    Diarrhea    Stroke-like symptoms    Benign paroxysmal positional vertigo    Falls frequently    Dizziness    Generalized weakness    Bilateral carotid artery stenosis    Urinary tract obstruction by kidney stone    Bacteremia due to Proteus species    Hydronephrosis of right kidney    Renal cell carcinoma of left kidney (HCC)    H/O left nephrectomy    ELIAS (acute kidney injury) (HCC)    CKD stage 3b, 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)    Bandemia    Metabolic encephalopathy    Ileus (HCC)    Palliative care by specialist    Goals of care, counseling/discussion    PAF (paroxysmal atrial fibrillation) ([facility])       Consultations:  IP CONSULT TO UROLOGY  IP CONSULT TO NEPHROLOGY  IP CONSULT TO GENERAL SURGERY  IP CONSULT TO GI  IP CONSULT TO PHARMACY  IP CONSULT TO GI  IP CONSULT TO INFECTIOUS DISEASES  IP CONSULT TO PALLIATIVE CARE  IP CONSULT TO CASE MANAGEMENT  IP CONSULT TO NEUROLOGY  IP CONSULT TO CARDIOLOGY  IP CONSULT TO CASE MANAGEMENT  IP CONSULT TO HOSPICE      Brief Admission History/Reason for Admission Per Manny Interiano MD: Fady is a 89-year-old male with a PMH of HTN, BPH & RCC s/p left nephrectomy who presents to [facility] ED on 1/22 with lethargy, nausea, and vomiting found to have right obstructing ureteral stone.  Taken to the OR for cystoscopy and right ureteral stent placement was unsuccessful so patient underwent right PCN placement with IR.  Patient was admitted to internal medicine service for further management

## 2025-02-07 NOTE — CONSENT
Informed Consent for Blood Component Transfusion Note    I have discussed with the daughter the rationale for blood component transfusion; its benefits in treating or preventing fatigue, organ damage, or death; and its risk which includes mild transfusion reactions, rare risk of blood borne infection, or more serious but rare reactions. I have discussed the alternatives to transfusion, including the risk and consequences of not receiving transfusion. The daughter had an opportunity to ask questions and had agreed to proceed with transfusion of blood components.    Electronically signed by Kye Flowers MD on 2/7/25 at 12:50 PM EST

## 2025-02-07 NOTE — CARE COORDINATION
Patient is clear from a CM standpoint on 2/8/2025.    [facility]: [phone]    Transition of Care Plan: Home with [facility] and family services     RUR: 21% (high RUR)  Prior Level of Functioning: Requiring assistance with most activities of daily living (ADLs).  Disposition: Home with [facility] and family services (contact information listed on AVS)    UAI completed on this admission.    REYES: 2/7/2025  If SNF or IPR: Date FOC offered: 1/27  Date FOC received: 1/27    Accepting facility: [facility]  Date authorization started with reference number: N/A - 3NS verified  Date authorization received and expires: N/A  Follow up appointments: defer to SNF.  DME needed: defer to SNF.   Transportation at discharge: [facility] requested for 2/8/2025 at 10 AM, to home with hospice, nursing please place a copy of the MAR on the chart for transport.  Inpt DNR copy left on bedside chart.    IM/IMM Medicare/ letter given: 2nd IM done 2/7/2025.  Is patient a  and connected with [facility]? No.   If yes, was  transfer form completed and [facility] notified? N/A  Caregiver Contact: Kareen Maharaj (spouse - [phone]) has deferred to: Faby \"Sinai\" Ramu - DTR - [phone] - [email or web address]   Discharge Caregiver contacted prior to discharge? Aware of pending discharge.  Care Conference needed? No.  Barriers to discharge: Echo?, blood    0816 to 0820 - CM received VMs from [place] with [facility] and DTRSinai, regarding a change in the discharge plan.  Attending notified of potential change in discharge plan.  CM contacted Sinai who confirmed the family did not want him to go to a nursing facility and they will accept the patient home with [facility] services.  Sinai confirmed that her sister said she would care for the patient at home and he will return to the following address: [address] [place], [place] [address], [place].  Sinai confirmed that someone be at home to let the patient in.    CM left VM and sent message

## 2025-02-07 NOTE — PLAN OF CARE
Problem: Safety - Adult  Goal: Free from fall injury  Outcome: Progressing     Problem: Pain  Goal: Verbalizes/displays adequate comfort level or baseline comfort level  Outcome: Progressing     Problem: Discharge Planning  Goal: Discharge to home or other facility with appropriate resources  Outcome: Progressing  Flowsheets (Taken 2/7/2025 5053)  Discharge to home or other facility with appropriate resources: Identify barriers to discharge with patient and caregiver     Problem: Skin/Tissue Integrity  Goal: Absence of new skin breakdown  Description: 1.  Monitor for areas of redness and/or skin breakdown  2.  Assess vascular access sites hourly  3.  Every 4-6 hours minimum:  Change oxygen saturation probe site  4.  Every 4-6 hours:  If on nasal continuous positive airway pressure, respiratory therapy assess nares and determine need for appliance change or resting period.  Outcome: Progressing     Problem: Respiratory - Adult  Goal: Achieves optimal ventilation and oxygenation  Outcome: Progressing     Problem: Gastrointestinal - Adult  Goal: Minimal or absence of nausea and vomiting  Outcome: Progressing  Goal: Maintains or returns to baseline bowel function  Outcome: Progressing  Goal: Maintains adequate nutritional intake  Outcome: Progressing     Problem: Genitourinary - Adult  Goal: Absence of urinary retention  Outcome: Progressing  Goal: Urinary catheter remains patent  Outcome: Progressing     Problem: Infection - Adult  Goal: Absence of infection at discharge  Outcome: Progressing  Goal: Absence of infection during hospitalization  Outcome: Progressing  Goal: Absence of fever/infection during anticipated neutropenic period  Outcome: Progressing     Problem: Hematologic - Adult  Goal: Maintains hematologic stability  Outcome: Progressing     Problem: ABCDS Injury Assessment  Goal: Absence of physical injury  Outcome: Progressing     Problem: Nutrition Deficit:  Goal: Optimize nutritional status  Outcome:

## 2025-02-07 NOTE — PROGRESS NOTES
End of Shift Note    Bedside shift change report given to Joel AWRD (oncoming nurse) by OLIVIER GARRETT RN (offgoing nurse).  Report included the following information SBAR, Intake/Output, MAR, Recent Results, and Cardiac Rhythm SR    Shift worked:  7p-730a     Shift summary and any significant changes:     Pt had restful night, multiple large episodes of diarrhea     Concerns for physician to address:       Zone phone for oncoming shift:          Activity:  Level of Assistance: Moderate assist, patient does 50-74%  Number times ambulated in hallways past shift: 0  Number of times OOB to chair past shift: 0    Cardiac:   Cardiac Monitoring: Yes      Cardiac Rhythm: Sinus rhythm    Access:  Current line(s): PIV     Genitourinary:   Urinary Status: Incontinent    Respiratory:   O2 Device: None (Room air)  Chronic home O2 use?: NO  Incentive spirometer at bedside: NO    GI:  Last BM (including prior to admit): 02/06/25  Current diet:  ADULT DIET; Full Liquid  ADULT ORAL NUTRITION SUPPLEMENT; Lunch, Dinner; Standard High Calorie/High Protein Oral Supplement  ADULT ORAL NUTRITION SUPPLEMENT; Breakfast; Clear Liquid Oral Supplement  ADULT ORAL NUTRITION SUPPLEMENT; Dinner; Frozen Oral Supplement  Passing flatus: YES    Pain Management:   Patient states pain is manageable on current regimen: YES    Skin:  Mateusz Scale Score: 14  Interventions: Wound Offloading (Prevention Methods): Repositioning    Patient Safety:  Fall Risk: Nursing Judgement-Fall Risk High(Add Comments): Yes  Fall Risk Interventions  Nursing Judgement-Fall Risk High(Add Comments): Yes  Toilet Every 2 Hours-In Advance of Need: Yes  Hourly Visual Checks: Awake, In bed  Fall Visual Posted: Fall sign posted, Socks  Room Door Open: Yes  Alarm On: Bed  Patient Moved Closer to Nursing Station: No    Active Consults:   IP CONSULT TO UROLOGY  IP CONSULT TO NEPHROLOGY  IP CONSULT TO GENERAL SURGERY  IP CONSULT TO GI  IP CONSULT TO PHARMACY  IP CONSULT TO

## 2025-02-07 NOTE — PROGRESS NOTES
Physician Progress Note      PATIENT:               CAMELIA PADILLA  [facility] #:                  [id number]  :                       1933  ADMIT DATE:       2025 6:32 PM  DISCH DATE:  RESPONDING  PROVIDER #:        Kye Flowers MD          QUERY TEXT:    Patient admitted with obstructive R ureteral stone and ELIAS.  Noted to have   severe malnutrition in RD Note . If possible, please document in progress   notes and discharge summary if you are evaluating and /or treating any of the   following:    The medical record reflects the following:  Risk Factors: renal stone, chronic carpenter, sepsis, elias, encephalopathy, GIB,   ileus, constipation    Clinical Indicators:  RD Note -    Malnutrition Assessment:  Malnutrition Status:  Severe malnutrition (25 1448)  Context:  Acute Illness  Findings of the 6 clinical characteristics of malnutrition:  Energy Intake:  50% or less of estimated energy requirements for 5 or more   days  Weight Loss:   (11.7% wt loss but unsure of time frame and reliability of   current wt in chart)  Body Fat Loss:  Unable to assess  Muscle Mass Loss:  Moderate muscle mass loss Temples (temporalis), Clavicles   (pectoralis & deltoids)  Fluid Accumulation:  No fluid accumulation   Strength:  Not Performed    Nutrition Assessment: Chart reviewed, case discussed during CCU rounds.  Pt   receiving care at time of visit, unable to examine/interview.  He tx to CCU   and has NGT in place to LCS, concern for ileus earlier this admission.  GI   consulted, plans for flexible sigmoidoscopy to decompress today.  K 3.3, phos   and Mag WNL.  Significant wt loss noted if current wt in chart (128lb) is   correct as his UBW is 145lb.  He has been NPO or with poor PO intake most of   this admission (LOS 7 days) and had reported n/v/anorexia for several day PTA   so he meets criteria for acute malnutrition (including his muscle wasting   already observed).  Discussed with provider consideration

## 2025-02-07 NOTE — CARE COORDINATION
CM verified patient has a qualifying hospital stay for Skilled Nursing Facility.  .  Inpatient admit on 1/23/2024. NATY STINSON  [phone]

## 2025-02-07 NOTE — PROGRESS NOTES
Palliative Medicine  Per H&P: Steve Shrestha is a 89-year-old male with a PMH of HTN, BPH & RCC s/p left nephrectomy who presents to [facility] ED on  with lethargy, nausea, and vomiting found to have right obstructing ureteral stone.  Taken to the OR for cystoscopy and right ureteral stent placement was unsuccessful so patient underwent right PCN placement with IR.  Patient was admitted to internal medicine service for further management of sepsis. On hospital day 8 patient had multiple rapid responses initiated for tachycardia with heart rate into the 150s and tachypnea.  Laboratory workup during rapid response demonstrates lactic acidosis (3) but normal anion gap with alkalotic ABG without hypoxia and increasing leukocytosis concerning for worsening sepsis.  IV antibiotics broadened and ICU consulted for transfer.  Goals of care addressed per primary team today and patient is confirmed to be DNR/DNI.    Code Status: DNR (DDNR signed by spouse, to be signed by attending, Dr. Flowers.)    [facility]/6/2025    10:05 AM   Demographics   Marital Status    No AMD on file. Spouse is legal nok so primary HCDM. Family makes decisions together when patient is unable. Oldest daughter \"Sinai\" is main contact.    Patient / Family Encounter Documentation    Participants (names): Steve Maharaj, confused, anxious, [place], (by phone, daughter Faby (\"Sinai\"),  Kellie Wong, LUCY    Narrative:   --LCSW received call from dequan Rawls (Faby) reporting that family has now decided to bring him home with support of [facility].  --Empathetic listening provided with affirmation that hospice at home is appropriate and encouraged her to use all members of hospice team, including Bereavement counseling, to support family members as they experience anticipatory grief as they coordinate 24/7 care for patient in the home by signing up on a calendar for shifts to support their parents.  --Suggested they try to have as

## 2025-02-07 NOTE — PLAN OF CARE
Problem: Safety - Adult  Goal: Free from fall injury  2/6/2025 2212 by Crystal Terrell RN  Outcome: Progressing  2/6/2025 1002 by Nikia Jackson RN  Outcome: Progressing     Problem: Pain  Goal: Verbalizes/displays adequate comfort level or baseline comfort level  2/6/2025 2212 by Crystal Terrell RN  Outcome: Progressing  2/6/2025 1002 by Nikia Jackson RN  Outcome: Progressing     Problem: Discharge Planning  Goal: Discharge to home or other facility with appropriate resources  2/6/2025 2212 by Crystal Terrell RN  Outcome: Progressing  2/6/2025 1002 by Nikia Jackson RN  Outcome: Progressing

## 2025-02-07 NOTE — PROGRESS NOTES
Speech Pathology Contact Note:    SLP previously following for dysphagia rehab. Per discussion with RN and chart review, patient with plans to d/c home with hospice this date. Note patient with diet advanced to full liquids. Suspect patient would be appropriate to advance further per patient preference/tolerance. Attempted to assist with feeding breakfast. Patient moaning and refused PO, stating \"I need rest, rest\". No further acute SLP services warranted at this time. SLP will sign off. Please re-consult if concerns arise in the future. Thanks!     Cherelle Meehan [facility]-SLP

## 2025-02-08 VITALS
RESPIRATION RATE: 20 BRPM | WEIGHT: 128 LBS | DIASTOLIC BLOOD PRESSURE: 66 MMHG | HEART RATE: 104 BPM | BODY MASS INDEX: 17.34 KG/M2 | SYSTOLIC BLOOD PRESSURE: 147 MMHG | TEMPERATURE: 97.9 F | HEIGHT: 72 IN | OXYGEN SATURATION: 98 %

## 2025-02-08 LAB
ABO + RH BLD: NORMAL
BLD PROD TYP BPU: NORMAL
BLOOD BANK BLOOD PRODUCT EXPIRATION DATE: NORMAL
BLOOD BANK DISPENSE STATUS: NORMAL
BLOOD BANK ISBT PRODUCT BLOOD TYPE: 5100
BLOOD BANK PRODUCT CODE: NORMAL
BLOOD BANK UNIT TYPE AND RH: NORMAL
BLOOD GROUP ANTIBODIES SERPL: NORMAL
BPU ID: NORMAL
CROSSMATCH RESULT: NORMAL
SPECIMEN EXP DATE BLD: NORMAL
UNIT DIVISION: 0
UNIT ISSUE DATE/TIME: NORMAL

## 2025-02-08 PROCEDURE — 6370000000 HC RX 637 (ALT 250 FOR IP): Performed by: INTERNAL MEDICINE

## 2025-02-08 PROCEDURE — 6360000002 HC RX W HCPCS: Performed by: NURSE PRACTITIONER

## 2025-02-08 PROCEDURE — 6360000002 HC RX W HCPCS: Performed by: INTERNAL MEDICINE

## 2025-02-08 PROCEDURE — 6370000000 HC RX 637 (ALT 250 FOR IP): Performed by: STUDENT IN AN ORGANIZED HEALTH CARE EDUCATION/TRAINING PROGRAM

## 2025-02-08 PROCEDURE — 2500000003 HC RX 250 WO HCPCS: Performed by: STUDENT IN AN ORGANIZED HEALTH CARE EDUCATION/TRAINING PROGRAM

## 2025-02-08 RX ADMIN — HYDROMORPHONE HYDROCHLORIDE 0.5 MG: 1 INJECTION, SOLUTION INTRAMUSCULAR; INTRAVENOUS; SUBCUTANEOUS at 01:29

## 2025-02-08 RX ADMIN — SODIUM BICARBONATE 650 MG: 650 TABLET ORAL at 08:03

## 2025-02-08 RX ADMIN — TAMSULOSIN HYDROCHLORIDE 0.4 MG: 0.4 CAPSULE ORAL at 08:03

## 2025-02-08 RX ADMIN — Medication 400 MG: at 08:03

## 2025-02-08 RX ADMIN — FERROUS SULFATE TAB 325 MG (65 MG ELEMENTAL FE) 325 MG: 325 (65 FE) TAB at 08:03

## 2025-02-08 RX ADMIN — POTASSIUM & SODIUM PHOSPHATES POWDER PACK 280-160-250 MG 500 MG: 280-160-250 PACK at 08:03

## 2025-02-08 RX ADMIN — METOPROLOL TARTRATE 12.5 MG: 25 TABLET, FILM COATED ORAL at 08:03

## 2025-02-08 RX ADMIN — FINASTERIDE 5 MG: 5 TABLET, FILM COATED ORAL at 08:03

## 2025-02-08 RX ADMIN — SODIUM CHLORIDE, PRESERVATIVE FREE 10 ML: 5 INJECTION INTRAVENOUS at 08:09

## 2025-02-08 RX ADMIN — HYDROMORPHONE HYDROCHLORIDE 0.5 MG: 1 INJECTION, SOLUTION INTRAMUSCULAR; INTRAVENOUS; SUBCUTANEOUS at 08:09

## 2025-02-08 RX ADMIN — HEPARIN SODIUM 5000 UNITS: 5000 INJECTION INTRAVENOUS; SUBCUTANEOUS at 05:02

## 2025-02-08 RX ADMIN — ACETAMINOPHEN 650 MG: 325 TABLET ORAL at 05:02

## 2025-02-08 RX ADMIN — PANTOPRAZOLE SODIUM 40 MG: 40 TABLET, DELAYED RELEASE ORAL at 06:40

## 2025-02-08 RX ADMIN — ACETAMINOPHEN 650 MG: 325 TABLET ORAL at 10:55

## 2025-02-08 ASSESSMENT — PAIN DESCRIPTION - ORIENTATION
ORIENTATION: RIGHT
ORIENTATION: RIGHT

## 2025-02-08 ASSESSMENT — PAIN DESCRIPTION - DESCRIPTORS
DESCRIPTORS: ACHING
DESCRIPTORS: THROBBING

## 2025-02-08 ASSESSMENT — PAIN DESCRIPTION - ONSET: ONSET: ON-GOING

## 2025-02-08 ASSESSMENT — PAIN - FUNCTIONAL ASSESSMENT: PAIN_FUNCTIONAL_ASSESSMENT: ACTIVITIES ARE NOT PREVENTED

## 2025-02-08 ASSESSMENT — PAIN SCALES - GENERAL
PAINLEVEL_OUTOF10: 7
PAINLEVEL_OUTOF10: 8
PAINLEVEL_OUTOF10: 6

## 2025-02-08 ASSESSMENT — PAIN DESCRIPTION - LOCATION
LOCATION: HIP
LOCATION: ABDOMEN;FLANK

## 2025-02-08 ASSESSMENT — PAIN DESCRIPTION - FREQUENCY: FREQUENCY: INTERMITTENT

## 2025-02-08 ASSESSMENT — PAIN DESCRIPTION - PAIN TYPE: TYPE: ACUTE PAIN

## 2025-02-08 NOTE — PROGRESS NOTES
Hospitalist Progress Note    NAME:   Steve Maharaj   : 1933   MRN: [id number]     Date/Time: 2025 8:56 AM  Patient PCP: Joseph Ugarte MD    Estimated discharge date: 25  Barriers: home hospice       Assessment / Plan:        Anemia (likely iron deficiency)  Hemoglobin POA was 11.6  Reduced to 8.5 on 2025, increased to 9.8 on 2025, reduced to 7.2 on 2025.  Now  6.8  this a.m.  Continue to hold PTA aspirin  C/w iron tabs, repeat cbc status post blood transfusions 25 as per family.  Delay discharge until 2025        Right ureteral stone  Moderate hydronephrosis  History of left nephrectomy due to renal cell carcinoma  Right PCN by IR on   CKDlll  Metabolic acidosis  BPH     Chronic Murphy  Patient should follow-up with urology outpatient  Nephrology  following appreciate input  Continue PTA Flomax  Continue PTA Proscar    Hemoglobin POA was 11.6  Reduced to 8.5 on 2025, increased to 9.8 on 2025, reduced to 7.2 on 2025.  Now  6.8  this a.m.  Continue to hold PTA aspirin  Low  iron profile, will start iron tabs     Leukocytosis, resolved  Elevated lactic acid (Resolved)    Antibiotic to completed      Epigastric pain  Dysphagia     Patient complaining of pain on the right lower quadrant  KUB  revealed decreased colonic distention  Continue metronidazole  GI following, appreciate recommendation  Speech following  Currently on clear liquid diet  Aspiration precaution     Sinus rhythm with PACs noted on EKG   Continue telemetry monitoring  Monitor vitals  Metoprolol IV for heart rate greater than 130 sustained     Hypokalemia  Hypomagnesimia   Seems ongoing since admission.  Yesterday  potassium 2.8, decrease to 2.6 today   Magnesium normal at 1.5  Replete K & Mg  Daily CMP.      Body mass index is 17.36 kg/m². -    Discharge planning, patient family has been trying to decide between hospice versus SNF with comfort care.    Medical

## 2025-02-08 NOTE — PROGRESS NOTES
Echo tech came up to patient's room to complete Echo that was ordered on February 3, 2025. This RN informed Echo tech that patient was discharging home with hospice today and that Dr. Flwoers stated that Echo did not need to be completed. The Echo tech, this RN and Dr. Flowers were unable to discontinue the order for the Echo as it stated that we were \"not authorized\" to discontinue the order. Dr. Flowers aware and is okay with discharging patient with order still active.

## 2025-02-08 NOTE — PLAN OF CARE
Problem: Safety - Adult  Goal: Free from fall injury  2/7/2025 2210 by Crystal Terrell RN  Outcome: Progressing  2/7/2025 1406 by Joel Ramos RN  Outcome: Progressing     Problem: Pain  Goal: Verbalizes/displays adequate comfort level or baseline comfort level  2/7/2025 2210 by Crystal Terrell RN  Outcome: Progressing  2/7/2025 1406 by Joel Ramos RN  Outcome: Progressing     Problem: Discharge Planning  Goal: Discharge to home or other facility with appropriate resources  2/7/2025 2210 by Crystal Terrell RN  Outcome: Progressing  2/7/2025 1406 by Joel Ramos RN  Outcome: Progressing  Flowsheets (Taken 2/7/2025 0748)  Discharge to home or other facility with appropriate resources: Identify barriers to discharge with patient and caregiver     Problem: Skin/Tissue Integrity  Goal: Absence of new skin breakdown  Description: 1.  Monitor for areas of redness and/or skin breakdown  2.  Assess vascular access sites hourly  3.  Every 4-6 hours minimum:  Change oxygen saturation probe site  4.  Every 4-6 hours:  If on nasal continuous positive airway pressure, respiratory therapy assess nares and determine need for appliance change or resting period.  2/7/2025 1406 by Joel Ramos RN  Outcome: Progressing     Problem: Respiratory - Adult  Goal: Achieves optimal ventilation and oxygenation  2/7/2025 2210 by Crystal Terrell RN  Outcome: Progressing  2/7/2025 1406 by Joel Ramos RN  Outcome: Progressing

## 2025-02-08 NOTE — CARE COORDINATION
Pt clear for d/c from CM - please see CM Note from 2/7/25    Discharge Home w/ [facility]  [facility] BLS scheduled for 10:00AM ([phone])  *Please check [facility] Transport Order Status for updated ETA      Transition of Care Plan:    RUR: 21% (high RUR)  Prior Level of Functioning: Requires assistance w/ most ADLS  Disposition: Home w/ [facility]   UAI completed on this admission.  REYES: 2/8/2025  If SNF or IPR: Date FOC offered: 1/27  Date FOC received: 1/27    Accepting facility: [facility]  Date authorization started with reference number: N/A - 3NS verified  Date authorization received and expires: N/A  Follow up appointments: defer to SNF.  DME needed: defer to SNF.   Transportation at discharge: [facility] requested for 2/8/2025 at 10 AM, to home with hospice, nursing please place a copy of the MAR on the chart for transport.  Inpt DNR copy left on bedside chart.    IM/IMM Medicare/ letter given: 2nd IM done 2/7/2025.  Is patient a  and connected with [facility]? No.              If yes, was [place] transfer form completed and [facility] notified? N/A  Caregiver Contact: Kareen Bardalesiver (spouse - [phone]) has deferred to: Faby \"Sinai\" Ramu - DTR - [phone] - [email or web address]   Discharge Caregiver contacted prior to discharge? Aware of pending discharge.  Care Conference needed? No.  Barriers to discharge: None    Diana Schumacher LCSW  [facility] Care Manager  [phone]

## 2025-02-08 NOTE — PROGRESS NOTES
TRANSFER - OUT REPORT:    Verbal report given to Baldomero WARD on Steve Maharaj  being transferred to oncology for routine progression of patient care       Report consisted of patient's Situation, Background, Assessment and   Recommendations(SBAR).     Information from the following report(s) Nurse Handoff Report, Intake/Output, MAR, Recent Results, and Cardiac Rhythm SR  was reviewed with the receiving nurse.           Lines:   Peripheral IV 02/06/25 Right;Ventral Forearm (Active)   Site Assessment Clean, dry & intact 02/08/25 0417   Line Status Capped;Flushed 02/08/25 0417   Line Care Cap changed;Connections checked and tightened 02/07/25 0738   Phlebitis Assessment No symptoms 02/08/25 0417   Infiltration Assessment 0 02/08/25 0417   Alcohol Cap Used Yes 02/08/25 0417   Dressing Status Clean, dry & intact 02/07/25 0738   Dressing Type Transparent 02/08/25 0417   Dressing Intervention New 02/06/25 1640        Opportunity for questions and clarification was provided.      Patient transported with:  Registered Nurse

## 2025-02-11 NOTE — PROGRESS NOTES
Physician Progress Note      PATIENT:               CAMELIA MAHARAJ  CSN #:                  [id number]  :                       1933  ADMIT DATE:       2025 6:32 PM  DISCH DATE:        2025 11:50 AM  RESPONDING  PROVIDER #:        Kye Flowers MD          QUERY TEXT:    Pt admitted with UTI.  Pt noted to have chronic indwelling urinary catheter.   If possible, please document in the progress notes and discharge summary if   you are evaluating and/or treating any of the following:    The medical record reflects the following:  Risk Factors: 89 year old male, chronic carpenter, R ureteral stone,   hydronephrosis    Clinical Indicators:  ED PROVIDER -    Camelia Maharaj is a 89 y.o. male with history of prostate enlargement   requiring indwelling Carpenter who presents to the emergency department with   lethargy over the past 5 days.  He has had nausea, vomiting as not eating or   drinking anything.  Family is ultimately concerned about dehydration.  His   Carpenter was changed by urology yesterday and since it was changed she has not   had any urine output overall.      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  - Cr 7.28 today from prior 1.60 2024  - Went to OR with Urology however no stent was able to be placed  - IR consulted by Urology for consideration of perc neph tube, plan to place   in AM  - NPO  - Hold VTE ppx overnight  - Hold home atenolol-chlorthalidone in setting of UTI and soft BP  - Previously with chronic indwelling carpenter catheter, hx of recurrent UTI;   started on IV zosyn in ED due to concern for UTI, will continue empirically   for now pending repeat UA once able to be collected  - Repeat BMP in AM  - Nephrology consulted, appreciate assistance  - Pending progress and renal fxn, may need goals of care      UROLOGY CONSULT-  Right solitary kidney with obstructing stone. Discussed with daughters and   Faby Solis over

## (undated) DEVICE — INFECTION CONTROL KIT SYS

## (undated) DEVICE — (D)PREP SKN CHLRAPRP APPL 26ML -- CONVERT TO ITEM 371833

## (undated) DEVICE — ROCKER SWITCH PENCIL BLADE ELECTRODE, HOLSTER: Brand: EDGE

## (undated) DEVICE — CONTAINER SPEC 20 ML LID NEUT BUFF FORMALIN 10 % POLYPR STS

## (undated) DEVICE — URETERAL STENT
Type: IMPLANTABLE DEVICE | Status: NON-FUNCTIONAL
Brand: POLARIS™ ULTRA

## (undated) DEVICE — 3M™ MEDIPORE™ H SOFT CLOTH SURGICAL TAPE 2864, 4 INCH X 10 YARD (10CM X 9,14M), 12 ROLLS/CASE: Brand: 3M™ MEDIPORE™

## (undated) DEVICE — ENDOSCOPIC KIT COMPLIANCE ENDOKIT

## (undated) DEVICE — DEVON™ KNEE AND BODY STRAP 60" X 3" (1.5 M X 7.6 CM): Brand: DEVON

## (undated) DEVICE — CYSTO/BLADDER IRRIGATION SET, REGULATING CLAMP

## (undated) DEVICE — SPONGE GZ W4XL4IN COT 12 PLY TYP VII WVN C FLD DSGN STERILE

## (undated) DEVICE — SOLUTION IRRIGATION STRL H2O 1000 ML UROMATIC CONTAINER

## (undated) DEVICE — GUIDEWIRE ENDOSCP L150CM DIA0.035IN TIP 3CM PTFE NIT

## (undated) DEVICE — SURGICAL PROCEDURE PACK BASIN MAJ SET CUST NO CAUT

## (undated) DEVICE — CYSTO MRMC: Brand: MEDLINE INDUSTRIES, INC.

## (undated) DEVICE — NEEDLE HYPO 22GA L1.5IN BLK S STL HUB POLYPR SHLD REG BVL

## (undated) DEVICE — OPEN-END URETERAL CATHETER: Brand: COOK

## (undated) DEVICE — CURITY IDOFORM PACKING STRIP: Brand: CURITY

## (undated) DEVICE — HANDLE LT SNAP ON ULT DURABLE LENS FOR TRUMPF ALC DISPOSABLE

## (undated) DEVICE — SYR 10ML LUER LOK 1/5ML GRAD --

## (undated) DEVICE — STERILE POLYISOPRENE POWDER-FREE SURGICAL GLOVES: Brand: PROTEXIS

## (undated) DEVICE — 14FR COLON DECOMPRESSION SET: Brand: COOK

## (undated) DEVICE — TOWEL SURG W17XL27IN STD BLU COT NONFENESTRATED PREWASHED

## (undated) DEVICE — GUIDEWIRE VASC ANGLED 035X150 M00146151B17

## (undated) DEVICE — TIP SUCT TRNSPAR RIB SURF STD BLB RIG NVENT W/ 5IN1 CONN DYND50138] MEDLINE INDUSTRIES INC]

## (undated) DEVICE — DBD-PACK,LAPAROTOMY,2 REINFORCED GOWNS: Brand: MEDLINE

## (undated) DEVICE — GOWN,SIRUS,NONRNF,SETINSLV,2XL,18/CS: Brand: MEDLINE

## (undated) DEVICE — FORCEPS BX L240CM JAW DIA2.4MM ORNG L CAP W/ NDL DISP RAD

## (undated) DEVICE — REM POLYHESIVE ADULT PATIENT RETURN ELECTRODE: Brand: VALLEYLAB

## (undated) DEVICE — 1200 GUARD II KIT W/5MM TUBE W/O VAC TUBE: Brand: GUARDIAN

## (undated) DEVICE — INTENDED FOR TISSUE SEPARATION, AND OTHER PROCEDURES THAT REQUIRE A SHARP SURGICAL BLADE TO PUNCTURE OR CUT.: Brand: BARD-PARKER ® CARBON RIB-BACK BLADES

## (undated) DEVICE — SOLUTION IRRIG 1000ML STRL H2O USP PLAS POUR BTL

## (undated) DEVICE — CULTURETTE SGL EVAC TUBE PALL -- 100/CA

## (undated) DEVICE — KENDALL SCD EXPRESS SLEEVES, KNEE LENGTH, MEDIUM: Brand: KENDALL SCD

## (undated) DEVICE — CUFF BLD PRSS AD CLTH SGL TB W/ BAYNT CONN ROUNDED CORNER

## (undated) DEVICE — SUTURE PDS II SZ 2-0 L27IN ABSRB VLT SH L26MM 1/2 CIR Z317H

## (undated) DEVICE — SWAB CULT LIQ STUART AGR AERB MOD IN BRK SGL RAYON TIP PLAS 220099] BECTON DICKINSON MICRO]

## (undated) DEVICE — GAUZE SPONGES,12 PLY: Brand: CURITY

## (undated) DEVICE — GOWN,SIRUS,FABRNF,XL,20/CS: Brand: MEDLINE

## (undated) DEVICE — SET GRAV CK VLV NEEDLESS ST 3 GANGED 4WAY STPCOCK HI FLO 10

## (undated) DEVICE — DRAINBAG,ANTI-REFLUX TOWER,L/F,2000ML,LL: Brand: MEDLINE